# Patient Record
Sex: MALE | Race: WHITE | NOT HISPANIC OR LATINO | Employment: OTHER | URBAN - METROPOLITAN AREA
[De-identification: names, ages, dates, MRNs, and addresses within clinical notes are randomized per-mention and may not be internally consistent; named-entity substitution may affect disease eponyms.]

---

## 2017-06-01 ENCOUNTER — COMMUNICATION - HEALTHEAST (OUTPATIENT)
Dept: INTERNAL MEDICINE | Facility: CLINIC | Age: 67
End: 2017-06-01

## 2017-06-23 ENCOUNTER — COMMUNICATION - HEALTHEAST (OUTPATIENT)
Dept: INTERNAL MEDICINE | Facility: CLINIC | Age: 67
End: 2017-06-23

## 2017-07-17 ENCOUNTER — COMMUNICATION - HEALTHEAST (OUTPATIENT)
Dept: INTERNAL MEDICINE | Facility: CLINIC | Age: 67
End: 2017-07-17

## 2017-07-18 ENCOUNTER — OFFICE VISIT - HEALTHEAST (OUTPATIENT)
Dept: INTERNAL MEDICINE | Facility: CLINIC | Age: 67
End: 2017-07-18

## 2017-07-18 DIAGNOSIS — E78.00 HYPERCHOLESTEREMIA: ICD-10-CM

## 2017-07-18 DIAGNOSIS — I10 ESSENTIAL HYPERTENSION: ICD-10-CM

## 2017-07-18 ASSESSMENT — MIFFLIN-ST. JEOR: SCORE: 1527.37

## 2017-08-27 ENCOUNTER — COMMUNICATION - HEALTHEAST (OUTPATIENT)
Dept: INTERNAL MEDICINE | Facility: CLINIC | Age: 67
End: 2017-08-27

## 2017-08-27 DIAGNOSIS — I10 HTN (HYPERTENSION): ICD-10-CM

## 2017-09-25 ENCOUNTER — COMMUNICATION - HEALTHEAST (OUTPATIENT)
Dept: INTERNAL MEDICINE | Facility: CLINIC | Age: 67
End: 2017-09-25

## 2017-09-25 DIAGNOSIS — E78.5 HYPERLIPIDEMIA: ICD-10-CM

## 2017-10-19 ENCOUNTER — COMMUNICATION - HEALTHEAST (OUTPATIENT)
Dept: INTERNAL MEDICINE | Facility: CLINIC | Age: 67
End: 2017-10-19

## 2017-10-19 DIAGNOSIS — I10 ESSENTIAL HYPERTENSION: ICD-10-CM

## 2018-01-19 ENCOUNTER — COMMUNICATION - HEALTHEAST (OUTPATIENT)
Dept: INTERNAL MEDICINE | Facility: CLINIC | Age: 68
End: 2018-01-19

## 2018-01-22 ENCOUNTER — OFFICE VISIT - HEALTHEAST (OUTPATIENT)
Dept: INTERNAL MEDICINE | Facility: CLINIC | Age: 68
End: 2018-01-22

## 2018-01-22 DIAGNOSIS — E66.9 OBESITY: ICD-10-CM

## 2018-01-22 DIAGNOSIS — Z12.5 SCREENING FOR PROSTATE CANCER: ICD-10-CM

## 2018-01-22 DIAGNOSIS — F10.10 ALCOHOL ABUSE: ICD-10-CM

## 2018-01-22 DIAGNOSIS — E78.5 HYPERLIPIDEMIA: ICD-10-CM

## 2018-01-22 DIAGNOSIS — I10 ESSENTIAL HYPERTENSION: ICD-10-CM

## 2018-01-22 DIAGNOSIS — Z00.00 ENCOUNTER FOR MEDICARE ANNUAL WELLNESS EXAM: ICD-10-CM

## 2018-01-22 DIAGNOSIS — R21 RASH: ICD-10-CM

## 2018-01-22 LAB
ALBUMIN SERPL-MCNC: 4.1 G/DL (ref 3.5–5)
ALBUMIN UR-MCNC: ABNORMAL MG/DL
ALP SERPL-CCNC: 75 U/L (ref 45–120)
ALT SERPL W P-5'-P-CCNC: 56 U/L (ref 0–45)
ANION GAP SERPL CALCULATED.3IONS-SCNC: 11 MMOL/L (ref 5–18)
APPEARANCE UR: CLEAR
AST SERPL W P-5'-P-CCNC: 48 U/L (ref 0–40)
BACTERIA #/AREA URNS HPF: ABNORMAL HPF
BILIRUB SERPL-MCNC: 1.5 MG/DL (ref 0–1)
BILIRUB UR QL STRIP: ABNORMAL
BUN SERPL-MCNC: 14 MG/DL (ref 8–22)
CALCIUM SERPL-MCNC: 9.8 MG/DL (ref 8.5–10.5)
CHLORIDE BLD-SCNC: 98 MMOL/L (ref 98–107)
CHOLEST SERPL-MCNC: 186 MG/DL
CO2 SERPL-SCNC: 25 MMOL/L (ref 22–31)
COLOR UR AUTO: YELLOW
CREAT SERPL-MCNC: 0.93 MG/DL (ref 0.7–1.3)
ERYTHROCYTE [DISTWIDTH] IN BLOOD BY AUTOMATED COUNT: 11.8 % (ref 11–14.5)
FASTING STATUS PATIENT QL REPORTED: YES
GFR SERPL CREATININE-BSD FRML MDRD: >60 ML/MIN/1.73M2
GLUCOSE BLD-MCNC: 121 MG/DL (ref 70–125)
GLUCOSE UR STRIP-MCNC: NEGATIVE MG/DL
HCT VFR BLD AUTO: 39.9 % (ref 40–54)
HDLC SERPL-MCNC: 87 MG/DL
HGB BLD-MCNC: 13.4 G/DL (ref 14–18)
HGB UR QL STRIP: NEGATIVE
KETONES UR STRIP-MCNC: ABNORMAL MG/DL
LDLC SERPL CALC-MCNC: 84 MG/DL
LEUKOCYTE ESTERASE UR QL STRIP: NEGATIVE
MCH RBC QN AUTO: 32 PG (ref 27–34)
MCHC RBC AUTO-ENTMCNC: 33.5 G/DL (ref 32–36)
MCV RBC AUTO: 96 FL (ref 80–100)
MUCOUS THREADS #/AREA URNS LPF: ABNORMAL LPF
NITRATE UR QL: NEGATIVE
PH UR STRIP: 6 [PH] (ref 5–8)
PLATELET # BLD AUTO: 222 THOU/UL (ref 140–440)
PMV BLD AUTO: 7.4 FL (ref 7–10)
POTASSIUM BLD-SCNC: 4.6 MMOL/L (ref 3.5–5)
PROT SERPL-MCNC: 7 G/DL (ref 6–8)
PSA SERPL-MCNC: 1.5 NG/ML (ref 0–4.5)
RBC # BLD AUTO: 4.18 MILL/UL (ref 4.4–6.2)
RBC #/AREA URNS AUTO: ABNORMAL HPF
SODIUM SERPL-SCNC: 134 MMOL/L (ref 136–145)
SP GR UR STRIP: 1.02 (ref 1–1.03)
SQUAMOUS #/AREA URNS AUTO: ABNORMAL LPF
TRIGL SERPL-MCNC: 76 MG/DL
UROBILINOGEN UR STRIP-ACNC: ABNORMAL
WBC #/AREA URNS AUTO: ABNORMAL HPF
WBC: 6.4 THOU/UL (ref 4–11)

## 2018-01-22 ASSESSMENT — MIFFLIN-ST. JEOR: SCORE: 1545.51

## 2018-01-23 ENCOUNTER — COMMUNICATION - HEALTHEAST (OUTPATIENT)
Dept: INTERNAL MEDICINE | Facility: CLINIC | Age: 68
End: 2018-01-23

## 2018-02-07 ENCOUNTER — OFFICE VISIT - HEALTHEAST (OUTPATIENT)
Dept: INTERNAL MEDICINE | Facility: CLINIC | Age: 68
End: 2018-02-07

## 2018-02-07 DIAGNOSIS — D64.9 ANEMIA: ICD-10-CM

## 2018-02-07 DIAGNOSIS — R74.8 ELEVATED LIVER ENZYMES: ICD-10-CM

## 2018-02-07 DIAGNOSIS — F10.20 ALCOHOLISM (H): ICD-10-CM

## 2018-02-07 DIAGNOSIS — I10 ESSENTIAL HYPERTENSION: ICD-10-CM

## 2018-02-07 LAB
ALBUMIN SERPL-MCNC: 4 G/DL (ref 3.5–5)
ALP SERPL-CCNC: 68 U/L (ref 45–120)
ALT SERPL W P-5'-P-CCNC: 49 U/L (ref 0–45)
AST SERPL W P-5'-P-CCNC: 33 U/L (ref 0–40)
BILIRUB DIRECT SERPL-MCNC: 0.2 MG/DL
BILIRUB SERPL-MCNC: 0.7 MG/DL (ref 0–1)
ERYTHROCYTE [DISTWIDTH] IN BLOOD BY AUTOMATED COUNT: 11.9 % (ref 11–14.5)
FERRITIN SERPL-MCNC: 605 NG/ML (ref 27–300)
FOLATE SERPL-MCNC: >20 NG/ML
HCT VFR BLD AUTO: 40.3 % (ref 40–54)
HGB BLD-MCNC: 13.5 G/DL (ref 14–18)
IRON SATN MFR SERPL: 21 % (ref 20–50)
IRON SERPL-MCNC: 63 UG/DL (ref 42–175)
MCH RBC QN AUTO: 31.8 PG (ref 27–34)
MCHC RBC AUTO-ENTMCNC: 33.4 G/DL (ref 32–36)
MCV RBC AUTO: 95 FL (ref 80–100)
PLATELET # BLD AUTO: 258 THOU/UL (ref 140–440)
PMV BLD AUTO: 7.7 FL (ref 7–10)
PROT SERPL-MCNC: 6.6 G/DL (ref 6–8)
RBC # BLD AUTO: 4.23 MILL/UL (ref 4.4–6.2)
TIBC SERPL-MCNC: 303 UG/DL (ref 313–563)
TRANSFERRIN SERPL-MCNC: 243 MG/DL (ref 212–360)
VIT B12 SERPL-MCNC: 1380 PG/ML (ref 213–816)
WBC: 6.6 THOU/UL (ref 4–11)

## 2018-02-07 ASSESSMENT — MIFFLIN-ST. JEOR: SCORE: 1554.58

## 2018-02-09 ENCOUNTER — COMMUNICATION - HEALTHEAST (OUTPATIENT)
Dept: INTERNAL MEDICINE | Facility: CLINIC | Age: 68
End: 2018-02-09

## 2018-05-08 ENCOUNTER — RECORDS - HEALTHEAST (OUTPATIENT)
Dept: ADMINISTRATIVE | Facility: OTHER | Age: 68
End: 2018-05-08

## 2018-05-14 ENCOUNTER — OFFICE VISIT - HEALTHEAST (OUTPATIENT)
Dept: INTERNAL MEDICINE | Facility: CLINIC | Age: 68
End: 2018-05-14

## 2018-05-14 DIAGNOSIS — R68.89 COLD INTOLERANCE: ICD-10-CM

## 2018-05-14 DIAGNOSIS — F10.20 ALCOHOLISM (H): ICD-10-CM

## 2018-05-14 DIAGNOSIS — R74.8 ELEVATED LIVER ENZYMES: ICD-10-CM

## 2018-05-14 DIAGNOSIS — L73.9 FOLLICULITIS: ICD-10-CM

## 2018-05-14 DIAGNOSIS — I10 ESSENTIAL HYPERTENSION: ICD-10-CM

## 2018-05-14 DIAGNOSIS — E78.00 HYPERCHOLESTEREMIA: ICD-10-CM

## 2018-05-14 LAB
ALBUMIN SERPL-MCNC: 3.9 G/DL (ref 3.5–5)
ALP SERPL-CCNC: 81 U/L (ref 45–120)
ALT SERPL W P-5'-P-CCNC: 94 U/L (ref 0–45)
ANION GAP SERPL CALCULATED.3IONS-SCNC: 9 MMOL/L (ref 5–18)
AST SERPL W P-5'-P-CCNC: 60 U/L (ref 0–40)
BASOPHILS # BLD AUTO: 0 THOU/UL (ref 0–0.2)
BASOPHILS NFR BLD AUTO: 1 % (ref 0–2)
BILIRUB SERPL-MCNC: 0.4 MG/DL (ref 0–1)
BUN SERPL-MCNC: 24 MG/DL (ref 8–22)
CALCIUM SERPL-MCNC: 10.1 MG/DL (ref 8.5–10.5)
CHLORIDE BLD-SCNC: 102 MMOL/L (ref 98–107)
CO2 SERPL-SCNC: 23 MMOL/L (ref 22–31)
CREAT SERPL-MCNC: 0.94 MG/DL (ref 0.7–1.3)
EOSINOPHIL # BLD AUTO: 0.2 THOU/UL (ref 0–0.4)
EOSINOPHIL NFR BLD AUTO: 3 % (ref 0–6)
ERYTHROCYTE [DISTWIDTH] IN BLOOD BY AUTOMATED COUNT: 11.8 % (ref 11–14.5)
GFR SERPL CREATININE-BSD FRML MDRD: >60 ML/MIN/1.73M2
GLUCOSE BLD-MCNC: 115 MG/DL (ref 70–125)
HCT VFR BLD AUTO: 40.6 % (ref 40–54)
HGB BLD-MCNC: 13.4 G/DL (ref 14–18)
LYMPHOCYTES # BLD AUTO: 1.2 THOU/UL (ref 0.8–4.4)
LYMPHOCYTES NFR BLD AUTO: 23 % (ref 20–40)
MCH RBC QN AUTO: 31.6 PG (ref 27–34)
MCHC RBC AUTO-ENTMCNC: 33 G/DL (ref 32–36)
MCV RBC AUTO: 96 FL (ref 80–100)
MONOCYTES # BLD AUTO: 0.5 THOU/UL (ref 0–0.9)
MONOCYTES NFR BLD AUTO: 9 % (ref 2–10)
NEUTROPHILS # BLD AUTO: 3.5 THOU/UL (ref 2–7.7)
NEUTROPHILS NFR BLD AUTO: 65 % (ref 50–70)
PLATELET # BLD AUTO: 343 THOU/UL (ref 140–440)
PMV BLD AUTO: 7.3 FL (ref 7–10)
POTASSIUM BLD-SCNC: 5.1 MMOL/L (ref 3.5–5)
PROT SERPL-MCNC: 7.1 G/DL (ref 6–8)
RBC # BLD AUTO: 4.24 MILL/UL (ref 4.4–6.2)
SODIUM SERPL-SCNC: 134 MMOL/L (ref 136–145)
TSH SERPL DL<=0.005 MIU/L-ACNC: 2.5 UIU/ML (ref 0.3–5)
WBC: 5.4 THOU/UL (ref 4–11)

## 2018-05-14 RX ORDER — TRIAMCINOLONE ACETONIDE 1 MG/G
CREAM TOPICAL
Refills: 6 | Status: SHIPPED | COMMUNITY
Start: 2018-05-08 | End: 2022-01-17

## 2018-05-14 ASSESSMENT — MIFFLIN-ST. JEOR: SCORE: 1545.51

## 2018-05-16 ENCOUNTER — COMMUNICATION - HEALTHEAST (OUTPATIENT)
Dept: INTERNAL MEDICINE | Facility: CLINIC | Age: 68
End: 2018-05-16

## 2018-05-16 ENCOUNTER — AMBULATORY - HEALTHEAST (OUTPATIENT)
Dept: INTERNAL MEDICINE | Facility: CLINIC | Age: 68
End: 2018-05-16

## 2018-05-16 DIAGNOSIS — R74.01 ELEVATED TRANSAMINASE LEVEL: ICD-10-CM

## 2018-05-18 ENCOUNTER — RECORDS - HEALTHEAST (OUTPATIENT)
Dept: ADMINISTRATIVE | Facility: OTHER | Age: 68
End: 2018-05-18

## 2018-05-23 ENCOUNTER — COMMUNICATION - HEALTHEAST (OUTPATIENT)
Dept: SCHEDULING | Facility: CLINIC | Age: 68
End: 2018-05-23

## 2018-05-23 ENCOUNTER — OFFICE VISIT - HEALTHEAST (OUTPATIENT)
Dept: FAMILY MEDICINE | Facility: CLINIC | Age: 68
End: 2018-05-23

## 2018-05-23 DIAGNOSIS — L03.116 CELLULITIS OF LEFT FOOT: ICD-10-CM

## 2018-05-24 ENCOUNTER — OFFICE VISIT - HEALTHEAST (OUTPATIENT)
Dept: INTERNAL MEDICINE | Facility: CLINIC | Age: 68
End: 2018-05-24

## 2018-05-24 ENCOUNTER — COMMUNICATION - HEALTHEAST (OUTPATIENT)
Dept: INTERNAL MEDICINE | Facility: CLINIC | Age: 68
End: 2018-05-24

## 2018-05-24 ENCOUNTER — HOSPITAL ENCOUNTER (OUTPATIENT)
Dept: ULTRASOUND IMAGING | Facility: CLINIC | Age: 68
Discharge: HOME | End: 2018-05-24
Attending: INTERNAL MEDICINE

## 2018-05-24 DIAGNOSIS — R74.02 ELEVATION OF LEVEL OF TRANSAMINASE AND LACTIC ACID DEHYDROGENASE (LDH): ICD-10-CM

## 2018-05-24 DIAGNOSIS — R74.8 ELEVATED LIVER ENZYMES: ICD-10-CM

## 2018-05-24 DIAGNOSIS — R74.01 ELEVATION OF LEVEL OF TRANSAMINASE AND LACTIC ACID DEHYDROGENASE (LDH): ICD-10-CM

## 2018-05-24 DIAGNOSIS — R74.01 ELEVATED TRANSAMINASE LEVEL: ICD-10-CM

## 2018-05-24 DIAGNOSIS — L73.9 FOLLICULITIS: ICD-10-CM

## 2018-05-24 DIAGNOSIS — L03.90 CELLULITIS: ICD-10-CM

## 2018-05-24 DIAGNOSIS — I10 ESSENTIAL HYPERTENSION: ICD-10-CM

## 2018-05-24 LAB
ALBUMIN SERPL-MCNC: 4.1 G/DL (ref 3.5–5)
ALP SERPL-CCNC: 72 U/L (ref 45–120)
ALT SERPL W P-5'-P-CCNC: 58 U/L (ref 0–45)
ANION GAP SERPL CALCULATED.3IONS-SCNC: 10 MMOL/L (ref 5–18)
AST SERPL W P-5'-P-CCNC: 39 U/L (ref 0–40)
BILIRUB SERPL-MCNC: 0.7 MG/DL (ref 0–1)
BUN SERPL-MCNC: 27 MG/DL (ref 8–22)
CALCIUM SERPL-MCNC: 10.3 MG/DL (ref 8.5–10.5)
CHLORIDE BLD-SCNC: 102 MMOL/L (ref 98–107)
CO2 SERPL-SCNC: 22 MMOL/L (ref 22–31)
CREAT SERPL-MCNC: 1.15 MG/DL (ref 0.7–1.3)
GFR SERPL CREATININE-BSD FRML MDRD: >60 ML/MIN/1.73M2
GLUCOSE BLD-MCNC: 111 MG/DL (ref 70–125)
POTASSIUM BLD-SCNC: 5.4 MMOL/L (ref 3.5–5)
PROT SERPL-MCNC: 7 G/DL (ref 6–8)
SODIUM SERPL-SCNC: 134 MMOL/L (ref 136–145)

## 2018-05-24 ASSESSMENT — MIFFLIN-ST. JEOR: SCORE: 1536.44

## 2018-05-31 ENCOUNTER — OFFICE VISIT - HEALTHEAST (OUTPATIENT)
Dept: INTERNAL MEDICINE | Facility: CLINIC | Age: 68
End: 2018-05-31

## 2018-05-31 DIAGNOSIS — F10.20 ALCOHOLISM (H): ICD-10-CM

## 2018-05-31 DIAGNOSIS — R23.3 EASY BRUISING: ICD-10-CM

## 2018-05-31 DIAGNOSIS — K76.0 FATTY LIVER: ICD-10-CM

## 2018-05-31 DIAGNOSIS — I10 ESSENTIAL HYPERTENSION: ICD-10-CM

## 2018-05-31 DIAGNOSIS — L03.90 CELLULITIS: ICD-10-CM

## 2018-05-31 ASSESSMENT — MIFFLIN-ST. JEOR: SCORE: 1545.51

## 2018-06-11 ENCOUNTER — COMMUNICATION - HEALTHEAST (OUTPATIENT)
Dept: INTERNAL MEDICINE | Facility: CLINIC | Age: 68
End: 2018-06-11

## 2018-06-11 DIAGNOSIS — I10 ESSENTIAL HYPERTENSION: ICD-10-CM

## 2018-08-03 ENCOUNTER — COMMUNICATION - HEALTHEAST (OUTPATIENT)
Dept: INTERNAL MEDICINE | Facility: CLINIC | Age: 68
End: 2018-08-03

## 2018-08-03 DIAGNOSIS — I10 ESSENTIAL HYPERTENSION: ICD-10-CM

## 2018-08-15 ENCOUNTER — COMMUNICATION - HEALTHEAST (OUTPATIENT)
Dept: INTERNAL MEDICINE | Facility: CLINIC | Age: 68
End: 2018-08-15

## 2018-08-15 DIAGNOSIS — I10 ESSENTIAL HYPERTENSION: ICD-10-CM

## 2018-09-05 ENCOUNTER — OFFICE VISIT - HEALTHEAST (OUTPATIENT)
Dept: INTERNAL MEDICINE | Facility: CLINIC | Age: 68
End: 2018-09-05

## 2018-09-05 DIAGNOSIS — F10.20 ALCOHOLISM (H): ICD-10-CM

## 2018-09-05 DIAGNOSIS — I10 ESSENTIAL HYPERTENSION: ICD-10-CM

## 2018-09-05 DIAGNOSIS — K76.0 FATTY LIVER: ICD-10-CM

## 2018-09-05 DIAGNOSIS — R79.89 ELEVATED FERRITIN: ICD-10-CM

## 2018-09-05 DIAGNOSIS — E78.00 HYPERCHOLESTEREMIA: ICD-10-CM

## 2018-09-05 LAB
ALBUMIN SERPL-MCNC: 3.9 G/DL (ref 3.5–5)
ALP SERPL-CCNC: 69 U/L (ref 45–120)
ALT SERPL W P-5'-P-CCNC: 40 U/L (ref 0–45)
ANION GAP SERPL CALCULATED.3IONS-SCNC: 9 MMOL/L (ref 5–18)
AST SERPL W P-5'-P-CCNC: 30 U/L (ref 0–40)
BILIRUB SERPL-MCNC: 0.5 MG/DL (ref 0–1)
BUN SERPL-MCNC: 20 MG/DL (ref 8–22)
CALCIUM SERPL-MCNC: 10.1 MG/DL (ref 8.5–10.5)
CHLORIDE BLD-SCNC: 102 MMOL/L (ref 98–107)
CO2 SERPL-SCNC: 25 MMOL/L (ref 22–31)
CREAT SERPL-MCNC: 0.99 MG/DL (ref 0.7–1.3)
FERRITIN SERPL-MCNC: 445 NG/ML (ref 27–300)
GFR SERPL CREATININE-BSD FRML MDRD: >60 ML/MIN/1.73M2
GLUCOSE BLD-MCNC: 105 MG/DL (ref 70–125)
IRON SATN MFR SERPL: 37 % (ref 20–50)
IRON SERPL-MCNC: 103 UG/DL (ref 42–175)
POTASSIUM BLD-SCNC: 4.9 MMOL/L (ref 3.5–5)
PROT SERPL-MCNC: 6.6 G/DL (ref 6–8)
SODIUM SERPL-SCNC: 136 MMOL/L (ref 136–145)
TIBC SERPL-MCNC: 278 UG/DL (ref 313–563)
TRANSFERRIN SERPL-MCNC: 222 MG/DL (ref 212–360)

## 2018-09-05 ASSESSMENT — MIFFLIN-ST. JEOR: SCORE: 1545.51

## 2018-09-06 ENCOUNTER — COMMUNICATION - HEALTHEAST (OUTPATIENT)
Dept: INTERNAL MEDICINE | Facility: CLINIC | Age: 68
End: 2018-09-06

## 2018-11-13 ENCOUNTER — COMMUNICATION - HEALTHEAST (OUTPATIENT)
Dept: INTERNAL MEDICINE | Facility: CLINIC | Age: 68
End: 2018-11-13

## 2018-11-13 DIAGNOSIS — I10 ESSENTIAL HYPERTENSION: ICD-10-CM

## 2018-12-13 ENCOUNTER — RECORDS - HEALTHEAST (OUTPATIENT)
Dept: ADMINISTRATIVE | Facility: OTHER | Age: 68
End: 2018-12-13

## 2019-01-02 ENCOUNTER — RECORDS - HEALTHEAST (OUTPATIENT)
Dept: ADMINISTRATIVE | Facility: OTHER | Age: 69
End: 2019-01-02

## 2019-01-09 ENCOUNTER — HOSPITAL ENCOUNTER (OUTPATIENT)
Dept: ULTRASOUND IMAGING | Facility: CLINIC | Age: 69
Discharge: HOME | End: 2019-01-09
Attending: INTERNAL MEDICINE

## 2019-01-09 ENCOUNTER — OFFICE VISIT - HEALTHEAST (OUTPATIENT)
Dept: INTERNAL MEDICINE | Facility: CLINIC | Age: 69
End: 2019-01-09

## 2019-01-09 ENCOUNTER — COMMUNICATION - HEALTHEAST (OUTPATIENT)
Dept: INTERNAL MEDICINE | Facility: CLINIC | Age: 69
End: 2019-01-09

## 2019-01-09 DIAGNOSIS — Z12.5 ENCOUNTER FOR SCREENING FOR MALIGNANT NEOPLASM OF PROSTATE: ICD-10-CM

## 2019-01-09 DIAGNOSIS — I10 ESSENTIAL HYPERTENSION: ICD-10-CM

## 2019-01-09 DIAGNOSIS — F10.20 ALCOHOLISM (H): ICD-10-CM

## 2019-01-09 DIAGNOSIS — K76.0 FATTY LIVER: ICD-10-CM

## 2019-01-09 DIAGNOSIS — E66.811 CLASS 1 OBESITY DUE TO EXCESS CALORIES WITHOUT SERIOUS COMORBIDITY WITH BODY MASS INDEX (BMI) OF 31.0 TO 31.9 IN ADULT: ICD-10-CM

## 2019-01-09 DIAGNOSIS — E78.00 HYPERCHOLESTEREMIA: ICD-10-CM

## 2019-01-09 DIAGNOSIS — Z00.00 ENCOUNTER FOR MEDICARE ANNUAL WELLNESS EXAM: ICD-10-CM

## 2019-01-09 DIAGNOSIS — E66.09 CLASS 1 OBESITY DUE TO EXCESS CALORIES WITHOUT SERIOUS COMORBIDITY WITH BODY MASS INDEX (BMI) OF 31.0 TO 31.9 IN ADULT: ICD-10-CM

## 2019-01-09 DIAGNOSIS — R60.9 EDEMA, UNSPECIFIED TYPE: ICD-10-CM

## 2019-01-09 DIAGNOSIS — L73.9 FOLLICULITIS: ICD-10-CM

## 2019-01-09 LAB
ALBUMIN SERPL-MCNC: 4 G/DL (ref 3.5–5)
ALBUMIN UR-MCNC: NEGATIVE MG/DL
ALP SERPL-CCNC: 66 U/L (ref 45–120)
ALT SERPL W P-5'-P-CCNC: 30 U/L (ref 0–45)
ANION GAP SERPL CALCULATED.3IONS-SCNC: 10 MMOL/L (ref 5–18)
APPEARANCE UR: CLEAR
AST SERPL W P-5'-P-CCNC: 27 U/L (ref 0–40)
ATRIAL RATE - MUSE: 56 BPM
BILIRUB SERPL-MCNC: 0.6 MG/DL (ref 0–1)
BILIRUB UR QL STRIP: NEGATIVE
BUN SERPL-MCNC: 23 MG/DL (ref 8–22)
CALCIUM SERPL-MCNC: 9.6 MG/DL (ref 8.5–10.5)
CHLORIDE BLD-SCNC: 102 MMOL/L (ref 98–107)
CHOLEST SERPL-MCNC: 147 MG/DL
CO2 SERPL-SCNC: 27 MMOL/L (ref 22–31)
COLOR UR AUTO: YELLOW
CREAT SERPL-MCNC: 1.08 MG/DL (ref 0.7–1.3)
DIASTOLIC BLOOD PRESSURE - MUSE: NORMAL MMHG
ERYTHROCYTE [DISTWIDTH] IN BLOOD BY AUTOMATED COUNT: 13 % (ref 11–14.5)
FASTING STATUS PATIENT QL REPORTED: YES
GFR SERPL CREATININE-BSD FRML MDRD: >60 ML/MIN/1.73M2
GLUCOSE BLD-MCNC: 100 MG/DL (ref 70–125)
GLUCOSE UR STRIP-MCNC: NEGATIVE MG/DL
HCT VFR BLD AUTO: 41.1 % (ref 40–54)
HDLC SERPL-MCNC: 49 MG/DL
HGB BLD-MCNC: 13.6 G/DL (ref 14–18)
HGB UR QL STRIP: NEGATIVE
INTERPRETATION ECG - MUSE: NORMAL
KETONES UR STRIP-MCNC: NEGATIVE MG/DL
LDLC SERPL CALC-MCNC: 83 MG/DL
LEUKOCYTE ESTERASE UR QL STRIP: NEGATIVE
MCH RBC QN AUTO: 30.6 PG (ref 27–34)
MCHC RBC AUTO-ENTMCNC: 33.2 G/DL (ref 32–36)
MCV RBC AUTO: 92 FL (ref 80–100)
NITRATE UR QL: NEGATIVE
P AXIS - MUSE: 65 DEGREES
PH UR STRIP: 6.5 [PH] (ref 5–8)
PLATELET # BLD AUTO: 151 THOU/UL (ref 140–440)
PMV BLD AUTO: 9.7 FL (ref 7–10)
POTASSIUM BLD-SCNC: 4.1 MMOL/L (ref 3.5–5)
PR INTERVAL - MUSE: 166 MS
PROT SERPL-MCNC: 6.8 G/DL (ref 6–8)
PSA SERPL-MCNC: 0.9 NG/ML (ref 0–4.5)
QRS DURATION - MUSE: 110 MS
QT - MUSE: 414 MS
QTC - MUSE: 399 MS
R AXIS - MUSE: 40 DEGREES
RBC # BLD AUTO: 4.47 MILL/UL (ref 4.4–6.2)
SODIUM SERPL-SCNC: 139 MMOL/L (ref 136–145)
SP GR UR STRIP: 1.02 (ref 1–1.03)
SYSTOLIC BLOOD PRESSURE - MUSE: NORMAL MMHG
T AXIS - MUSE: 55 DEGREES
TRIGL SERPL-MCNC: 73 MG/DL
TSH SERPL DL<=0.005 MIU/L-ACNC: 2.68 UIU/ML (ref 0.3–5)
UROBILINOGEN UR STRIP-ACNC: NORMAL
VENTRICULAR RATE- MUSE: 56 BPM
WBC: 5.5 THOU/UL (ref 4–11)

## 2019-01-09 ASSESSMENT — MIFFLIN-ST. JEOR: SCORE: 1572.73

## 2019-01-10 ENCOUNTER — COMMUNICATION - HEALTHEAST (OUTPATIENT)
Dept: INTERNAL MEDICINE | Facility: CLINIC | Age: 69
End: 2019-01-10

## 2019-01-30 ENCOUNTER — COMMUNICATION - HEALTHEAST (OUTPATIENT)
Dept: INTERNAL MEDICINE | Facility: CLINIC | Age: 69
End: 2019-01-30

## 2019-01-30 DIAGNOSIS — I10 ESSENTIAL HYPERTENSION: ICD-10-CM

## 2019-02-07 ENCOUNTER — RECORDS - HEALTHEAST (OUTPATIENT)
Dept: ADMINISTRATIVE | Facility: OTHER | Age: 69
End: 2019-02-07

## 2019-02-15 ENCOUNTER — RECORDS - HEALTHEAST (OUTPATIENT)
Dept: ADMINISTRATIVE | Facility: OTHER | Age: 69
End: 2019-02-15

## 2019-02-19 ENCOUNTER — RECORDS - HEALTHEAST (OUTPATIENT)
Dept: ADMINISTRATIVE | Facility: OTHER | Age: 69
End: 2019-02-19

## 2019-03-01 ENCOUNTER — RECORDS - HEALTHEAST (OUTPATIENT)
Dept: ADMINISTRATIVE | Facility: OTHER | Age: 69
End: 2019-03-01

## 2019-03-16 ENCOUNTER — COMMUNICATION - HEALTHEAST (OUTPATIENT)
Dept: INTERNAL MEDICINE | Facility: CLINIC | Age: 69
End: 2019-03-16

## 2019-03-16 DIAGNOSIS — E78.5 HYPERLIPIDEMIA: ICD-10-CM

## 2019-04-01 ENCOUNTER — RECORDS - HEALTHEAST (OUTPATIENT)
Dept: ADMINISTRATIVE | Facility: OTHER | Age: 69
End: 2019-04-01

## 2019-04-30 ENCOUNTER — RECORDS - HEALTHEAST (OUTPATIENT)
Dept: ADMINISTRATIVE | Facility: OTHER | Age: 69
End: 2019-04-30

## 2019-05-12 ENCOUNTER — COMMUNICATION - HEALTHEAST (OUTPATIENT)
Dept: INTERNAL MEDICINE | Facility: CLINIC | Age: 69
End: 2019-05-12

## 2019-05-12 DIAGNOSIS — I10 ESSENTIAL HYPERTENSION: ICD-10-CM

## 2019-07-11 ENCOUNTER — OFFICE VISIT - HEALTHEAST (OUTPATIENT)
Dept: INTERNAL MEDICINE | Facility: CLINIC | Age: 69
End: 2019-07-11

## 2019-07-11 ENCOUNTER — COMMUNICATION - HEALTHEAST (OUTPATIENT)
Dept: INTERNAL MEDICINE | Facility: CLINIC | Age: 69
End: 2019-07-11

## 2019-07-11 DIAGNOSIS — L12.0 BULLOUS PEMPHIGOID (H): ICD-10-CM

## 2019-07-11 DIAGNOSIS — E87.1 HYPONATREMIA: ICD-10-CM

## 2019-07-11 DIAGNOSIS — F10.20 ALCOHOLISM (H): ICD-10-CM

## 2019-07-11 DIAGNOSIS — I10 ESSENTIAL HYPERTENSION: ICD-10-CM

## 2019-07-11 DIAGNOSIS — K76.0 FATTY LIVER: ICD-10-CM

## 2019-07-11 DIAGNOSIS — E78.00 HYPERCHOLESTEREMIA: ICD-10-CM

## 2019-07-11 LAB
ALBUMIN SERPL-MCNC: 4.1 G/DL (ref 3.5–5)
ALP SERPL-CCNC: 68 U/L (ref 45–120)
ALT SERPL W P-5'-P-CCNC: 36 U/L (ref 0–45)
ANION GAP SERPL CALCULATED.3IONS-SCNC: 9 MMOL/L (ref 5–18)
AST SERPL W P-5'-P-CCNC: 27 U/L (ref 0–40)
BILIRUB SERPL-MCNC: 0.5 MG/DL (ref 0–1)
BUN SERPL-MCNC: 23 MG/DL (ref 8–28)
CALCIUM SERPL-MCNC: 10 MG/DL (ref 8.5–10.5)
CHLORIDE BLD-SCNC: 96 MMOL/L (ref 98–107)
CO2 SERPL-SCNC: 24 MMOL/L (ref 22–31)
CREAT SERPL-MCNC: 1 MG/DL (ref 0.7–1.3)
ERYTHROCYTE [DISTWIDTH] IN BLOOD BY AUTOMATED COUNT: 11.9 % (ref 11–14.5)
GFR SERPL CREATININE-BSD FRML MDRD: >60 ML/MIN/1.73M2
GLUCOSE BLD-MCNC: 100 MG/DL (ref 70–125)
HCT VFR BLD AUTO: 41.6 % (ref 40–54)
HGB BLD-MCNC: 14 G/DL (ref 14–18)
MCH RBC QN AUTO: 31.3 PG (ref 27–34)
MCHC RBC AUTO-ENTMCNC: 33.6 G/DL (ref 32–36)
MCV RBC AUTO: 93 FL (ref 80–100)
PLATELET # BLD AUTO: 234 THOU/UL (ref 140–440)
PMV BLD AUTO: 7.8 FL (ref 7–10)
POTASSIUM BLD-SCNC: 4.5 MMOL/L (ref 3.5–5)
PROT SERPL-MCNC: 6.8 G/DL (ref 6–8)
RBC # BLD AUTO: 4.47 MILL/UL (ref 4.4–6.2)
SODIUM SERPL-SCNC: 129 MMOL/L (ref 136–145)
WBC: 5.9 THOU/UL (ref 4–11)

## 2019-07-11 RX ORDER — BETAMETHASONE DIPROPIONATE 0.5 MG/G
CREAM TOPICAL
Refills: 11 | Status: SHIPPED | COMMUNITY
Start: 2019-03-14 | End: 2022-04-25

## 2019-07-11 RX ORDER — MYCOPHENOLATE MOFETIL 500 MG/1
500 TABLET ORAL DAILY
Refills: 2 | Status: ON HOLD | COMMUNITY
Start: 2019-06-13 | End: 2021-10-24

## 2019-07-11 RX ORDER — CETIRIZINE HYDROCHLORIDE 10 MG/1
10 TABLET ORAL PRN
Status: SHIPPED | COMMUNITY
Start: 2019-07-11 | End: 2022-01-17

## 2019-07-11 ASSESSMENT — MIFFLIN-ST. JEOR: SCORE: 1559.12

## 2019-07-17 ENCOUNTER — AMBULATORY - HEALTHEAST (OUTPATIENT)
Dept: LAB | Facility: CLINIC | Age: 69
End: 2019-07-17

## 2019-07-17 ENCOUNTER — COMMUNICATION - HEALTHEAST (OUTPATIENT)
Dept: INTERNAL MEDICINE | Facility: CLINIC | Age: 69
End: 2019-07-17

## 2019-07-17 DIAGNOSIS — E87.1 HYPONATREMIA: ICD-10-CM

## 2019-07-17 LAB
ANION GAP SERPL CALCULATED.3IONS-SCNC: 7 MMOL/L (ref 5–18)
BUN SERPL-MCNC: 24 MG/DL (ref 8–28)
CALCIUM SERPL-MCNC: 9.7 MG/DL (ref 8.5–10.5)
CHLORIDE BLD-SCNC: 101 MMOL/L (ref 98–107)
CO2 SERPL-SCNC: 26 MMOL/L (ref 22–31)
CREAT SERPL-MCNC: 1.07 MG/DL (ref 0.7–1.3)
GFR SERPL CREATININE-BSD FRML MDRD: >60 ML/MIN/1.73M2
GLUCOSE BLD-MCNC: 95 MG/DL (ref 70–125)
POTASSIUM BLD-SCNC: 4.8 MMOL/L (ref 3.5–5)
SODIUM SERPL-SCNC: 134 MMOL/L (ref 136–145)

## 2019-07-22 ENCOUNTER — RECORDS - HEALTHEAST (OUTPATIENT)
Dept: ADMINISTRATIVE | Facility: OTHER | Age: 69
End: 2019-07-22

## 2019-08-03 ENCOUNTER — COMMUNICATION - HEALTHEAST (OUTPATIENT)
Dept: INTERNAL MEDICINE | Facility: CLINIC | Age: 69
End: 2019-08-03

## 2019-08-03 DIAGNOSIS — I10 ESSENTIAL HYPERTENSION: ICD-10-CM

## 2019-08-07 ENCOUNTER — OFFICE VISIT - HEALTHEAST (OUTPATIENT)
Dept: INTERNAL MEDICINE | Facility: CLINIC | Age: 69
End: 2019-08-07

## 2019-08-07 DIAGNOSIS — E87.1 HYPONATREMIA: ICD-10-CM

## 2019-08-07 DIAGNOSIS — I10 ESSENTIAL HYPERTENSION: ICD-10-CM

## 2019-08-07 LAB
ANION GAP SERPL CALCULATED.3IONS-SCNC: 8 MMOL/L (ref 5–18)
BUN SERPL-MCNC: 23 MG/DL (ref 8–28)
CALCIUM SERPL-MCNC: 9.8 MG/DL (ref 8.5–10.5)
CHLORIDE BLD-SCNC: 104 MMOL/L (ref 98–107)
CO2 SERPL-SCNC: 23 MMOL/L (ref 22–31)
CREAT SERPL-MCNC: 0.88 MG/DL (ref 0.7–1.3)
GFR SERPL CREATININE-BSD FRML MDRD: >60 ML/MIN/1.73M2
GLUCOSE BLD-MCNC: 100 MG/DL (ref 70–125)
POTASSIUM BLD-SCNC: 4.8 MMOL/L (ref 3.5–5)
SODIUM SERPL-SCNC: 135 MMOL/L (ref 136–145)

## 2019-08-07 ASSESSMENT — MIFFLIN-ST. JEOR: SCORE: 1568.19

## 2019-08-09 ENCOUNTER — COMMUNICATION - HEALTHEAST (OUTPATIENT)
Dept: INTERNAL MEDICINE | Facility: CLINIC | Age: 69
End: 2019-08-09

## 2019-11-02 ENCOUNTER — COMMUNICATION - HEALTHEAST (OUTPATIENT)
Dept: INTERNAL MEDICINE | Facility: CLINIC | Age: 69
End: 2019-11-02

## 2019-11-02 DIAGNOSIS — I10 ESSENTIAL HYPERTENSION: ICD-10-CM

## 2019-12-12 ENCOUNTER — COMMUNICATION - HEALTHEAST (OUTPATIENT)
Dept: INTERNAL MEDICINE | Facility: CLINIC | Age: 69
End: 2019-12-12

## 2019-12-12 DIAGNOSIS — I10 ESSENTIAL HYPERTENSION: ICD-10-CM

## 2019-12-26 ENCOUNTER — COMMUNICATION - HEALTHEAST (OUTPATIENT)
Dept: INTERNAL MEDICINE | Facility: CLINIC | Age: 69
End: 2019-12-26

## 2019-12-26 DIAGNOSIS — E78.5 HYPERLIPIDEMIA: ICD-10-CM

## 2020-01-06 ENCOUNTER — RECORDS - HEALTHEAST (OUTPATIENT)
Dept: ADMINISTRATIVE | Facility: OTHER | Age: 70
End: 2020-01-06

## 2020-03-10 ENCOUNTER — COMMUNICATION - HEALTHEAST (OUTPATIENT)
Dept: INTERNAL MEDICINE | Facility: CLINIC | Age: 70
End: 2020-03-10

## 2020-03-10 DIAGNOSIS — I10 ESSENTIAL HYPERTENSION: ICD-10-CM

## 2020-04-25 ENCOUNTER — COMMUNICATION - HEALTHEAST (OUTPATIENT)
Dept: INTERNAL MEDICINE | Facility: CLINIC | Age: 70
End: 2020-04-25

## 2020-04-25 DIAGNOSIS — I10 ESSENTIAL HYPERTENSION: ICD-10-CM

## 2020-07-25 ENCOUNTER — COMMUNICATION - HEALTHEAST (OUTPATIENT)
Dept: INTERNAL MEDICINE | Facility: CLINIC | Age: 70
End: 2020-07-25

## 2020-07-25 DIAGNOSIS — I10 ESSENTIAL HYPERTENSION: ICD-10-CM

## 2020-07-28 ENCOUNTER — COMMUNICATION - HEALTHEAST (OUTPATIENT)
Dept: INTERNAL MEDICINE | Facility: CLINIC | Age: 70
End: 2020-07-28

## 2020-07-28 DIAGNOSIS — I10 ESSENTIAL HYPERTENSION: ICD-10-CM

## 2020-10-02 ENCOUNTER — COMMUNICATION - HEALTHEAST (OUTPATIENT)
Dept: INTERNAL MEDICINE | Facility: CLINIC | Age: 70
End: 2020-10-02

## 2020-10-02 DIAGNOSIS — E78.5 HYPERLIPIDEMIA: ICD-10-CM

## 2020-10-14 ENCOUNTER — COMMUNICATION - HEALTHEAST (OUTPATIENT)
Dept: INTERNAL MEDICINE | Facility: CLINIC | Age: 70
End: 2020-10-14

## 2020-10-14 DIAGNOSIS — I10 ESSENTIAL HYPERTENSION: ICD-10-CM

## 2020-10-29 ENCOUNTER — COMMUNICATION - HEALTHEAST (OUTPATIENT)
Dept: INTERNAL MEDICINE | Facility: CLINIC | Age: 70
End: 2020-10-29

## 2020-10-29 DIAGNOSIS — I10 ESSENTIAL HYPERTENSION: ICD-10-CM

## 2020-10-30 ENCOUNTER — COMMUNICATION - HEALTHEAST (OUTPATIENT)
Dept: INTERNAL MEDICINE | Facility: CLINIC | Age: 70
End: 2020-10-30

## 2020-10-30 DIAGNOSIS — I10 ESSENTIAL HYPERTENSION: ICD-10-CM

## 2020-11-16 ENCOUNTER — OFFICE VISIT - HEALTHEAST (OUTPATIENT)
Dept: INTERNAL MEDICINE | Facility: CLINIC | Age: 70
End: 2020-11-16

## 2020-11-16 DIAGNOSIS — F41.9 ANXIETY: ICD-10-CM

## 2020-11-16 DIAGNOSIS — L12.0 BULLOUS PEMPHIGOID (H): ICD-10-CM

## 2020-11-16 DIAGNOSIS — E78.00 HYPERCHOLESTEREMIA: ICD-10-CM

## 2020-11-16 DIAGNOSIS — F10.20 ALCOHOLISM (H): ICD-10-CM

## 2020-11-16 DIAGNOSIS — I10 ESSENTIAL HYPERTENSION: ICD-10-CM

## 2020-11-16 DIAGNOSIS — F32.0 MILD MAJOR DEPRESSION (H): ICD-10-CM

## 2020-11-16 LAB
ALBUMIN SERPL-MCNC: 4.8 G/DL (ref 3.5–5)
ALBUMIN UR-MCNC: ABNORMAL MG/DL
ALP SERPL-CCNC: 79 U/L (ref 45–120)
ALT SERPL W P-5'-P-CCNC: 39 U/L (ref 0–45)
ANION GAP SERPL CALCULATED.3IONS-SCNC: 13 MMOL/L (ref 5–18)
APPEARANCE UR: CLEAR
AST SERPL W P-5'-P-CCNC: 45 U/L (ref 0–40)
BACTERIA #/AREA URNS HPF: ABNORMAL HPF
BILIRUB SERPL-MCNC: 1.4 MG/DL (ref 0–1)
BILIRUB UR QL STRIP: ABNORMAL
BUN SERPL-MCNC: 13 MG/DL (ref 8–28)
CALCIUM SERPL-MCNC: 9.7 MG/DL (ref 8.5–10.5)
CHLORIDE BLD-SCNC: 96 MMOL/L (ref 98–107)
CHOLEST SERPL-MCNC: 195 MG/DL
CO2 SERPL-SCNC: 23 MMOL/L (ref 22–31)
COLOR UR AUTO: YELLOW
CREAT SERPL-MCNC: 0.96 MG/DL (ref 0.7–1.3)
FASTING STATUS PATIENT QL REPORTED: YES
GFR SERPL CREATININE-BSD FRML MDRD: >60 ML/MIN/1.73M2
GLUCOSE BLD-MCNC: 110 MG/DL (ref 70–125)
GLUCOSE UR STRIP-MCNC: NEGATIVE MG/DL
HDLC SERPL-MCNC: 94 MG/DL
HGB UR QL STRIP: NEGATIVE
KETONES UR STRIP-MCNC: ABNORMAL MG/DL
LDLC SERPL CALC-MCNC: 90 MG/DL
LEUKOCYTE ESTERASE UR QL STRIP: NEGATIVE
MUCOUS THREADS #/AREA URNS LPF: ABNORMAL LPF
NITRATE UR QL: NEGATIVE
PH UR STRIP: 6.5 [PH] (ref 5–8)
POTASSIUM BLD-SCNC: 4.6 MMOL/L (ref 3.5–5)
PROT SERPL-MCNC: 7.3 G/DL (ref 6–8)
RBC #/AREA URNS AUTO: ABNORMAL HPF
SODIUM SERPL-SCNC: 132 MMOL/L (ref 136–145)
SP GR UR STRIP: 1.02 (ref 1–1.03)
SQUAMOUS #/AREA URNS AUTO: ABNORMAL LPF
TRIGL SERPL-MCNC: 57 MG/DL
UROBILINOGEN UR STRIP-ACNC: ABNORMAL
WBC #/AREA URNS AUTO: ABNORMAL HPF

## 2020-11-16 ASSESSMENT — PATIENT HEALTH QUESTIONNAIRE - PHQ9: SUM OF ALL RESPONSES TO PHQ QUESTIONS 1-9: 8

## 2020-11-16 ASSESSMENT — ANXIETY QUESTIONNAIRES
IF YOU CHECKED OFF ANY PROBLEMS ON THIS QUESTIONNAIRE, HOW DIFFICULT HAVE THESE PROBLEMS MADE IT FOR YOU TO DO YOUR WORK, TAKE CARE OF THINGS AT HOME, OR GET ALONG WITH OTHER PEOPLE: NOT DIFFICULT AT ALL
3. WORRYING TOO MUCH ABOUT DIFFERENT THINGS: MORE THAN HALF THE DAYS
2. NOT BEING ABLE TO STOP OR CONTROL WORRYING: MORE THAN HALF THE DAYS
GAD7 TOTAL SCORE: 9
4. TROUBLE RELAXING: SEVERAL DAYS
6. BECOMING EASILY ANNOYED OR IRRITABLE: SEVERAL DAYS
7. FEELING AFRAID AS IF SOMETHING AWFUL MIGHT HAPPEN: SEVERAL DAYS
5. BEING SO RESTLESS THAT IT IS HARD TO SIT STILL: SEVERAL DAYS
1. FEELING NERVOUS, ANXIOUS, OR ON EDGE: SEVERAL DAYS

## 2020-11-16 ASSESSMENT — MIFFLIN-ST. JEOR: SCORE: 1536.44

## 2020-11-19 ENCOUNTER — COMMUNICATION - HEALTHEAST (OUTPATIENT)
Dept: INTERNAL MEDICINE | Facility: CLINIC | Age: 70
End: 2020-11-19

## 2021-01-06 ENCOUNTER — COMMUNICATION - HEALTHEAST (OUTPATIENT)
Dept: INTERNAL MEDICINE | Facility: CLINIC | Age: 71
End: 2021-01-06

## 2021-01-06 DIAGNOSIS — E78.5 HYPERLIPIDEMIA: ICD-10-CM

## 2021-01-07 RX ORDER — ATORVASTATIN CALCIUM 10 MG/1
TABLET, FILM COATED ORAL
Qty: 90 TABLET | Refills: 2 | Status: ON HOLD | OUTPATIENT
Start: 2021-01-07 | End: 2021-11-02

## 2021-01-27 ENCOUNTER — COMMUNICATION - HEALTHEAST (OUTPATIENT)
Dept: INTERNAL MEDICINE | Facility: CLINIC | Age: 71
End: 2021-01-27

## 2021-01-27 DIAGNOSIS — I10 ESSENTIAL HYPERTENSION: ICD-10-CM

## 2021-01-29 ENCOUNTER — COMMUNICATION - HEALTHEAST (OUTPATIENT)
Dept: INTERNAL MEDICINE | Facility: CLINIC | Age: 71
End: 2021-01-29

## 2021-01-29 DIAGNOSIS — I10 ESSENTIAL HYPERTENSION: ICD-10-CM

## 2021-01-31 RX ORDER — METOPROLOL SUCCINATE 50 MG/1
TABLET, EXTENDED RELEASE ORAL
Qty: 90 TABLET | Refills: 2 | Status: ON HOLD | OUTPATIENT
Start: 2021-01-31 | End: 2021-11-02

## 2021-01-31 RX ORDER — AMLODIPINE BESYLATE 2.5 MG/1
TABLET ORAL
Qty: 90 TABLET | Refills: 2 | Status: ON HOLD | OUTPATIENT
Start: 2021-01-31 | End: 2021-11-02

## 2021-02-28 ENCOUNTER — COMMUNICATION - HEALTHEAST (OUTPATIENT)
Dept: INTERNAL MEDICINE | Facility: CLINIC | Age: 71
End: 2021-02-28

## 2021-02-28 DIAGNOSIS — F32.0 MILD MAJOR DEPRESSION (H): ICD-10-CM

## 2021-02-28 DIAGNOSIS — F41.9 ANXIETY: ICD-10-CM

## 2021-02-28 DIAGNOSIS — F10.20 ALCOHOLISM (H): ICD-10-CM

## 2021-02-28 RX ORDER — SERTRALINE HYDROCHLORIDE 25 MG/1
TABLET, FILM COATED ORAL
Qty: 90 TABLET | Refills: 2 | Status: ON HOLD | OUTPATIENT
Start: 2021-02-28 | End: 2021-11-02

## 2021-03-10 ENCOUNTER — AMBULATORY - HEALTHEAST (OUTPATIENT)
Dept: NURSING | Facility: CLINIC | Age: 71
End: 2021-03-10

## 2021-03-31 ENCOUNTER — AMBULATORY - HEALTHEAST (OUTPATIENT)
Dept: NURSING | Facility: CLINIC | Age: 71
End: 2021-03-31

## 2021-04-27 ENCOUNTER — COMMUNICATION - HEALTHEAST (OUTPATIENT)
Dept: INTERNAL MEDICINE | Facility: CLINIC | Age: 71
End: 2021-04-27

## 2021-04-27 DIAGNOSIS — I10 ESSENTIAL HYPERTENSION: ICD-10-CM

## 2021-04-28 RX ORDER — LISINOPRIL 40 MG/1
TABLET ORAL
Qty: 90 TABLET | Refills: 2 | Status: ON HOLD | OUTPATIENT
Start: 2021-04-28 | End: 2021-11-02

## 2021-05-27 ASSESSMENT — PATIENT HEALTH QUESTIONNAIRE - PHQ9: SUM OF ALL RESPONSES TO PHQ QUESTIONS 1-9: 8

## 2021-05-28 ASSESSMENT — ANXIETY QUESTIONNAIRES: GAD7 TOTAL SCORE: 9

## 2021-05-28 NOTE — TELEPHONE ENCOUNTER
Refill Approved    Rx renewed per Medication Renewal Policy. Medication was last renewed on 8/15/18.    Karin Todd, [facility] Triage/Med Refill 5/13/2019     Requested Prescriptions   Pending Prescriptions Disp Refills     hydroCHLOROthiazide (HYDRODIURIL) 12.5 MG tablet [Pharmacy Med Name: HYDROCHLOROTHIAZIDE 12.5MG TABLETS] 90 tablet 0     Sig: TAKE 1 TABLET(12.5 MG) BY MOUTH DAILY       Diuretics/Combination Diuretics Refill Protocol  Passed - 5/12/2019  2:03 PM        Passed - Visit with PCP or prescribing provider visit in past 12 months     Last office visit with prescriber/PCP: 9/5/2018 Ronnie Bundy MD OR same dept: 9/5/2018 Ronnie Bundy MD OR same specialty: 9/5/2018 Ronnie Bundy MD  Last physical: 1/9/2019 Last MTM visit: Visit date not found   Next visit within 3 mo: Visit date not found  Next physical within 3 mo: Visit date not found  Prescriber OR PCP: Ronnie Bundy MD  Last diagnosis associated with med order: 1. Essential hypertension  - hydroCHLOROthiazide (HYDRODIURIL) 12.5 MG tablet [Pharmacy Med Name: HYDROCHLOROTHIAZIDE 12.5MG TABLETS]; TAKE 1 TABLET(12.5 MG) BY MOUTH DAILY  Dispense: 90 tablet; Refill: 0    If protocol passes may refill for 12 months if within 3 months of last provider visit (or a total of 15 months).             Passed - Serum Potassium in past 12 months      Lab Results   Component Value Date    Potassium 4.1 01/09/2019             Passed - Serum Sodium in past 12 months      Lab Results   Component Value Date    Sodium 139 01/09/2019             Passed - Blood pressure on file in past 12 months     BP Readings from Last 1 Encounters:   01/09/19 130/78             Passed - Serum Creatinine in past 12 months      Creatinine   Date Value Ref Range Status   01/09/2019 1.08 0.70 - 1.30 mg/dL Final

## 2021-05-30 NOTE — TELEPHONE ENCOUNTER
LMTCB - please relay results/recommendations below per PCP. I will enter in future lab orders for PCP to co-sign. Please schedule patient for a lab appt only early next week, thank you.

## 2021-05-30 NOTE — TELEPHONE ENCOUNTER
----- Message from Ronnie Bundy MD sent at 7/11/2019  1:40 PM CDT -----  Please call pt:    Sodium came back fairly low.  This may just be a fluke.  Could be due to your medicines.  I would like you to stop the hydrochlorothiazide and come in for a repeat lab early next week.  Everything else here looks fine.    Vira, please place order for BMP.  Diagnosis is hyponatremia.  Thank you

## 2021-05-30 NOTE — PROGRESS NOTES
Office Visit - Follow Up   Efrain Gomes   69 y.o. male    Date of Visit: 7/11/2019    Chief Complaint   Patient presents with     Hypertension     6 mo f/u - not fasting         Assessment and Plan   1. Essential hypertension  Excellent control with current regimen.  Continue same.  - Comprehensive Metabolic Panel  - HM2(CBC w/o Differential)    2. Bullous pemphigoid  Now on pretty heavy duty immune suppression.  I counseled him on this today.  Fortunately he has had his in immunizations.  He was told not to get a shingles vaccination.  I am not sure if that holds true with the current vaccination or not.  He will double check with the dermatologist.  - Comprehensive Metabolic Panel  - HM2(CBC w/o Differential)    3. Alcoholism (H)  He remains sober.  Congratulated him on sobriety.    4. Hypercholesteremia  He will return fasting next visit.    5. Fatty liver  Continued efforts at lifestyle modification.        Return in about 6 months (around 1/11/2020) for AWV.     History of Present Illness   This 69 y.o. old Jus comes today for follow-up of his multiple medical problems.  Since I saw him last he was diagnosed with bullous pemphigoid.  He is now on CellCept for immunosuppression.  Also on triamcinolone cream.  He is following but with Dr. Fior Beckford [facility].  He feels good.  He is remained sober.  Going to his AA meetings.  Blood pressures been well controlled he believes.  He is trying to remain active.  He is down a few pounds from last visit.  Denies other new somatic concerns for me.    Review of Systems: A comprehensive review of systems was negative except as noted.     Medications, Allergies and Problem List   Reviewed, reconciled and updated  Post Discharge Medication Reconciliation Status:      Physical Exam   General Appearance:   Pleasant gentleman no distress.  Vitals are as noted.  He has some nodular lesions on his legs.  His right mid back has a somewhat concerning  "lesion with heaped up borders and central excoriation that could potentially be cancerous.    /74 (Patient Site: Right Arm, Patient Position: Sitting, Cuff Size: Adult Regular)   Pulse 72   Ht 5' 6\" (1.676 m)   Wt 191 lb (86.6 kg)   SpO2 98%   BMI 30.83 kg/m           Additional Information   Current Outpatient Medications   Medication Sig Dispense Refill     amLODIPine (NORVASC) 5 MG tablet Take 0.5 tablets (2.5 mg total) by mouth daily. 90 tablet 2     atorvastatin (LIPITOR) 10 MG tablet TAKE 1 TABLET(10 MG) BY MOUTH DAILY 90 tablet 2     betamethasone dipropionate (DIPROLENE) 0.05 % cream STEPHEN EXT AA BID  11     cetirizine (ZYRTEC) 10 MG tablet Take 10 mg by mouth daily.       cholecalciferol, vitamin D3, (VITAMIN D3) 2,000 unit Tab Take 1 tablet by mouth daily.       hydroCHLOROthiazide (HYDRODIURIL) 12.5 MG tablet TAKE 1 TABLET(12.5 MG) BY MOUTH DAILY 90 tablet 2     lisinopril (PRINIVIL,ZESTRIL) 40 MG tablet TAKE 1 TABLET BY MOUTH EVERY DAY. 90 tablet 3     metoprolol succinate (TOPROL-XL) 50 MG 24 hr tablet Take 1 tablet (50 mg total) by mouth daily. 90 tablet 3     mycophenolate (CELLCEPT) 500 mg tablet Take 500 mg by mouth 2 (two) times a day.  2     sildenafil (VIAGRA) 100 MG tablet Take 1 tablet (100 mg total) by mouth daily as needed for erectile dysfunction. 6 tablet 11     triamcinolone (KENALOG) 0.1 % cream STEPHEN TO BODY RASH D PRN  6     fluocinolone acetonide oil (DERMOTIC) 0.01 % Drop Administer 3 drops into both ears 2 (two) times a day. Used as needed 20 mL 1     No current facility-administered medications for this visit.      Allergies   Allergen Reactions     Septra [Sulfamethoxazole-Trimethoprim] Rash     Social History     Tobacco Use     Smoking status: Never Smoker     Smokeless tobacco: Never Used   Substance Use Topics     Alcohol use: Not on file     Drug use: Not on file       Review and/or order of clinical lab tests:  Review and/or order of radiology tests:  Review and/or " order of medicine tests:  Discussion of test results with performing physician:  Decision to obtain old records and/or obtain history from someone other than the patient:  Review and summarization of old records and/or obtaining history from someone other than the patient and.or discussion of case with another health care provider:  Independent visualization of image, tracing or specimen itself:    Time: not applicable     Ronnie Bundy MD

## 2021-05-31 VITALS — WEIGHT: 190 LBS | HEIGHT: 66 IN | BODY MASS INDEX: 30.53 KG/M2

## 2021-05-31 VITALS — WEIGHT: 188 LBS | BODY MASS INDEX: 30.22 KG/M2 | HEIGHT: 66 IN

## 2021-05-31 VITALS — WEIGHT: 184 LBS | BODY MASS INDEX: 29.57 KG/M2 | HEIGHT: 66 IN

## 2021-05-31 NOTE — TELEPHONE ENCOUNTER
Refill Approved    Rx renewed per Medication Renewal Policy. Medication was last renewed on 8/3/2018.         Shon Barney, [facility] Triage/Med Refill 8/4/2019     Requested Prescriptions   Pending Prescriptions Disp Refills     metoprolol succinate (TOPROL-XL) 50 MG 24 hr tablet [Pharmacy Med Name: METOPROLOL ER SUCCINATE 50MG TABS] 90 tablet 0     Sig: TAKE 1 TABLET(50 MG) BY MOUTH DAILY       Beta-Blockers Refill Protocol Passed - 8/3/2019 10:06 PM        Passed - PCP or prescribing provider visit in past 12 months or next 3 months     Last office visit with prescriber/PCP: 7/11/2019 Ronine Bundy MD OR same dept: 7/11/2019 Ronnie Bundy MD OR same specialty: 7/11/2019 Ronnie Bundy MD  Last physical: 1/9/2019 Last MTM visit: Visit date not found   Next visit within 3 mo: Visit date not found  Next physical within 3 mo: Visit date not found  Prescriber OR PCP: Ronnie Bundy MD  Last diagnosis associated with med order: 1. Essential hypertension  - metoprolol succinate (TOPROL-XL) 50 MG 24 hr tablet [Pharmacy Med Name: METOPROLOL ER SUCCINATE 50MG TABS]; TAKE 1 TABLET(50 MG) BY MOUTH DAILY  Dispense: 90 tablet; Refill: 0    If protocol passes may refill for 12 months if within 3 months of last provider visit (or a total of 15 months).             Passed - Blood pressure filed in past 12 months     BP Readings from Last 1 Encounters:   07/11/19 124/74

## 2021-05-31 NOTE — PROGRESS NOTES
"  Office Visit - Follow Up   Efrain Gomes   69 y.o. male    Date of Visit: 8/7/2019    Chief Complaint   Patient presents with     Hypertension     bp f/u - stopped HCTZ x 3 wks ago         Assessment and Plan   1. Essential hypertension  Fine control off the diuretic.  Continue same.  - Basic Metabolic Panel    2. Hyponatremia  Recheck sodium.  Likely either a fluke or made worse by his diuretic.  He does not have any excessive water intake at [place].  He wonders about salt intake.  I urged him to continue on a lower sodium diet due to his hypertension.  He understands.  - Basic Metabolic Panel    As an aside, he tells me that his 50-year-old daughter was just what sounds like stage IV colon cancer on her first colonoscopy.  She is doing well status post resection.  I urged him to have his son screened now.  He is in his mid 40s.    Return in about 5 months (around 1/7/2020) for Recheck.     History of Present Illness   This 69 y.o. old Efrain comes in today for follow-up of his hypertension and hyponatremia.  He was found at last visit to be hyponatremic.  He is unclear as the etiology.  I did have him hold his hydrochlorothiazide and he return for labs in his sodium had returned to near normal.  He feels well.  Blood pressure has not been checked since I saw him last.  Denies somatic concerns for me.    Review of Systems: A comprehensive review of systems was negative except as noted.     Medications, Allergies and Problem List   Reviewed, reconciled and updated  Post Discharge Medication Reconciliation Status:      Physical Exam   General Appearance:   Pleasant gentleman.  Vital signs noted.  Looks well.    /72 (Patient Site: Right Arm, Patient Position: Sitting, Cuff Size: Adult Regular)   Pulse 64   Ht 5' 6\" (1.676 m)   Wt 193 lb (87.5 kg)   SpO2 98%   BMI 31.15 kg/m           Additional Information   Current Outpatient Medications   Medication Sig Dispense Refill     amLODIPine (NORVASC) 5 MG " tablet Take 0.5 tablets (2.5 mg total) by mouth daily. 90 tablet 2     atorvastatin (LIPITOR) 10 MG tablet TAKE 1 TABLET(10 MG) BY MOUTH DAILY 90 tablet 2     betamethasone dipropionate (DIPROLENE) 0.05 % cream STEPHEN EXT AA BID  11     cetirizine (ZYRTEC) 10 MG tablet Take 10 mg by mouth daily.       cholecalciferol, vitamin D3, (VITAMIN D3) 2,000 unit Tab Take 1 tablet by mouth daily.       fluocinolone acetonide oil (DERMOTIC) 0.01 % Drop Administer 3 drops into both ears 2 (two) times a day. Used as needed 20 mL 1     lisinopril (PRINIVIL,ZESTRIL) 40 MG tablet TAKE 1 TABLET BY MOUTH EVERY DAY. 90 tablet 3     metoprolol succinate (TOPROL-XL) 50 MG 24 hr tablet TAKE 1 TABLET(50 MG) BY MOUTH DAILY 90 tablet 3     mycophenolate (CELLCEPT) 500 mg tablet Take 500 mg by mouth 2 (two) times a day.  2     sildenafil (VIAGRA) 100 MG tablet Take 1 tablet (100 mg total) by mouth daily as needed for erectile dysfunction. 6 tablet 11     triamcinolone (KENALOG) 0.1 % cream STEPHEN TO BODY RASH D PRN  6     No current facility-administered medications for this visit.      Allergies   Allergen Reactions     Septra [Sulfamethoxazole-Trimethoprim] Rash     Social History     Tobacco Use     Smoking status: Never Smoker     Smokeless tobacco: Never Used   Substance Use Topics     Alcohol use: Not on file     Drug use: Not on file       Review and/or order of clinical lab tests:  Review and/or order of radiology tests:  Review and/or order of medicine tests:  Discussion of test results with performing physician:  Decision to obtain old records and/or obtain history from someone other than the patient:  Review and summarization of old records and/or obtaining history from someone other than the patient and.or discussion of case with another health care provider:  Independent visualization of image, tracing or specimen itself:    Time: total time spent with the patient was 15 minutes of which >50% was spent in counseling and coordination  of care     Ronnie Bundy MD

## 2021-06-01 VITALS — BODY MASS INDEX: 30.22 KG/M2 | HEIGHT: 66 IN | WEIGHT: 188 LBS

## 2021-06-01 VITALS — HEIGHT: 66 IN | WEIGHT: 188 LBS | BODY MASS INDEX: 30.22 KG/M2

## 2021-06-01 VITALS — WEIGHT: 188 LBS | BODY MASS INDEX: 30.22 KG/M2 | HEIGHT: 66 IN

## 2021-06-01 VITALS — HEIGHT: 66 IN | WEIGHT: 186 LBS | BODY MASS INDEX: 29.89 KG/M2

## 2021-06-01 VITALS — WEIGHT: 188 LBS | BODY MASS INDEX: 30.34 KG/M2

## 2021-06-02 VITALS — WEIGHT: 194 LBS | BODY MASS INDEX: 31.18 KG/M2 | HEIGHT: 66 IN

## 2021-06-02 NOTE — TELEPHONE ENCOUNTER
RN cannot approve Refill Request    RN can NOT refill this medication PCP to review - SIG does not match. Last office visit: 8/7/2019 Ronnie Bundy MD Last Physical: 1/9/2019 Last MTM visit: Visit date not found Last visit same specialty: 8/7/2019 Ronnie Bundy MD.  Next visit within 3 mo: Visit date not found  Next physical within 3 mo: Visit date not found      Gina Olson, [facility] Triage/Med Refill 11/2/2019    Requested Prescriptions   Pending Prescriptions Disp Refills     amLODIPine (NORVASC) 5 MG tablet [Pharmacy Med Name: AMLODIPINE BESYLATE 5MG TABLETS] 90 tablet 0     Sig: TAKE 1 TABLET(5 MG) BY MOUTH DAILY       Calcium-Channel Blockers Protocol Passed - 11/2/2019 12:27 PM        Passed - PCP or prescribing provider visit in past 12 months or next 3 months     Last office visit with prescriber/PCP: 8/7/2019 Ronnie Bundy MD OR same dept: 8/7/2019 Ronnie Bundy MD OR same specialty: 8/7/2019 Ronnie Bundy MD  Last physical: 1/9/2019 Last MTM visit: Visit date not found   Next visit within 3 mo: Visit date not found  Next physical within 3 mo: Visit date not found  Prescriber OR PCP: Ronnie Bundy MD  Last diagnosis associated with med order: 1. Essential hypertension  - amLODIPine (NORVASC) 5 MG tablet [Pharmacy Med Name: AMLODIPINE BESYLATE 5MG TABLETS]; TAKE 1 TABLET(5 MG) BY MOUTH DAILY  Dispense: 90 tablet; Refill: 0    If protocol passes may refill for 12 months if within 3 months of last provider visit (or a total of 15 months).             Passed - Blood pressure filed in past 12 months     BP Readings from Last 1 Encounters:   08/07/19 120/72

## 2021-06-03 VITALS — BODY MASS INDEX: 30.7 KG/M2 | WEIGHT: 191 LBS | HEIGHT: 66 IN

## 2021-06-03 VITALS — WEIGHT: 193 LBS | HEIGHT: 66 IN | BODY MASS INDEX: 31.02 KG/M2

## 2021-06-03 NOTE — TELEPHONE ENCOUNTER
Pt called - patient reports that he is taking 2.5 mg (1/2 tablet of 5 mg tablet) daily.Patient reports that he would preferred the 2.5 mg tablets therefore he wouldn't have to break the 5 mg tablet in half. Please signed 2.5 mg tablet and refuse the 5 mg tablet, thank you.

## 2021-06-04 NOTE — TELEPHONE ENCOUNTER
Refill Approved    Rx renewed per Medication Renewal Policy. Medication was last renewed on 11/13/18.    Marisa Goldstein, [facility] Triage/Med Refill 12/13/2019     Requested Prescriptions   Pending Prescriptions Disp Refills     lisinopril (PRINIVIL,ZESTRIL) 40 MG tablet [Pharmacy Med Name: LISINOPRIL 40MG TABLETS] 90 tablet 0     Sig: TAKE 1 TABLET BY MOUTH EVERY DAY       Ace Inhibitors Refill Protocol Passed - 12/12/2019  8:16 AM        Passed - PCP or prescribing provider visit in past 12 months       Last office visit with prescriber/PCP: 8/7/2019 Ronnie Bundy MD OR same dept: 8/7/2019 Ronnie Bundy MD OR same specialty: 8/7/2019 Ronnie Bundy MD  Last physical: 1/9/2019 Last MTM visit: Visit date not found   Next visit within 3 mo: Visit date not found  Next physical within 3 mo: Visit date not found  Prescriber OR PCP: Ronnie Bundy MD  Last diagnosis associated with med order: 1. Essential hypertension  - lisinopril (PRINIVIL,ZESTRIL) 40 MG tablet [Pharmacy Med Name: LISINOPRIL 40MG TABLETS]; TAKE 1 TABLET BY MOUTH EVERY DAY.  Dispense: 90 tablet; Refill: 0    If protocol passes may refill for 12 months if within 3 months of last provider visit (or a total of 15 months).             Passed - Serum Potassium in past 12 months     Lab Results   Component Value Date    Potassium 4.8 08/07/2019             Passed - Blood pressure filed in past 12 months     BP Readings from Last 1 Encounters:   08/07/19 120/72             Passed - Serum Creatinine in past 12 months     Creatinine   Date Value Ref Range Status   08/07/2019 0.88 0.70 - 1.30 mg/dL Final

## 2021-06-04 NOTE — TELEPHONE ENCOUNTER
Refill Approved    Rx renewed per Medication Renewal Policy. Medication was last renewed on 3/19/19.    Karin Todd, [facility] Triage/Med Refill 12/27/2019     Requested Prescriptions   Pending Prescriptions Disp Refills     atorvastatin (LIPITOR) 10 MG tablet [Pharmacy Med Name: ATORVASTATIN 10MG TABLETS] 90 tablet 2     Sig: TAKE 1 TABLET(10 MG) BY MOUTH DAILY       Statins Refill Protocol (Hmg CoA Reductase Inhibitors) Passed - 12/26/2019  8:48 AM        Passed - PCP or prescribing provider visit in past 12 months      Last office visit with prescriber/PCP: 8/7/2019 Ronnie Bundy MD OR same dept: 8/7/2019 Ronnie Bundy MD OR same specialty: 8/7/2019 Ronnie Bundy MD  Last physical: 1/9/2019 Last MTM visit: Visit date not found   Next visit within 3 mo: Visit date not found  Next physical within 3 mo: Visit date not found  Prescriber OR PCP: Ronnie Bundy MD  Last diagnosis associated with med order: 1. Hyperlipidemia  - atorvastatin (LIPITOR) 10 MG tablet [Pharmacy Med Name: ATORVASTATIN 10MG TABLETS]; TAKE 1 TABLET(10 MG) BY MOUTH DAILY  Dispense: 90 tablet; Refill: 2    If protocol passes may refill for 12 months if within 3 months of last provider visit (or a total of 15 months).

## 2021-06-05 VITALS
WEIGHT: 186 LBS | BODY MASS INDEX: 29.89 KG/M2 | OXYGEN SATURATION: 99 % | SYSTOLIC BLOOD PRESSURE: 154 MMHG | TEMPERATURE: 97.3 F | DIASTOLIC BLOOD PRESSURE: 80 MMHG | HEIGHT: 66 IN | HEART RATE: 72 BPM

## 2021-06-06 NOTE — TELEPHONE ENCOUNTER
Refill Approved    Rx renewed per Medication Renewal Policy. Medication was last renewed on 2/13/19.    Karin Todd, [facility] Triage/Med Refill 3/14/2020     Requested Prescriptions   Pending Prescriptions Disp Refills     lisinopriL (PRINIVIL,ZESTRIL) 40 MG tablet [Pharmacy Med Name: LISINOPRIL 40MG TABLETS] 90 tablet 0     Sig: TAKE 1 TABLET BY MOUTH EVERY DAY       Ace Inhibitors Refill Protocol Passed - 3/10/2020  6:41 PM        Passed - PCP or prescribing provider visit in past 12 months       Last office visit with prescriber/PCP: 8/7/2019 Ronnie Bundy MD OR same dept: 8/7/2019 Ronnie Bundy MD OR same specialty: 8/7/2019 Ronnie Bundy MD  Last physical: 1/9/2019 Last MTM visit: Visit date not found   Next visit within 3 mo: Visit date not found  Next physical within 3 mo: Visit date not found  Prescriber OR PCP: Ronnie Bundy MD  Last diagnosis associated with med order: 1. Essential hypertension  - lisinopriL (PRINIVIL,ZESTRIL) 40 MG tablet [Pharmacy Med Name: LISINOPRIL 40MG TABLETS]; TAKE 1 TABLET BY MOUTH EVERY DAY  Dispense: 90 tablet; Refill: 0    If protocol passes may refill for 12 months if within 3 months of last provider visit (or a total of 15 months).             Passed - Serum Potassium in past 12 months     Lab Results   Component Value Date    Potassium 4.8 08/07/2019             Passed - Blood pressure filed in past 12 months     BP Readings from Last 1 Encounters:   08/07/19 120/72             Passed - Serum Creatinine in past 12 months     Creatinine   Date Value Ref Range Status   08/07/2019 0.88 0.70 - 1.30 mg/dL Final

## 2021-06-07 NOTE — TELEPHONE ENCOUNTER
Refill Approved    Rx renewed per Medication Renewal Policy. Medication was last renewed on 11/4/19.    Karin Todd, [facility] Triage/Med Refill 4/27/2020     Requested Prescriptions   Pending Prescriptions Disp Refills     amLODIPine (NORVASC) 2.5 MG tablet [Pharmacy Med Name: AMLODIPINE BESYLATE 2.5MG TABLETS] 90 tablet 1     Sig: TAKE 1 TABLET(2.5 MG) BY MOUTH DAILY       Calcium-Channel Blockers Protocol Passed - 4/25/2020  5:49 PM        Passed - PCP or prescribing provider visit in past 12 months or next 3 months     Last office visit with prescriber/PCP: 8/7/2019 Ronnie Bundy MD OR same dept: 8/7/2019 Ronnie Bundy MD OR same specialty: 8/7/2019 Ronnie Bundy MD  Last physical: 1/9/2019 Last MTM visit: Visit date not found   Next visit within 3 mo: Visit date not found  Next physical within 3 mo: Visit date not found  Prescriber OR PCP: Ronnie Bundy MD  Last diagnosis associated with med order: 1. Essential hypertension  - amLODIPine (NORVASC) 2.5 MG tablet [Pharmacy Med Name: AMLODIPINE BESYLATE 2.5MG TABLETS]; TAKE 1 TABLET(2.5 MG) BY MOUTH DAILY  Dispense: 90 tablet; Refill: 1    If protocol passes may refill for 12 months if within 3 months of last provider visit (or a total of 15 months).             Passed - Blood pressure filed in past 12 months     BP Readings from Last 1 Encounters:   08/07/19 120/72

## 2021-06-11 NOTE — PROGRESS NOTES
".    Office Visit - Follow Up   Efrain Gomes   67 y.o. male    Date of Visit: 7/18/2017    Chief Complaint   Patient presents with     Hypertension     med & bp recheck - not fasting         Assessment and Plan   1. Essential hypertension  Doing well.  Continue current regimen.    2. Hypercholesteremia  Continue current regimen.  I will recheck his labs at his next physical which I have urged he do this fall.        Return in about 3 months (around 10/18/2017) for Annual physical.     History of Present Illness   This 67 y.o. old patient comes in for follow-up.  He has not been checking his blood pressure.  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  Enjoying himself.  He feels well.  He is done no traveling since his trip to [place].  No plans for upcoming trips.  May go back to [place] this winter.    Review of Systems: A comprehensive review of systems was negative except as noted.     Medications, Allergies and Problem List   Reviewed and updated     Physical Exam   General Appearance:   Pleasant gentleman.  No distress.  Blood pressure is excellent today.  Further exam deferred.    /76 (Patient Site: Right Arm, Patient Position: Sitting, Cuff Size: Adult Regular)  Pulse 84  Ht 5' 6\" (1.676 m)  Wt 184 lb (83.5 kg)  SpO2 98%  BMI 29.7 kg/m2         Additional Information   Current Outpatient Prescriptions   Medication Sig Dispense Refill     amLODIPine (NORVASC) 5 MG tablet Take 1 tablet (5 mg total) by mouth daily. 90 tablet 2     aspirin 81 MG EC tablet Take 81 mg by mouth daily. As directed.       atorvastatin (LIPITOR) 10 MG tablet TAKE 1 TABLET(10 MG) BY MOUTH DAILY 90 tablet 2     cholecalciferol, vitamin D3, (VITAMIN D3) 2,000 unit Tab Take 1 tablet by mouth daily.       clindamycin (CLEOCIN T) 1 % lotion Use as directed prn 60 mL 1     fluocinolone acetonide oil (DERMOTIC) 0.01 % Drop " Administer 3 drops into both ears 2 (two) times a day. Used as needed 20 mL 1     lisinopril (PRINIVIL,ZESTRIL) 40 MG tablet TAKE 1 TABLET BY MOUTH ONCE DAILY. 90 tablet 0     multivitamin capsule Take 1 capsule by mouth daily.       sildenafil (VIAGRA) 100 MG tablet Take 1 tablet (100 mg total) by mouth daily as needed for erectile dysfunction. 6 tablet 11     UBIDECARENONE (COENZYME Q10) 100 mg Tab tablet Take 100 mg by mouth daily.       No current facility-administered medications for this visit.      No Known Allergies  Social History   Substance Use Topics     Smoking status: Never Smoker     Smokeless tobacco: None     Alcohol use None       Review and/or order of clinical lab tests:  Review and/or order of radiology tests:  Review and/or order of medicine tests:  Discussion of test results with performing physician:  Decision to obtain old records and/or obtain history from someone other than the patient:  Review and summarization of old records and/or obtaining history from someone other than the patient and.or discussion of case with another health care provider:  Independent visualization of image, tracing or specimen itself:    Time: total time spent with the patient was 15 minutes of which >50% was spent in counseling and coordination of care     Ronnie Bundy MD

## 2021-06-12 NOTE — TELEPHONE ENCOUNTER
RN cannot approve Refill Request    RN can NOT refill this medication Protocol failed and NO refill given. Last office visit: 8/7/2019 Ronnie Bundy MD Last Physical: 1/9/2019 Last MTM visit: Visit date not found Last visit same specialty: 8/7/2019 Ronnie Bundy MD.  Next visit within 3 mo: Visit date not found  Next physical within 3 mo: Visit date not found      Karin Todd, [facility] Triage/Med Refill 10/29/2020    Requested Prescriptions   Pending Prescriptions Disp Refills     amLODIPine (NORVASC) 2.5 MG tablet 90 tablet 1       Calcium-Channel Blockers Protocol Failed - 10/29/2020 12:02 PM        Failed - PCP or prescribing provider visit in past 12 months or next 3 months     Last office visit with prescriber/PCP: 8/7/2019 Ronnie Bundy MD OR willie dept: Visit date not found OR same specialty: 8/7/2019 Ronnie Bundy MD  Last physical: 1/9/2019 Last MTM visit: Visit date not found   Next visit within 3 mo: Visit date not found  Next physical within 3 mo: Visit date not found  Prescriber OR PCP: Ronnie Bundy MD  Last diagnosis associated with med order: 1. Essential hypertension  - amLODIPine (NORVASC) 2.5 MG tablet  Dispense: 90 tablet; Refill: 1  - metoprolol succinate (TOPROL-XL) 50 MG 24 hr tablet; Take 1 tablet (50 mg total) by mouth daily.  Dispense: 90 tablet; Refill: 0    If protocol passes may refill for 12 months if within 3 months of last provider visit (or a total of 15 months).             Failed - Blood pressure filed in past 12 months     BP Readings from Last 1 Encounters:   08/07/19 120/72                metoprolol succinate (TOPROL-XL) 50 MG 24 hr tablet 90 tablet 0     Sig: Take 1 tablet (50 mg total) by mouth daily.       Beta-Blockers Refill Protocol Failed - 10/29/2020 12:02 PM        Failed - PCP or prescribing provider visit in past 12 months or next 3 months     Last office visit with prescriber/PCP: 8/7/2019 Ronnie Bundy MD OR same dept: Visit date not found OR same  specialty: 8/7/2019 Ronnie Bundy MD  Last physical: 1/9/2019 Last MTM visit: Visit date not found   Next visit within 3 mo: Visit date not found  Next physical within 3 mo: Visit date not found  Prescriber OR PCP: Ronnie Bundy MD  Last diagnosis associated with med order: 1. Essential hypertension  - amLODIPine (NORVASC) 2.5 MG tablet  Dispense: 90 tablet; Refill: 1  - metoprolol succinate (TOPROL-XL) 50 MG 24 hr tablet; Take 1 tablet (50 mg total) by mouth daily.  Dispense: 90 tablet; Refill: 0    If protocol passes may refill for 12 months if within 3 months of last provider visit (or a total of 15 months).             Failed - Blood pressure filed in past 12 months     BP Readings from Last 1 Encounters:   08/07/19 120/72

## 2021-06-12 NOTE — TELEPHONE ENCOUNTER
RN cannot approve Refill Request    RN can NOT refill this medication Protocol failed and NO refill given. Last office visit: 8/7/2019 Ronnie Bundy MD Last Physical: 1/9/2019 Last MTM visit: Visit date not found Last visit same specialty: 8/7/2019 Ronnie Bundy MD.  Next visit within 3 mo: Visit date not found  Next physical within 3 mo: Visit date not found      Karin Todd, [facility] Triage/Med Refill 10/5/2020    Requested Prescriptions   Pending Prescriptions Disp Refills     atorvastatin (LIPITOR) 10 MG tablet 90 tablet 2     Sig: Take 1 tablet (10 mg total) by mouth daily.       Statins Refill Protocol (Hmg CoA Reductase Inhibitors) Failed - 10/2/2020  3:13 PM        Failed - PCP or prescribing provider visit in past 12 months      Last office visit with prescriber/PCP: 8/7/2019 Ronnie Bundy MD OR same dept: Visit date not found OR same specialty: 8/7/2019 Ronnie Bundy MD  Last physical: 1/9/2019 Last MTM visit: Visit date not found   Next visit within 3 mo: Visit date not found  Next physical within 3 mo: Visit date not found  Prescriber OR PCP: Ronnie Bundy MD  Last diagnosis associated with med order: 1. Hyperlipidemia  - atorvastatin (LIPITOR) 10 MG tablet; Take 1 tablet (10 mg total) by mouth daily.  Dispense: 90 tablet; Refill: 2    If protocol passes may refill for 12 months if within 3 months of last provider visit (or a total of 15 months).

## 2021-06-12 NOTE — TELEPHONE ENCOUNTER
RN cannot approve Refill Request    RN can NOT refill this medication Protocol failed and NO refill given. Last office visit: 8/7/2019 Ronnie Bundy MD Last Physical: 1/9/2019 Last MTM visit: Visit date not found Last visit same specialty: 8/7/2019 Ronnie Bundy MD.  Next visit within 3 mo: Visit date not found  Next physical within 3 mo: Visit date not found      Gina Olson, [facility] Triage/Med Refill 10/16/2020    Requested Prescriptions   Pending Prescriptions Disp Refills     lisinopriL (PRINIVIL,ZESTRIL) 40 MG tablet 90 tablet 1     Sig: Take 1 tablet (40 mg total) by mouth daily.       Ace Inhibitors Refill Protocol Failed - 10/14/2020 10:57 AM        Failed - PCP or prescribing provider visit in past 12 months       Last office visit with prescriber/PCP: 8/7/2019 Ronnie Bundy MD OR same dept: Visit date not found OR same specialty: 8/7/2019 Ronnie Bundy MD  Last physical: 1/9/2019 Last MTM visit: Visit date not found   Next visit within 3 mo: Visit date not found  Next physical within 3 mo: Visit date not found  Prescriber OR PCP: Ronnie Bundy MD  Last diagnosis associated with med order: 1. Essential hypertension  - lisinopriL (PRINIVIL,ZESTRIL) 40 MG tablet; Take 1 tablet (40 mg total) by mouth daily.  Dispense: 90 tablet; Refill: 1    If protocol passes may refill for 12 months if within 3 months of last provider visit (or a total of 15 months).             Failed - Serum Potassium in past 12 months     No results found for: LN-POTASSIUM          Failed - Blood pressure filed in past 12 months     BP Readings from Last 1 Encounters:   08/07/19 120/72             Failed - Serum Creatinine in past 12 months     Creatinine   Date Value Ref Range Status   08/07/2019 0.88 0.70 - 1.30 mg/dL Final

## 2021-06-12 NOTE — TELEPHONE ENCOUNTER
Caesar Loomis for refills requested?  Patient is requesting the following:  Amlodipine 2.5 mg -take one 2.5 mg tablet daily  Metoprolol XL 50 mg- take one 50 mg tablet by mouth daily.    Refills have been set up for you to review.  Stephanie HOLLEY CMA/SARAI....................10:29 AM

## 2021-06-13 NOTE — PROGRESS NOTES
"  Office Visit - Follow Up   Efrain Gomes   70 y.o. male    Date of Visit: 11/16/2020    Chief Complaint   Patient presents with     Hypertension     6 mo f/u - fasting         Assessment and Plan   1. Alcoholism (H)  Concerning.  He wonders about Antabuse.  I have urged him to meet with a chemical dependency counselor.  He has gone through chemical dependency in the past and I think you would benefit from another round of treatment.  Continue with his AA.  - Ambulatory referral to Chemical Dependency  - sertraline (ZOLOFT) 25 MG tablet; Take 1 tablet (25 mg total) by mouth daily.  Dispense: 30 tablet; Refill: 2    2. Essential hypertension  Blood pressure is high likely due to his alcohol abuse.  I will see him back in a month and we will recheck hopefully he will be sober by then.  - Urinalysis-UC if Indicated  - Comprehensive Metabolic Panel    3. Bullous pemphigoid  He is on CellCept through his dermatologist.  He needs close follow-up with them especially with alcohol use.    4. Hypercholesteremia  Lipids today for monitoring.  - Comprehensive Metabolic Panel  - Lipid Cascade    5. Mild major depression (H)  Mood and anxiety I think are causing some of his drinking behaviors to worsen again.  Trial of sertraline.  Low-dose.  Return in 3 to 4 weeks and we will see how he is doing.  I did discuss with him symptoms and side effects that can occur with this medication.  - sertraline (ZOLOFT) 25 MG tablet; Take 1 tablet (25 mg total) by mouth daily.  Dispense: 30 tablet; Refill: 2    6. Anxiety  As above.  - sertraline (ZOLOFT) 25 MG tablet; Take 1 tablet (25 mg total) by mouth daily.  Dispense: 30 tablet; Refill: 2        Return in about 4 weeks (around 12/14/2020) for Recheck.     History of Present Illness   This 70 y.o. old and comes in today for follow-up.  I have not seen him in some time.  He tells me he is back to drinking again.  He is somewhat elusive on how much.  He states \"a couple glasses\".  He has " "a long history of alcoholism.  He is going to  but he has been drinking for some time now it sounds like.  He denies any symptoms of alcohol withdrawal but he does appear somewhat tremulous to me today.  He feels well but does note that he is somewhat down and anxious about all that is gone on in this country recently with the pandemic and is nervous about that.    Review of Systems: A comprehensive review of systems was negative except as noted.     Medications, Allergies and Problem List   Reviewed, reconciled and updated  Post Discharge Medication Reconciliation Status:      Physical Exam   General Appearance:   Pleasant gentleman.  Blood pressure is high and is somewhat tremulous today.  No distress however.    /80 (Patient Site: Right Arm, Patient Position: Sitting, Cuff Size: Adult Regular)   Pulse 72   Temp 97.3  F (36.3  C)   Ht 5' 6\" (1.676 m)   Wt 186 lb (84.4 kg)   SpO2 99%   BMI 30.02 kg/m           Additional Information   Current Outpatient Medications   Medication Sig Dispense Refill     amLODIPine (NORVASC) 2.5 MG tablet TAKE 1 TABLET(2.5 MG) BY MOUTH DAILY 90 tablet 0     atorvastatin (LIPITOR) 10 MG tablet Take 1 tablet (10 mg total) by mouth daily. 90 tablet 0     betamethasone dipropionate (DIPROLENE) 0.05 % cream STEPHEN EXT AA BID  11     cetirizine (ZYRTEC) 10 MG tablet Take 10 mg by mouth as needed.        cholecalciferol, vitamin D3, (VITAMIN D3) 2,000 unit Tab Take 1 tablet by mouth daily.       fluocinolone acetonide oil (DERMOTIC) 0.01 % Drop Administer 3 drops into both ears 2 (two) times a day. Used as needed 20 mL 1     lisinopriL (PRINIVIL,ZESTRIL) 40 MG tablet Take 1 tablet (40 mg total) by mouth daily. 90 tablet 0     metoprolol succinate (TOPROL-XL) 50 MG 24 hr tablet Take 1 tablet (50 mg total) by mouth daily. 90 tablet 0     mycophenolate (CELLCEPT) 500 mg tablet Take 500 mg by mouth daily.   2     sildenafil (VIAGRA) 100 MG tablet Take 1 tablet (100 mg total) by mouth " daily as needed for erectile dysfunction. 6 tablet 11     triamcinolone (KENALOG) 0.1 % cream STEPHEN TO BODY RASH D PRN  6     sertraline (ZOLOFT) 25 MG tablet Take 1 tablet (25 mg total) by mouth daily. 30 tablet 2     No current facility-administered medications for this visit.      Allergies   Allergen Reactions     Septra [Sulfamethoxazole-Trimethoprim] Rash     Social History     Tobacco Use     Smoking status: Never Smoker     Smokeless tobacco: Never Used   Substance Use Topics     Alcohol use: Not on file     Drug use: Not on file       Review and/or order of clinical lab tests:  Review and/or order of radiology tests:  Review and/or order of medicine tests:  Discussion of test results with performing physician:  Decision to obtain old records and/or obtain history from someone other than the patient:  Review and summarization of old records and/or obtaining history from someone other than the patient and.or discussion of case with another health care provider:  Independent visualization of image, tracing or specimen itself:    Time: not applicable     Ronnie Bundy MD

## 2021-06-14 NOTE — TELEPHONE ENCOUNTER
Refill request received from [employer] via fax for a refill of Lisinopril. Order pended.    Last OV/VV on 11/16/2020  Next scheduled appointment with PCP None

## 2021-06-14 NOTE — TELEPHONE ENCOUNTER
Refill Approved    Rx renewed per Medication Renewal Policy. Medication was last renewed on 10/6/20.    Karin Todd, [facility] Triage/Med Refill 1/7/2021     Requested Prescriptions   Pending Prescriptions Disp Refills     atorvastatin (LIPITOR) 10 MG tablet [Pharmacy Med Name: ATORVASTATIN 10MG TABLETS] 90 tablet 0     Sig: TAKE 1 TABLET(10 MG) BY MOUTH DAILY       Statins Refill Protocol (Hmg CoA Reductase Inhibitors) Passed - 1/6/2021  7:59 AM        Passed - PCP or prescribing provider visit in past 12 months      Last office visit with prescriber/PCP: 11/16/2020 Ronnie Bundy MD OR same dept: 11/16/2020 Ronnie Bundy MD OR same specialty: 11/16/2020 Ronnie Bundy MD  Last physical: 1/9/2019 Last MTM visit: Visit date not found   Next visit within 3 mo: Visit date not found  Next physical within 3 mo: Visit date not found  Prescriber OR PCP: Ronnie Bundy MD  Last diagnosis associated with med order: 1. Hyperlipidemia  - atorvastatin (LIPITOR) 10 MG tablet [Pharmacy Med Name: ATORVASTATIN 10MG TABLETS]; TAKE 1 TABLET(10 MG) BY MOUTH DAILY  Dispense: 90 tablet; Refill: 0    If protocol passes may refill for 12 months if within 3 months of last provider visit (or a total of 15 months).

## 2021-06-14 NOTE — TELEPHONE ENCOUNTER
Refill Approved    Rx renewed per Medication Renewal Policy. Medication was last renewed on 11/2/20.    Karin Todd, [facility] Triage/Med Refill 1/31/2021     Requested Prescriptions   Pending Prescriptions Disp Refills     amLODIPine (NORVASC) 2.5 MG tablet [Pharmacy Med Name: AMLODIPINE BESYLATE 2.5MG TABLETS] 90 tablet 0     Sig: TAKE 1 TABLET(2.5 MG) BY MOUTH DAILY       Calcium-Channel Blockers Protocol Passed - 1/29/2021  1:31 PM        Passed - PCP or prescribing provider visit in past 12 months or next 3 months     Last office visit with prescriber/PCP: 11/16/2020 Ronnie Bundy MD OR same dept: 11/16/2020 Ronnie Bundy MD OR same specialty: 11/16/2020 Ronnie Bundy MD  Last physical: 1/9/2019 Last MTM visit: Visit date not found   Next visit within 3 mo: Visit date not found  Next physical within 3 mo: Visit date not found  Prescriber OR PCP: Ronnie Bundy MD  Last diagnosis associated with med order: 1. Essential hypertension  - amLODIPine (NORVASC) 2.5 MG tablet [Pharmacy Med Name: AMLODIPINE BESYLATE 2.5MG TABLETS]; TAKE 1 TABLET(2.5 MG) BY MOUTH DAILY  Dispense: 90 tablet; Refill: 0  - metoprolol succinate (TOPROL-XL) 50 MG 24 hr tablet [Pharmacy Med Name: METOPROLOL ER SUCCINATE 50MG TABS]; TAKE 1 TABLET(50 MG) BY MOUTH DAILY  Dispense: 90 tablet; Refill: 0    If protocol passes may refill for 12 months if within 3 months of last provider visit (or a total of 15 months).             Passed - Blood pressure filed in past 12 months     BP Readings from Last 1 Encounters:   11/16/20 154/80                metoprolol succinate (TOPROL-XL) 50 MG 24 hr tablet [Pharmacy Med Name: METOPROLOL ER SUCCINATE 50MG TABS] 90 tablet 0     Sig: TAKE 1 TABLET(50 MG) BY MOUTH DAILY       Beta-Blockers Refill Protocol Passed - 1/29/2021  1:31 PM        Passed - PCP or prescribing provider visit in past 12 months or next 3 months     Last office visit with prescriber/PCP: 11/16/2020 Ronnie Bundy MD OR same  dept: 11/16/2020 Ronnie Bundy MD OR same specialty: 11/16/2020 Ronnie Bundy MD  Last physical: 1/9/2019 Last MTM visit: Visit date not found   Next visit within 3 mo: Visit date not found  Next physical within 3 mo: Visit date not found  Prescriber OR PCP: Ronnie Bundy MD  Last diagnosis associated with med order: 1. Essential hypertension  - amLODIPine (NORVASC) 2.5 MG tablet [Pharmacy Med Name: AMLODIPINE BESYLATE 2.5MG TABLETS]; TAKE 1 TABLET(2.5 MG) BY MOUTH DAILY  Dispense: 90 tablet; Refill: 0  - metoprolol succinate (TOPROL-XL) 50 MG 24 hr tablet [Pharmacy Med Name: METOPROLOL ER SUCCINATE 50MG TABS]; TAKE 1 TABLET(50 MG) BY MOUTH DAILY  Dispense: 90 tablet; Refill: 0    If protocol passes may refill for 12 months if within 3 months of last provider visit (or a total of 15 months).             Passed - Blood pressure filed in past 12 months     BP Readings from Last 1 Encounters:   11/16/20 154/80

## 2021-06-15 NOTE — PROGRESS NOTES
"  Office Visit - Follow Up   Efrain Gomes   67 y.o. male    Date of Visit: 2/7/2018    Chief Complaint   Patient presents with     Follow Up     2 week follow-up        Assessment and Plan   1. Elevated liver enzymes  Check liver enzymes today.  Continue to remain sober.  - Hepatic Profile    2. Anemia  Labs as below.  - HM2(CBC w/o Differential)  - Vitamin B12  - Folate, Serum  - Ferritin  - Iron and Transferrin Iron Binding Capacity    3. Alcoholism  I congratulated him on his sobriety.  I also offered chemical dependency treatment again today and he declines.  Continue working program.    4. Essential hypertension  Increase the metoprolol to 50 mg daily.  New Rx sent.   I will see him back in 1 month.        Return in about 4 weeks (around 3/7/2018) for Recheck.     History of Present Illness   This 67 y.o. old patient comes in today for follow-up of his recent physical.  At that time he had been going back into drinking again.  He has a history of alcoholism and has had been sober for many years but started back up drinking socially in 2014 and this is escalated over the last couple years.  He was having considerable elevation of his blood pressure.  He has had elevated liver enzymes.  He is mildly anemic.  Mildly hyponatremic.  He is here today and he has been going to  at least once or twice a day.  He has not had a drink since I saw him last.  He is feeling better.  Is getting a lot of support from his friends at .  He has not checked blood pressure.    Review of Systems: A comprehensive review of systems was negative except as noted.     Medications, Allergies and Problem List   Reviewed and updated     Physical Exam   General Appearance:   Pleasant gentleman.  Pulse is 70.  Blood pressure 140s over 70s.    /68 (Patient Site: Right Arm, Patient Position: Sitting, Cuff Size: Adult Regular)  Pulse 70  Ht 5' 6\" (1.676 m)  Wt 190 lb (86.2 kg)  SpO2 97%  BMI 30.67 kg/m2         Additional " Information   Current Outpatient Prescriptions   Medication Sig Dispense Refill     amLODIPine (NORVASC) 5 MG tablet Take 1 tablet (5 mg total) by mouth daily. 90 tablet 2     aspirin 81 MG EC tablet Take 81 mg by mouth daily. As directed.       atorvastatin (LIPITOR) 10 MG tablet TAKE 1 TABLET(10 MG) BY MOUTH DAILY 90 tablet 2     cholecalciferol, vitamin D3, (VITAMIN D3) 2,000 unit Tab Take 1 tablet by mouth daily.       clindamycin (CLEOCIN T) 1 % lotion Use as directed prn 60 mL 1     fluocinolone acetonide oil (DERMOTIC) 0.01 % Drop Administer 3 drops into both ears 2 (two) times a day. Used as needed 20 mL 1     lisinopril (PRINIVIL,ZESTRIL) 40 MG tablet TAKE 1 TABLET BY MOUTH ONCE DAILY. 90 tablet 2     metoprolol succinate (TOPROL-XL) 50 MG 24 hr tablet Take 1 tablet (50 mg total) by mouth daily. 90 tablet 1     multivitamin capsule Take 1 capsule by mouth daily.       sildenafil (VIAGRA) 100 MG tablet Take 1 tablet (100 mg total) by mouth daily as needed for erectile dysfunction. 6 tablet 11     No current facility-administered medications for this visit.      No Known Allergies  Social History   Substance Use Topics     Smoking status: Never Smoker     Smokeless tobacco: Never Used     Alcohol use None       Review and/or order of clinical lab tests:  Review and/or order of radiology tests:  Review and/or order of medicine tests:  Discussion of test results with performing physician:  Decision to obtain old records and/or obtain history from someone other than the patient:  Review and summarization of old records and/or obtaining history from someone other than the patient and.or discussion of case with another health care provider:  Independent visualization of image, tracing or specimen itself:    Time: total time spent with the patient was 15 minutes of which >50% was spent in counseling and coordination of care     Ronnie Bundy MD

## 2021-06-15 NOTE — TELEPHONE ENCOUNTER
Refill Approved    Rx renewed per Medication Renewal Policy. Medication was last renewed on 11/16/20.    Gagan Burrell, [facility] Triage/Med Refill 2/28/2021     Requested Prescriptions   Pending Prescriptions Disp Refills     sertraline (ZOLOFT) 25 MG tablet [Pharmacy Med Name: SERTRALINE 25MG TABLETS] 30 tablet 2     Sig: TAKE 1 TABLET(25 MG) BY MOUTH DAILY       [facility] Refill Protocol  Passed - 2/28/2021  7:46 AM        Passed - PCP or prescribing provider visit in last year     Last office visit with prescriber/PCP: 11/16/2020 Ronnie Bundy MD OR same dept: 11/16/2020 Ronnie Bundy MD OR same specialty: 11/16/2020 Ronnie Bundy MD  Last physical: 1/9/2019 Last MTM visit: Visit date not found   Next visit within 3 mo: Visit date not found  Next physical within 3 mo: Visit date not found  Prescriber OR PCP: Ronnie Bundy MD  Last diagnosis associated with med order: 1. Alcoholism (H)  - sertraline (ZOLOFT) 25 MG tablet [Pharmacy Med Name: SERTRALINE 25MG TABLETS]; TAKE 1 TABLET(25 MG) BY MOUTH DAILY  Dispense: 30 tablet; Refill: 2    2. Mild major depression (H)  - sertraline (ZOLOFT) 25 MG tablet [Pharmacy Med Name: SERTRALINE 25MG TABLETS]; TAKE 1 TABLET(25 MG) BY MOUTH DAILY  Dispense: 30 tablet; Refill: 2    3. Anxiety  - sertraline (ZOLOFT) 25 MG tablet [Pharmacy Med Name: SERTRALINE 25MG TABLETS]; TAKE 1 TABLET(25 MG) BY MOUTH DAILY  Dispense: 30 tablet; Refill: 2    If protocol passes may refill for 12 months if within 3 months of last provider visit (or a total of 15 months).

## 2021-06-15 NOTE — PROGRESS NOTES
Assessment and Plan:   1. Encounter for Medicare annual wellness exam  Patient is up-to-date on his health maintenance.  I have urged him to get back and exercise.  My biggest concern is alcohol abuse.  Please see below.    2. Essential hypertension  Uncontrolled.  Add metoprolol XL 25 mg daily.  See me back in 2 weeks .  I have discussed with him that weight loss and alcohol cessation will help the blood pressure as well.  - amLODIPine (NORVASC) 5 MG tablet; Take 1 tablet (5 mg total) by mouth daily.  Dispense: 90 tablet; Refill: 2  - lisinopril (PRINIVIL,ZESTRIL) 40 MG tablet; TAKE 1 TABLET BY MOUTH ONCE DAILY.  Dispense: 90 tablet; Refill: 2  - Comprehensive Metabolic Panel  - Urinalysis-UC if Indicated    3. Hyperlipidemia  Labs today for monitoring.  - atorvastatin (LIPITOR) 10 MG tablet; TAKE 1 TABLET(10 MG) BY MOUTH DAILY  Dispense: 90 tablet; Refill: 2  - Comprehensive Metabolic Panel  - Lipid [place]    4. Rash    - Ambulatory referral to Dermatology    5. Alcohol abuse  Concerning.  Labs today for monitoring.  Offered chemical dependency evaluation he declines.  He is going to work with his friend who is a chemical dependency counselor and AA.  I will see him back in another couple weeks.  - HM2(CBC w/o Differential)    6. Obesity  Concerning.  We discussed that he is now obese.  I counseled him on getting back and exercise giving up the alcohol.    7. Screening for prostate cancer    - PSA, Annual Screen (Prostatic-Specific Antigen)        The patient's current medical problems were reviewed.    I have had an Advance Directives discussion with the patient.  The following health maintenance schedule was reviewed with the patient and provided in printed form in the after visit summary:   Health Maintenance   Topic Date Due     FALL RISK ASSESSMENT  07/19/2018     ADVANCE DIRECTIVES DISCUSSED WITH PATIENT  01/17/2019     COLONOSCOPY  04/26/2021     TD 18+ HE  01/22/2025     PNEUMOCOCCAL POLYSACCHARIDE  VACCINE AGE 65 AND OVER  Completed     INFLUENZA VACCINE RULE BASED  Completed     PNEUMOCOCCAL CONJUGATE VACCINE FOR ADULTS (PCV13 OR PREVNAR)  Completed     ZOSTER VACCINE  Completed        Subjective:   Chief Complaint: Efrain Gomes is an 67 y.o. male here for an Annual Wellness visit.   HPI:  Patient comes in today for annual wellness.  He states his been going to .  He believes that his alcohol use has worsened and he had not had issues in quite some time and now he also need to go to AA.  He still continues to drink.  2-4 drinks a day he states.  He does not know if he has had withdrawal symptoms.  He has had no problems with the law.  He did take a fall a couple months ago but states it was not alcohol related.  He injured his chest wall but that is better.  He has not been checking his blood pressure.  He is gained weight.  He has not been exercising.  He continues to have a rash in his groin.        Review of Systems:    Please see above.  The rest of the review of systems are negative for all systems.    Patient Care Team:  Ronnie Bundy MD as PCP - General (Internal Medicine)     Patient Active Problem List   Diagnosis     Folliculitis     ED (erectile dysfunction)     Chronic sinusitis     Low back pain     Hypercholesteremia     Essential hypertension     Obesity     No past medical history on file.   No past surgical history on file.   No family history on file.   Social History     Social History     Marital status:      Spouse name: N/A     Number of children: N/A     Years of education: N/A     Occupational History     Not on file.     Social History Main Topics     Smoking status: Never Smoker     Smokeless tobacco: Never Used     Alcohol use Not on file     Drug use: Not on file     Sexual activity: Not on file     Other Topics Concern     Not on file     Social History Narrative      Current Outpatient Prescriptions   Medication Sig Dispense Refill     amLODIPine (NORVASC) 5 MG  "tablet Take 1 tablet (5 mg total) by mouth daily. 90 tablet 2     aspirin 81 MG EC tablet Take 81 mg by mouth daily. As directed.       atorvastatin (LIPITOR) 10 MG tablet TAKE 1 TABLET(10 MG) BY MOUTH DAILY 90 tablet 2     cholecalciferol, vitamin D3, (VITAMIN D3) 2,000 unit Tab Take 1 tablet by mouth daily.       clindamycin (CLEOCIN T) 1 % lotion Use as directed prn 60 mL 1     fluocinolone acetonide oil (DERMOTIC) 0.01 % Drop Administer 3 drops into both ears 2 (two) times a day. Used as needed 20 mL 1     lisinopril (PRINIVIL,ZESTRIL) 40 MG tablet TAKE 1 TABLET BY MOUTH ONCE DAILY. 90 tablet 2     multivitamin capsule Take 1 capsule by mouth daily.       sildenafil (VIAGRA) 100 MG tablet Take 1 tablet (100 mg total) by mouth daily as needed for erectile dysfunction. 6 tablet 11     metoprolol succinate (TOPROL-XL) 25 MG Take 1 tablet (25 mg total) by mouth daily. 30 tablet 0     No current facility-administered medications for this visit.       Objective:   Vital Signs:   Visit Vitals     /82 (Patient Site: Right Arm, Patient Position: Sitting, Cuff Size: Adult Regular)     Pulse 88     Ht 5' 6\" (1.676 m)     Wt 188 lb (85.3 kg)     SpO2 99%     BMI 30.34 kg/m2        VisionScreening:  No exam data present     PHYSICAL EXAM  /82 (Patient Site: Right Arm, Patient Position: Sitting, Cuff Size: Adult Regular)  Pulse 88  Ht 5' 6\" (1.676 m)  Wt 188 lb (85.3 kg)  SpO2 99%  BMI 30.34 kg/m2    General Appearance:    Alert, cooperative, no distress, appears stated age   Head:    Normocephalic, without obvious abnormality, atraumatic   Eyes:    PERRL, conjunctiva/corneas clear, EOM's intact    Ears:    Normal TM's and external ear canals, both ears   Nose:   Nares normal, septum midline, mucosa normal, no drainage    or sinus tenderness   Throat:   Lips, mucosa, and tongue normal; teeth and gums normal   Neck:   Supple, symmetrical, trachea midline, no adenopathy;        thyroid:  No " enlargement/tenderness/nodules; no carotid    bruit or JVD   Back:     Symmetric, no curvature, ROM normal, no CVA tenderness   Lungs:     Clear to auscultation bilaterally, respirations unlabored   Chest wall:    No tenderness or deformity   Heart:    Regular rate and rhythm, S1 and S2 normal, no murmur, rub    or gallop   Abdomen:     Soft, non-tender, bowel sounds active all four quadrants,     no masses, no organomegaly ventral hernia    Genitalia:    Normal male without lesion, discharge or tenderness   Rectal:    Normal tone, normal prostate, no masses or tenderness;    guaiac negative stool   Extremities:   Extremities normal, atraumatic, no cyanosis or edema   Pulses:   2+ and symmetric all extremities   Skin:   Skin color, texture, turgor normal, rash in groin folds.   Lymph nodes:   Cervical, supraclavicular, and axillary nodes normal   Neurologic:   CNII-XII intact. Normal strength, sensation and reflexes       throughout       Assessment Results 1/22/2018   Activities of Daily Living No help needed   Instrumental Activities of Daily Living No help needed   Get Up and Go Score Less than 12 seconds   Mini Cog Total Score 5   Some recent data might be hidden     A Mini-Cog score of 0-2 suggests the possibility of dementia, score of 3-5 suggests no dementia    Identified Health Risks:     He is at risk for falling and has been provided with information to reduce the risk of falling at home.  Patient's advanced directive was discussed and I am comfortable with the patient's wishes.

## 2021-06-16 PROBLEM — K76.0 FATTY LIVER: Status: ACTIVE | Noted: 2018-05-31

## 2021-06-16 PROBLEM — E66.9 OBESITY: Status: ACTIVE | Noted: 2018-01-22

## 2021-06-16 PROBLEM — L12.0 BULLOUS PEMPHIGOID (H): Status: ACTIVE | Noted: 2019-07-11

## 2021-06-16 PROBLEM — F10.20 ALCOHOLISM (H): Status: ACTIVE | Noted: 2018-05-14

## 2021-06-16 NOTE — TELEPHONE ENCOUNTER
Telephone Encounter by Shereen Clarke LPN at 7/11/2019  3:36 PM     Author: Shereen Clarke LPN Service: -- Author Type: Certified Medical Assistant    Filed: 7/11/2019  3:38 PM Encounter Date: 7/11/2019 Status: Signed    : Shereen Clarke LPN (Licensed Nurse)       Patient Returning Call  Reason for call:  Test results   Information relayed to patient:  - Message from Ronnie Bundy MD sent at 7/11/2019  1:40 PM CDT -----  Please call pt:     Sodium came back fairly low.  This may just be a fluke.  Could be due to your medicines.  I would like you to stop the hydrochlorothiazide and come in for a repeat lab early next week.  Everything else here looks fine.     Vira, please place order for BMP.  Diagnosis is hyponatremia.  Thank you  Vira Silverio MA           7/11/19 1:46 PM   Note      LMTCB - please relay results/recommendations below per PCP. I will enter in future lab orders for PCP to co-sign. Please schedule patient for a lab appt only early next week, thank you.       Patient has additional questions:  No  If YES, what are your questions/concerns:  NONE   Okay to leave a detailed message?: No call back needed

## 2021-06-17 NOTE — TELEPHONE ENCOUNTER
Refill Approved    Rx renewed per Medication Renewal Policy. Medication was last renewed on 1/28/21.    Klaa Kay, [facility] Triage/Med Refill 4/28/2021     Requested Prescriptions   Pending Prescriptions Disp Refills     lisinopriL (PRINIVIL,ZESTRIL) 40 MG tablet [Pharmacy Med Name: LISINOPRIL 40MG TABLETS] 90 tablet 0     Sig: TAKE 1 TABLET(40 MG) BY MOUTH DAILY       [facility]ce Inhibitors Refill Protocol Passed - 4/27/2021  8:13 AM        Passed - PCP or prescribing provider visit in past 12 months       Last office visit with prescriber/PCP: Visit date not found OR same dept: Visit date not found OR same specialty: 11/16/2020 Ronnie Bundy MD  Last physical: Visit date not found Last MTM visit: Visit date not found   Next visit within 3 mo: Visit date not found  Next physical within 3 mo: Visit date not found  Prescriber OR PCP: Arpan Jiménez MD  Last diagnosis associated with med order: 1. Essential hypertension  - lisinopriL (PRINIVIL,ZESTRIL) 40 MG tablet [Pharmacy Med Name: LISINOPRIL 40MG TABLETS]; TAKE 1 TABLET(40 MG) BY MOUTH DAILY  Dispense: 90 tablet; Refill: 0    If protocol passes may refill for 12 months if within 3 months of last provider visit (or a total of 15 months).             Passed - Serum Potassium in past 12 months     Lab Results   Component Value Date    Potassium 4.6 11/16/2020             Passed - Blood pressure filed in past 12 months     BP Readings from Last 1 Encounters:   11/16/20 154/80             Passed - Serum Creatinine in past 12 months     Creatinine   Date Value Ref Range Status   11/16/2020 0.96 0.70 - 1.30 mg/dL Final

## 2021-06-18 NOTE — LETTER
Letter by Ronnie Bundy MD at      Author: Ronnie Bundy MD Service: -- Author Type: --    Filed:  Encounter Date: 1/10/2019 Status: (Other)       Efrain Gomes  [address] [place] [address]             January 10, 2019         Dear Mr. Gomes,    Below are the results from your recent visit:    Resulted Orders   Comprehensive Metabolic Panel   Result Value Ref Range    Sodium 139 136 - 145 mmol/L    Potassium 4.1 3.5 - 5.0 mmol/L    Chloride 102 98 - 107 mmol/L    CO2 27 22 - 31 mmol/L    Anion Gap, Calculation 10 5 - 18 mmol/L    Glucose 100 70 - 125 mg/dL    BUN 23 (H) 8 - 22 mg/dL    Creatinine 1.08 0.70 - 1.30 mg/dL    GFR MDRD Af Amer >60 >60 mL/min/1.73m2    GFR MDRD Non Af Amer >60 >60 mL/min/1.73m2    Bilirubin, Total 0.6 0.0 - 1.0 mg/dL    Calcium 9.6 8.5 - 10.5 mg/dL    Protein, Total 6.8 6.0 - 8.0 g/dL    Albumin 4.0 3.5 - 5.0 g/dL    Alkaline Phosphatase 66 45 - 120 U/L    AST 27 0 - 40 U/L    ALT 30 0 - 45 U/L    Narrative    Fasting Glucose reference range is 70-99 mg/dL per  American Diabetes Association (ADA) guidelines.   HM2(CBC w/o Differential)   Result Value Ref Range    WBC 5.5 4.0 - 11.0 thou/uL    RBC 4.47 4.40 - 6.20 mill/uL    Hemoglobin 13.6 (L) 14.0 - 18.0 g/dL    Hematocrit 41.1 40.0 - 54.0 %    MCV 92 80 - 100 fL    MCH 30.6 27.0 - 34.0 pg    MCHC 33.2 32.0 - 36.0 g/dL    RDW 13.0 11.0 - 14.5 %    Platelets 151 140 - 440 thou/uL    MPV 9.7 7.0 - 10.0 fL   Thyroid Cascade   Result Value Ref Range    TSH 2.68 0.30 - 5.00 uIU/mL   Lipid [place]   Result Value Ref Range    Cholesterol 147 <=199 mg/dL    Triglycerides 73 <=149 mg/dL    HDL Cholesterol 49 >=40 mg/dL    LDL Calculated 83 <=129 mg/dL    Patient Fasting > 8hrs? Yes    Urinalysis-UC if Indicated   Result Value Ref Range    Color, UA Yellow Colorless, Yellow, Straw, Light Yellow    Clarity, UA Clear Clear    Glucose, UA Negative Negative    Bilirubin, UA Negative Negative    Ketones, UA Negative Negative     Specific Gravity, UA 1.020 1.005 - 1.030    Blood, UA Negative Negative    pH, UA 6.5 5.0 - 8.0    Protein, UA Negative Negative mg/dL    Urobilinogen, UA 0.2 E.U./dL 0.2 E.U./dL, 1.0 E.U./dL    Nitrite, UA Negative Negative    Leukocytes, UA Negative Negative    Narrative    Microscopic not indicated  UC not indicated   PSA, Annual Screen (Prostatic-Specific Antigen)   Result Value Ref Range    PSA 0.9 0.0 - 4.5 ng/mL    Narrative    Method is Abbott Prostate-Specific Antigen (PSA)  Standard-WHO 1st International (90:10)       Thyroid function normal.  PSA is low and stable.  Kidney and liver tests are stable.  Cholesterol is excellent.  Urine is normal.  Blood counts stable.     Please call with questions or contact us using [employer].    Sincerely,        Electronically signed by Ronnie Bundy MD

## 2021-06-18 NOTE — PROGRESS NOTES
ASSESSMENT:   1. Cellulitis of left foot  cephalexin (KEFLEX) 500 MG capsule       PLAN:  68-year-old male presents for evaluation of swelling and redness of his left foot.  Of note, patient had a mole removed from the foot at the beginning of May.  Was seen again by the dermatologist to remove the mole for questionable cellulitis.  At that time a wound culture was taken from the mole removal site, this did result as negative.  In the interim of the wound culture resulting, patient was placed on a 4 day course of Keflex.  He does note that while on the Keflex the swelling and redness of his foot did improve.  He took 4 days of the medication, was contacted by the office and asked to stop.  His last dose was yesterday morning, and now symptoms return.  Exam today is consistent with a mild cellulitis.  I suspect that given the short time that he was on antibiotics, cellulitis was not completely resolved.  I have a low suspicion for DVT.  Patient does have a follow-up appointment with his primary care provider tomorrow morning.  This can be rechecked at that point.  He is prescribed a 10 day course of Keflex, warm soaks advised.  He will keep his appointment with his primary care provider in the morning to be rechecked at that point.    I discussed red flag symptoms, return precautions to clinic/ER and follow up care with patient/parent.  Expected clinical course, symptomatic cares advised. Questions answered. Patient/parent amenable with plan.    Patient Instructions:  Patient Instructions   I do think you have an infection of the soft tissue of your foot, and I think you did not take a complete course of antibiotics.  I have sent in a new prescription for Keflex.  Please keep your appointment with Dr. Bundy, and have him take a look at your foot.  Please use warm soaks several times a day to your foot as well.        SUBJECTIVE:   Efrain Gomes is a 68 y.o. male who presents today with left foot swelling. Had a  mole removed from the dorsum of the foot on 5/8 by derm, several days later developed redness and swelling to the foot.  Was seen by derm again, given 4 day course of Keflex, then called by the office and told the wound culture from the mole removal site was negative and he did not need to continue abx.  He does note he had vast improvement of swelling and redness while on Keflex.  Last dose of abx was yesterday, now symptoms are returning.  No fevers.       ROS:  Comprehensive 12 pt ROS completed, positives noted in HPI, otherwise negative.      Past Medical History:  Patient Active Problem List   Diagnosis     Folliculitis     ED (erectile dysfunction)     Chronic sinusitis     Low back pain     Hypercholesteremia     Essential hypertension     Obesity     Alcoholism       Surgical History:  No past surgical history on file.        Family History:  No family history on file.    Reviewed; Non-contributory    History   Smoking Status     Never Smoker   Smokeless Tobacco     Never Used            Current Medications:  Current Outpatient Prescriptions on File Prior to Visit   Medication Sig Dispense Refill     amLODIPine (NORVASC) 5 MG tablet Take 1 tablet (5 mg total) by mouth daily. 90 tablet 2     aspirin 81 MG EC tablet Take 81 mg by mouth daily. As directed.       atorvastatin (LIPITOR) 10 MG tablet TAKE 1 TABLET(10 MG) BY MOUTH DAILY 90 tablet 2     cholecalciferol, vitamin D3, (VITAMIN D3) 2,000 unit Tab Take 1 tablet by mouth daily.       clindamycin (CLEOCIN T) 1 % lotion Use as directed prn 60 mL 1     doxycycline (VIBRAMYCIN) 100 MG capsule Take 100 mg by mouth 2 (two) times a day.  10     fluocinolone acetonide oil (DERMOTIC) 0.01 % Drop Administer 3 drops into both ears 2 (two) times a day. Used as needed 20 mL 1     hydroCHLOROthiazide (HYDRODIURIL) 12.5 MG tablet Take 1 tablet (12.5 mg total) by mouth daily. 30 tablet 0     lisinopril (PRINIVIL,ZESTRIL) 40 MG tablet TAKE 1 TABLET BY MOUTH ONCE DAILY. 90  tablet 2     metoprolol succinate (TOPROL-XL) 50 MG 24 hr tablet Take 1 tablet (50 mg total) by mouth daily. 90 tablet 1     multivitamin capsule Take 1 capsule by mouth daily.       sildenafil (VIAGRA) 100 MG tablet Take 1 tablet (100 mg total) by mouth daily as needed for erectile dysfunction. 6 tablet 11     triamcinolone (KENALOG) 0.1 % cream STEPHEN TO BODY RASH D PRN  6     No current facility-administered medications on file prior to visit.        Allergies:   No Known Allergies    OBJECTIVE:   Vitals:    05/23/18 1756   BP: 138/68   Pulse: 65   Resp: 14   Temp: 98.2  F (36.8  C)   TempSrc: Oral   SpO2: 99%   Weight: 188 lb (85.3 kg)     Physical exam reveals a pleasant 68 y.o. male.   Appears healthy, alert and cooperative. Non-toxic appearance.  Lungs: Even, unlabored resp  Heart: regular rate  MSK: Left foot: Mild swelling, area of erythema to medial aspect of dorsum of foot, mild streaking proximally noted. Warm to touch. No evidence of abscess. No drainage. 2+ bilateral pedal pulses. No calf tenderness.     RADIOLOGY    none  LABORATORY STUDIES    none      Flaca Hairston, MINNIE

## 2021-06-18 NOTE — PROGRESS NOTES
"  Office Visit - Follow Up   Efrain Gomes   68 y.o. male    Date of Visit: 5/24/2018    Chief Complaint   Patient presents with     Cellulitis     Lt foot f/u - is better w/ abx since yesterday         Assessment and Plan   1. Cellulitis  Already markedly improved.  Continue 10 days treatment of the Keflex.    2. Folliculitis  I have suggested that he come off the doxycycline while on the Keflex but he refuses to do such.  We discussed the potential C. difficile and diarrhea.  I have discussed with him utilizing a probiotic peer    3. Essential hypertension  Much better control.  [facility] for monitoring  - Comprehensive Metabolic Panel    4. Elevated liver enzymes  Recheck liver enzymes today as well.        No Follow-up on file.     History of Present Illness   This 68 y.o. old patient comes in today for follow-up of his cellulitis.  He was seen last week by his dermatologist for cellulitis around a biopsy site.  He is given 4 days of Keflex and then told to stop it because a wound culture was negative.  He started getting hot painful swollen foot around the area yesterday.  I was told by triage nurse that he had fever but patient absolutely refuses that he even took his temperature.  He went into walk-in care.  They put him back on Keflex and he states that already he is markedly improved.  He is also taking doxycycline for folliculitis on his back.  He is tolerating the hydrochlorothiazide well for his blood pressure.  He has been off all alcohol since I saw him last.    Review of Systems: A comprehensive review of systems was negative except as noted.     Medications, Allergies and Problem List   Reviewed and updated     Physical Exam   General Appearance:   Pleasant gentleman.  Some mild swelling of the left foot dorsally but no significant erythema or warmth.    /72 (Patient Site: Right Arm, Patient Position: Sitting, Cuff Size: Adult Regular)  Pulse 64  Temp 98.7  F (37.1  C)  Ht 5' 6\" (1.676 m)  Wt " 186 lb (84.4 kg)  SpO2 98%  BMI 30.02 kg/m2         Additional Information   Current Outpatient Prescriptions   Medication Sig Dispense Refill     amLODIPine (NORVASC) 5 MG tablet Take 1 tablet (5 mg total) by mouth daily. 90 tablet 2     aspirin 81 MG EC tablet Take 81 mg by mouth daily. As directed.       atorvastatin (LIPITOR) 10 MG tablet TAKE 1 TABLET(10 MG) BY MOUTH DAILY 90 tablet 2     cephalexin (KEFLEX) 500 MG capsule Take 1 capsule (500 mg total) by mouth 4 (four) times a day for 10 days. 40 capsule 0     cholecalciferol, vitamin D3, (VITAMIN D3) 2,000 unit Tab Take 1 tablet by mouth daily.       clindamycin (CLEOCIN T) 1 % lotion Use as directed prn 60 mL 1     doxycycline (VIBRAMYCIN) 100 MG capsule Take 100 mg by mouth 2 (two) times a day.  10     fluocinolone acetonide oil (DERMOTIC) 0.01 % Drop Administer 3 drops into both ears 2 (two) times a day. Used as needed 20 mL 1     hydroCHLOROthiazide (HYDRODIURIL) 12.5 MG tablet Take 1 tablet (12.5 mg total) by mouth daily. 30 tablet 0     lisinopril (PRINIVIL,ZESTRIL) 40 MG tablet TAKE 1 TABLET BY MOUTH ONCE DAILY. 90 tablet 2     metoprolol succinate (TOPROL-XL) 50 MG 24 hr tablet Take 1 tablet (50 mg total) by mouth daily. 90 tablet 1     multivitamin capsule Take 1 capsule by mouth daily.       sildenafil (VIAGRA) 100 MG tablet Take 1 tablet (100 mg total) by mouth daily as needed for erectile dysfunction. 6 tablet 11     triamcinolone (KENALOG) 0.1 % cream STEPHEN TO BODY RASH D PRN  6     No current facility-administered medications for this visit.      No Known Allergies  Social History   Substance Use Topics     Smoking status: Never Smoker     Smokeless tobacco: Never Used     Alcohol use None       Review and/or order of clinical lab tests:  Review and/or order of radiology tests:  Review and/or order of medicine tests:  Discussion of test results with performing physician:  Decision to obtain old records and/or obtain history from someone other  than the patient:  Review and summarization of old records and/or obtaining history from someone other than the patient and.or discussion of case with another health care provider:  Independent visualization of image, tracing or specimen itself:    Time: total time spent with the patient was 15 minutes of which >50% was spent in counseling and coordination of care     Ronnie Bundy MD

## 2021-06-18 NOTE — LETTER
Letter by Ronnie Bundy MD at      Author: Ronnie Bundy MD Service: -- Author Type: --    Filed:  Encounter Date: 1/9/2019 Status: (Other)       Efrain Gomes  [address]  [place] [address]             January 9, 2019         Dear Mr. Gomes,    Below are the results from your recent visit:    Resulted Orders   US Venous Leg Left    Narrative    US VENOUS LEG LEFT  1/9/2019 10:07 AM    INDICATION: Pain and swelling.  TECHNIQUE: Routine exam without and with compression, augmentation, and duplex utilizing 2D gray-scale imaging, Doppler interrogation with color-flow and spectral waveform analysis.  COMPARISON: None.    FINDINGS: The common femoral, femoral, popliteal, and segmentally visualized calf veins were evaluated. The opposite CFV was also included in the evaluation.    Left leg veins are negative for deep venous thrombosis. No popliteal cysts.      Impression    CONCLUSION:  Left leg veins are negative for DVT.       No clot in the leg Efrain.     Please call with questions or contact us using [employer].    Sincerely,        Electronically signed by Ronnie Bundy MD

## 2021-06-18 NOTE — PROGRESS NOTES
Office Visit - Follow Up   Efrain Gomes   68 y.o. male    Date of Visit: 5/14/2018    Chief Complaint   Patient presents with     Hypertension     med & bp f/u - not fasting        Assessment and Plan   1. Essential hypertension  Add hydrochlorothiazide 12.5 mg and see me back in 2 weeks.  - lisinopril (PRINIVIL,ZESTRIL) 40 MG tablet; TAKE 1 TABLET BY MOUTH ONCE DAILY.  Dispense: 90 tablet; Refill: 2  - hydroCHLOROthiazide (HYDRODIURIL) 12.5 MG tablet; Take 1 tablet (12.5 mg total) by mouth daily.  Dispense: 30 tablet; Refill: 0    2. Folliculitis  He is been dealing with a folliculitis recently.  He will he is on doxycycline per his dermatologist.    3. Cold intolerance  He notes his been cold on the doxycycline.  Does not seem feverish.  Labs as below.  - Thyroid Stimulating Hormone (TSH)  - Comprehensive Metabolic Panel    4. Hypercholesteremia  Continue regimen.    5. Alcoholism  Urged complete cessation.  Continue work his program.    6. Elevated liver enzymes  Recheck liver enzymes today.  - Comprehensive Metabolic Panel  - HM1(CBC and Differential)  - HM1 (CBC with Diff)        No Follow-up on file.     History of Present Illness   This 68 y.o. old patient comes in today for follow-up of his hypertension.  He was supposed to be here about 3 months ago but failed to do so.  He has a history of alcoholism and has been active in .  He started drinking again about 4 years ago and has now been trying to get back on the [place].  He states he has not had a drink in 8-9 days now.  He has not had any major binge drinking recently he tells me.  He is not been interested in going to see a chemical dependency counselor.  Blood pressures been high.  Possibly made worse by his drinking.  He has not checked his blood pressure recently.  He has not been exercising.  He denies other new concerns today.    Review of Systems: A comprehensive review of systems was negative except as noted.     Medications, Allergies and  "Problem List   Reviewed and updated     Physical Exam   General Appearance:   Pleasant gentleman.  No distress.  Not tremulous.  Vitals are noted.  Blood pressure my check 160/80.    /82 (Patient Site: Right Arm, Patient Position: Sitting, Cuff Size: Adult Regular)  Pulse 68  Ht 5' 6\" (1.676 m)  Wt 188 lb (85.3 kg)  SpO2 98%  BMI 30.34 kg/m2         Additional Information   Current Outpatient Prescriptions   Medication Sig Dispense Refill     amLODIPine (NORVASC) 5 MG tablet Take 1 tablet (5 mg total) by mouth daily. 90 tablet 2     atorvastatin (LIPITOR) 10 MG tablet TAKE 1 TABLET(10 MG) BY MOUTH DAILY 90 tablet 2     cholecalciferol, vitamin D3, (VITAMIN D3) 2,000 unit Tab Take 1 tablet by mouth daily.       clindamycin (CLEOCIN T) 1 % lotion Use as directed prn 60 mL 1     doxycycline (VIBRAMYCIN) 100 MG capsule Take 100 mg by mouth 2 (two) times a day.  10     fluocinolone acetonide oil (DERMOTIC) 0.01 % Drop Administer 3 drops into both ears 2 (two) times a day. Used as needed 20 mL 1     metoprolol succinate (TOPROL-XL) 50 MG 24 hr tablet Take 1 tablet (50 mg total) by mouth daily. 90 tablet 1     multivitamin capsule Take 1 capsule by mouth daily.       sildenafil (VIAGRA) 100 MG tablet Take 1 tablet (100 mg total) by mouth daily as needed for erectile dysfunction. 6 tablet 11     triamcinolone (KENALOG) 0.1 % cream STEPHEN TO BODY RASH D PRN  6     aspirin 81 MG EC tablet Take 81 mg by mouth daily. As directed.       hydroCHLOROthiazide (HYDRODIURIL) 12.5 MG tablet Take 1 tablet (12.5 mg total) by mouth daily. 30 tablet 0     lisinopril (PRINIVIL,ZESTRIL) 40 MG tablet TAKE 1 TABLET BY MOUTH ONCE DAILY. 90 tablet 2     No current facility-administered medications for this visit.      No Known Allergies  Social History   Substance Use Topics     Smoking status: Never Smoker     Smokeless tobacco: Never Used     Alcohol use None       Review and/or order of clinical lab tests:  Review and/or order of " radiology tests:  Review and/or order of medicine tests:  Discussion of test results with performing physician:  Decision to obtain old records and/or obtain history from someone other than the patient:  Review and summarization of old records and/or obtaining history from someone other than the patient and.or discussion of case with another health care provider:  Independent visualization of image, tracing or specimen itself:    Time: total time spent with the patient was 25 minutes of which >50% was spent in counseling and coordination of care     Ronnie Bundy MD

## 2021-06-18 NOTE — PROGRESS NOTES
Office Visit - Follow Up   Efrain Gomes   68 y.o. male    Date of Visit: 5/31/2018    Chief Complaint   Patient presents with     Follow-up     bp check - cellulitis is better      Bleeding/Bruising     easily bruising - stopped 81mg asprin 2 wks ago        Assessment and Plan   1. Fatty liver  Counseled patient at length on fatty liver.  It is unclear if this is alcohol induced or due to his weight.  Regardless he is now off alcohol which I congratulated him on and we discussed weight loss.  I will see him back in 3 months.  His liver enzymes are already improving.    2. Alcoholism  Congratulated him on his sobriety.  Continue to work his program.    3. Essential hypertension  Good control now that he is not drinking.    4. Easy bruising  Likely combination of his alcohol use and baby aspirin.  I had like him to get back on the aspirin at some point.    5. Cellulitis  He is much improved.  No residual cellulitis at this time.  Biopsy site is healing up nicely.        Return in about 3 months (around 8/31/2018) for Recheck.     History of Present Illness   This 68 y.o. old gentleman comes in today for follow-up.  He has been off alcohol now for about a month and he is feeling very good.  His blood pressures been well controlled.  He feels his skin is getting better.  His cellulitis is resolving.  He did undergo ultrasound which indeed confirmed fatty liver.  It is unclear if this is alcohol induced versus due to his obesity.  He is trying to be good about eating less carbohydrate and staying active.  He is going to daily AA meetings.  Finding support through friends.  He denies other new concerns for me.  He did note that he is having some easy bruising so he stopped his aspirin a few weeks ago.    Review of Systems: A comprehensive review of systems was negative except as noted.     Medications, Allergies and Problem List   Reviewed and updated     Physical Exam   General Appearance:   Pleasant gentleman.  A  "few ecchymoses over his hand.    /74 (Patient Site: Right Arm, Patient Position: Sitting, Cuff Size: Adult Regular)  Pulse 64  Ht 5' 6\" (1.676 m)  Wt 188 lb (85.3 kg)  BMI 30.34 kg/m2         Additional Information   Current Outpatient Prescriptions   Medication Sig Dispense Refill     amLODIPine (NORVASC) 5 MG tablet Take 1 tablet (5 mg total) by mouth daily. 90 tablet 2     atorvastatin (LIPITOR) 10 MG tablet TAKE 1 TABLET(10 MG) BY MOUTH DAILY 90 tablet 2     cephalexin (KEFLEX) 500 MG capsule Take 1 capsule (500 mg total) by mouth 4 (four) times a day for 10 days. 40 capsule 0     cholecalciferol, vitamin D3, (VITAMIN D3) 2,000 unit Tab Take 1 tablet by mouth daily.       clindamycin (CLEOCIN T) 1 % lotion Use as directed prn 60 mL 1     doxycycline (VIBRAMYCIN) 100 MG capsule Take 100 mg by mouth 2 (two) times a day.  10     fluocinolone acetonide oil (DERMOTIC) 0.01 % Drop Administer 3 drops into both ears 2 (two) times a day. Used as needed 20 mL 1     hydroCHLOROthiazide (HYDRODIURIL) 12.5 MG tablet Take 1 tablet (12.5 mg total) by mouth daily. 30 tablet 0     lisinopril (PRINIVIL,ZESTRIL) 40 MG tablet TAKE 1 TABLET BY MOUTH ONCE DAILY. 90 tablet 2     metoprolol succinate (TOPROL-XL) 50 MG 24 hr tablet Take 1 tablet (50 mg total) by mouth daily. 90 tablet 1     multivitamin capsule Take 1 capsule by mouth daily.       sildenafil (VIAGRA) 100 MG tablet Take 1 tablet (100 mg total) by mouth daily as needed for erectile dysfunction. 6 tablet 11     triamcinolone (KENALOG) 0.1 % cream STEPHEN TO BODY RASH D PRN  6     aspirin 81 MG EC tablet Take 81 mg by mouth daily. As directed.       No current facility-administered medications for this visit.      No Known Allergies  Social History   Substance Use Topics     Smoking status: Never Smoker     Smokeless tobacco: Never Used     Alcohol use None       Review and/or order of clinical lab tests:  Review and/or order of radiology tests:  Review and/or order " of medicine tests:  Discussion of test results with performing physician:  Decision to obtain old records and/or obtain history from someone other than the patient:  Review and summarization of old records and/or obtaining history from someone other than the patient and.or discussion of case with another health care provider:  Independent visualization of image, tracing or specimen itself:    Time: total time spent with the patient was 25 minutes of which >50% was spent in counseling and coordination of care     Ronnie Bundy MD

## 2021-06-19 NOTE — LETTER
Letter by Ronnie Bundy MD at      Author: Ronnie Bundy MD Service: -- Author Type: --    Filed:  Encounter Date: 7/17/2019 Status: (Other)         Efrain Gomes  [address] [place] [address]             July 17, 2019         Dear Mr. Gomes,    Below are the results from your recent visit:    Resulted Orders   Basic Metabolic Panel   Result Value Ref Range    Sodium 134 (L) 136 - 145 mmol/L    Potassium 4.8 3.5 - 5.0 mmol/L    Chloride 101 98 - 107 mmol/L    CO2 26 22 - 31 mmol/L    Anion Gap, Calculation 7 5 - 18 mmol/L    Glucose 95 70 - 125 mg/dL    Calcium 9.7 8.5 - 10.5 mg/dL    BUN 24 8 - 28 mg/dL    Creatinine 1.07 0.70 - 1.30 mg/dL    GFR MDRD Af Amer >60 >60 mL/min/1.73m2    GFR MDRD Non Af Amer >60 >60 mL/min/1.73m2    Narrative    Fasting Glucose reference range is 70-99 mg/dL per  American Diabetes Association (ADA) guidelines.       Sodium level looks much better.  Stay off the hydrochlorothiazide.  Please make an appointment with me in about 3 weeks for blood pressure check.     Please call with questions or contact us using [employer].    Sincerely,        Electronically signed by Ronnie Bundy MD

## 2021-06-19 NOTE — LETTER
Letter by Ronnie Bundy MD at      Author: Ronnie Bundy MD Service: -- Author Type: --    Filed:  Encounter Date: 7/11/2019 Status: (Other)         Efrain Gomes  [address] [place] [address]             July 11, 2019         Dear Mr. Gomes,    Below are the results from your recent visit:    Resulted Orders   Comprehensive Metabolic Panel   Result Value Ref Range    Sodium 129 (L) 136 - 145 mmol/L    Potassium 4.5 3.5 - 5.0 mmol/L    Chloride 96 (L) 98 - 107 mmol/L    CO2 24 22 - 31 mmol/L    Anion Gap, Calculation 9 5 - 18 mmol/L    Glucose 100 70 - 125 mg/dL    BUN 23 8 - 28 mg/dL    Creatinine 1.00 0.70 - 1.30 mg/dL    GFR MDRD Af Amer >60 >60 mL/min/1.73m2    GFR MDRD Non Af Amer >60 >60 mL/min/1.73m2    Bilirubin, Total 0.5 0.0 - 1.0 mg/dL    Calcium 10.0 8.5 - 10.5 mg/dL    Protein, Total 6.8 6.0 - 8.0 g/dL    Albumin 4.1 3.5 - 5.0 g/dL    Alkaline Phosphatase 68 45 - 120 U/L    AST 27 0 - 40 U/L    ALT 36 0 - 45 U/L    Narrative    Fasting Glucose reference range is 70-99 mg/dL per  American Diabetes Association (ADA) guidelines.   HM2(CBC w/o Differential)   Result Value Ref Range    WBC 5.9 4.0 - 11.0 thou/uL    RBC 4.47 4.40 - 6.20 mill/uL    Hemoglobin 14.0 14.0 - 18.0 g/dL    Hematocrit 41.6 40.0 - 54.0 %    MCV 93 80 - 100 fL    MCH 31.3 27.0 - 34.0 pg    MCHC 33.6 32.0 - 36.0 g/dL    RDW 11.9 11.0 - 14.5 %    Platelets 234 140 - 440 thou/uL    MPV 7.8 7.0 - 10.0 fL       Sodium came back fairly low.  This may just be a fluke.  Could be due to your medicines.  I would like you to stop the hydrochlorothiazide and come in for a repeat lab early next week.  Everything else here looks fine.     Please call with questions or contact us using [employer].    Sincerely,        Electronically signed by Ronnie Bundy MD

## 2021-06-19 NOTE — LETTER
Letter by Ronnie Bundy MD at      Author: Ronnie Bundy MD Service: -- Author Type: --    Filed:  Encounter Date: 8/9/2019 Status: (Other)         Efrain Gomes  [address] [place] [address]             August 9, 2019         Dear Mr. Gomes,    Below are the results from your recent visit:    Resulted Orders   Basic Metabolic Panel   Result Value Ref Range    Sodium 135 (L) 136 - 145 mmol/L    Potassium 4.8 3.5 - 5.0 mmol/L    Chloride 104 98 - 107 mmol/L    CO2 23 22 - 31 mmol/L    Anion Gap, Calculation 8 5 - 18 mmol/L    Glucose 100 70 - 125 mg/dL    Calcium 9.8 8.5 - 10.5 mg/dL    BUN 23 8 - 28 mg/dL    Creatinine 0.88 0.70 - 1.30 mg/dL    GFR MDRD Af Amer >60 >60 mL/min/1.73m2    GFR MDRD Non Af Amer >60 >60 mL/min/1.73m2    Narrative    Fasting Glucose reference range is 70-99 mg/dL per  American Diabetes Association (ADA) guidelines.        Looks good.     Please call with questions or contact us using [employer].    Sincerely,        Electronically signed by Ronnie Bundy MD

## 2021-06-20 NOTE — PROGRESS NOTES
"  Office Visit - Follow Up   Efrain Gomes   68 y.o. male    Date of Visit: 9/5/2018    Chief Complaint   Patient presents with     Hypertension     3 mo f/u - not fasting         Assessment and Plan   1. Essential hypertension  Good control with current regimen.  Continue same.  Labs today for monitoring.  - Comprehensive Metabolic Panel    2. Alcoholism (H)  He is back at  again and I have urged him to continue to work his program.    3. Fatty liver  As above, due for labs today for monitoring.  - Comprehensive Metabolic Panel    4. Hypercholesteremia  Continue lipid-lowering therapies.    5. Elevated ferritin  He had an elevated ferritin a few months ago.  I need to recheck that today.  - Ferritin  - Iron and Transferrin Iron Binding Capacity        Return in about 3 months (around 12/5/2018) for Recheck.     History of Present Illness   This 68 y.o. old patient comes in today for follow-up.  Since I saw him last he states he had another week lapse in his alcoholism.  Couple months ago.  He was vague about it but he is sober now he states.  Feeling well.  Enjoying things used to like to do.  He was down a couple months ago but states that mood is not an issue at this time.  He may go back to [place] this winter he tells me.  Blood pressures been well controlled when he is not drinking.  He is eating healthy.  He is not in a relationship currently.    Review of Systems: A comprehensive review of systems was negative except as noted.     Medications, Allergies and Problem List   Reviewed and updated     Physical Exam   General Appearance:   Pleasant gentleman.  Obese.  Good spirits.  Blood pressure noted.    /70 (Patient Site: Right Arm, Patient Position: Sitting, Cuff Size: Adult Regular)  Pulse 60  Ht 5' 6\" (1.676 m)  Wt 188 lb (85.3 kg)  SpO2 98%  BMI 30.34 kg/m2         Additional Information   Current Outpatient Prescriptions   Medication Sig Dispense Refill     amLODIPine (NORVASC) 5 MG tablet " Take 1 tablet (5 mg total) by mouth daily. 90 tablet 2     aspirin 81 MG EC tablet Take 81 mg by mouth daily. As directed.       atorvastatin (LIPITOR) 10 MG tablet TAKE 1 TABLET(10 MG) BY MOUTH DAILY 90 tablet 2     cholecalciferol, vitamin D3, (VITAMIN D3) 2,000 unit Tab Take 1 tablet by mouth daily.       clindamycin (CLEOCIN T) 1 % lotion Use as directed prn 60 mL 1     fluocinolone acetonide oil (DERMOTIC) 0.01 % Drop Administer 3 drops into both ears 2 (two) times a day. Used as needed 20 mL 1     hydroCHLOROthiazide (HYDRODIURIL) 12.5 MG tablet TAKE 1 TABLET(12.5 MG) BY MOUTH DAILY 90 tablet 2     lisinopril (PRINIVIL,ZESTRIL) 40 MG tablet TAKE 1 TABLET BY MOUTH ONCE DAILY. 90 tablet 2     metoprolol succinate (TOPROL-XL) 50 MG 24 hr tablet Take 1 tablet (50 mg total) by mouth daily. 90 tablet 3     triamcinolone (KENALOG) 0.1 % cream STEPHEN TO BODY RASH D PRN  6     sildenafil (VIAGRA) 100 MG tablet Take 1 tablet (100 mg total) by mouth daily as needed for erectile dysfunction. 6 tablet 11     No current facility-administered medications for this visit.      No Known Allergies  Social History   Substance Use Topics     Smoking status: Never Smoker     Smokeless tobacco: Never Used     Alcohol use None       Review and/or order of clinical lab tests:  Review and/or order of radiology tests:  Review and/or order of medicine tests:  Discussion of test results with performing physician:  Decision to obtain old records and/or obtain history from someone other than the patient:  Review and summarization of old records and/or obtaining history from someone other than the patient and.or discussion of case with another health care provider:  Independent visualization of image, tracing or specimen itself:    Time: total time spent with the patient was 25 minutes of which >50% was spent in counseling and coordination of care     Ronnie Bundy MD

## 2021-06-21 NOTE — LETTER
Letter by Ronnie Bundy MD at      Author: Ronnie Bundy MD Service: -- Author Type: --    Filed:  Encounter Date: 11/19/2020 Status: (Other)         Efrain Gomes  [address]  [place] [address]             November 19, 2020         Dear Mr. Gomes,    Below are the results from your recent visit:    Resulted Orders   Urinalysis-UC if Indicated   Result Value Ref Range    Color, UA Yellow Colorless, Yellow, Straw, Light Yellow    Clarity, UA Clear Clear    Glucose, UA Negative Negative    Bilirubin, UA Small (!) Negative    Ketones, UA 15 mg/dL (!) Negative    Specific Gravity, UA 1.025 1.005 - 1.030    Blood, UA Negative Negative    pH, UA 6.5 5.0 - 8.0    Protein, UA Trace (!) Negative mg/dL    Urobilinogen, UA 1.0 E.U./dL 0.2 E.U./dL, 1.0 E.U./dL    Nitrite, UA Negative Negative    Leukocytes, UA Negative Negative    Bacteria, UA None Seen None Seen hpf    RBC, UA None Seen None Seen, 0-2 hpf    WBC, UA None Seen None Seen, 0-5 hpf    Squam Epithel, UA None Seen None Seen, 0-5 lpf    Mucus, UA Few (!) None Seen lpf    Narrative    UC not indicated   Comprehensive Metabolic Panel   Result Value Ref Range    Sodium 132 (L) 136 - 145 mmol/L    Potassium 4.6 3.5 - 5.0 mmol/L    Chloride 96 (L) 98 - 107 mmol/L    CO2 23 22 - 31 mmol/L    Anion Gap, Calculation 13 5 - 18 mmol/L    Glucose 110 70 - 125 mg/dL    BUN 13 8 - 28 mg/dL    Creatinine 0.96 0.70 - 1.30 mg/dL    GFR MDRD Af Amer >60 >60 mL/min/1.73m2    GFR MDRD Non Af Amer >60 >60 mL/min/1.73m2    Bilirubin, Total 1.4 (H) 0.0 - 1.0 mg/dL    Calcium 9.7 8.5 - 10.5 mg/dL    Protein, Total 7.3 6.0 - 8.0 g/dL    Albumin 4.8 3.5 - 5.0 g/dL    Alkaline Phosphatase 79 45 - 120 U/L    AST 45 (H) 0 - 40 U/L    ALT 39 0 - 45 U/L    Narrative    Fasting Glucose reference range is 70-99 mg/dL per  American Diabetes Association (ADA) guidelines.   Lipid Cascade   Result Value Ref Range    Cholesterol 195 <=199 mg/dL    Triglycerides 57 <=149 mg/dL    HDL  Cholesterol 94 >=40 mg/dL    LDL Calculated 90 <=129 mg/dL    Patient Fasting > 8hrs? Yes        Cholesterol looks good.  Liver enzyme elevated and sodium low likely due to your alcohol use.  Hopefully you got in to meet with a chemical dependency counselor soon?     CA:  Please let me know what the status is on the chemical dependency consult.  Thank you.  Galo notified of the result Stephanie Meier, EDITH      Please call with questions or contact us using [employer].    Sincerely,        Electronically signed by Ronnie Bundy MD

## 2021-06-22 NOTE — PATIENT INSTRUCTIONS - HE
Advance Directive  Patient s advance directive was discussed and I am comfortable with the patient s wishes.  Patient Education   Personalized Prevention Plan  You are due for the preventive services outlined below.  Your care team is available to assist you in scheduling these services.  If you have already completed any of these items, please share that information with your care team to update in your medical record.  Health Maintenance   Topic Date Due     ZOSTER VACCINES (2 of 3) 09/21/2010     FALL RISK ASSESSMENT  01/09/2020     COLONOSCOPY  04/26/2021     ADVANCE DIRECTIVES DISCUSSED WITH PATIENT  01/22/2023     TD 18+ HE  01/22/2025     PNEUMOCOCCAL POLYSACCHARIDE VACCINE AGE 65 AND OVER  Completed     INFLUENZA VACCINE RULE BASED  Completed     PNEUMOCOCCAL CONJUGATE VACCINE FOR ADULTS (PCV13 OR PREVNAR)  Completed

## 2021-06-22 NOTE — PROGRESS NOTES
Assessment and Plan:   1. Encounter for Medicare annual wellness exam  Urged shingles vaccine.  Urged continued efforts at weight loss.  I will see him back in 6 months as long as all is well.  Counseled on PSA screening and he would like to proceed.  - PSA, Annual Screen (Prostatic-Specific Antigen)    2. Edema, unspecified type  Likely due to salt and his amlodipine.  Cut amlodipine back to 2.5 mg.  Ultrasound to rule out DVT.  If things do not improve or worsen he is to let me know.  - Electrocardiogram Perform - Clinic  - Comprehensive Metabolic Panel  - Thyroid Cascade  - Urinalysis-UC if Indicated  - US Venous Leg Left; Future    3. Alcoholism (H)  Alcohol free for months now.  Continue to encourage sobriety.    4. Essential hypertension  Blood pressure well controlled.  Cut back amlodipine given the swelling.  - Lipid Cascade  - Urinalysis-UC if Indicated  - amLODIPine (NORVASC) 5 MG tablet; Take 0.5 tablets (2.5 mg total) by mouth daily.  Dispense: 90 tablet; Refill: 2    5. Fatty liver  Continued efforts at lifestyle modification urged.  Weight loss urged.  - HM2(CBC w/o Differential)    6. Folliculitis  Continue close follow-up with dermatology.  - HM2(CBC w/o Differential)    7. Hypercholesteremia  Lipids today for monitoring.  - Lipid Cascade    8. Class 1 obesity due to excess calories without serious comorbidity with body mass index (BMI) of 31.0 to 31.9 in adult  Counseled on weight loss today.    9. Encounter for screening for malignant neoplasm of prostate     - PSA, Annual Screen (Prostatic-Specific Antigen)        The patient's current medical problems were reviewed.    I have had an Advance Directives discussion with the patient.  The following health maintenance schedule was reviewed with the patient and provided in printed form in the after visit summary:   Health Maintenance   Topic Date Due     ZOSTER VACCINES (2 of 3) 09/21/2010     FALL RISK ASSESSMENT  01/09/2020     COLONOSCOPY   04/26/2021     ADVANCE DIRECTIVES DISCUSSED WITH PATIENT  01/22/2023     TD 18+ HE  01/22/2025     PNEUMOCOCCAL POLYSACCHARIDE VACCINE AGE 65 AND OVER  Completed     INFLUENZA VACCINE RULE BASED  Completed     PNEUMOCOCCAL CONJUGATE VACCINE FOR ADULTS (PCV13 OR PREVNAR)  Completed        Subjective:   Chief Complaint: Efrain Gomes is an 68 y.o. male here for an Annual Wellness visit.   HPI: Efrain comes in today for follow-up and for annual wellness.  He is complaining of recurrence of his rash which has been diagnosed of several different things.  Bronx's disease, folliculitis, multiple other things.  His most recent dermatologist given the diagnosis of folliculitis, presumably based upon biopsy although I do not have those results.    He has been sober since I saw him last.  About 5 months he tells me.  He does not know the number of days though.    He occasionally get constipated.    His biggest issue that is new is some left lower extremity swelling.  Intermittent.  Not bothersome.  No pain.  He tries to stay away from salt.  He is eating good.  He is not doing much exercise.    Review of Systems:    Please see above.  The rest of the review of systems are negative for all systems.    Patient Care Team:  Ronnie Bundy MD as PCP - General (Internal Medicine)     Patient Active Problem List   Diagnosis     Folliculitis     ED (erectile dysfunction)     Chronic sinusitis     Low back pain     Hypercholesteremia     Essential hypertension     Obesity     Alcoholism (H)     Fatty liver     No past medical history on file.   No past surgical history on file.   No family history on file.   Social History     Socioeconomic History     Marital status:      Spouse name: Not on file     Number of children: Not on file     Years of education: Not on file     Highest education level: Not on file   Social Needs     Financial resource strain: Not on file     Food insecurity - worry: Not on file     Food  insecurity - inability: Not on file     Transportation needs - medical: Not on file     Transportation needs - non-medical: Not on file   Occupational History     Not on file   Tobacco Use     Smoking status: Never Smoker     Smokeless tobacco: Never Used   Substance and Sexual Activity     Alcohol use: Not on file     Drug use: Not on file     Sexual activity: Not on file   Other Topics Concern     Not on file   Social History Narrative     Not on file      Current Outpatient Medications   Medication Sig Dispense Refill     amLODIPine (NORVASC) 5 MG tablet Take 0.5 tablets (2.5 mg total) by mouth daily. 90 tablet 2     aspirin 81 MG EC tablet Take 81 mg by mouth daily. As directed.       atorvastatin (LIPITOR) 10 MG tablet TAKE 1 TABLET(10 MG) BY MOUTH DAILY 90 tablet 2     cefuroxime (CEFTIN) 500 MG tablet Take 500 mg by mouth 2 (two) times a day.       cholecalciferol, vitamin D3, (VITAMIN D3) 2,000 unit Tab Take 1 tablet by mouth daily.       clindamycin (CLEOCIN T) 1 % lotion Use as directed prn (Patient taking differently: Apply to directed two times a day prn      ) 60 mL 1     hydroCHLOROthiazide (HYDRODIURIL) 12.5 MG tablet TAKE 1 TABLET(12.5 MG) BY MOUTH DAILY 90 tablet 2     lisinopril (PRINIVIL,ZESTRIL) 40 MG tablet TAKE 1 TABLET BY MOUTH EVERY DAY. 90 tablet 3     metoprolol succinate (TOPROL-XL) 50 MG 24 hr tablet Take 1 tablet (50 mg total) by mouth daily. 90 tablet 3     sildenafil (VIAGRA) 100 MG tablet Take 1 tablet (100 mg total) by mouth daily as needed for erectile dysfunction. 6 tablet 11     WAL-FEX ALLERGY 60 mg tablet TK 1 T PO BID PRF ITCHING  0     fluocinolone acetonide oil (DERMOTIC) 0.01 % Drop Administer 3 drops into both ears 2 (two) times a day. Used as needed 20 mL 1     triamcinolone (KENALOG) 0.1 % cream STEPHEN TO BODY RASH D PRN  6     No current facility-administered medications for this visit.       Objective:   Vital Signs:   Visit Vitals  /78 (Patient Site: Right Arm,  "Patient Position: Sitting, Cuff Size: Adult Regular)   Pulse 68   Ht 5' 6\" (1.676 m)   Wt 194 lb (88 kg)   SpO2 98%   BMI 31.31 kg/m         VisionScreening:  No exam data present     PHYSICAL EXAM  /78 (Patient Site: Right Arm, Patient Position: Sitting, Cuff Size: Adult Regular)   Pulse 68   Ht 5' 6\" (1.676 m)   Wt 194 lb (88 kg)   SpO2 98%   BMI 31.31 kg/m      General Appearance:    Alert, cooperative, no distress, appears stated age   Head:    Normocephalic, without obvious abnormality, atraumatic   Eyes:    PERRL, conjunctiva/corneas clear, EOM's intact,   Ears:    Normal TM's and external ear canals, both ears   Nose:   Nares normal, septum midline, mucosa normal, no drainage    or sinus tenderness   Throat:   Lips, mucosa, and tongue normal; teeth and gums normal   Neck:   Supple, symmetrical, trachea midline, no adenopathy;        thyroid:  No enlargement/tenderness/nodules; no carotid    bruit or JVD   Back:     Symmetric, no curvature, ROM normal, no CVA tenderness   Lungs:     Clear to auscultation bilaterally, respirations unlabored   Chest wall:    No tenderness or deformity   Heart:    Regular rate and rhythm, S1 and S2 normal, no murmur, rub    or gallop   Abdomen:     Soft, non-tender, bowel sounds active all four quadrants,     no masses, no organomegaly ventral hernia in place.   Genitalia:    Normal male without lesion, discharge or tenderness   Rectal:    Normal tone, normal prostate, no masses or tenderness;    guaiac negative stool   Extremities:   Extremities normal, atraumatic, no cyanosis or edema   Pulses:   2+ and symmetric all extremities   Skin:   Skin color, texture, turgor normal, folliculitis on trunk.  Also with dermatitis low abdomen and feet bilaterally.   Lymph nodes:   Cervical, supraclavicular, and axillary nodes normal   Neurologic:   CNII-XII intact. Normal strength, sensation and reflexes       throughout       Assessment Results 1/9/2019   Activities of Daily " Living No help needed   Instrumental Activities of Daily Living No help needed   Get Up and Go Score Less than 12 seconds   Mini Cog Total Score 5   Some recent data might be hidden     A Mini-Cog score of 0-2 suggests the possibility of dementia, score of 3-5 suggests no dementia    Identified Health Risks:     Patient's advanced directive was discussed and I am comfortable with the patient's wishes.

## 2021-06-23 NOTE — TELEPHONE ENCOUNTER
Refill Approved    Rx renewed per Medication Renewal Policy. Medication was last renewed on 1/9/19.    Karin Todd, [facility] Triage/Med Refill 1/31/2019     Requested Prescriptions   Pending Prescriptions Disp Refills     amLODIPine (NORVASC) 5 MG tablet [Pharmacy Med Name: AMLODIPINE BESYLATE 5MG TABLETS] 90 tablet 0     Sig: TAKE 1 TABLET(5 MG) BY MOUTH DAILY    Calcium-Channel Blockers Protocol Passed - 1/30/2019  1:29 PM       Passed - PCP or prescribing provider visit in past 12 months or next 3 months    Last office visit with prescriber/PCP: 9/5/2018 Ronnie Bundy MD OR same dept: 9/5/2018 Ronnie Bundy MD OR same specialty: 9/5/2018 Ronnie Bundy MD  Last physical: 1/9/2019 Last MTM visit: Visit date not found   Next visit within 3 mo: Visit date not found  Next physical within 3 mo: Visit date not found  Prescriber OR PCP: Ronnie Bundy MD  Last diagnosis associated with med order: 1. Essential hypertension  - amLODIPine (NORVASC) 5 MG tablet [Pharmacy Med Name: AMLODIPINE BESYLATE 5MG TABLETS]; TAKE 1 TABLET(5 MG) BY MOUTH DAILY  Dispense: 90 tablet; Refill: 0    If protocol passes may refill for 12 months if within 3 months of last provider visit (or a total of 15 months).            Passed - Blood pressure filed in past 12 months    BP Readings from Last 1 Encounters:   01/09/19 130/78

## 2021-06-25 NOTE — TELEPHONE ENCOUNTER
Refill Approved    Rx renewed per Medication Renewal Policy. Medication was last renewed on 1/22/18.    Karin Todd, [facility] Triage/Med Refill 3/19/2019     Requested Prescriptions   Pending Prescriptions Disp Refills     atorvastatin (LIPITOR) 10 MG tablet [Pharmacy Med Name: ATORVASTATIN 10MG TABLETS] 90 tablet 0     Sig: TAKE 1 TABLET(10 MG) BY MOUTH DAILY    Statins Refill Protocol (Hmg CoA Reductase Inhibitors) Passed - 3/16/2019  4:37 PM       Passed - PCP or prescribing provider visit in past 12 months     Last office visit with prescriber/PCP: 9/5/2018 Ronnie Bundy MD OR same dept: 9/5/2018 Ronnie Bundy MD OR same specialty: 9/5/2018 Ronnie Bundy MD  Last physical: 1/9/2019 Last MTM visit: Visit date not found   Next visit within 3 mo: Visit date not found  Next physical within 3 mo: Visit date not found  Prescriber OR PCP: Ronnie Bundy MD  Last diagnosis associated with med order: 1. Hyperlipidemia  - atorvastatin (LIPITOR) 10 MG tablet [Pharmacy Med Name: ATORVASTATIN 10MG TABLETS]; TAKE 1 TABLET(10 MG) BY MOUTH DAILY  Dispense: 90 tablet; Refill: 0    If protocol passes may refill for 12 months if within 3 months of last provider visit (or a total of 15 months).

## 2021-06-26 ENCOUNTER — HEALTH MAINTENANCE LETTER (OUTPATIENT)
Age: 71
End: 2021-06-26

## 2021-10-16 ENCOUNTER — HEALTH MAINTENANCE LETTER (OUTPATIENT)
Age: 71
End: 2021-10-16

## 2021-10-23 ENCOUNTER — HOSPITAL ENCOUNTER (INPATIENT)
Facility: CLINIC | Age: 71
LOS: 9 days | Discharge: NURSING, REHAB OR LONG-TERM CARE | DRG: 439 | End: 2021-11-02
Attending: EMERGENCY MEDICINE | Admitting: PEDIATRICS
Payer: MEDICARE

## 2021-10-23 DIAGNOSIS — I10 ESSENTIAL HYPERTENSION: ICD-10-CM

## 2021-10-23 DIAGNOSIS — Z11.52 ENCOUNTER FOR SCREENING LABORATORY TESTING FOR SEVERE ACUTE RESPIRATORY SYNDROME CORONAVIRUS 2 (SARS-COV-2): ICD-10-CM

## 2021-10-23 DIAGNOSIS — F10.20 ALCOHOLISM (H): Primary | ICD-10-CM

## 2021-10-23 DIAGNOSIS — E78.00 HYPERCHOLESTEREMIA: ICD-10-CM

## 2021-10-23 DIAGNOSIS — E83.42 HYPOMAGNESEMIA: ICD-10-CM

## 2021-10-23 DIAGNOSIS — R11.2 NAUSEA AND VOMITING, INTRACTABILITY OF VOMITING NOT SPECIFIED, UNSPECIFIED VOMITING TYPE: ICD-10-CM

## 2021-10-23 DIAGNOSIS — R74.8 ELEVATED LIPASE: ICD-10-CM

## 2021-10-23 DIAGNOSIS — F32.9 MAJOR DEPRESSIVE DISORDER, REMISSION STATUS UNSPECIFIED, UNSPECIFIED WHETHER RECURRENT: ICD-10-CM

## 2021-10-23 LAB
ALBUMIN SERPL-MCNC: 3 G/DL (ref 3.4–5)
ALBUMIN UR-MCNC: 30 MG/DL
ALP SERPL-CCNC: 129 U/L (ref 40–150)
ALT SERPL W P-5'-P-CCNC: 51 U/L (ref 0–70)
AMPHETAMINES UR QL SCN: NORMAL
ANION GAP SERPL CALCULATED.3IONS-SCNC: 12 MMOL/L (ref 3–14)
APPEARANCE UR: CLEAR
AST SERPL W P-5'-P-CCNC: 40 U/L (ref 0–45)
ATRIAL RATE - MUSE: 115 BPM
BARBITURATES UR QL: NORMAL
BASOPHILS # BLD AUTO: 0 10E3/UL (ref 0–0.2)
BASOPHILS NFR BLD AUTO: 0 %
BENZODIAZ UR QL: NORMAL
BILIRUB SERPL-MCNC: 1.3 MG/DL (ref 0.2–1.3)
BILIRUB UR QL STRIP: NEGATIVE
BUN SERPL-MCNC: 46 MG/DL (ref 7–30)
CALCIUM SERPL-MCNC: 9 MG/DL (ref 8.5–10.1)
CANNABINOIDS UR QL SCN: NORMAL
CHLORIDE BLD-SCNC: 95 MMOL/L (ref 94–109)
CK SERPL-CCNC: NORMAL U/L
CO2 SERPL-SCNC: 26 MMOL/L (ref 20–32)
COCAINE UR QL: NORMAL
COLOR UR AUTO: YELLOW
CREAT SERPL-MCNC: 1.13 MG/DL (ref 0.66–1.25)
DIASTOLIC BLOOD PRESSURE - MUSE: NORMAL MMHG
EOSINOPHIL # BLD AUTO: 0 10E3/UL (ref 0–0.7)
EOSINOPHIL NFR BLD AUTO: 0 %
ERYTHROCYTE [DISTWIDTH] IN BLOOD BY AUTOMATED COUNT: 13.1 % (ref 10–15)
ETHANOL SERPL-MCNC: <0.01 G/DL
GFR SERPL CREATININE-BSD FRML MDRD: 65 ML/MIN/1.73M2
GLUCOSE BLD-MCNC: 176 MG/DL (ref 70–99)
GLUCOSE UR STRIP-MCNC: NEGATIVE MG/DL
HCT VFR BLD AUTO: 47.9 % (ref 40–53)
HGB BLD-MCNC: 16.8 G/DL (ref 13.3–17.7)
HGB UR QL STRIP: NEGATIVE
HOLD SPECIMEN: NORMAL
HYALINE CASTS: 6 /LPF
IMM GRANULOCYTES # BLD: 0 10E3/UL
IMM GRANULOCYTES NFR BLD: 0 %
INTERPRETATION ECG - MUSE: NORMAL
KETONES UR STRIP-MCNC: 10 MG/DL
LEUKOCYTE ESTERASE UR QL STRIP: NEGATIVE
LIPASE SERPL-CCNC: 980 U/L (ref 73–393)
LYMPHOCYTES # BLD AUTO: 0.6 10E3/UL (ref 0.8–5.3)
LYMPHOCYTES NFR BLD AUTO: 6 %
MAGNESIUM SERPL-MCNC: 2.5 MG/DL (ref 1.6–2.3)
MCH RBC QN AUTO: 34 PG (ref 26.5–33)
MCHC RBC AUTO-ENTMCNC: 35.1 G/DL (ref 31.5–36.5)
MCV RBC AUTO: 97 FL (ref 78–100)
MONOCYTES # BLD AUTO: 1.4 10E3/UL (ref 0–1.3)
MONOCYTES NFR BLD AUTO: 13 %
MUCOUS THREADS #/AREA URNS LPF: PRESENT /LPF
NEUTROPHILS # BLD AUTO: 8.5 10E3/UL (ref 1.6–8.3)
NEUTROPHILS NFR BLD AUTO: 81 %
NITRATE UR QL: NEGATIVE
NRBC # BLD AUTO: 0 10E3/UL
NRBC BLD AUTO-RTO: 0 /100
OPIATES UR QL SCN: NORMAL
P AXIS - MUSE: 55 DEGREES
PH UR STRIP: 5.5 [PH] (ref 5–7)
PLATELET # BLD AUTO: 339 10E3/UL (ref 150–450)
POTASSIUM BLD-SCNC: 3.9 MMOL/L (ref 3.4–5.3)
PR INTERVAL - MUSE: 126 MS
PROT SERPL-MCNC: 6.9 G/DL (ref 6.8–8.8)
QRS DURATION - MUSE: 94 MS
QT - MUSE: 382 MS
QTC - MUSE: 528 MS
R AXIS - MUSE: 8 DEGREES
RBC # BLD AUTO: 4.94 10E6/UL (ref 4.4–5.9)
RBC URINE: 0 /HPF
SARS-COV-2 RNA RESP QL NAA+PROBE: NEGATIVE
SODIUM SERPL-SCNC: 133 MMOL/L (ref 133–144)
SP GR UR STRIP: 1.03 (ref 1–1.03)
SYSTOLIC BLOOD PRESSURE - MUSE: NORMAL MMHG
T AXIS - MUSE: 71 DEGREES
TROPONIN I SERPL-MCNC: <0.015 UG/L (ref 0–0.04)
UROBILINOGEN UR STRIP-MCNC: NORMAL MG/DL
VENTRICULAR RATE- MUSE: 115 BPM
WBC # BLD AUTO: 10.5 10E3/UL (ref 4–11)
WBC URINE: 1 /HPF

## 2021-10-23 PROCEDURE — 80307 DRUG TEST PRSMV CHEM ANLYZR: CPT | Performed by: EMERGENCY MEDICINE

## 2021-10-23 PROCEDURE — 96361 HYDRATE IV INFUSION ADD-ON: CPT | Performed by: EMERGENCY MEDICINE

## 2021-10-23 PROCEDURE — 93005 ELECTROCARDIOGRAM TRACING: CPT | Performed by: EMERGENCY MEDICINE

## 2021-10-23 PROCEDURE — 36415 COLL VENOUS BLD VENIPUNCTURE: CPT | Performed by: EMERGENCY MEDICINE

## 2021-10-23 PROCEDURE — 81001 URINALYSIS AUTO W/SCOPE: CPT | Performed by: EMERGENCY MEDICINE

## 2021-10-23 PROCEDURE — 82550 ASSAY OF CK (CPK): CPT | Performed by: NURSE PRACTITIONER

## 2021-10-23 PROCEDURE — G0378 HOSPITAL OBSERVATION PER HR: HCPCS

## 2021-10-23 PROCEDURE — U0005 INFEC AGEN DETEC AMPLI PROBE: HCPCS | Performed by: EMERGENCY MEDICINE

## 2021-10-23 PROCEDURE — 85025 COMPLETE CBC W/AUTO DIFF WBC: CPT | Performed by: EMERGENCY MEDICINE

## 2021-10-23 PROCEDURE — 99285 EMERGENCY DEPT VISIT HI MDM: CPT | Mod: 25 | Performed by: EMERGENCY MEDICINE

## 2021-10-23 PROCEDURE — 250N000013 HC RX MED GY IP 250 OP 250 PS 637: Performed by: EMERGENCY MEDICINE

## 2021-10-23 PROCEDURE — 250N000011 HC RX IP 250 OP 636: Performed by: NURSE PRACTITIONER

## 2021-10-23 PROCEDURE — 83690 ASSAY OF LIPASE: CPT | Performed by: EMERGENCY MEDICINE

## 2021-10-23 PROCEDURE — 250N000009 HC RX 250: Performed by: NURSE PRACTITIONER

## 2021-10-23 PROCEDURE — 99284 EMERGENCY DEPT VISIT MOD MDM: CPT | Mod: 25 | Performed by: EMERGENCY MEDICINE

## 2021-10-23 PROCEDURE — 82040 ASSAY OF SERUM ALBUMIN: CPT | Performed by: EMERGENCY MEDICINE

## 2021-10-23 PROCEDURE — 258N000003 HC RX IP 258 OP 636: Performed by: EMERGENCY MEDICINE

## 2021-10-23 PROCEDURE — 96365 THER/PROPH/DIAG IV INF INIT: CPT | Performed by: EMERGENCY MEDICINE

## 2021-10-23 PROCEDURE — 93010 ELECTROCARDIOGRAM REPORT: CPT | Performed by: EMERGENCY MEDICINE

## 2021-10-23 PROCEDURE — 84484 ASSAY OF TROPONIN QUANT: CPT | Performed by: EMERGENCY MEDICINE

## 2021-10-23 PROCEDURE — 250N000013 HC RX MED GY IP 250 OP 250 PS 637: Performed by: NURSE PRACTITIONER

## 2021-10-23 PROCEDURE — 36415 COLL VENOUS BLD VENIPUNCTURE: CPT | Performed by: NURSE PRACTITIONER

## 2021-10-23 PROCEDURE — 258N000003 HC RX IP 258 OP 636: Performed by: NURSE PRACTITIONER

## 2021-10-23 PROCEDURE — 83735 ASSAY OF MAGNESIUM: CPT | Performed by: NURSE PRACTITIONER

## 2021-10-23 PROCEDURE — 82077 ASSAY SPEC XCP UR&BREATH IA: CPT | Performed by: EMERGENCY MEDICINE

## 2021-10-23 PROCEDURE — C9803 HOPD COVID-19 SPEC COLLECT: HCPCS | Performed by: EMERGENCY MEDICINE

## 2021-10-23 RX ORDER — ONDANSETRON 2 MG/ML
4 INJECTION INTRAMUSCULAR; INTRAVENOUS EVERY 30 MIN PRN
Status: DISCONTINUED | OUTPATIENT
Start: 2021-10-23 | End: 2021-11-02 | Stop reason: HOSPADM

## 2021-10-23 RX ORDER — FOLIC ACID 1 MG/1
1 TABLET ORAL DAILY
Status: DISCONTINUED | OUTPATIENT
Start: 2021-10-24 | End: 2021-11-02 | Stop reason: HOSPADM

## 2021-10-23 RX ORDER — METOPROLOL SUCCINATE 25 MG/1
50 TABLET, EXTENDED RELEASE ORAL DAILY
Status: DISCONTINUED | OUTPATIENT
Start: 2021-10-24 | End: 2021-11-02 | Stop reason: HOSPADM

## 2021-10-23 RX ORDER — AMOXICILLIN 250 MG
1 CAPSULE ORAL 2 TIMES DAILY PRN
Status: DISCONTINUED | OUTPATIENT
Start: 2021-10-23 | End: 2021-10-24

## 2021-10-23 RX ORDER — AMOXICILLIN 250 MG
2 CAPSULE ORAL 2 TIMES DAILY PRN
Status: DISCONTINUED | OUTPATIENT
Start: 2021-10-23 | End: 2021-10-24

## 2021-10-23 RX ORDER — LANOLIN ALCOHOL/MO/W.PET/CERES
100 CREAM (GRAM) TOPICAL DAILY
Status: DISCONTINUED | OUTPATIENT
Start: 2021-10-24 | End: 2021-11-02 | Stop reason: HOSPADM

## 2021-10-23 RX ORDER — LISINOPRIL 40 MG/1
40 TABLET ORAL ONCE
Status: COMPLETED | OUTPATIENT
Start: 2021-10-23 | End: 2021-10-23

## 2021-10-23 RX ORDER — PROCHLORPERAZINE MALEATE 5 MG
5 TABLET ORAL EVERY 6 HOURS PRN
Status: DISCONTINUED | OUTPATIENT
Start: 2021-10-23 | End: 2021-11-02 | Stop reason: HOSPADM

## 2021-10-23 RX ORDER — AMLODIPINE BESYLATE 2.5 MG/1
2.5 TABLET ORAL ONCE
Status: COMPLETED | OUTPATIENT
Start: 2021-10-23 | End: 2021-10-23

## 2021-10-23 RX ORDER — ONDANSETRON 4 MG/1
4 TABLET, ORALLY DISINTEGRATING ORAL EVERY 6 HOURS PRN
Status: DISCONTINUED | OUTPATIENT
Start: 2021-10-23 | End: 2021-11-02 | Stop reason: HOSPADM

## 2021-10-23 RX ORDER — METOPROLOL SUCCINATE 50 MG/1
50 TABLET, EXTENDED RELEASE ORAL ONCE
Status: COMPLETED | OUTPATIENT
Start: 2021-10-23 | End: 2021-10-23

## 2021-10-23 RX ORDER — SERTRALINE HYDROCHLORIDE 25 MG/1
25 TABLET, FILM COATED ORAL DAILY
Status: DISCONTINUED | OUTPATIENT
Start: 2021-10-24 | End: 2021-11-02 | Stop reason: HOSPADM

## 2021-10-23 RX ORDER — ONDANSETRON 2 MG/ML
4 INJECTION INTRAMUSCULAR; INTRAVENOUS EVERY 6 HOURS PRN
Status: DISCONTINUED | OUTPATIENT
Start: 2021-10-23 | End: 2021-11-02 | Stop reason: HOSPADM

## 2021-10-23 RX ORDER — LIDOCAINE 40 MG/G
CREAM TOPICAL
Status: DISCONTINUED | OUTPATIENT
Start: 2021-10-23 | End: 2021-11-02 | Stop reason: HOSPADM

## 2021-10-23 RX ORDER — MAGNESIUM OXIDE 400 MG/1
800 TABLET ORAL DAILY
Status: DISCONTINUED | OUTPATIENT
Start: 2021-10-24 | End: 2021-11-02 | Stop reason: HOSPADM

## 2021-10-23 RX ORDER — AMLODIPINE BESYLATE 2.5 MG/1
2.5 TABLET ORAL DAILY
Status: DISCONTINUED | OUTPATIENT
Start: 2021-10-24 | End: 2021-10-26

## 2021-10-23 RX ORDER — LISINOPRIL 20 MG/1
40 TABLET ORAL DAILY
Status: DISCONTINUED | OUTPATIENT
Start: 2021-10-24 | End: 2021-11-02 | Stop reason: HOSPADM

## 2021-10-23 RX ORDER — PROCHLORPERAZINE 25 MG
12.5 SUPPOSITORY, RECTAL RECTAL EVERY 12 HOURS PRN
Status: DISCONTINUED | OUTPATIENT
Start: 2021-10-23 | End: 2021-11-02 | Stop reason: HOSPADM

## 2021-10-23 RX ORDER — MULTIVITAMIN,THERAPEUTIC
1 TABLET ORAL DAILY
Status: DISCONTINUED | OUTPATIENT
Start: 2021-10-24 | End: 2021-10-25 | Stop reason: ALTCHOICE

## 2021-10-23 RX ADMIN — LISINOPRIL 40 MG: 40 TABLET ORAL at 18:05

## 2021-10-23 RX ADMIN — FOLIC ACID: 5 INJECTION, SOLUTION INTRAMUSCULAR; INTRAVENOUS; SUBCUTANEOUS at 20:45

## 2021-10-23 RX ADMIN — SODIUM CHLORIDE 1000 ML: 9 INJECTION, SOLUTION INTRAVENOUS at 15:27

## 2021-10-23 RX ADMIN — METOPROLOL SUCCINATE 50 MG: 50 TABLET, EXTENDED RELEASE ORAL at 18:04

## 2021-10-23 RX ADMIN — AMLODIPINE BESYLATE 2.5 MG: 2.5 TABLET ORAL at 18:05

## 2021-10-23 RX ADMIN — MICONAZOLE NITRATE: 20 POWDER TOPICAL at 23:03

## 2021-10-23 ASSESSMENT — MIFFLIN-ST. JEOR: SCORE: 1536.44

## 2021-10-23 NOTE — ED TRIAGE NOTES
"Pt comes in by ambulance due to emesis over the last week reporting that it is \"coffee ground colored\", per EMS, patient is chronic alcohol user. Per EMS, pt endorses drinking a bottle of wine every other day, they did not ask patient when last drink was. Endorses weakness.   "

## 2021-10-23 NOTE — DISCHARGE INSTRUCTIONS
TODAY'S VISIT:  You were seen today for nausea and vomiting   -   - If you had any labs or imaging/radiology tests performed today, you should also discuss these tests with your usual provider.     FOLLOW-UP:  Please make an appointment to follow up with:  - Your Primary Care Provider. If you do not have a PCP, please call the Primary Care Center (phone: [phone] for an appointment    - Have your provider review the results from today's visit with you again to make sure no further follow-up or additional testing is needed based on those results.     RETURN TO THE EMERGENCY DEPARTMENT  Return to the Emergency Department at any time for any new or worsening symptoms or any concerns.

## 2021-10-23 NOTE — ED PROVIDER NOTES
"ED Provider Note  [facility]      History     Chief Complaint   Patient presents with     Generalized Weakness     Fall     10/23/21 due to weakness     Alcohol Problem     drinking and not eating     Dehydration     Depression     HPI  Efrain Gomes is a 71 year old male who has a significant medical history of alcohol abuse who was BIBA to ED due to coffee-ground emesis persistent for 1 week.  The patient arrived via EMS.  They report that the patient is chronic alcohol user.  The patient endorses drinking a bottle of wine every day.he reports that his last drink was 3 days ago.  He states he has not had anything to eat in the past week.  He did try to eat, but vomited.  He states he did have nausea and vomiting symptoms as part of his alcohol withdrawal symptoms over the last 3 days.  He may have had a little bit of sweating and tremor, but these have resolved.  He denies any history of alcohol withdrawal seizures or delirium tremens.  He states that overall his alcohol withdrawal symptoms have been significantly improving, but are still present.  The patient states he feels very hungry and does feel like he could tolerate some food at this time.  He reports associated weakness.   He is wondering if he could spend the night here because he \"does not trust himself.\"    Past Medical History  History reviewed. No pertinent past medical history.  History reviewed. No pertinent surgical history.  amLODIPine (NORVASC) 2.5 MG tablet  atorvastatin (LIPITOR) 10 MG tablet  betamethasone dipropionate (DIPROLENE) 0.05 % cream  cetirizine (ZYRTEC) 10 MG tablet  cholecalciferol, vitamin D3, (VITAMIN D3) 2,000 unit Tab  fluocinolone acetonide oil (DERMOTIC) 0.01 % Drop  lisinopriL (PRINIVIL,ZESTRIL) 40 MG tablet  metoprolol succinate (TOPROL-XL) 50 MG 24 hr tablet  mycophenolate (CELLCEPT) 500 mg tablet  sertraline (ZOLOFT) 25 MG tablet  sildenafil (VIAGRA) 100 MG tablet  triamcinolone (KENALOG) " "0.1 % cream      Allergies   Allergen Reactions     Septra [Sulfamethoxazole W/Trimethoprim] Rash     Sulfamethoxazole-Trimethoprim Rash     Family History  History reviewed. No pertinent family history.  Social History   Social History     Tobacco Use     Smoking status: Never Smoker     Smokeless tobacco: Never Used   Substance Use Topics     Alcohol use: Yes     Drug use: Not Currently      Past medical history, past surgical history, medications, allergies, family history, and social history were reviewed with the patient. No additional pertinent items.       Review of Systems   General: No fevers or chills  Skin: No rash or diaphoresis  Eyes: No eye redness or discharge  Ears/Nose/Throat: No rhinorrhea or nasal congestion  Respiratory: No cough or SOB  Cardiovascular: No chest pain or palpitations  Gastrointestinal: See HPI  Genitourinary: No urinary frequency, hematuria, or dysuria  Musculoskeletal: No arthralgias or myalgias  Neurologic: No numbness or weakness  Psychiatric: Positive for alcohol abuse  Hematologic/Lymphatic/Immunologic: No leg swelling, no easy bruising/bleeding  Endocrine: No polyuria/polydypsia    A complete review of systems was performed with pertinent positives and negatives noted in the HPI, and all other systems negative.    Physical Exam   BP: (!) 145/100  Pulse: 96  Temp: 97.6  F (36.4  C)  Resp: 16  Height: 167.6 cm (5' 6\")  Weight: 84.4 kg (186 lb)  SpO2: 96 %  Physical Exam  General: Well nourished, well developed, NAD  HEENT: EOMI, anicteric. NCAT, dry mucous membranes  Neck: no jugular venous distension, supple, nl ROM  Cardiac: Regular rate and rhythm. No murmurs, rubs, or gallops. Normal S1, S2.  Intact peripheral pulses  Pulm: CTAB, no stridor, wheezes, rales, rhonchi  Abd: Soft, nontender, nondistended.  No masses palpated.    Skin: Warm and dry to the touch.  No rash  Extremities: No LE edema, no cyanosis, w/w/p  Neuro: A&Ox3, no gross focal deficits      ED Course    "   Procedures       The medical record was reviewed and interpreted.  Current labs reviewed and interpreted.       EKG Interpretation:      Interpreted by Marisa Saeed MD  Time reviewed:1516   Symptoms at time of EKG: weakness   Rhythm: sinus tachycardia  Rate: 115  Axis: NORMAL  Ectopy: none  Conduction: normal  ST Segments/ T Waves: Nonspecific ST abnormality  Q Waves: none  Comparison to prior: compared to EKG dated 1/9/19, the tachycardia is new    Clinical Impression: abnormal EKG       Results for orders placed or performed during the hospital encounter of 10/23/21   Comprehensive metabolic panel     Status: Abnormal   Result Value Ref Range    Sodium 133 133 - 144 mmol/L    Potassium 3.9 3.4 - 5.3 mmol/L    Chloride 95 94 - 109 mmol/L    Carbon Dioxide (CO2) 26 20 - 32 mmol/L    Anion Gap 12 3 - 14 mmol/L    Urea Nitrogen 46 (H) 7 - 30 mg/dL    Creatinine 1.13 0.66 - 1.25 mg/dL    Calcium 9.0 8.5 - 10.1 mg/dL    Glucose 176 (H) 70 - 99 mg/dL    Alkaline Phosphatase 129 40 - 150 U/L    AST 40 0 - 45 U/L    ALT 51 0 - 70 U/L    Protein Total 6.9 6.8 - 8.8 g/dL    Albumin 3.0 (L) 3.4 - 5.0 g/dL    Bilirubin Total 1.3 0.2 - 1.3 mg/dL    GFR Estimate 65 >60 mL/min/1.73m2   Extra Blue Top Tube     Status: None   Result Value Ref Range    Hold Specimen JIC    Extra Red Top Tube     Status: None   Result Value Ref Range    Hold Specimen JIC    Extra Green Top (Lithium Heparin) Tube     Status: None   Result Value Ref Range    Hold Specimen JIC    Extra Purple Top Tube     Status: None   Result Value Ref Range    Hold Specimen JIC    CBC with platelets and differential     Status: Abnormal   Result Value Ref Range    WBC Count 10.5 4.0 - 11.0 10e3/uL    RBC Count 4.94 4.40 - 5.90 10e6/uL    Hemoglobin 16.8 13.3 - 17.7 g/dL    Hematocrit 47.9 40.0 - 53.0 %    MCV 97 78 - 100 fL    MCH 34.0 (H) 26.5 - 33.0 pg    MCHC 35.1 31.5 - 36.5 g/dL    RDW 13.1 10.0 - 15.0 %    Platelet Count 339 150 - 450 10e3/uL    %  Neutrophils 81 %    % Lymphocytes 6 %    % Monocytes 13 %    % Eosinophils 0 %    % Basophils 0 %    % Immature Granulocytes 0 %    NRBCs per 100 WBC 0 <1 /100    Absolute Neutrophils 8.5 (H) 1.6 - 8.3 10e3/uL    Absolute Lymphocytes 0.6 (L) 0.8 - 5.3 10e3/uL    Absolute Monocytes 1.4 (H) 0.0 - 1.3 10e3/uL    Absolute Eosinophils 0.0 0.0 - 0.7 10e3/uL    Absolute Basophils 0.0 0.0 - 0.2 10e3/uL    Absolute Immature Granulocytes 0.0 <=0.0 10e3/uL    Absolute NRBCs 0.0 10e3/uL   Lipase     Status: Abnormal   Result Value Ref Range    Lipase 980 (H) 73 - 393 U/L   Troponin I     Status: Normal   Result Value Ref Range    Troponin I <0.015 0.000 - 0.045 ug/L   Ethyl Alcohol Level     Status: Normal   Result Value Ref Range    Alcohol ethyl <0.01 <=0.01 g/dL   EKG 12-lead     Status: None (Preliminary result)   Result Value Ref Range    Systolic Blood Pressure  mmHg    Diastolic Blood Pressure  mmHg    Ventricular Rate 115 BPM    Atrial Rate 115 BPM    LA Interval 126 ms    QRS Duration 94 ms     ms    QTc 528 ms    P Axis 55 degrees    R AXIS 8 degrees    T Axis 71 degrees    Interpretation ECG       Sinus tachycardia  Possible Left atrial enlargement  Nonspecific ST abnormality  Prolonged QT  Abnormal ECG     [place] Draw     Status: None    Narrative    The following orders were created for panel order [place] Draw.  Procedure                               Abnormality         Status                     ---------                               -----------         ------                     Extra Blue Top Tube[[id number]]                              Final result               Extra Red Top Tube[[id number]]                               Final result               Extra Green Top (Lithium...[[id number]]                      Final result               Extra Purple Top Tube[[id number]]                            Final result                 Please view results for these tests on the individual orders.   CBC with platelets  differential     Status: Abnormal    Narrative    The following orders were created for panel order CBC with platelets differential.  Procedure                               Abnormality         Status                     ---------                               -----------         ------                     CBC with platelets and d...[[id number]]  Abnormal            Final result                 Please view results for these tests on the individual orders.   Urine Drugs of Abuse Screen     Status: None ()    Narrative    The following orders were created for panel order Urine Drugs of Abuse Screen.  Procedure                               Abnormality         Status                     ---------                               -----------         ------                     Drug abuse screen 1 urin...[[id number]]                                                   Please view results for these tests on the individual orders.     Medications   ondansetron (ZOFRAN) injection 4 mg (has no administration in time range)   0.9% sodium chloride BOLUS (0 mLs Intravenous Stopped 10/23/21 1639)   amLODIPine (NORVASC) tablet 2.5 mg (2.5 mg Oral Given 10/23/21 1805)   lisinopril (ZESTRIL) tablet 40 mg (40 mg Oral Given 10/23/21 1805)   metoprolol succinate ER (TOPROL-XL) 24 hr tablet 50 mg (50 mg Oral Given 10/23/21 1804)        Assessments & Plan (with Medical Decision Making)   The patient is a 71-year-old male with a past medical history of alcohol abuse who presents to the emergency department with coffee-ground emesis.  Differential diagnosis includes alcohol withdrawal, gastritis, peptic ulcer disease, pancreatitis.  No significant abdominal pain or tenderness on exam.  Labs were ordered to further evaluate the patient.  Medications given for symptomatic relief in the emergency department.  IV fluids were also given.    Laboratory work-up is remarkable for elevated lipase, however it is less than 3 times the upper limit of normal  and patient has no epigastric tenderness on exam, nausea is now controlled after medications were given in the emergency department.  Patient was able to tolerate p.o. food and fluids in the ER without difficulty.    Patient symptoms most likely secondary to alcohol withdrawal which seems to be improving at this time.  Patient has no tremor currently or diaphoresis.    Patient noted to have significantly elevated blood pressures.  He admits to not having taken his home medications for this.  Patient's home blood pressure medications were ordered and given, resulting in significant improvement in patient's blood pressure.    1846 blood pressure 169/84.    I have reviewed the nursing notes. I have reviewed the findings, diagnosis, plan and need for follow up with the patient.    Patient to be discharged home. Advised to follow up with PCP within 1 week. To return to ER immediately with any new/worsening symptoms. Plan of care discussed with patient who expresses understanding and agrees with plan of care.      New Prescriptions    No medications on file       Final diagnoses:   Nausea and vomiting, intractability of vomiting not specified, unspecified vomiting type   Elevated lipase       --  Marisa Saeed MD  [facility] EMERGENCY DEPARTMENT  10/23/2021     Marisa Saeed MD  10/23/21 1846       Marisa Saeed MD  10/23/21 3550

## 2021-10-24 ENCOUNTER — APPOINTMENT (OUTPATIENT)
Dept: PHYSICAL THERAPY | Facility: CLINIC | Age: 71
DRG: 439 | End: 2021-10-24
Attending: NURSE PRACTITIONER
Payer: MEDICARE

## 2021-10-24 LAB
ALBUMIN SERPL-MCNC: 2.2 G/DL (ref 3.4–5)
ALP SERPL-CCNC: 94 U/L (ref 40–150)
ALT SERPL W P-5'-P-CCNC: 35 U/L (ref 0–70)
ANION GAP SERPL CALCULATED.3IONS-SCNC: 7 MMOL/L (ref 3–14)
AST SERPL W P-5'-P-CCNC: 24 U/L (ref 0–45)
BILIRUB SERPL-MCNC: 1 MG/DL (ref 0.2–1.3)
BUN SERPL-MCNC: 33 MG/DL (ref 7–30)
CALCIUM SERPL-MCNC: 8.3 MG/DL (ref 8.5–10.1)
CHLORIDE BLD-SCNC: 101 MMOL/L (ref 94–109)
CK SERPL-CCNC: 28 U/L (ref 30–300)
CO2 SERPL-SCNC: 28 MMOL/L (ref 20–32)
CREAT SERPL-MCNC: 0.85 MG/DL (ref 0.66–1.25)
ERYTHROCYTE [DISTWIDTH] IN BLOOD BY AUTOMATED COUNT: 13.1 % (ref 10–15)
GFR SERPL CREATININE-BSD FRML MDRD: 87 ML/MIN/1.73M2
GLUCOSE BLD-MCNC: 111 MG/DL (ref 70–99)
HCT VFR BLD AUTO: 41.1 % (ref 40–53)
HGB BLD-MCNC: 14.1 G/DL (ref 13.3–17.7)
LIPASE SERPL-CCNC: 1933 U/L (ref 73–393)
MAGNESIUM SERPL-MCNC: 2.4 MG/DL (ref 1.6–2.3)
MCH RBC QN AUTO: 33.7 PG (ref 26.5–33)
MCHC RBC AUTO-ENTMCNC: 34.3 G/DL (ref 31.5–36.5)
MCV RBC AUTO: 98 FL (ref 78–100)
PLATELET # BLD AUTO: 282 10E3/UL (ref 150–450)
POTASSIUM BLD-SCNC: 2.7 MMOL/L (ref 3.4–5.3)
POTASSIUM BLD-SCNC: 3.3 MMOL/L (ref 3.4–5.3)
POTASSIUM BLD-SCNC: 3.6 MMOL/L (ref 3.4–5.3)
PROT SERPL-MCNC: 5.3 G/DL (ref 6.8–8.8)
RBC # BLD AUTO: 4.19 10E6/UL (ref 4.4–5.9)
SODIUM SERPL-SCNC: 136 MMOL/L (ref 133–144)
WBC # BLD AUTO: 6.5 10E3/UL (ref 4–11)

## 2021-10-24 PROCEDURE — HZ2ZZZZ DETOXIFICATION SERVICES FOR SUBSTANCE ABUSE TREATMENT: ICD-10-PCS | Performed by: PHYSICIAN ASSISTANT

## 2021-10-24 PROCEDURE — 83735 ASSAY OF MAGNESIUM: CPT | Performed by: INTERNAL MEDICINE

## 2021-10-24 PROCEDURE — 999N000128 HC STATISTIC PERIPHERAL IV START W/O US GUIDANCE

## 2021-10-24 PROCEDURE — 99207 PR CDG-MDM COMPONENT: MEETS MODERATE - DOWN CODED: CPT | Performed by: PEDIATRICS

## 2021-10-24 PROCEDURE — 120N000002 HC R&B MED SURG/OB UMMC

## 2021-10-24 PROCEDURE — 250N000011 HC RX IP 250 OP 636: Performed by: PHYSICIAN ASSISTANT

## 2021-10-24 PROCEDURE — 36415 COLL VENOUS BLD VENIPUNCTURE: CPT | Performed by: PHYSICIAN ASSISTANT

## 2021-10-24 PROCEDURE — 97161 PT EVAL LOW COMPLEX 20 MIN: CPT | Mod: GP

## 2021-10-24 PROCEDURE — G0378 HOSPITAL OBSERVATION PER HR: HCPCS

## 2021-10-24 PROCEDURE — 250N000013 HC RX MED GY IP 250 OP 250 PS 637: Performed by: PHYSICIAN ASSISTANT

## 2021-10-24 PROCEDURE — 258N000003 HC RX IP 258 OP 636: Performed by: PHYSICIAN ASSISTANT

## 2021-10-24 PROCEDURE — 250N000013 HC RX MED GY IP 250 OP 250 PS 637: Performed by: NURSE PRACTITIONER

## 2021-10-24 PROCEDURE — 250N000013 HC RX MED GY IP 250 OP 250 PS 637: Performed by: PEDIATRICS

## 2021-10-24 PROCEDURE — 99232 SBSQ HOSP IP/OBS MODERATE 35: CPT | Performed by: PEDIATRICS

## 2021-10-24 PROCEDURE — 85027 COMPLETE CBC AUTOMATED: CPT | Performed by: NURSE PRACTITIONER

## 2021-10-24 PROCEDURE — 80053 COMPREHEN METABOLIC PANEL: CPT | Performed by: NURSE PRACTITIONER

## 2021-10-24 PROCEDURE — 82550 ASSAY OF CK (CPK): CPT | Performed by: NURSE PRACTITIONER

## 2021-10-24 PROCEDURE — 84132 ASSAY OF SERUM POTASSIUM: CPT | Performed by: PHYSICIAN ASSISTANT

## 2021-10-24 PROCEDURE — 83690 ASSAY OF LIPASE: CPT | Performed by: NURSE PRACTITIONER

## 2021-10-24 PROCEDURE — 97530 THERAPEUTIC ACTIVITIES: CPT | Mod: GP

## 2021-10-24 PROCEDURE — 99207 PR APP CREDIT; MD BILLING SHARED VISIT: CPT | Performed by: PHYSICIAN ASSISTANT

## 2021-10-24 PROCEDURE — 84132 ASSAY OF SERUM POTASSIUM: CPT | Performed by: PEDIATRICS

## 2021-10-24 PROCEDURE — 36415 COLL VENOUS BLD VENIPUNCTURE: CPT | Performed by: NURSE PRACTITIONER

## 2021-10-24 PROCEDURE — 36415 COLL VENOUS BLD VENIPUNCTURE: CPT | Performed by: PEDIATRICS

## 2021-10-24 PROCEDURE — 97116 GAIT TRAINING THERAPY: CPT | Mod: GP

## 2021-10-24 RX ORDER — POTASSIUM CHLORIDE 750 MG/1
20 TABLET, EXTENDED RELEASE ORAL ONCE
Status: COMPLETED | OUTPATIENT
Start: 2021-10-24 | End: 2021-10-24

## 2021-10-24 RX ORDER — LORAZEPAM 0.5 MG/1
1-2 TABLET ORAL EVERY 30 MIN PRN
Status: DISCONTINUED | OUTPATIENT
Start: 2021-10-24 | End: 2021-10-27

## 2021-10-24 RX ORDER — SODIUM CHLORIDE, SODIUM LACTATE, POTASSIUM CHLORIDE, CALCIUM CHLORIDE 600; 310; 30; 20 MG/100ML; MG/100ML; MG/100ML; MG/100ML
INJECTION, SOLUTION INTRAVENOUS
Status: DISCONTINUED
Start: 2021-10-24 | End: 2021-10-25 | Stop reason: HOSPADM

## 2021-10-24 RX ORDER — ACETAMINOPHEN 325 MG/1
650 TABLET ORAL EVERY 6 HOURS PRN
Status: DISCONTINUED | OUTPATIENT
Start: 2021-10-24 | End: 2021-11-02 | Stop reason: HOSPADM

## 2021-10-24 RX ORDER — POTASSIUM CHLORIDE 750 MG/1
40 TABLET, EXTENDED RELEASE ORAL ONCE
Status: COMPLETED | OUTPATIENT
Start: 2021-10-24 | End: 2021-10-24

## 2021-10-24 RX ORDER — SODIUM CHLORIDE, SODIUM LACTATE, POTASSIUM CHLORIDE, CALCIUM CHLORIDE 600; 310; 30; 20 MG/100ML; MG/100ML; MG/100ML; MG/100ML
INJECTION, SOLUTION INTRAVENOUS CONTINUOUS
Status: DISCONTINUED | OUTPATIENT
Start: 2021-10-24 | End: 2021-10-25

## 2021-10-24 RX ORDER — SODIUM CHLORIDE 9 MG/ML
INJECTION, SOLUTION INTRAVENOUS CONTINUOUS
Status: DISCONTINUED | OUTPATIENT
Start: 2021-10-24 | End: 2021-10-24

## 2021-10-24 RX ORDER — POLYETHYLENE GLYCOL 3350 17 G/17G
17 POWDER, FOR SOLUTION ORAL 2 TIMES DAILY
Status: DISCONTINUED | OUTPATIENT
Start: 2021-10-24 | End: 2021-11-02 | Stop reason: HOSPADM

## 2021-10-24 RX ORDER — SENNOSIDES 8.6 MG
8.6 TABLET ORAL 2 TIMES DAILY
Status: DISCONTINUED | OUTPATIENT
Start: 2021-10-24 | End: 2021-11-02 | Stop reason: HOSPADM

## 2021-10-24 RX ADMIN — SODIUM CHLORIDE, POTASSIUM CHLORIDE, SODIUM LACTATE AND CALCIUM CHLORIDE: 600; 310; 30; 20 INJECTION, SOLUTION INTRAVENOUS at 10:05

## 2021-10-24 RX ADMIN — LISINOPRIL 40 MG: 40 TABLET ORAL at 08:01

## 2021-10-24 RX ADMIN — THERA TABS 1 TABLET: TAB at 08:01

## 2021-10-24 RX ADMIN — POTASSIUM CHLORIDE 40 MEQ: 750 TABLET, EXTENDED RELEASE ORAL at 16:20

## 2021-10-24 RX ADMIN — MICONAZOLE NITRATE: 20 POWDER TOPICAL at 08:33

## 2021-10-24 RX ADMIN — SERTRALINE HYDROCHLORIDE 25 MG: 25 TABLET ORAL at 08:01

## 2021-10-24 RX ADMIN — SENNOSIDES 8.6 MG: 8.6 TABLET ORAL at 20:19

## 2021-10-24 RX ADMIN — POTASSIUM CHLORIDE 40 MEQ: 750 TABLET, EXTENDED RELEASE ORAL at 08:21

## 2021-10-24 RX ADMIN — METOPROLOL SUCCINATE 50 MG: 25 TABLET, EXTENDED RELEASE ORAL at 08:01

## 2021-10-24 RX ADMIN — AMLODIPINE BESYLATE 2.5 MG: 2.5 TABLET ORAL at 08:01

## 2021-10-24 RX ADMIN — FOLIC ACID 1 MG: 1 TABLET ORAL at 08:01

## 2021-10-24 RX ADMIN — SODIUM CHLORIDE, POTASSIUM CHLORIDE, SODIUM LACTATE AND CALCIUM CHLORIDE: 600; 310; 30; 20 INJECTION, SOLUTION INTRAVENOUS at 14:42

## 2021-10-24 RX ADMIN — THIAMINE HCL TAB 100 MG 100 MG: 100 TAB at 08:01

## 2021-10-24 RX ADMIN — POLYETHYLENE GLYCOL 3350 17 G: 17 POWDER, FOR SOLUTION ORAL at 20:19

## 2021-10-24 RX ADMIN — POLYETHYLENE GLYCOL 3350 17 G: 17 POWDER, FOR SOLUTION ORAL at 11:23

## 2021-10-24 RX ADMIN — SENNOSIDES 8.6 MG: 8.6 TABLET ORAL at 11:22

## 2021-10-24 RX ADMIN — MICONAZOLE NITRATE: 20 POWDER TOPICAL at 20:19

## 2021-10-24 RX ADMIN — ENOXAPARIN SODIUM 40 MG: 40 INJECTION SUBCUTANEOUS at 11:23

## 2021-10-24 RX ADMIN — Medication 800 MG: at 08:01

## 2021-10-24 RX ADMIN — POTASSIUM CHLORIDE 20 MEQ: 750 TABLET, EXTENDED RELEASE ORAL at 10:05

## 2021-10-24 ASSESSMENT — ACTIVITIES OF DAILY LIVING (ADL)
DEPENDENT_IADLS:: INDEPENDENT
ADLS_ACUITY_SCORE: 11
CONCENTRATING,_REMEMBERING_OR_MAKING_DECISIONS_DIFFICULTY: NO
DOING_ERRANDS_INDEPENDENTLY_DIFFICULTY: NO
ADLS_ACUITY_SCORE: 16
WHICH_OF_THE_ABOVE_FUNCTIONAL_RISKS_HAD_A_RECENT_ONSET_OR_CHANGE?: AMBULATION;FALL HISTORY
ADLS_ACUITY_SCORE: 12
WEAR_GLASSES_OR_BLIND: YES
ADLS_ACUITY_SCORE: 10
EATING/SWALLOWING: OTHER (SEE COMMENTS)
ADLS_ACUITY_SCORE: 11
ADLS_ACUITY_SCORE: 10
ADLS_ACUITY_SCORE: 10
DIFFICULTY_COMMUNICATING: NO
WALKING_OR_CLIMBING_STAIRS_DIFFICULTY: YES
ADLS_ACUITY_SCORE: 10
ADLS_ACUITY_SCORE: 10
ADLS_ACUITY_SCORE: 14
DRESSING/BATHING_DIFFICULTY: NO
NUMBER_OF_TIMES_PATIENT_HAS_FALLEN_WITHIN_LAST_SIX_MONTHS: 1
ADLS_ACUITY_SCORE: 12
DIFFICULTY_EATING/SWALLOWING: NO
FALL_HISTORY_WITHIN_LAST_SIX_MONTHS: YES
WALKING_OR_CLIMBING_STAIRS: AMBULATION DIFFICULTY, REQUIRES EQUIPMENT;AMBULATION DIFFICULTY, ASSISTANCE 1 PERSON
HEARING_DIFFICULTY_OR_DEAF: NO
ADLS_ACUITY_SCORE: 10
ADLS_ACUITY_SCORE: 12
ADLS_ACUITY_SCORE: 10
TOILETING_ISSUES: NO

## 2021-10-24 NOTE — PLAN OF CARE
"OBSERVATION GOALS:    - Diagnostic tests and consults completed and resulted:  Not met  - Vital signs normal or at patient baseline:  Met; VSS on RA  - Tolerating oral intake to maintain hydration:  Met; pt denies N/V.  Pt did report some heartburn after eating a couple bites of his breakfast.  Declined medications.  Pt report it subsides quickly.  - Returns to baseline functional status:  Not met  - Safe disposition plan has been identified:  Not met    Pt A&Ox4.  Reported 2/10 bilateral shoulder pain.  Rest, repositioned, and pillow support provided.  Miconazole powder applied to abdomen folds.  Redness blanchable on pt's lower abdomen; pt could not see and could not tell if it was from his bullous pemphigold or not.  STEPHEN notified.    /86 (BP Location: Right arm)   Pulse 78   Temp 98.1  F (36.7  C) (Oral)   Resp 18   Ht 1.676 m (5' 6\")   Wt 84.4 kg (186 lb)   SpO2 98%   BMI 30.02 kg/m      "

## 2021-10-24 NOTE — PLAN OF CARE
"SHIFT 0700 - 1930     Patient A&Ox4, no deficits noted.  Able to make needs known.  VSS on RA; slightly hypertensive in 150's/90's.  Notified provider.  Will continue to monitor.  CIWA score 1.  No intervention indicated.  Pt denies pain this shift.  Pt did reported some LUQ discomfort 2/10 upon palpation.  Otherwise; no noted pain.  Declined medications/interventions.  Lung sounds are clear.  On telemonitoring with NSR.  Denies CP, SOB, N/V, lightheadedness/dizziness.  K+ replaced in the AM with 60 mEq tabs.  K+ resulted in 3.3.  Notified provider.  K+ replaced again at ~1600.  Labs to be drawn @ 2000.  Pt reported no BM for ~7 days; unable to recall exact date.  Pt reported may be due to no appetite for the past couple days.  Sennosides & Miralax administered.  Voiding spontaneously.  Up to bathroom with assistx1 via walker.  LR infusing @ 200.  Daughter at bedside.    BP (!) 154/99 (BP Location: Right arm)   Pulse 70   Temp 98.5  F (36.9  C) (Oral)   Resp 16   Ht 1.676 m (5' 6\")   Wt 84.4 kg (186 lb)   SpO2 97%   BMI 30.02 kg/m      "

## 2021-10-24 NOTE — PLAN OF CARE
- Diagnostic tests and consults completed and resulted: No   - Vital signs normal or at patient baseline: Yes   - Tolerating oral intake to maintain hydration: Yes   - Returns to baseline functional status: No   - Safe disposition plan has been identified: No

## 2021-10-24 NOTE — PROGRESS NOTES
"   10/24/21 8153   Quick Adds   Type of Visit Initial PT Evaluation   Living Environment   People in home alone   Current Living Arrangements condominium   Home Accessibility stairs to enter home   Number of Stairs, Main Entrance greater than 10 stairs  (3.5 flights)   Stair Railings, Main Entrance railing on right side (ascending)   Transportation Anticipated car, drives self   Living Environment Comments Pt lives alone in 3rd floor condo, no elevator access. [place] and will be able to assist pt as needed.   Self-Care   Usual Activity Tolerance good   Current Activity Tolerance fair   Regular Exercise No   Equipment Currently Used at Home none   Activity/Exercise/Self-Care Comment Independent at baseline, reports stairs are his exercise. When asked if he uses any equipment for walking he states \"my 4 walls.\"   Disability/Function   Hearing Difficulty or Deaf no   Wear Glasses or Blind yes   Vision Management glasses   Concentrating, Remembering or Making Decisions Difficulty no   Difficulty Communicating no   Difficulty Eating/Swallowing no   Walking or Climbing Stairs Difficulty no   Dressing/Bathing Difficulty no   Toileting issues no   Doing Errands Independently Difficulty (such as shopping) no   Fall history within last six months no   Number of times patient has fallen within last six months   (reported no falls to me though 1 per chart review)   Change in Functional Status Since Onset of Current Illness/Injury yes   General Information   Onset of Illness/Injury or Date of Surgery 10/23/21   Referring Physician Janina Cook PA-C   Patient/Family Therapy Goals Statement (PT) to return home   Pertinent History of Current Problem (include personal factors and/or comorbidities that impact the POC) Per chart, Efrain Gomes is a 71 year old male with a history of alcohol use disorder, HTN, anxiety, depression, who was admitted with pancreatitis.    Existing " Precautions/Restrictions fall   General Observations Activity orders: ambulate with assist   Cognition   Orientation Status (Cognition) oriented x 3   Affect/Mental Status (Cognition) WFL   Follows Commands (Cognition) delayed response/completion   Integumentary/Edema   Integumentary/Edema Comments Bruising noted on L anterior knee   Posture    Posture Forward head position;Protracted shoulders   Range of Motion (ROM)   ROM Quick Adds ROM WFL   Strength   Manual Muscle Testing Quick Adds Strength WFL   Bed Mobility   Comment (Bed Mobility) Michelle with use of bedrail, movement is very effortful   Transfers   Transfer Safety Comments SBA sit<>stand up to FWW, relies on UE support   Gait/Stairs (Locomotion)   Comment (Gait/Stairs) SBA and FWW, declines trial without FWW this date 2/2 weakness and feeling generally unwell   Balance   Balance Comments Fair static standing balance, tends to reach out for UE support   Sensory Examination   Sensory Perception patient reports no sensory changes   Clinical Impression   Criteria for Skilled Therapeutic Intervention yes, treatment indicated   PT Diagnosis (PT) impaired functional mobility   Influenced by the following impairments decreased strength and endurance, impaired balance   Functional limitations due to impairments gait, stairs   Clinical Presentation Stable/Uncomplicated   Clinical Presentation Rationale PMH and clinical judgment   Clinical Decision Making (Complexity) low complexity   Therapy Frequency (PT) 5x/week   Predicted Duration of Therapy Intervention (days/wks) 1 week   Planned Therapy Interventions (PT) balance training;bed mobility training;gait training;home exercise program;neuromuscular re-education;stair training;strengthening;transfer training;progressive activity/exercise;risk factor education;home program guidelines   Anticipated Equipment Needs at Discharge (PT) walker, rolling   Risk & Benefits of therapy have been explained evaluation/treatment  results reviewed;care plan/treatment goals reviewed;risks/benefits reviewed;current/potential barriers reviewed;participants voiced agreement with care plan;participants included;patient   PT Discharge Planning    PT Discharge Recommendation (DC Rec) home with assist;home with home care physical therapy   PT Rationale for DC Rec Pt currently presenting below baseline though anticipate when medically stable pt will be safe to return home with assist prn. Pt will need to demonstrate ability to navigate stairs prior to discharge home, declined performing this date.   PT Brief overview of current status  SBA and FWW   Total Evaluation Time   Total Evaluation Time (Minutes) 5

## 2021-10-24 NOTE — H&P
"[facility]    History and Physical - ED Observation       Date of Admission:  10/23/2021    Assessment & Plan      Efrain Gomes is a 71 year old male admitted on 10/23/2021. He has a past medical history significant for Alcohol abuse, HLD, HTN, Anxiety, Depression, and presents to the Emergency Dept for evaluation of nausea, vomiting, and weakness.    ##Nausea & Vomiting  ##Weakness  ##Failure to thrive  Elderly male with history of alcohol use who presents with one week of nausea & vomiting with increasing weakness. He reports vomit was coffee brown in color. Pt reports his last drink was 3 days ago. He denies falling but reports he did \"spend some time on the floor\" in the last week. In the ED, VSS. Afebrile. No vomiting while in ED. Blood pressure has improved from 182/111 on arrival to 150/97 more recently. Labs show BUN 46, Creatinine 1.13, up from a baseline ~0.9-1.0. Mag 2.5. LFTs normal. Lipase 980. Pt denies abdominal pain. Troponin negative. Ethanol level undetectable. CBC is unremarkable with Hgb 16.8. UA consistent with dehydration with 10 ketones and 9 protein, mucus. Patient was given an IVF bolus and zofran, then he tolerated a few bites of food. Plan was for discharge but patient was unable to ambulate safely. Plan for admission to ED Observation for PT evaluation and social work assistance for a safe disposition.  -IVF  -VS Q4hrs  -CBC, CMP, and CK in AM  -I/Os  -Supplemental shakes between meals  -PT evaluation  -SW consult    ##Alcoholism  ##Elevated Lipase  Pt reports his last drink was three days ago, due to nausea and vomiting and weakness. His ethanol level is undetectable. Pt does report that vomit was coffee ground like, however, he has not vomited here and his Hgb is 16. He reports having diaphoresis and tremors while at home but that has improved. He denies history of DT's or seizures with withdrawal. Per chart review, pt has long history " "of alcohol use, AA meeting, chemical dependency referrals. Labs show elevated lipase. This is less than 3 times the upper limit of normal and patient has no epigastric tenderness on exam, nausea is now controlled after medications were given in the emergency department. Patient was able to tolerate oral intake in the ER without difficulty.   -Rally pack daily with Multivitamin, folic acid, thiamine, magnesium daily  -CIWA with each set of vitals  -IVF    ##HTN: Pt reports he has not been taking any medications for the last few months. Blood pressure 182/111 on arrival. Pt was given his home doses and now 145/92.   -Resume daily Amlodipine, lisinopril, Toprol-XL    ##HLD: Will hold PTA atorvastatin for now, as pt reports he has not been taking it for months.     ##Anxiety  ##Depression: Pt reports he has not been taking any medications for months, but he has them all at home. He would like to restart \"my depression medication\".   -Resume PTA sertraline, discussed dose with pharmacy and no need to titrate dose, ok to start 25mg daily now.     ##Bullous pemphigoid  He is no longer taking CellCept through his dermatologist. Medication was expensive and he feels that his symptoms are gone. He has not been taking this medication for several months.        Diet:   regular  DVT Prophylaxis: Low Risk/Ambulatory with no VTE prophylaxis indicated  Ruiz Catheter: Not present  Central Lines: None  Code Status:   full    Disposition Plan   Expected discharge: 2-3 days, recommended to transitional care unit once adequate pain management/ tolerating PO medications, renal function improved and safe disposition plan/ TCU bed available.     The patient's care was discussed with the Bedside Nurse, Patient and ED MD, Dr Saeed.    Sabi Woodard, [facility]  ED Observation, Ascom:[id number]  ______________________________________________________________________    Chief Complaint   Nausea, " "Vomiting, and weakness    History is obtained from the patient    History of Present Illness   Per ED, \"Efrain Gomes is a 71 year old male who has a significant medical history of alcohol abuse who was BIBA to ED due to coffee-ground emesis persistent for 1 week.  The patient arrived via EMS.  They report that the patient is chronic alcohol user.  The patient endorses drinking a bottle of wine every day.he reports that his last drink was 3 days ago.  He states he has not had anything to eat in the past week.  He did try to eat, but vomited.  He states he did have nausea and vomiting symptoms as part of his alcohol withdrawal symptoms over the last 3 days.  He may have had a little bit of sweating and tremor, but these have resolved.  He denies any history of alcohol withdrawal seizures or delirium tremens.  He states that overall his alcohol withdrawal symptoms have been significantly improving, but are still present.  The patient states he feels very hungry and does feel like he could tolerate some food at this time.  He reports associated weakness.   He is wondering if he could spend the night here because he \"does not trust himself.\"    Review of Systems    A complete review of systems was performed with pertinent positives and negatives noted in the HPI, and all other systems negative.    Past Medical History    I have reviewed this patient's medical history and updated it with pertinent information if needed.   History reviewed. No pertinent past medical history.    Past Surgical History   I have reviewed this patient's surgical history and updated it with pertinent information if needed.  History reviewed. No pertinent surgical history.    Social History   I have reviewed this patient's social history and updated it with pertinent information if needed.  Social History     Tobacco Use     Smoking status: Never Smoker     Smokeless tobacco: Never Used   Substance Use Topics     Alcohol use: Yes     Drug " use: Not Currently       Prior to Admission Medications   Prior to Admission Medications   Prescriptions Last Dose Informant Patient Reported? Taking?   amLODIPine (NORVASC) 2.5 MG tablet   No No   Sig: [AMLODIPINE (NORVASC) 2.5 MG TABLET] TAKE 1 TABLET(2.5 MG) BY MOUTH DAILY   atorvastatin (LIPITOR) 10 MG tablet   No No   Sig: [ATORVASTATIN (LIPITOR) 10 MG TABLET] TAKE 1 TABLET(10 MG) BY MOUTH DAILY   betamethasone dipropionate (DIPROLENE) 0.05 % cream   Yes No   Sig: [BETAMETHASONE DIPROPIONATE (DIPROLENE) 0.05 % CREAM] STEPHEN EXT AA BID   cetirizine (ZYRTEC) 10 MG tablet   Yes No   Sig: [CETIRIZINE (ZYRTEC) 10 MG TABLET] Take 10 mg by mouth as needed.    cholecalciferol, vitamin D3, (VITAMIN D3) 2,000 unit Tab   Yes No   Sig: [CHOLECALCIFEROL, VITAMIN D3, (VITAMIN D3) 2,000 UNIT TAB] Take 1 tablet by mouth daily.   fluocinolone acetonide oil (DERMOTIC) 0.01 % Drop   No No   Sig: [FLUOCINOLONE ACETONIDE OIL (DERMOTIC) 0.01 % DROP] Administer 3 drops into both ears 2 (two) times a day. Used as needed   lisinopriL (PRINIVIL,ZESTRIL) 40 MG tablet   No No   Sig: [LISINOPRIL (PRINIVIL,ZESTRIL) 40 MG TABLET] TAKE 1 TABLET(40 MG) BY MOUTH DAILY   metoprolol succinate (TOPROL-XL) 50 MG 24 hr tablet   No No   Sig: [METOPROLOL SUCCINATE (TOPROL-XL) 50 MG 24 HR TABLET] TAKE 1 TABLET(50 MG) BY MOUTH DAILY   mycophenolate (CELLCEPT) 500 mg tablet   Yes No   Sig: [MYCOPHENOLATE (CELLCEPT) 500 MG TABLET] Take 500 mg by mouth daily.    sertraline (ZOLOFT) 25 MG tablet   No No   Sig: [SERTRALINE (ZOLOFT) 25 MG TABLET] TAKE 1 TABLET(25 MG) BY MOUTH DAILY   sildenafil (VIAGRA) 100 MG tablet   No No   Sig: [SILDENAFIL (VIAGRA) 100 MG TABLET] Take 1 tablet (100 mg total) by mouth daily as needed for erectile dysfunction.   triamcinolone (KENALOG) 0.1 % cream   Yes No   Sig: [TRIAMCINOLONE (KENALOG) 0.1 % CREAM] STEPHEN TO BODY RASH D PRN      Facility-Administered Medications: None     Allergies   Allergies   Allergen Reactions     Septra  [Sulfamethoxazole W/Trimethoprim] Rash     Sulfamethoxazole-Trimethoprim Rash       Physical Exam   Vital Signs: Temp: 97.6  F (36.4  C) Temp src: Temporal BP: (!) 185/115 Pulse: 100   Resp: 16 SpO2: 98 % O2 Device: None (Room air)    Weight: 186 lbs 0 oz    Constitutional: awake, alert, cooperative, no apparent distress, and appears stated age  Eyes: Lids and lashes normal, pupils equal, round and reactive to light, extra ocular muscles intact, sclera clear, conjunctiva normal  ENT: Normocephalic, atraumatic, external ears without lesions, oral pharynx with moist mucous membranes.   Respiratory: No increased work of breathing, good air exchange, clear to auscultation bilaterally, no crackles or wheezing  Cardiovascular: Normal apical impulse, regular rate and rhythm, normal S1 and S2, no S3 or S4, and no murmur noted  Abdomen: Normal bowel sounds, soft, non-distended, non-tender, no masses palpated, no hepatosplenomegally  Skin: normal skin color, texture, turgor and no redness, warmth, or swelling  Musculoskeletal: There is no redness, warmth, or swelling of the joints.  Full range of motion noted.  Motor strength is 5 out of 5 all extremities bilaterally.  Tone is normal.  Neurologic: Awake, alert, oriented to name, place and time.  Cranial nerves II-XII are grossly intact.  Motor is 5 out of 5 bilaterally.  Gait is normal.    Data   Data reviewed today: I reviewed all medications, new labs and imaging results over the last 24 hours. I personally reviewed the EKG tracing showing sinus tachycardia.    Recent Labs   Lab 10/23/21  1523   WBC 10.5   HGB 16.8   MCV 97         POTASSIUM 3.9   CHLORIDE 95   CO2 26   BUN 46*   CR 1.13   ANIONGAP 12   EDDIE 9.0   *   ALBUMIN 3.0*   PROTTOTAL 6.9   BILITOTAL 1.3   ALKPHOS 129   ALT 51   AST 40   LIPASE 980*   TROPONIN <0.015     No results found for this or any previous visit (from the past 24 hour(s)).

## 2021-10-24 NOTE — PLAN OF CARE
- Diagnostic tests and consults completed and resulted: No   - Vital signs normal or at patient baseline: Yes   - Tolerating oral intake to maintain hydration: Yes   - Returns to baseline functional status: No   - Safe disposition plan has been identified: No   Addended by: Tiffany Mason on: 6/22/2021 03:46 PM     Modules accepted: Orders

## 2021-10-24 NOTE — PROGRESS NOTES
[facility]    Medicine Progress Note - Hospitalist Service, Gold 6       Date of Admission:  10/23/2021    Assessment & Plan         Efrain Gomes is a 71 year old male with a history of alcohol use disorder, HTN, anxiety, depression, who was admitted with pancreatitis.     Acute pancreatitis: Presented with LUQ pain, nausea, vomiting, weakness, lipase 1,933. Most likely secondary to alcohol use. Afebrile. LFTs, mag wnl.    - Advance to clear liquid diet   - LR at 200 mL/hr   - Pain: has not received any pain medications since admission, will start with Tylenol    - Nausea: Zofran, Compazine    Constipation: Last BM 6-7 days ago. No e/o obstruction   - Start Miralax and Senokot BID    Hypokalemia: K 2.7. Suspect secondary to poor oral intake in setting of heavy alcohol use.   - Replace per protocol    Alcohol use disorder: Drinking about 1 quart daily. Last drink 3 days PTA. No other substance use, Utox negative. No prior history of withdrawal seizures. Does not appear to be withdrawing at this time.    - CIWA protocol with lorazepam as indicated   - MVI, folic acid, and thiamine supplements    Hypertension: Not taking his medications for a few months. Hypertensive on admission   - Continue PTA amlodipine, lisinopril, metoprolol      Other Chronic Medical Issues:  HLD: Not taking atorvastatin for months, will hold this.   Anxiety, depression: Also not taking his medications recently and desired to resume his meds. Continue PTA sertraline   Bullous pemphigoid: No longer on CellCept as it was cost prohibitive.        Diet: Snacks/Supplements Pediatric: Boost Breeze; Between Meals  NPO for Medical/Clinical Reasons Except for: Meds    DVT Prophylaxis: Enoxaparin (Lovenox) SQ  Ruiz Catheter: Not present  Central Lines: None  Code Status: Full Code      Disposition Plan   Expected discharge:  2-3 days recommended to prior living arrangement once clinically improved,  tolerating diet, withdrawal resolved.     The patient's care was discussed with the Attending Physician, Dr. Majano, Bedside Nurse and Patient.    Brittany Sams PA-C  Hospitalist Service, [address]  Securely message with the Vocera Web Console (learn more here)  Text page via [facility] Paging/Directory  Please see sign in/sign out for up to date coverage information    Clinically Significant Risk Factors Present on Admission        # Hypokalemia: K = 2.7 mmol/L (Ref range: 3.4 - 5.3 mmol/L) on admission, will replace as needed            ______________________________________________________________________    Interval History   Has ongoing left upper quadrant pain. He has been drinking clear liquids this morning with no increase in his pain. Denies any nausea or vomiting. Last bowel movement 6-7 days ago. No withdrawal symptoms noted.     Data reviewed today: I reviewed all medications, new labs and imaging results over the last 24 hours.     Physical Exam   Vital Signs: Temp: 97.9  F (36.6  C) Temp src: Oral BP: 136/86 Pulse: 74   Resp: 18 SpO2: 96 % O2 Device: None (Room air)    Weight: 186 lbs 0 oz  Constitutional: Awake and alert, in no apparent distress. Lying comfortably in bed.   Eyes: Sclera clear, anicteric   Respiratory: Breathing non-labored.   Cardiovascular:  RRR, normal S1/S2. No rubs or murmurs. Intact bilateral pedal pulses. No peripheral edema.   GI: Distended but soft. Non-tender. Normoactive bowel sounds.   Skin:  Good color. No jaundice. No visible rashes, lesions, or bruising of concern.   Neurologic: Alert and fully oriented. No focal deficits. No tremor noted.       Data   ROUTINE IP LABS (Last four results)  Recent Labs   Lab 10/24/21  0649 10/23/21  1523    133   POTASSIUM 2.7* 3.9   CHLORIDE 101 95   CO2 28 26   ANIONGAP 7 12   * 176*   BUN 33* 46*   CR 0.85 1.13   EDDIE 8.3* 9.0   MAG  --  2.5*   PROTTOTAL 5.3* 6.9    ALBUMIN 2.2* 3.0*   BILITOTAL 1.0 1.3   ALKPHOS 94 129   AST 24 40   ALT 35 51     Recent Labs   Lab 10/24/21  0649 10/23/21  1523   WBC 6.5 10.5   RBC 4.19* 4.94   HGB 14.1 16.8   HCT 41.1 47.9   MCV 98 97   MCH 33.7* 34.0*   MCHC 34.3 35.1   RDW 13.1 13.1    339     No lab results found in last 7 days.

## 2021-10-24 NOTE — UTILIZATION REVIEW
[facility] Utilization Review  Admission Status; Secondary Review Determination     Admission Date: 10/23/2021  3:06 PM      Under the authority of the Utilization Management Committee, the utilization review process indicated a secondary review on the above patient.  The review outcome is based on review of the medical records, discussions with staff, and applying clinical experience noted on the date of the review.        (X)      Inpatient Status Appropriate - This patient's medical care is consistent with medical management for inpatient care and reasonable inpatient medical practice.          RATIONALE FOR DETERMINATION   Efrain Goems is a 71 year old male with past medical history of alcohol abuse, who is admitted with alcohol intoxication and alcohol induced acute pancreatitis with abdominal pain, lipase significantly elevated at 1933.  Started on bowel rest, IV fluids, pain control, alcohol withdrawal precautions, electrolyte replacement and close monitoring.  Plan to initiate clear liquid diet and advance as tolerated, continue monitoring with CIWA protocol and safe disposition planning.  Patient remains high risk for alcohol withdrawal symptoms.  Anticipated length of stay is more than 2 midnights, and patient is appropriately admitted as inpatient.           The definitions of Inpatient Status and Observation Status used in making the determination above are those provided in the CMS Coverage Manual, Chapter 1 and Chapter 6, section 70.4.      Sincerely,       Jose Antonio Chiu MD, MS  Physician Advisor  Utilization Review-[place]    Phone: [phone]

## 2021-10-24 NOTE — CONSULTS
Care Management Initial Consult    General Information  Assessment completed with: Patient, Jay Cardoso      Type of CM/SW Visit: Initial Assessment    Primary Care Provider verified and updated as needed: Yes     Readmission within the last 30 days: No         Reason for Consult: discharge planning    Advance Care Planning: no concerns identified        Communication Assessment  Patient's communication style: spoken language (English or Bilingual)      Hearing Difficulty or Deaf: no     Wear Glasses or Blind: yes    Cognitive  Cognitive/Neuro/Behavioral: WDL                      Living Environment:   People in home: alone       Current living Arrangements: condominium        Able to return to prior arrangements: yes     Family/Social Support:  Care provided by: self    Provides care for: no one    Marital Status:     Support System: Jay Cardoso            Description of Support System: Supportive, Involved      Support Assessment: Adequate family and caregiver support    Current Resources:   Patient receiving home care services: No     Community Resources: None    Equipment currently used at home: none    Supplies currently used at home: None    Employment/Financial:  Employment Status: retired -      Financial Concerns: No concerns identified     Referral to Financial Counselor: No    Lifestyle & Psychosocial Needs:  Social Determinants of Health     Tobacco Use: Low Risk      Smoking Tobacco Use: Never Smoker     Smokeless Tobacco Use: Never Used   Alcohol Use:      Frequency of Alcohol Consumption:      Average Number of Drinks:      Frequency of Binge Drinking:    Financial Resource Strain:      Difficulty of Paying Living Expenses:    Food Insecurity:      Worried About Running Out of Food in the Last Year:      Ran Out of Food in the Last Year:    Transportation Needs:      Lack of Transportation (Medical):      Lack of Transportation (Non-Medical):    Physical  Activity:      Days of Exercise per Week:      Minutes of Exercise per Session:    Stress:      Feeling of Stress :    Social Connections:      Frequency of Communication with Friends and Family:      Frequency of Social Gatherings with Friends and Family:      Attends L[employer] Services:      Active Member of Clubs or Organizations:      Attends Club or Organization Meetings:      Marital Status:    Intimate Partner Violence:      Fear of Current or Ex-Partner:      Emotionally Abused:      Physically Abused:      Sexually Abused:    Depression: Not at risk     PHQ-2 Score: 2   Housing Stability:      Unable to Pay for Housing in the Last Year:      Number of Places Lived in the Last Year:      Unstable Housing in the Last Year:        Functional Status:  Prior to admission patient needed assistance:   Dependent ADLs:: Independent  Dependent IADLs:: Independent       Mental Health Status:  Mental Health Status: Current Concern - Anxiety      Mental Health Management: Medication    Chemical Dependency Status:  Chemical Dependency Status: Current Concern - Alcohol abuse    Chemical Dependency Management: AA        Additional Information:  Efrain Gomes is a 71 year old male admitted on 10/23/2021. He has a past medical history significant for Alcohol abuse, HLD, HTN, Anxiety, Depression, and presents to the Emergency Dept for evaluation of nausea, vomiting, and weakness.    Writer met with Pt in Pt's room at 1420.  Pt was awake, alert, and willing to meet with Writer.  Pt's daughter was in the room and Pt expressed that he wished for her to stay.  Writer introduced self, described SW role, and explained reason for visit to discuss discharge planning.    Pt stated that he lives alone in a condo.  Pt stated that he must walk up three flights of stairs with handrails in order to enter and exit his condo.  Once inside his condo, his living space is all on one floor.  Pt stated that he does not have any medical  "equipment that he uses in his home.  He does not use any mobility devices.    Pt shared that he does not utilize any in home services such as PCA or housekeeping.  Pt's daughter, who lives an hour away, will occasionally come by to help Pt when needed.  Pt is fully independent with his ADLs.  Pt still drives himself.  Pt states that he lives in a small building and that he and his neighbors are friendly and look out for each other.    Pt shared that he has been experiencing anxiety and depression.  His doctor prescribed him Zoloft to manage these concerns and he feels it is helping.  Pt minimized his alcohol use.  Pt's daughter encouraged Pt to be more open about his struggles and Pt admitted that he has been \"hitting the bottle pretty hard\" for the past week.  Writer asked Pt if adarsh was a trigger that caused him to start drinking more heavily and Pt stated there was not.  Writer asked Pt what has helped him in the past with avoiding heavy alcohol use and Pt stated that he has been successful with AA meetings and maintaining social connections.  Pt stated that he occasionally attends a [employer].    Writer explained that someone from PT will meet with him to assess his mobility and determine whether they think Pt will need rehabilitation before safely returning home.  Janae asked about the referral process for TCU.  Writer explained the Medicare.gov website and ratings and the TCU referral process.  Writer encouraged Pt and Janae to reach out to Writer with any additional questions or concerns.  ______________________    MARK Bermudez, LGROSELINE  ED/Obs   [facility]  Phone: [phone]  Pager: [phone]    "

## 2021-10-25 ENCOUNTER — APPOINTMENT (OUTPATIENT)
Dept: PHYSICAL THERAPY | Facility: CLINIC | Age: 71
DRG: 439 | End: 2021-10-25
Payer: MEDICARE

## 2021-10-25 LAB
ALBUMIN SERPL-MCNC: 1.9 G/DL (ref 3.4–5)
ALP SERPL-CCNC: 75 U/L (ref 40–150)
ALT SERPL W P-5'-P-CCNC: 34 U/L (ref 0–70)
ANION GAP SERPL CALCULATED.3IONS-SCNC: 4 MMOL/L (ref 3–14)
AST SERPL W P-5'-P-CCNC: 29 U/L (ref 0–45)
BILIRUB SERPL-MCNC: 0.7 MG/DL (ref 0.2–1.3)
BUN SERPL-MCNC: 24 MG/DL (ref 7–30)
CALCIUM SERPL-MCNC: 7.8 MG/DL (ref 8.5–10.1)
CHLORIDE BLD-SCNC: 107 MMOL/L (ref 94–109)
CO2 SERPL-SCNC: 27 MMOL/L (ref 20–32)
CREAT SERPL-MCNC: 0.72 MG/DL (ref 0.66–1.25)
ERYTHROCYTE [DISTWIDTH] IN BLOOD BY AUTOMATED COUNT: 13.1 % (ref 10–15)
GFR SERPL CREATININE-BSD FRML MDRD: >90 ML/MIN/1.73M2
GLUCOSE BLD-MCNC: 101 MG/DL (ref 70–99)
HCT VFR BLD AUTO: 37.7 % (ref 40–53)
HGB BLD-MCNC: 12.4 G/DL (ref 13.3–17.7)
MCH RBC QN AUTO: 33.6 PG (ref 26.5–33)
MCHC RBC AUTO-ENTMCNC: 32.9 G/DL (ref 31.5–36.5)
MCV RBC AUTO: 102 FL (ref 78–100)
PLATELET # BLD AUTO: 270 10E3/UL (ref 150–450)
POTASSIUM BLD-SCNC: 3.9 MMOL/L (ref 3.4–5.3)
PROT SERPL-MCNC: 4.4 G/DL (ref 6.8–8.8)
RBC # BLD AUTO: 3.69 10E6/UL (ref 4.4–5.9)
SODIUM SERPL-SCNC: 138 MMOL/L (ref 133–144)
WBC # BLD AUTO: 4.9 10E3/UL (ref 4–11)

## 2021-10-25 PROCEDURE — 80053 COMPREHEN METABOLIC PANEL: CPT | Performed by: PHYSICIAN ASSISTANT

## 2021-10-25 PROCEDURE — 250N000013 HC RX MED GY IP 250 OP 250 PS 637: Performed by: PHYSICIAN ASSISTANT

## 2021-10-25 PROCEDURE — 36415 COLL VENOUS BLD VENIPUNCTURE: CPT | Performed by: PHYSICIAN ASSISTANT

## 2021-10-25 PROCEDURE — 250N000011 HC RX IP 250 OP 636: Performed by: PEDIATRICS

## 2021-10-25 PROCEDURE — 97530 THERAPEUTIC ACTIVITIES: CPT | Mod: GP

## 2021-10-25 PROCEDURE — 120N000002 HC R&B MED SURG/OB UMMC

## 2021-10-25 PROCEDURE — 99207 PR APP CREDIT; MD BILLING SHARED VISIT: CPT | Performed by: PHYSICIAN ASSISTANT

## 2021-10-25 PROCEDURE — 99232 SBSQ HOSP IP/OBS MODERATE 35: CPT | Performed by: PEDIATRICS

## 2021-10-25 PROCEDURE — 250N000011 HC RX IP 250 OP 636: Performed by: PHYSICIAN ASSISTANT

## 2021-10-25 PROCEDURE — 97116 GAIT TRAINING THERAPY: CPT | Mod: GP

## 2021-10-25 PROCEDURE — 258N000003 HC RX IP 258 OP 636: Performed by: PHYSICIAN ASSISTANT

## 2021-10-25 PROCEDURE — 85014 HEMATOCRIT: CPT | Performed by: PHYSICIAN ASSISTANT

## 2021-10-25 RX ORDER — HYDRALAZINE HYDROCHLORIDE 20 MG/ML
10 INJECTION INTRAMUSCULAR; INTRAVENOUS EVERY 6 HOURS PRN
Status: DISCONTINUED | OUTPATIENT
Start: 2021-10-25 | End: 2021-10-27

## 2021-10-25 RX ORDER — MULTIPLE VITAMINS W/ MINERALS TAB 9MG-400MCG
1 TAB ORAL DAILY
Status: DISCONTINUED | OUTPATIENT
Start: 2021-10-26 | End: 2021-11-02 | Stop reason: HOSPADM

## 2021-10-25 RX ADMIN — SENNOSIDES 8.6 MG: 8.6 TABLET ORAL at 09:28

## 2021-10-25 RX ADMIN — LORAZEPAM 1 MG: 0.5 TABLET ORAL at 20:41

## 2021-10-25 RX ADMIN — POLYETHYLENE GLYCOL 3350 17 G: 17 POWDER, FOR SOLUTION ORAL at 08:33

## 2021-10-25 RX ADMIN — SODIUM CHLORIDE, POTASSIUM CHLORIDE, SODIUM LACTATE AND CALCIUM CHLORIDE: 600; 310; 30; 20 INJECTION, SOLUTION INTRAVENOUS at 01:21

## 2021-10-25 RX ADMIN — THERA TABS 1 TABLET: TAB at 08:34

## 2021-10-25 RX ADMIN — HYDRALAZINE HYDROCHLORIDE 10 MG: 20 INJECTION INTRAMUSCULAR; INTRAVENOUS at 09:28

## 2021-10-25 RX ADMIN — FOLIC ACID 1 MG: 1 TABLET ORAL at 08:34

## 2021-10-25 RX ADMIN — SERTRALINE HYDROCHLORIDE 25 MG: 25 TABLET ORAL at 08:34

## 2021-10-25 RX ADMIN — ENOXAPARIN SODIUM 40 MG: 40 INJECTION SUBCUTANEOUS at 11:43

## 2021-10-25 RX ADMIN — Medication 800 MG: at 08:34

## 2021-10-25 RX ADMIN — THIAMINE HCL TAB 100 MG 100 MG: 100 TAB at 08:34

## 2021-10-25 RX ADMIN — LISINOPRIL 40 MG: 40 TABLET ORAL at 08:34

## 2021-10-25 RX ADMIN — AMLODIPINE BESYLATE 2.5 MG: 2.5 TABLET ORAL at 08:33

## 2021-10-25 RX ADMIN — METOPROLOL SUCCINATE 50 MG: 25 TABLET, EXTENDED RELEASE ORAL at 08:34

## 2021-10-25 ASSESSMENT — ACTIVITIES OF DAILY LIVING (ADL)
ADLS_ACUITY_SCORE: 12
ADLS_ACUITY_SCORE: 11
ADLS_ACUITY_SCORE: 12
ADLS_ACUITY_SCORE: 12
ADLS_ACUITY_SCORE: 11
ADLS_ACUITY_SCORE: 11
ADLS_ACUITY_SCORE: 12
ADLS_ACUITY_SCORE: 11
ADLS_ACUITY_SCORE: 12
ADLS_ACUITY_SCORE: 11
ADLS_ACUITY_SCORE: 11
ADLS_ACUITY_SCORE: 12

## 2021-10-25 ASSESSMENT — MIFFLIN-ST. JEOR: SCORE: 1558.21

## 2021-10-25 NOTE — PLAN OF CARE
"[id number]:  Transferred from Obs. Scores a 3 on the CIWA scale; slight tremors and mild anxiety reported. Physical therapy suggesting TCU. Occupational therapy ordered. Pt up w/ SBA & walker, upon standing reports feeling \"wobbly\" for a quick moment. Telemetry discontinued. Pt had multiple BMs today. Continue to monitor & w/ POC.  "

## 2021-10-25 NOTE — PROGRESS NOTES
[facility]    Medicine Progress Note - Hospitalist Service, Gold 6       Date of Admission:  10/23/2021    Assessment & Plan         Efrain Schmid is a 71-year-old male with a history of alcohol use disorder, hypertension, anxiety, depression, who was admitted with acute pancreatitis.    # Acute pancreatitis: Presented with LUQ pain, nausea, vomiting, weakness, lipase 1,933. Most likely secondary to alcohol use. Afebrile. LFTs, mag wnl.    - Advance to Regular Diet   - Stop IVF   - Pain: has not received any pain medications since admission, will start with Tylenol    - Nausea: Zofran, Compazine  - PT/OT for safe Dispo recommendations      Alcohol use disorder: Drinking about 1 quart daily. Last drink 3 days PTA. No other substance use, Utox negative. No prior history of withdrawal seizures. Does not appear to be withdrawing at this time. Started on CIWA protocol, but has not scored for prns.   - MVI, folic acid, and thiamine supplements     Hypertension: Not taking his medications for a few months. Hypertensive on admission   - Continue PTA amlodipine, lisinopril, metoprolol  -  Hydralazine prn for SBP >180        Other Chronic Medical Issues:  HLD: Not taking atorvastatin for months, will hold this.   Anxiety, depression: Also not taking his medications recently and desired to resume his meds. Continue PTA sertraline   Bullous pemphigoid: No longer on CellCept as it was cost prohibitive.          Diet: Snacks/Supplements Pediatric: Boost Breeze; Between Meals  Regular Diet Adult    DVT Prophylaxis: Enoxaparin (Lovenox) SQ  Ruiz Catheter: Not present  Central Lines: None  Code Status: Full Code      Disposition Plan   Expected discharge: 10/26/2021  recommended to prior living arrangement once cleared by PT for safe discharge home. .     The patient's care was discussed with the Attending Physician, Dr. Majano.    Joan Brooks PA-C  Hospitalist Service, Gold  [address] [place] [address]  Securely message with the [employer] Web Console (learn more here)  Text page via [facility] Paging/Directory  Please see sign in/sign out for up to date coverage information      ______________________________________________________________________    Interval History   Feeling better today.  Denies pain.  Tolerated breakfast this morning without any nausea, vomiting, or abdominal pain.  Fluids stopped.  Patient concerned that he is wobbly on his feet, notes that he has stairs that he needs to ascend to get into his apartment.  Otherwise denies any new complaints.  Specifically, he denies any chest pain, shortness of breath, difficulty breathing.  He has not been having fevers, body aches, or chills.    Data reviewed today: I reviewed all medications, new labs and imaging results over the last 24 hours.   Physical Exam   Vital Signs: Temp: 98.5  F (36.9  C) Temp src: Oral BP: (!) 148/76 Pulse: 80   Resp: 18 SpO2: 98 % O2 Device: None (Room air)    Weight: 190 lbs 12.8 oz  General Appearance: Alert and oriented x 3, NAD  Respiratory: Lungs CTAB  Cardiovascular: RRR, no murmurs  GI: Abdomen soft, non distended, non tender to palpation  Skin: warm and dry to touch, no jaundice  Other: No peripheral edema     Data   Recent Labs   Lab 10/25/21  0529 10/24/21  2015 10/24/21  1332 10/24/21  0649 10/24/21  0649 10/23/21  1523 10/23/21  1523   WBC 4.9  --   --   --  6.5  --  10.5   HGB 12.4*  --   --   --  14.1  --  16.8   *  --   --   --  98  --  97     --   --   --  282  --  339     --   --   --  136  --  133   POTASSIUM 3.9 3.6 3.3*   < > 2.7*   < > 3.9   CHLORIDE 107  --   --   --  101  --  95   CO2 27  --   --   --  28  --  26   BUN 24  --   --   --  33*  --  46*   CR 0.72  --   --   --  0.85  --  1.13   ANIONGAP 4  --   --   --  7  --  12   EDDIE 7.8*  --   --   --  8.3*  --  9.0   *  --   --   --  111*  --  176*   ALBUMIN 1.9*  --   --   --   2.2*   < > 3.0*   PROTTOTAL 4.4*  --   --   --  5.3*   < > 6.9   BILITOTAL 0.7  --   --   --  1.0   < > 1.3   ALKPHOS 75  --   --   --  94   < > 129   ALT 34  --   --   --  35   < > 51   AST 29  --   --   --  24   < > 40   LIPASE  --   --   --   --  1,933*  --  980*   TROPONIN  --   --   --   --   --   --  <0.015    < > = values in this interval not displayed.

## 2021-10-25 NOTE — PROGRESS NOTES
"CLINICAL NUTRITION SERVICES - ASSESSMENT NOTE     Nutrition Prescription    RECOMMENDATIONS FOR MDs/PROVIDERS TO ORDER:  Recommend adding on phosphorus level and repleting if low.     Malnutrition Status:    Patient does not meet two of the established criteria necessary for diagnosing malnutrition but is at risk for malnutrition     Recommendations already ordered by Registered Dietitian (RD):  Change MVI to MVI with minerals    Continue Ensure Clear once daily    Future/Additional Recommendations:  Consider low fat diet if experiencing abdominal pain associated with PO intake.      REASON FOR ASSESSMENT  Efrain Gomes is a/an 71 year old male assessed by the dietitian for Admission Nutrition Risk Screen for positive (weight loss/reduced intake)    NUTRITION HISTORY  Admitted with 1 week hx of nausea/vomiting/heartburn and weakness.  He reports his only solid food intake in the last week was 1 banana.  He also had small amounts of milk, juice and \"quite a bit of wine\".     CURRENT NUTRITION ORDERS  Diet: Regular + Boost Breeze between meals  Intake/Tolerance: He reports he ate a full meal today (625 kcal, 30 g protein) for the first time (~11am) and so far it is sitting well.  He denies any nausea or pain associated with intake.     LABS  Lipase 1933 - acute pancreatitis per team    MEDICATIONS  Miralax BID  Senokot BID  MagOx 800 mg daily  Thiamine 100 mg daily  Folic acid 1 mg daily  MVI daily  Zofran PRN     ANTHROPOMETRICS  Height: 167.6 cm (5' 6\")  Most Recent Weight: 84.4 kg (186 lb)    IBW: 64.5 kg  BMI: Obesity Grade I BMI 30-34.9  Weight History: He denies any recent weight changes.   Dosing Weight: 70 kg (adj wt based on IBW of 64.5 kg and actual wt of 84.4 kg)    ASSESSED NUTRITION NEEDS  Estimated Energy Needs: 4436-4475 kcals/day (25 - 30 kcals/kg)  Justification: Maintenance  Estimated Protein Needs: 70-84 grams protein/day (1 - 1.2 grams of pro/kg)  Justification: Maintenance  Estimated Fluid " Needs: 1 mL/kcal  Justification: Maintenance    PHYSICAL FINDINGS  See malnutrition section below.    MALNUTRITION  % Intake: </= 50% for >/= 5 days (severe)  % Weight Loss: None noted  Subcutaneous Fat Loss: None observed  Muscle Loss: None observed  Fluid Accumulation/Edema: None noted  Malnutrition Diagnosis: Patient does not meet two of the established criteria necessary for diagnosing malnutrition but is at risk for malnutrition     NUTRITION DIAGNOSIS  Inadequate oral intake related to alcohol use, nausea/vomiting as evidenced by patient with minimal PO intake x 1 week.     INTERVENTIONS  Implementation  Nutrition Education: Provided education on consuming more protein/micronutrients.  Discussed role pancreas plays in digestion/absorption and how PO intake can affect the pancreas.    Medical food supplement therapy     Goals  Patient to consume % of nutritionally adequate meal trays TID, or the equivalent with supplements/snacks.     Monitoring/Evaluation  Progress toward goals will be monitored and evaluated per protocol.    Kristin Rea MS, RD, LD  Pager [phone]

## 2021-10-25 NOTE — PROGRESS NOTES
"Shift: 8276-8752  VS: /80 (BP Location: Right arm)   Pulse 71   Temp 97.3  F (36.3  C) (Oral)   Resp 19   Ht 1.676 m (5' 6\")   Wt 84.4 kg (186 lb)   SpO2 97%   BMI 30.02 kg/m    Facility age limit for growth percentiles is 20 years.   BP elevated this a.m, prn hydralazine given with relief  Pain: Denies, denies N/V  Neuro: Intact  Cardiac: WDL  Respiratory: WDL  Diet/Appetite:  Tolerating regular diet, denies N/V  /GI: WDL  LDA's: PIV R forearm, SL  Skin: Rash lower abdomen  Activity: Stby using walker  Tests/Procedures: Completed  Pertinent Labs/Lab Collection: Elevated lipase yesterday     Plan: Transfer to , report given to KIRIT Fang. Daughter updated. PT today to assess pt's tolerance to stairs  "

## 2021-10-26 ENCOUNTER — APPOINTMENT (OUTPATIENT)
Dept: PHYSICAL THERAPY | Facility: CLINIC | Age: 71
DRG: 439 | End: 2021-10-26
Payer: MEDICARE

## 2021-10-26 ENCOUNTER — APPOINTMENT (OUTPATIENT)
Dept: OCCUPATIONAL THERAPY | Facility: CLINIC | Age: 71
DRG: 439 | End: 2021-10-26
Attending: PHYSICIAN ASSISTANT
Payer: MEDICARE

## 2021-10-26 LAB
ALBUMIN SERPL-MCNC: 2 G/DL (ref 3.4–5)
ALP SERPL-CCNC: 84 U/L (ref 40–150)
ALT SERPL W P-5'-P-CCNC: 38 U/L (ref 0–70)
ANION GAP SERPL CALCULATED.3IONS-SCNC: 6 MMOL/L (ref 3–14)
AST SERPL W P-5'-P-CCNC: 34 U/L (ref 0–45)
BILIRUB SERPL-MCNC: 0.6 MG/DL (ref 0.2–1.3)
BUN SERPL-MCNC: 16 MG/DL (ref 7–30)
CALCIUM SERPL-MCNC: 7.6 MG/DL (ref 8.5–10.1)
CHLORIDE BLD-SCNC: 105 MMOL/L (ref 94–109)
CO2 SERPL-SCNC: 24 MMOL/L (ref 20–32)
CREAT SERPL-MCNC: 0.67 MG/DL (ref 0.66–1.25)
ERYTHROCYTE [DISTWIDTH] IN BLOOD BY AUTOMATED COUNT: 13 % (ref 10–15)
GFR SERPL CREATININE-BSD FRML MDRD: >90 ML/MIN/1.73M2
GLUCOSE BLD-MCNC: 93 MG/DL (ref 70–99)
HCT VFR BLD AUTO: 35.9 % (ref 40–53)
HGB BLD-MCNC: 11.8 G/DL (ref 13.3–17.7)
MCH RBC QN AUTO: 33.4 PG (ref 26.5–33)
MCHC RBC AUTO-ENTMCNC: 32.9 G/DL (ref 31.5–36.5)
MCV RBC AUTO: 102 FL (ref 78–100)
PLATELET # BLD AUTO: 291 10E3/UL (ref 150–450)
POTASSIUM BLD-SCNC: 3.5 MMOL/L (ref 3.4–5.3)
PROT SERPL-MCNC: 4.7 G/DL (ref 6.8–8.8)
RBC # BLD AUTO: 3.53 10E6/UL (ref 4.4–5.9)
SODIUM SERPL-SCNC: 135 MMOL/L (ref 133–144)
WBC # BLD AUTO: 4.4 10E3/UL (ref 4–11)

## 2021-10-26 PROCEDURE — 120N000002 HC R&B MED SURG/OB UMMC

## 2021-10-26 PROCEDURE — 99207 PR APP CREDIT; MD BILLING SHARED VISIT: CPT | Performed by: PHYSICIAN ASSISTANT

## 2021-10-26 PROCEDURE — 99232 SBSQ HOSP IP/OBS MODERATE 35: CPT | Performed by: INTERNAL MEDICINE

## 2021-10-26 PROCEDURE — 36415 COLL VENOUS BLD VENIPUNCTURE: CPT | Performed by: PHYSICIAN ASSISTANT

## 2021-10-26 PROCEDURE — 97166 OT EVAL MOD COMPLEX 45 MIN: CPT | Mod: GO

## 2021-10-26 PROCEDURE — 80053 COMPREHEN METABOLIC PANEL: CPT | Performed by: PHYSICIAN ASSISTANT

## 2021-10-26 PROCEDURE — 999N000127 HC STATISTIC PERIPHERAL IV START W US GUIDANCE

## 2021-10-26 PROCEDURE — 99207 PR CDG-MDM COMPONENT: MEETS MODERATE - DOWN CODED: CPT | Performed by: INTERNAL MEDICINE

## 2021-10-26 PROCEDURE — 250N000013 HC RX MED GY IP 250 OP 250 PS 637: Performed by: PHYSICIAN ASSISTANT

## 2021-10-26 PROCEDURE — 250N000013 HC RX MED GY IP 250 OP 250 PS 637: Performed by: PEDIATRICS

## 2021-10-26 PROCEDURE — 85027 COMPLETE CBC AUTOMATED: CPT | Performed by: PHYSICIAN ASSISTANT

## 2021-10-26 PROCEDURE — 250N000011 HC RX IP 250 OP 636: Performed by: PHYSICIAN ASSISTANT

## 2021-10-26 PROCEDURE — 97535 SELF CARE MNGMENT TRAINING: CPT | Mod: GO

## 2021-10-26 PROCEDURE — 97530 THERAPEUTIC ACTIVITIES: CPT | Mod: GO

## 2021-10-26 PROCEDURE — 97530 THERAPEUTIC ACTIVITIES: CPT | Mod: GP | Performed by: REHABILITATION PRACTITIONER

## 2021-10-26 PROCEDURE — 97116 GAIT TRAINING THERAPY: CPT | Mod: GP | Performed by: REHABILITATION PRACTITIONER

## 2021-10-26 RX ORDER — AMLODIPINE BESYLATE 5 MG/1
5 TABLET ORAL DAILY
Status: DISCONTINUED | OUTPATIENT
Start: 2021-10-27 | End: 2021-11-02 | Stop reason: HOSPADM

## 2021-10-26 RX ORDER — AMLODIPINE BESYLATE 2.5 MG/1
2.5 TABLET ORAL ONCE
Status: COMPLETED | OUTPATIENT
Start: 2021-10-26 | End: 2021-10-26

## 2021-10-26 RX ADMIN — THIAMINE HCL TAB 100 MG 100 MG: 100 TAB at 07:54

## 2021-10-26 RX ADMIN — AMLODIPINE BESYLATE 2.5 MG: 2.5 TABLET ORAL at 09:14

## 2021-10-26 RX ADMIN — FOLIC ACID 1 MG: 1 TABLET ORAL at 07:54

## 2021-10-26 RX ADMIN — Medication 1 TABLET: at 07:54

## 2021-10-26 RX ADMIN — Medication 800 MG: at 07:54

## 2021-10-26 RX ADMIN — ENOXAPARIN SODIUM 40 MG: 40 INJECTION SUBCUTANEOUS at 12:05

## 2021-10-26 RX ADMIN — LISINOPRIL 40 MG: 40 TABLET ORAL at 07:55

## 2021-10-26 RX ADMIN — SERTRALINE HYDROCHLORIDE 25 MG: 25 TABLET ORAL at 07:54

## 2021-10-26 RX ADMIN — METOPROLOL SUCCINATE 50 MG: 25 TABLET, EXTENDED RELEASE ORAL at 07:55

## 2021-10-26 RX ADMIN — AMLODIPINE BESYLATE 2.5 MG: 2.5 TABLET ORAL at 07:55

## 2021-10-26 RX ADMIN — SENNOSIDES 8.6 MG: 8.6 TABLET ORAL at 20:11

## 2021-10-26 ASSESSMENT — ACTIVITIES OF DAILY LIVING (ADL)
ADLS_ACUITY_SCORE: 15
ADLS_ACUITY_SCORE: 15
ADLS_ACUITY_SCORE: 16
ADLS_ACUITY_SCORE: 15
ADLS_ACUITY_SCORE: 16
ADLS_ACUITY_SCORE: 15
ADLS_ACUITY_SCORE: 15
ADLS_ACUITY_SCORE: 12
ADLS_ACUITY_SCORE: 15
ADLS_ACUITY_SCORE: 15
ADLS_ACUITY_SCORE: 16
ADLS_ACUITY_SCORE: 11
ADLS_ACUITY_SCORE: 12
ADLS_ACUITY_SCORE: 12
ADLS_ACUITY_SCORE: 16
PREVIOUS_RESPONSIBILITIES: MEAL PREP;HOUSEKEEPING;LAUNDRY;SHOPPING;MEDICATION MANAGEMENT;FINANCES;DRIVING
ADLS_ACUITY_SCORE: 15
ADLS_ACUITY_SCORE: 11
ADLS_ACUITY_SCORE: 15
ADLS_ACUITY_SCORE: 12
ADLS_ACUITY_SCORE: 15

## 2021-10-26 ASSESSMENT — MIFFLIN-ST. JEOR: SCORE: 1540.98

## 2021-10-26 NOTE — PROGRESS NOTES
"/69 (BP Location: Right arm)   Pulse 83   Temp 99.4  F (37.4  C) (Oral)   Resp 16   Ht 1.676 m (5' 6\")   Wt 84.8 kg (187 lb)   SpO2 97%   BMI 30.18 kg/m       3pm-7pm:  AVSS on room air. Denies pain, n/v, and diarrhea. Continues with CIWA scoring Q4h. Last CIWA score was 4 at 4pm. Is disoriented to situation and endorses mild anxiety with slight tremors. Has a good appetite. Is awaiting TCU placement.      "

## 2021-10-26 NOTE — PLAN OF CARE
[id number]    VSS on RA. Hypertensive. A&Ox3. Q4hr CIWA checks completed. Denies nausea and pain. 1 episode of urine incontinence. Continue with plan of care.

## 2021-10-26 NOTE — PROGRESS NOTES
10/26/21 1017   Quick Adds   Type of Visit Initial Occupational Therapy Evaluation   Living Environment   People in home alone   Current Living Arrangements condominium   Home Accessibility stairs to enter home   Number of Stairs, Main Entrance greater than 10 stairs   Stair Railings, Main Entrance railing on right side (ascending)   Transportation Anticipated car, drives self;family or friend will provide   Living Environment Comments Pt lives alone in 3rd floor condo, no elevator access. [place] and will be able to assist pt as needed. Pt has tub shower, no grab bars or shower chair   Self-Care   Usual Activity Tolerance good   Current Activity Tolerance fair   Regular Exercise No   Equipment Currently Used at Home none   Activity/Exercise/Self-Care Comment IND w/ ADLs at baseline   Instrumental Activities of Daily Living (IADL)   Previous Responsibilities meal prep;housekeeping;laundry;shopping;medication management;finances;driving   IADL Comments Reports IND w/ IADLs at baseline   Disability/Function   Hearing Difficulty or Deaf no   Wear Glasses or Blind yes   Vision Management Glasses   Concentrating, Remembering or Making Decisions Difficulty no   Difficulty Communicating no   Difficulty Eating/Swallowing no   Walking or Climbing Stairs Difficulty no   Dressing/Bathing Difficulty no   Toileting issues no   Doing Errands Independently Difficulty (such as shopping) no   Fall history within last six months yes   Number of times patient has fallen within last six months 1   Change in Functional Status Since Onset of Current Illness/Injury yes   General Information   Onset of Illness/Injury or Date of Surgery 10/23/21   Referring Physician Joan Brooks PA-C   Patient/Family Therapy Goal Statement (OT) Return to PLOF   Additional Occupational Profile Info/Pertinent History of Current Problem Efrain Schmid is a 71-year-old male with a history of alcohol use disorder,  hypertension, anxiety, depression, who was admitted with acute pancreatitis   Existing Precautions/Restrictions fall   Limitations/Impairments safety/cognitive   Left Upper Extremity (Weight-bearing Status) full weight-bearing (FWB)   Right Upper Extremity (Weight-bearing Status) full weight-bearing (FWB)   Left Lower Extremity (Weight-bearing Status) full weight-bearing (FWB)   Right Lower Extremity (Weight-bearing Status) full weight-bearing (FWB)   General Observations and Info Activity: Ambulate w/ A   Cognitive Status Examination   Orientation Status orientation to person, place and time   Affect/Mental Status (Cognitive) confused   Follows Commands follows two-step commands   Safety Deficit minimal deficit;insight into deficits/self-awareness;problem-solving   Memory Deficit minimal deficit;short-term memory;recall, recent events   Attention Deficit distractible in quiet environment;concentration;minimal deficit;focused/sustained attention   Executive Function Deficit information processing;insight/awareness of deficits;judgment;problem-solving/reasoning   Cognitive Status Comments Pt presents with cognitive deficits, anticipate below cognitive baseline. See SLUMS score. Some insight into deficits. Will continue to monitor.    Visual Perception   Visual Impairment/Limitations corrective lenses full-time   Sensory   Sensory Quick Adds No deficits were identified   Pain Assessment   Patient Currently in Pain No   Integumentary/Edema   Integumentary/Edema no deficits were identifed   Posture   Posture forward head position;protracted shoulders   Range of Motion Comprehensive   General Range of Motion bilateral upper extremity ROM WFL   Strength Comprehensive (MMT)   Comment, General Manual Muscle Testing (MMT) Assessment BUE WFL   Coordination   Coordination Comments BUE tremors, hand weakness noted   Clinical Impression   Criteria for Skilled Therapeutic Interventions Met (OT) yes;meets criteria;skilled  treatment is necessary   OT Diagnosis Decreased ADL/IADL I   OT Problem List-Impairments impacting ADL problems related to;activity tolerance impaired;cognition;strength   Assessment of Occupational Performance 3-5 Performance Deficits   Identified Performance Deficits Dressing, bathing, toileting, higher level IADLs   Planned Therapy Interventions (OT) ADL retraining;IADL retraining;cognition;strengthening;transfer training;home program guidelines;progressive activity/exercise;risk factor education   Clinical Decision Making Complexity (OT) moderate complexity   Therapy Frequency (OT) 6x/week   Predicted Duration of Therapy 2 weeks   Anticipated Equipment Needs Upon Discharge (OT) shower chair   Risk & Benefits of therapy have been explained evaluation/treatment results reviewed;care plan/treatment goals reviewed;risks/benefits reviewed;current/potential barriers reviewed;participants voiced agreement with care plan;participants included;patient   Comment-Clinical Impression Pt will benefit from skilled OT services to progress ADL/IADL I and support safe discharge plan   OT Discharge Planning    OT Discharge Recommendation (DC Rec) Transitional Care Facility;Home with assist;home with home care occupational therapy   OT Rationale for DC Rec Pt presents below baseline, limited by deconditioning and cognitive deficits secondary to withdrawal. Pt lives alone in condo with many stairs, presents as a falls risk. At this time, recommend TCU placement to promote increased ADL/IADL I prior to returning to previous living environment. Pending progress and improved IND with ambulation and safety with stair navigation with PT, pt may be safe from an OT perspective to discharge home w/ A (eduin for higher level IADL tasks) and HH OT. Pt reporting that son is able to stay with pt full time. Will continue to monitor and update recs as indicated.    OT Brief overview of current status  Ax1   Total Evaluation Time (Minutes)   Total  Evaluation Time (Minutes) 5

## 2021-10-26 NOTE — PLAN OF CARE
BPmax 190/99, team aware, amlodipine increased. OVSS. Denies pain/nausea/SOB. Continues w/ CIWA monitoring q 4hrs; disoriented to time (easily reoriented), mild anxiety reported & slight tremors noted. Bed alarm on for safety. Worked w/ PT/OT. Plan is to transition to TCU when the set up process is complete. Continue to monitor & w/ POC.

## 2021-10-26 NOTE — PLAN OF CARE
1900 - 2300  VS stable, afebrile. Pt denied pain/SOB/n/v. CIWA scale completed at 2030 and scored 10 (see flowsheets) - 1mg PO ativan given and CIWA recheck an hour later scored 6. BA on for safety. Pt is 1 assist with walker and gait belt. Confused but reoriented easily.Good UOP. Continue POC.

## 2021-10-26 NOTE — PROGRESS NOTES
[facility]    Medicine Progress Note - Hospitalist Service, Gold 6       Date of Admission:  10/23/2021    Assessment & Plan         Efrain Schmid is a 71-year-old male with a history of alcohol use disorder, hypertension, anxiety, depression, who was admitted with acute pancreatitis.    # Acute pancreatitis, resolved: Presented with LUQ pain, nausea, vomiting, weakness, lipase 1,933. Most likely secondary to alcohol use. Afebrile. LFTs, mag wnl. Diet advanced and no longer on IVF. Pain resolved.   -  PT recommending TCU placement, OT will evaluate patient today     Alcohol use disorder: Drinking about 1 quart daily. Last drink 3 days PTA. No other substance use, Utox negative. No prior history of withdrawal seizures.    - MVI, folic acid, and thiamine supplements     Hypertension: Not taking his medications for a few months. Hypertensive on admission. Resumed PTA amlodipine, lisinopril, metoprolol. BP has been elevated.   -  Increase lisinopril to 5 mg every day   -  Hydralazine prn for SBP >180        Other Chronic Medical Issues:  HLD: Not taking atorvastatin for months, did not resume on admission.   Anxiety, depression: Also not taking his medications recently and desired to resume his meds. Continue PTA sertraline.   Bullous pemphigoid: No longer on CellCept as it was cost prohibitive.          Diet: Snacks/Supplements Pediatric: Boost Breeze; Between Meals  Regular Diet Adult  Snacks/Supplements Adult: Ensure Clear; Between Meals    DVT Prophylaxis: Enoxaparin (Lovenox) SQ  Ruiz Catheter: Not present  Central Lines: None  Code Status: Full Code      Disposition Plan   Expected discharge: recommended to TCU once bed available, medically stable at this time     The patient's care was discussed with the Attending Physician, Dr. Carter.    Joan Brooks PA-C  Hospitalist Service [address]  Securely  message with the [employer] Web Console (learn more here)  Text page via [facility] Paging/Directory  Please see sign in/sign out for up to date coverage information      ______________________________________________________________________    Interval History   Feeling better today.  Denies pain.  Tolerated breakfast this morning without any nausea, vomiting, or abdominal pain.  Fluids stopped.  Patient concerned that he is wobbly on his feet, notes that he has stairs that he needs to ascend to get into his apartment.  Otherwise denies any new complaints.  Specifically, he denies any chest pain, shortness of breath, difficulty breathing.  He has not been having fevers, body aches, or chills.    Data reviewed today: I reviewed all medications, new labs and imaging results over the last 24 hours.   Physical Exam   Vital Signs: Temp: 98.5  F (36.9  C) Temp src: Oral BP: (!) 173/94 Pulse: 75   Resp: 18 SpO2: 97 % O2 Device: None (Room air)    Weight: 187 lbs 0 oz  General Appearance: Alert and oriented x 3, NAD  Respiratory: Lungs CTAB  Cardiovascular: RRR, no murmurs  GI: Abdomen soft, non distended, non tender to palpation  Skin: warm and dry to touch, no jaundice  Other: No peripheral edema     Data   Recent Labs   Lab 10/26/21  0611 10/25/21  0529 10/24/21  2015 10/24/21  1332 10/24/21  0649 10/24/21  0649 10/23/21  1523 10/23/21  1523   WBC 4.4 4.9  --   --   --  6.5   < > 10.5   HGB 11.8* 12.4*  --   --   --  14.1   < > 16.8   * 102*  --   --   --  98   < > 97    270  --   --   --  282   < > 339    138  --   --   --  136   < > 133   POTASSIUM 3.5 3.9 3.6   < >  --  2.7*   < > 3.9   CHLORIDE 105 107  --   --   --  101   < > 95   CO2 24 27  --   --   --  28   < > 26   BUN 16 24  --   --   --  33*   < > 46*   CR 0.67 0.72  --   --   --  0.85   < > 1.13   ANIONGAP 6 4  --   --   --  7   < > 12   EDDIE 7.6* 7.8*  --   --   --  8.3*   < > 9.0   GLC 93 101*  --   --   --  111*   < > 176*   ALBUMIN 2.0* 1.9*   --   --    < > 2.2*   < > 3.0*   PROTTOTAL 4.7* 4.4*  --   --    < > 5.3*   < > 6.9   BILITOTAL 0.6 0.7  --   --    < > 1.0   < > 1.3   ALKPHOS 84 75  --   --    < > 94   < > 129   ALT 38 34  --   --    < > 35   < > 51   AST 34 29  --   --    < > 24   < > 40   LIPASE  --   --   --   --   --  1,933*  --  980*   TROPONIN  --   --   --   --   --   --   --  <0.015    < > = values in this interval not displayed.

## 2021-10-26 NOTE — PROGRESS NOTES
Care Management Follow Up    Length of Stay (days): 2    Expected Discharge Date: 10/26/2021     Concerns to be Addressed:         Patient plan of care discussed at interdisciplinary rounds: Yes    Anticipated Discharge Disposition:  TCU vs Home with services     Anticipated Discharge Services:  TBD    Anticipated Discharge DME:  TBD    Patient/family educated on Medicare website which has current facility and service quality ratings:  Yes    Education Provided on the Discharge Plan:  Yes    Patient/Family in Agreement with the Plan:      Referrals Placed by CM/SW:      Private pay costs discussed: transportation costs    Additional Information:    Per medicine report, patient is medically ready for discharge and will need TCU placement. PT/OT recommending TCU due to limitations by deconditioning and living alone in condo with stairs.      met with patient in their hospital room; introduced self and explained role. Patient laying in hospital bed and minimally opened eyes to this writer during discussion.  Discussed PT/OT recommendations of SNF placement at time of discharge; patient confirmed recommendations.       discussed the SNF referral process. Provided the Medicare Compare list for SNF, with associated star ratings to assist with choice for referrals/discharge planning.  provided education regarding the star ratings and that they are updated/maintained by Medicare and can be reviewed by visiting www.medicare.gov. Patient stated that they would like to further review options and discuss with family before deciding.      No further questions or concerns reported at this time.  provided contact information and encouraged patient to call if any further needs arise.    ADDENDUM 1357: Attempted to meet with patient to obtain TCU choices. Patient's son present in room visiting from [place]. Patient stated that he has still not reviewed the Medicare Compare List.  Patient's son reviewed list and stated that he would discuss with patient and follow-up with this writer.     ADDENDUM 1455: Patient and son requested writer revisit. Patient stated first choice would be [facility]; referral submitted. Son added that patient's daughter lives in the [place], [place] area and would be open to patient discharging to a facility near her.  provided the Medicare Compare List of TCU facilities in the [facility].     YESY Horan  7D/5C Hematology/Oncology Social Worker  Phone: [phone]  Pager: [phone]  [email or web address]

## 2021-10-27 ENCOUNTER — APPOINTMENT (OUTPATIENT)
Dept: PHYSICAL THERAPY | Facility: CLINIC | Age: 71
DRG: 439 | End: 2021-10-27
Payer: MEDICARE

## 2021-10-27 LAB
CREAT SERPL-MCNC: 0.69 MG/DL (ref 0.66–1.25)
ERYTHROCYTE [DISTWIDTH] IN BLOOD BY AUTOMATED COUNT: 12.7 % (ref 10–15)
GFR SERPL CREATININE-BSD FRML MDRD: >90 ML/MIN/1.73M2
HCT VFR BLD AUTO: 38.9 % (ref 40–53)
HGB BLD-MCNC: 13.3 G/DL (ref 13.3–17.7)
MCH RBC QN AUTO: 34 PG (ref 26.5–33)
MCHC RBC AUTO-ENTMCNC: 34.2 G/DL (ref 31.5–36.5)
MCV RBC AUTO: 100 FL (ref 78–100)
PLATELET # BLD AUTO: 305 10E3/UL (ref 150–450)
RBC # BLD AUTO: 3.91 10E6/UL (ref 4.4–5.9)
WBC # BLD AUTO: 5.5 10E3/UL (ref 4–11)

## 2021-10-27 PROCEDURE — 99207 PR APP CREDIT; MD BILLING SHARED VISIT: CPT | Performed by: PHYSICIAN ASSISTANT

## 2021-10-27 PROCEDURE — 250N000011 HC RX IP 250 OP 636: Performed by: PEDIATRICS

## 2021-10-27 PROCEDURE — 36415 COLL VENOUS BLD VENIPUNCTURE: CPT | Performed by: PHYSICIAN ASSISTANT

## 2021-10-27 PROCEDURE — 250N000011 HC RX IP 250 OP 636: Performed by: PHYSICIAN ASSISTANT

## 2021-10-27 PROCEDURE — 120N000002 HC R&B MED SURG/OB UMMC

## 2021-10-27 PROCEDURE — 82565 ASSAY OF CREATININE: CPT | Performed by: PHYSICIAN ASSISTANT

## 2021-10-27 PROCEDURE — 250N000013 HC RX MED GY IP 250 OP 250 PS 637: Performed by: PHYSICIAN ASSISTANT

## 2021-10-27 PROCEDURE — 250N000013 HC RX MED GY IP 250 OP 250 PS 637: Performed by: PEDIATRICS

## 2021-10-27 PROCEDURE — 97530 THERAPEUTIC ACTIVITIES: CPT | Mod: GP | Performed by: REHABILITATION PRACTITIONER

## 2021-10-27 PROCEDURE — 97116 GAIT TRAINING THERAPY: CPT | Mod: GP | Performed by: REHABILITATION PRACTITIONER

## 2021-10-27 PROCEDURE — 99232 SBSQ HOSP IP/OBS MODERATE 35: CPT | Performed by: INTERNAL MEDICINE

## 2021-10-27 PROCEDURE — 85027 COMPLETE CBC AUTOMATED: CPT | Performed by: PHYSICIAN ASSISTANT

## 2021-10-27 PROCEDURE — 99207 PR CDG-MDM COMPONENT: MEETS MODERATE - DOWN CODED: CPT | Performed by: INTERNAL MEDICINE

## 2021-10-27 RX ADMIN — LISINOPRIL 40 MG: 40 TABLET ORAL at 08:17

## 2021-10-27 RX ADMIN — METOPROLOL SUCCINATE 50 MG: 25 TABLET, EXTENDED RELEASE ORAL at 08:18

## 2021-10-27 RX ADMIN — HYDRALAZINE HYDROCHLORIDE 10 MG: 20 INJECTION INTRAMUSCULAR; INTRAVENOUS at 09:03

## 2021-10-27 RX ADMIN — FOLIC ACID 1 MG: 1 TABLET ORAL at 08:18

## 2021-10-27 RX ADMIN — Medication 800 MG: at 08:18

## 2021-10-27 RX ADMIN — ENOXAPARIN SODIUM 40 MG: 40 INJECTION SUBCUTANEOUS at 11:36

## 2021-10-27 RX ADMIN — AMLODIPINE BESYLATE 5 MG: 5 TABLET ORAL at 08:18

## 2021-10-27 RX ADMIN — SERTRALINE HYDROCHLORIDE 25 MG: 25 TABLET ORAL at 08:17

## 2021-10-27 RX ADMIN — THIAMINE HCL TAB 100 MG 100 MG: 100 TAB at 08:17

## 2021-10-27 RX ADMIN — Medication 1 TABLET: at 08:18

## 2021-10-27 ASSESSMENT — ACTIVITIES OF DAILY LIVING (ADL)
ADLS_ACUITY_SCORE: 17
ADLS_ACUITY_SCORE: 17
ADLS_ACUITY_SCORE: 15
ADLS_ACUITY_SCORE: 17
ADLS_ACUITY_SCORE: 15
ADLS_ACUITY_SCORE: 17
ADLS_ACUITY_SCORE: 17
ADLS_ACUITY_SCORE: 15
ADLS_ACUITY_SCORE: 15
ADLS_ACUITY_SCORE: 17
ADLS_ACUITY_SCORE: 15
ADLS_ACUITY_SCORE: 17
ADLS_ACUITY_SCORE: 15
ADLS_ACUITY_SCORE: 17
ADLS_ACUITY_SCORE: 15
ADLS_ACUITY_SCORE: 15
ADLS_ACUITY_SCORE: 17
ADLS_ACUITY_SCORE: 15
ADLS_ACUITY_SCORE: 17
ADLS_ACUITY_SCORE: 17
ADLS_ACUITY_SCORE: 15
ADLS_ACUITY_SCORE: 17

## 2021-10-27 ASSESSMENT — MIFFLIN-ST. JEOR: SCORE: 1527.37

## 2021-10-27 NOTE — PROGRESS NOTES
Care Management Follow Up    Length of Stay (days): 3    Expected Discharge Date: 10/27/2021 - pending placement     Concerns to be Addressed:  Discharge planning       Patient plan of care discussed at interdisciplinary rounds: Yes    Anticipated Discharge Disposition:  Transitional Care Unit     Anticipated Discharge Services:  None    Anticipated Discharge DME:  None    Patient/family educated on Medicare website which has current facility and service quality ratings: Yes     Education Provided on the Discharge Plan:  Yes    Patient/Family in Agreement with the Plan:      Referrals Placed by CM/SW:  TCU facilities    Private pay costs discussed: Not applicable    Additional Information:    Call received from patient's daughter, Janae. Daughter stated that she and her brother, Wolf, reviewed the Medicare Compare List and have chosen facilities they would like referrals sent to. Janae requested list be emailed to this writer; email provided. Email received with a list of 15 TCU facilities chosen by family. Referrals submitted to the following in preferred order:      [facility] (P: [phone])    [facility] (P: [phone])    [facility] (P: [phone])    [facility] (P: [phone])    [facility] (P: [phone])    [facility] (P: [phone])    [facility] (P: [phone])    Referral previously submitted:       [facility] - pending    Denials:      [facility] - denied; no bed availability in near future    [facility] - denied; no bed availability in near future    [facility] - denied; patient's ETOH history    YESY Horan  7D/5C Hematology/Oncology Social Worker  Phone: [phone]  Pager: [phone]  [email or web address]

## 2021-10-27 NOTE — PROGRESS NOTES
[facility]    Medicine Progress Note - Hospitalist Service, Gold 6       Date of Admission:  10/23/2021    Assessment & Plan                    Efrain Schmid is a 71-year-old male with a history of alcohol use disorder, hypertension, anxiety, depression, who was admitted with acute pancreatitis.    Plan for today:  - Awaiting acceptance at a TCU  - Pt declined wanting Addiction consult as he states will follow up with his long time AA group.   - Discontinue CIWA protocol    # Acute pancreatitis, resolved: Presented with LUQ pain, nausea, vomiting, weakness, lipase 1,933. Most likely secondary to alcohol use. Afebrile. LFTs, mag wnl. Diet advanced to regular of which he is tolerating and no longer on IVF. Pain resolved.   - Therapies recommending TCU; awaiting placement     # Alcohol use disorder: Drinking about 1 quart daily. Last drink 3 days PTA. No other substance use, Utox negative. No prior history of withdrawal seizures. Pt declined wanting Addiction consult as he states will follow up with his long time AA group. Last scored high enough on CIWA protocol to receive lorazepam two days ago.   - Discontinue CIWA protocol  - MVI, folic acid, and thiamine supplements     # Hypertension: Not taking his medications for a few months. Hypertensive on admission. Resumed PTA amlodipine, lisinopril, metoprolol. BP has been elevated. Amlodipine dose increased yesterday to 5 mg daily. After receiving antihypertensive meds this am, last BP normotensive.   - Continue amlodipine 5 mg daily, lisinopril 40 mg daily, and Toprol XL 50 mg daily with parameters to hold.      Other Chronic Medical Issues:  # HLD: Not taking atorvastatin for months, did not resume on admission.   # Anxiety, depression: Also not taking his medications recently and desired to resume his meds. Continue PTA sertraline.   # Hx of bullous pemphigoid: No longer on CellCept as it was cost prohibitive.           Diet: Snacks/Supplements Pediatric: Boost Breeze; Between Meals  Regular Diet Adult  Snacks/Supplements Adult: Ensure Clear; Between Meals    DVT Prophylaxis: Enoxaparin (Lovenox) SQ  Ruiz Catheter: Not present  Central Lines: None  Code Status: Full Code      Disposition Plan   Expected discharge: 10/28/2021   recommended to transitional care unit once safe disposition plan/ TCU bed available.     The patient's care was discussed with the Attending Physician, Dr. Carter, Bedside Nurse and Patient.    Tevin Horton PA-C  Hospitalist Service, [address]  Securely message with the [employer] Web Console (learn more here)  Text page via [facility] Paging/Directory    Please see sign in/sign out for up to date coverage information  ______________________________________________________________________    Interval History   No acute events overnight. Tolerating regular diet. Denies abd pain and nausea. Awaiting TCU placement. Incontinent of urine. Denies other physical concerns. Declines wanting addiction consult and states will follow up with AA after discharge.     Data reviewed today: I reviewed all medications, new labs and imaging results over the last 24 hours.     Physical Exam   Vital Signs: Temp: 98.1  F (36.7  C) Temp src: Oral BP: 115/69 Pulse: 75   Resp: 18 SpO2: 98 % O2 Device: None (Room air)    Weight: 187 lbs 0 oz  GEN: In NAD  HEENT: NCAT; PERRL; sclerae non-icteric  LUNGS: CTAB  CV: RRR  ABD: +BSs; SNTND  EXT: No BLE edema  SKIN: No acute rashes noted on exposed areas.  NEURO: AAOx3; CNs grossly intact; No acute focal deficits noted.        Data   CMPRecent Labs   Lab 10/27/21  0630 10/26/21  0611 10/25/21  0529 10/24/21  2015 10/24/21  1332 10/24/21  0649 10/24/21  0649 10/23/21  1523 10/23/21  1523   NA  --  135 138  --   --   --  136  --  133   POTASSIUM  --  3.5 3.9 3.6 3.3*   < > 2.7*   < > 3.9   CHLORIDE  --  105 107  --   --   --  101   --  95   CO2  --  24 27  --   --   --  28  --  26   ANIONGAP  --  6 4  --   --   --  7  --  12   GLC  --  93 101*  --   --   --  111*  --  176*   BUN  --  16 24  --   --   --  33*  --  46*   CR 0.69 0.67 0.72  --   --   --  0.85   < > 1.13   GFRESTIMATED >90 >90 >90  --   --   --  87   < > 65   EDDIE  --  7.6* 7.8*  --   --   --  8.3*  --  9.0   MAG  --   --   --   --   --   --  2.4*  --  2.5*   PROTTOTAL  --  4.7* 4.4*  --   --   --  5.3*  --  6.9   ALBUMIN  --  2.0* 1.9*  --   --   --  2.2*  --  3.0*   BILITOTAL  --  0.6 0.7  --   --   --  1.0  --  1.3   ALKPHOS  --  84 75  --   --   --  94  --  129   AST  --  34 29  --   --   --  24  --  40   ALT  --  38 34  --   --   --  35  --  51    < > = values in this interval not displayed.     CBC  Recent Labs   Lab 10/27/21  0630 10/26/21  0611 10/25/21  0529 10/24/21  0649   WBC 5.5 4.4 4.9 6.5   RBC 3.91* 3.53* 3.69* 4.19*   HGB 13.3 11.8* 12.4* 14.1   HCT 38.9* 35.9* 37.7* 41.1    102* 102* 98   MCH 34.0* 33.4* 33.6* 33.7*   MCHC 34.2 32.9 32.9 34.3   RDW 12.7 13.0 13.1 13.1    291 270 282

## 2021-10-27 NOTE — PLAN OF CARE
"  Problem: Adult Inpatient Plan of Care  Goal: Optimal Comfort and Wellbeing  Outcome: Improving     Problem: Pain (Pancreatitis)  Goal: Acceptable Pain Control  Outcome: Improving     BP (!) 168/90 (BP Location: Left arm)   Pulse 83   Temp 97  F (36.1  C) (Oral)   Resp 20   Ht 1.676 m (5' 6\")   Wt 84.8 kg (187 lb)   SpO2 98%. Pt was seen trying to get out of bed. Pt said that he forgot to call for help because he thought he could do it himself. Gait unsteady. Assist of 2 staffs to transfer back in bed and to repositioned this patient. Denies having any pain or discomfort. Compliant with medications. Last BM was 10/25/21. Will continue to monitor.     "

## 2021-10-27 NOTE — PLAN OF CARE
Status: Admitted for nausea and vomiting, Hx: alcohol abuse.   Vitals: VSS, on RA. BP elevated this am, PRN hydralazine given and BP was within normal range. BP elevated again this evening.   Neuros: Intact  IV: 2 PIV L&R SL  Labs/Electrolytes: WNL  Resp/trach: Lung sounds clear on RA.   Diet: Regular. Good apetite ate about 75% of his meals.   Bowel status: He did not have a BM this shift.  : Incontinent of urine   Skin: Bruise on right hand, otherwise intact.  Pain: Denies  Activity: Ax2 GB and Walker   Social: Son at bedside   Plan: Awaiting placement. Continue with POC.

## 2021-10-27 NOTE — PLAN OF CARE
Alert, disoriented to situation. Denies pain, nausea and headache. PIV saline locked. Incontinent of bladder. Up with 1 assist. Bed alarm on. CIWA scores 2 and 1.

## 2021-10-28 ENCOUNTER — APPOINTMENT (OUTPATIENT)
Dept: PHYSICAL THERAPY | Facility: CLINIC | Age: 71
DRG: 439 | End: 2021-10-28
Payer: MEDICARE

## 2021-10-28 ENCOUNTER — APPOINTMENT (OUTPATIENT)
Dept: OCCUPATIONAL THERAPY | Facility: CLINIC | Age: 71
DRG: 439 | End: 2021-10-28
Payer: MEDICARE

## 2021-10-28 PROCEDURE — 250N000013 HC RX MED GY IP 250 OP 250 PS 637: Performed by: PHYSICIAN ASSISTANT

## 2021-10-28 PROCEDURE — 97535 SELF CARE MNGMENT TRAINING: CPT | Mod: GO

## 2021-10-28 PROCEDURE — 99232 SBSQ HOSP IP/OBS MODERATE 35: CPT | Performed by: INTERNAL MEDICINE

## 2021-10-28 PROCEDURE — 250N000011 HC RX IP 250 OP 636: Performed by: PHYSICIAN ASSISTANT

## 2021-10-28 PROCEDURE — 99207 PR APP CREDIT; MD BILLING SHARED VISIT: CPT | Performed by: PHYSICIAN ASSISTANT

## 2021-10-28 PROCEDURE — 97530 THERAPEUTIC ACTIVITIES: CPT | Mod: GP | Performed by: REHABILITATION PRACTITIONER

## 2021-10-28 PROCEDURE — 97530 THERAPEUTIC ACTIVITIES: CPT | Mod: GO

## 2021-10-28 PROCEDURE — 120N000002 HC R&B MED SURG/OB UMMC

## 2021-10-28 PROCEDURE — 250N000013 HC RX MED GY IP 250 OP 250 PS 637: Performed by: PEDIATRICS

## 2021-10-28 PROCEDURE — 97116 GAIT TRAINING THERAPY: CPT | Mod: GP | Performed by: REHABILITATION PRACTITIONER

## 2021-10-28 RX ORDER — HYDRALAZINE HYDROCHLORIDE 25 MG/1
25 TABLET, FILM COATED ORAL EVERY 6 HOURS PRN
Status: DISCONTINUED | OUTPATIENT
Start: 2021-10-28 | End: 2021-11-02 | Stop reason: HOSPADM

## 2021-10-28 RX ADMIN — THIAMINE HCL TAB 100 MG 100 MG: 100 TAB at 08:40

## 2021-10-28 RX ADMIN — SENNOSIDES 8.6 MG: 8.6 TABLET ORAL at 08:40

## 2021-10-28 RX ADMIN — AMLODIPINE BESYLATE 5 MG: 5 TABLET ORAL at 08:40

## 2021-10-28 RX ADMIN — Medication 1 TABLET: at 08:39

## 2021-10-28 RX ADMIN — SERTRALINE HYDROCHLORIDE 25 MG: 25 TABLET ORAL at 08:40

## 2021-10-28 RX ADMIN — ENOXAPARIN SODIUM 40 MG: 40 INJECTION SUBCUTANEOUS at 12:05

## 2021-10-28 RX ADMIN — Medication 800 MG: at 08:40

## 2021-10-28 RX ADMIN — LISINOPRIL 40 MG: 40 TABLET ORAL at 08:40

## 2021-10-28 RX ADMIN — FOLIC ACID 1 MG: 1 TABLET ORAL at 08:40

## 2021-10-28 RX ADMIN — METOPROLOL SUCCINATE 50 MG: 25 TABLET, EXTENDED RELEASE ORAL at 08:40

## 2021-10-28 ASSESSMENT — ACTIVITIES OF DAILY LIVING (ADL)
ADLS_ACUITY_SCORE: 17
ADLS_ACUITY_SCORE: 15
ADLS_ACUITY_SCORE: 17
ADLS_ACUITY_SCORE: 15
ADLS_ACUITY_SCORE: 17
ADLS_ACUITY_SCORE: 17
ADLS_ACUITY_SCORE: 15
ADLS_ACUITY_SCORE: 17
ADLS_ACUITY_SCORE: 15
ADLS_ACUITY_SCORE: 17
ADLS_ACUITY_SCORE: 15
ADLS_ACUITY_SCORE: 15
ADLS_ACUITY_SCORE: 17

## 2021-10-28 ASSESSMENT — MIFFLIN-ST. JEOR
SCORE: 1531.75
SCORE: 1519.66

## 2021-10-28 NOTE — PROGRESS NOTES
[facility]    Medicine Progress Note - Hospitalist Service, Gold 6       Date of Admission:  10/23/2021    Assessment & Plan            Efrain Schmid is a 71-year-old male with a history of alcohol use disorder, hypertension, anxiety, depression, who was admitted with acute pancreatitis.    Plan for today:  - Awaiting acceptance at a TCU  - Start oral prn hydralazine with parameters to give    # Acute pancreatitis, resolved: Presented with LUQ pain, nausea, vomiting, weakness, lipase 1,933. Most likely secondary to alcohol use. Afebrile. LFTs, mag wnl. Diet advanced to regular of which he is tolerating and no longer on IVF. Pain resolved.   - Therapies recommending TCU; awaiting placement     # Alcohol use disorder: Drinking about 1 quart daily. Last drink 3 days PTA. No other substance use, Utox negative. No prior history of withdrawal seizures. Pt declined wanting Addiction consult as he states will follow up with his long time AA group. Last scored high enough on CIWA protocol to receive lorazepam 10/25 so discontinued 10/27.   - MVI, folic acid, and thiamine supplements     # Hypertension: Not taking his medications for a few months. Hypertensive on admission, and continues to be intermittently after resuming PTA amlodipine, lisinopril, metoprolol. Amlodipine dose increased 10/26 to 5 mg daily. After receiving antihypertensive meds yesterday am, last BP normotensive. Today repeat BP 150s / 80s.   - Continue amlodipine 5 mg daily, lisinopril 40 mg daily, and Toprol XL 50 mg daily with parameters to hold.  - Add prn oral hydralazine with parameters to give.       Other Chronic Medical Issues:  # HLD: Not taking atorvastatin for months, did not resume on admission.   # Anxiety, depression: Also not taking his medications recently and desired to resume his meds. Continue PTA sertraline.   # Hx of bullous pemphigoid: No longer on CellCept as it was cost  prohibitive.          Diet: Snacks/Supplements Pediatric: Boost Breeze; Between Meals  Regular Diet Adult  Snacks/Supplements Adult: Ensure Clear; Between Meals    DVT Prophylaxis: Enoxaparin (Lovenox) SQ  Ruiz Catheter: Not present  Central Lines: None  Code Status: Full Code      Disposition Plan   Expected discharge: 10/29/2021   recommended to transitional care unit once safe disposition plan/ TCU bed available.     The patient's care was discussed with the Attending Physician, Dr. Carter, Bedside Nurse and Patient.    Tevin Horton PA-C  Hospitalist Service, [address]  Securely message with the Voc[employer] Web Console (learn more here)  Text page via [facility] Paging/Directory    Please see sign in/sign out for up to date coverage information  ______________________________________________________________________    Interval History   No acute events overnight. Denies acute physical concerns at this time including abd pain.      Data reviewed today: I reviewed all medications, new labs and imaging results over the last 24 hours.     Physical Exam   Vital Signs: Temp: 97.9  F (36.6  C) Temp src: Oral BP: (!) 151/85 Pulse: 80   Resp: 20 SpO2: 98 % O2 Device: None (Room air)    Weight: 182 lbs 4.8 oz  GEN: In NAD  HEENT: NCAT; PERRL; sclerae non-icteric  LUNGS: CTAB  CV: RRR  ABD: +BSs; SNTND  EXT: No BLE edema; No BUE rigidity noted  SKIN: No acute rashes noted on exposed areas.  NEURO: AAOx3; CNs grossly intact; No resting UE finger pill-rolling movement. No significant tremor. No acute focal deficits noted.        Data   CMP  Recent Labs   Lab 10/27/21  0630 10/26/21  0611 10/25/21  0529 10/24/21  2015 10/24/21  1332 10/24/21  0649 10/24/21  0649 10/23/21  1523 10/23/21  1523   NA  --  135 138  --   --   --  136  --  133   POTASSIUM  --  3.5 3.9 3.6 3.3*   < > 2.7*   < > 3.9   CHLORIDE  --  105 107  --   --   --  101  --  95   CO2  --  24 27  --   --   --   28  --  26   ANIONGAP  --  6 4  --   --   --  7  --  12   GLC  --  93 101*  --   --   --  111*  --  176*   BUN  --  16 24  --   --   --  33*  --  46*   CR 0.69 0.67 0.72  --   --   --  0.85   < > 1.13   GFRESTIMATED >90 >90 >90  --   --   --  87   < > 65   EDDIE  --  7.6* 7.8*  --   --   --  8.3*  --  9.0   MAG  --   --   --   --   --   --  2.4*  --  2.5*   PROTTOTAL  --  4.7* 4.4*  --   --   --  5.3*  --  6.9   ALBUMIN  --  2.0* 1.9*  --   --   --  2.2*  --  3.0*   BILITOTAL  --  0.6 0.7  --   --   --  1.0  --  1.3   ALKPHOS  --  84 75  --   --   --  94  --  129   AST  --  34 29  --   --   --  24  --  40   ALT  --  38 34  --   --   --  35  --  51    < > = values in this interval not displayed.     CBC  Recent Labs   Lab 10/27/21  0630 10/26/21  0611 10/25/21  0529 10/24/21  0649   WBC 5.5 4.4 4.9 6.5   RBC 3.91* 3.53* 3.69* 4.19*   HGB 13.3 11.8* 12.4* 14.1   HCT 38.9* 35.9* 37.7* 41.1    102* 102* 98   MCH 34.0* 33.4* 33.6* 33.7*   MCHC 34.2 32.9 32.9 34.3   RDW 12.7 13.0 13.1 13.1    291 270 282

## 2021-10-28 NOTE — PROVIDER NOTIFICATION
Provider notification;    Doctor Clifford notified at 0854 Via text page .    Reason for notification: FYI / 91, AM blood pressure meds given. Thanks Mary BETH [id number]     Plan: FYI page will monitor for new orders.

## 2021-10-28 NOTE — PROGRESS NOTES
Care Management Follow Up    Length of Stay (days): 4    Expected Discharge Date: 10/28/2021 - pending placement     Concerns to be Addressed:  Discharge planning    Patient plan of care discussed at interdisciplinary rounds: Yes    Anticipated Discharge Disposition:  Transitional Care Unit     Anticipated Discharge Services:  None    Anticipated Discharge DME:  None    Patient/family educated on Medicare website which has current facility and service quality ratings:  Yes    Education Provided on the Discharge Plan:  Yes    Patient/Family in Agreement with the Plan:  Yes    Referrals Placed by CM/SW:      Private pay costs discussed: Not applicable    Additional Information:     following for TCU placement. Patient medically ready for discharge 10/26/21.      [facility] - message sent to admissions liaison requesting update regarding referral ([phone]); follow-up message sent to admissions requesting update ([phone])    [facility] (P: [phone]) - voicemail left with admissions requesting a return call ([phone])    [facility] (P: [phone]) - admissions to review ([phone])    [facility] (P: [phone]) - spoke to admissions who stated they had not received initial referral. Referral resent as of 1038     Denials:      [facility] - denied; no bed availability in near future    [facility] - denied; no bed availability in near future    [facility] - denied; patient's ETOH history    [facility] - denied; no bed availability in near future    YESY Horan  7D/5C Hematology/Oncology Social Worker  Phone: [phone]  Pager: [phone]  [email or web address]

## 2021-10-28 NOTE — PLAN OF CARE
1930-0730: Hypertensive. Parameters to notify not met. OVSS on RA. Oriented X3. Somewhat disoriented to time. Pt stated the month was November 2021. C/o mild nausea but manageable without antiemetics. Denies pain and SOB. Slept most of shift. Incontinent of urine. Repositioning in bed with encouragement. No BM. Refused senna and Miralax. Continue with POC.

## 2021-10-28 NOTE — PLAN OF CARE
0700 - 1500 Patient is intermittently disoriented to situation, otherwise alert. Hypertensive, MD notified, scheduled BP meds given and PRN hydralazine orders initiated. Denies pain and SOB. Endorses mild nausea, declines anti-emetic. Good appetite. PIV saline locked. Incontinent of urine, voiding spontaneously. Given senna x1, BM smear. Worked with PT, OT. Bed alarm on for safety.     1500 - 1900 Patient resting comfortably between cares. Up with assist x1 to restroom, fatigues easily when ambulating. Update given to oncoming RN at the bedside.

## 2021-10-29 ENCOUNTER — APPOINTMENT (OUTPATIENT)
Dept: OCCUPATIONAL THERAPY | Facility: CLINIC | Age: 71
DRG: 439 | End: 2021-10-29
Payer: MEDICARE

## 2021-10-29 ENCOUNTER — APPOINTMENT (OUTPATIENT)
Dept: PHYSICAL THERAPY | Facility: CLINIC | Age: 71
DRG: 439 | End: 2021-10-29
Payer: MEDICARE

## 2021-10-29 PROCEDURE — 250N000013 HC RX MED GY IP 250 OP 250 PS 637: Performed by: PHYSICIAN ASSISTANT

## 2021-10-29 PROCEDURE — 97530 THERAPEUTIC ACTIVITIES: CPT | Mod: GP

## 2021-10-29 PROCEDURE — 120N000002 HC R&B MED SURG/OB UMMC

## 2021-10-29 PROCEDURE — 97530 THERAPEUTIC ACTIVITIES: CPT | Mod: GO

## 2021-10-29 PROCEDURE — 99232 SBSQ HOSP IP/OBS MODERATE 35: CPT | Performed by: PHYSICIAN ASSISTANT

## 2021-10-29 PROCEDURE — 250N000011 HC RX IP 250 OP 636: Performed by: PHYSICIAN ASSISTANT

## 2021-10-29 PROCEDURE — 250N000013 HC RX MED GY IP 250 OP 250 PS 637: Performed by: PEDIATRICS

## 2021-10-29 PROCEDURE — 97535 SELF CARE MNGMENT TRAINING: CPT | Mod: GO

## 2021-10-29 PROCEDURE — 97116 GAIT TRAINING THERAPY: CPT | Mod: GP

## 2021-10-29 RX ADMIN — FOLIC ACID 1 MG: 1 TABLET ORAL at 08:19

## 2021-10-29 RX ADMIN — AMLODIPINE BESYLATE 5 MG: 5 TABLET ORAL at 08:19

## 2021-10-29 RX ADMIN — Medication 800 MG: at 08:17

## 2021-10-29 RX ADMIN — Medication 1 TABLET: at 08:19

## 2021-10-29 RX ADMIN — METOPROLOL SUCCINATE 50 MG: 25 TABLET, EXTENDED RELEASE ORAL at 08:17

## 2021-10-29 RX ADMIN — SENNOSIDES 8.6 MG: 8.6 TABLET ORAL at 08:19

## 2021-10-29 RX ADMIN — THIAMINE HCL TAB 100 MG 100 MG: 100 TAB at 08:19

## 2021-10-29 RX ADMIN — ENOXAPARIN SODIUM 40 MG: 40 INJECTION SUBCUTANEOUS at 10:55

## 2021-10-29 RX ADMIN — SENNOSIDES 8.6 MG: 8.6 TABLET ORAL at 20:18

## 2021-10-29 RX ADMIN — SERTRALINE HYDROCHLORIDE 25 MG: 25 TABLET ORAL at 08:19

## 2021-10-29 RX ADMIN — LISINOPRIL 40 MG: 40 TABLET ORAL at 08:18

## 2021-10-29 ASSESSMENT — ACTIVITIES OF DAILY LIVING (ADL)
ADLS_ACUITY_SCORE: 15

## 2021-10-29 ASSESSMENT — MIFFLIN-ST. JEOR: SCORE: 1520.56

## 2021-10-29 NOTE — PROGRESS NOTES
Care Management Follow Up    Length of Stay (days): 5    Expected Discharge Date: TBD -pending placement      Concerns to be Addressed:  Discharge planning     Patient plan of care discussed at interdisciplinary rounds: Yes    Anticipated Discharge Disposition:  TCU     Anticipated Discharge Services:  TCU  Anticipated Discharge DME:  NA    Patient/family educated on Medicare website which has current facility and service quality ratings:  Yes  Education Provided on the Discharge Plan:  Yes  Patient/Family in Agreement with the Plan:  Yes    Referrals Placed by CM/SW:    Pending:  - [facility]  [facility] Liaison [phone] f: [phone]  SW called and left VM requesting return phone call.    - [facility] [phone]  SW called and left VM requesting return phone call.     - [facility] [phone]  SW called and left VM requesting return phone call.     - [facility] [phone]  SW called and left VM requesting return phone call.    - [facility]   [facility] Taylor liaison [phone] f: [phone]  Referral sent     - [facility]  Ph: [phone] f: [phone]   Referral sent     - [facility]  Ph: [phone] f: [phone]  Referral sent     - [facility]  Ph: [phone] f: [phone]   Referral sent     Declined:  -[facility] - denied; no bed availability in near future  -[facility] - denied; no bed availability in near future  -[facility] - denied; patient's ETOH history  -[facility] - denied; no bed availability in near future  - [facility]  Ph: [phone] f: [phone]  Declined due to ETOH  - [facility]  Ph: [phone] f: [phone]  Declined, no beds.  - [facility]  Ph: [phone] f: [phone]  Declined, no beds    Private pay costs discussed: Not applicable    Additional  "Information:  SW to continue to follow for discharge planning needs.    Addend 1020: ROSELINE sent additional referrals at the request of pt's daughter.     Addend 1330: ROSELINE spoke with Claudia who stated [facility] wants to see how pt does over the weekend to \"make sure he is not in alcohol withdrawal.\" ROSELINE also requested Claudia send referral to [facility].    Addend 1514: ROSELINE received phone call from Claudia who stated that pt has been accepted to [facility]. Claudia noted that they had a staff member test positive and visitors are not allowed indoors, are only doing outdoor visit until 10 days from when staff member tested positive. Per Claudia, she is not able to tell SW when visiting will be open.  ROSELINE spoke with pt's daughter Janae [phone] w/update. Janae expressed concerns regarding visiting policy. Janae stated she would need to discuss with her brother.   ROSELINE received phone call from Wolf. Per Wolf \"that was the last place we wanted.\" ROSELINE acknowledged concerns and provided supportive listening at difficulties regarding TCU placement. Wlof reported having concerns about visiting policy. Wolf had questions regarding home health care. ROSELINE provided education that home health care is limited and that services are not everyday. Wolf stated he was thinking a nurse \"could come out 1-2 times per day.\" ROSELINE informed Wolf that would be extremely difficult with staffing crisis. Wolf expressed understanding. Wolf requested time to consider placement at [facility]. ROSELINE again re-iterated that pt is medically ready for discharge. Wolf stated \"are you just goona kick him out to home?\" ROSELINE informed Wolf that is not the case, however there is a safe discharge option that family is refusing. Wolf requested \"some time today to consider.\" ROSELINE informed Wolf that if he does not call SW back today, ROSELINE will request staff member call him tomorrow.  ROSELINE attempted to meet with pt, however pt with other " providers.    MARK Guillory, [facility]  6D Adult Acute Care SW - assisting 7D   Ph:[phone]  Pager [phone]

## 2021-10-29 NOTE — PROGRESS NOTES
[facility]    Medicine Progress Note - Hospitalist Service, [place]       Date of Admission:  10/23/2021    Assessment & Plan           Efrain Schmid is a 71-year-old male with a history of alcohol use disorder, hypertension, anxiety, depression, who was admitted with acute pancreatitis.    Today: medically ready for discharge, awaiting TCU placement     # Acute pancreatitis, resolved. Presented with LUQ pain, nausea, vomiting, weakness, lipase 1,933. Most likely secondary to alcohol use. Afebrile. LFTs, mag wnl. Diet advanced to regular of which he is tolerating and no longer on IVF. Pain resolved.   - Therapies recommending TCU; awaiting placement     Alcohol use disorder.  Drinking about 1 quart daily. Last drink 3 days PTA. No other substance use, Utox negative. No prior history of withdrawal seizures. Pt declined wanting Addiction consult as he states will follow up with his long time AA group. Last scored high enough on CIWA protocol to receive lorazepam 10/25 so discontinued 10/27.   - MVI, folic acid, and thiamine supplements     Hypertension. Not taking his medications for a few months. Hypertensive on admission, and continues to be intermittently after resuming PTA amlodipine, lisinopril, metoprolol. Amlodipine dose increased 10/26 to 5 mg daily. After receiving antihypertensive meds yesterday am, last BP normotensive. Today repeat BP 150s / 80s.   - Continue amlodipine 5 mg daily, lisinopril 40 mg daily, and Toprol XL 50 mg daily with parameters to hold.  - Add prn oral hydralazine with parameters to give.       Other Chronic Medical Issues:  HLD. Not taking atorvastatin for months, did not resume on admission.   Anxiety, depression. Also not taking his medications recently and desired to resume his meds. Continue PTA sertraline.   Hx of bullous pemphigoid. No longer on CellCept as it was cost prohibitive.           Diet: Snacks/Supplements Pediatric:  Boost Breeze; Between Meals  Regular Diet Adult  Snacks/Supplements Adult: Ensure Clear; Between Meals    DVT Prophylaxis: Enoxaparin (Lovenox) SQ  Ruiz Catheter: Not present  Central Lines: None  Code Status: Full Code      Disposition Plan   Expected discharge: 11/01/2021   recommended to transitional care unit once safe disposition plan/ TCU bed available.     The patient's care was discussed with the Attending Physician, Dr. Carter.    Joan Brooks PA-C  Hospitalist Service, [address]  Securely message with the Vocera Web Console (learn more here)  Text page via [facility] Paging/Directory    Please see sign in/sign out for up to date coverage information    ______________________________________________________________________    Interval History   No acute events overnight. Continues to feel weak during therapies, but believes he is making progress. No new physical concerns. Agrees with plan to discharge to TCU..     Data reviewed today: I reviewed all medications, new labs and imaging results over the last 24 hours.     Physical Exam   Vital Signs: Temp: 98.5  F (36.9  C) Temp src: Oral BP: 135/75 Pulse: 77   Resp: 20 SpO2: 96 % O2 Device: None (Room air)    Weight: 182 lbs 8 oz  General Appearance: Alert and oriented x 3, NAD  Respiratory: Normal work of breathing on room air, Lungs CTAB, no wheezing  Cardiovascular: RRR, no murmurs auscultated  GI: Abdomen rotund, soft, non distended, non tender to palpation  Skin: warm and dry to touch, no rashes or excoriations  Other: No peripheral edema    Data   Recent Labs   Lab 10/27/21  0630 10/26/21  0611 10/25/21  0529 10/24/21  2015 10/24/21  1332 10/24/21  0649 10/24/21  0649 10/23/21  1523 10/23/21  1523   WBC 5.5 4.4 4.9  --   --    < > 6.5   < > 10.5   HGB 13.3 11.8* 12.4*  --   --    < > 14.1   < > 16.8    102* 102*  --   --    < > 98   < > 97    291 270  --   --    < > 282   < > 339   NA   --  135 138  --   --   --  136   < > 133   POTASSIUM  --  3.5 3.9 3.6   < >  --  2.7*   < > 3.9   CHLORIDE  --  105 107  --   --   --  101   < > 95   CO2  --  24 27  --   --   --  28   < > 26   BUN  --  16 24  --   --   --  33*   < > 46*   CR 0.69 0.67 0.72  --   --    < > 0.85   < > 1.13   ANIONGAP  --  6 4  --   --   --  7   < > 12   EDDIE  --  7.6* 7.8*  --   --   --  8.3*   < > 9.0   GLC  --  93 101*  --   --   --  111*   < > 176*   ALBUMIN  --  2.0* 1.9*  --   --    < > 2.2*   < > 3.0*   PROTTOTAL  --  4.7* 4.4*  --   --    < > 5.3*   < > 6.9   BILITOTAL  --  0.6 0.7  --   --    < > 1.0   < > 1.3   ALKPHOS  --  84 75  --   --    < > 94   < > 129   ALT  --  38 34  --   --    < > 35   < > 51   AST  --  34 29  --   --    < > 24   < > 40   LIPASE  --   --   --   --   --   --  1,933*  --  980*   TROPONIN  --   --   --   --   --   --   --   --  <0.015    < > = values in this interval not displayed.

## 2021-10-29 NOTE — PLAN OF CARE
[id number]  VSS. Disoriented to situation. No complaints of pain or N/V. Up with assistx1 to use bedside urinal. No acute events, continue with POC.

## 2021-10-29 NOTE — PLAN OF CARE
[id number]: VSS. A&Ox4. Stated he was here in the hospital to get stronger. Mentioned some lower back pain when walking. Paged provider about adding lidocaine patch. Complained of some indigestion after meals. Rounded abdomen. Denied interventions. No complaints of nausea, dizziness, or headache. Some tingling in toes. Small red blanchable area noted on buttocks. Repositioned on pillow and put on mepilex dressing. Voiding well. LBM this morning 10/29. Good appetite. Calm and cooperative. Continue with plan of care.

## 2021-10-29 NOTE — PLAN OF CARE
[id number]:    Pt is disoriented to situation. Vital signs stable. Denies nausea, pain and  Shortness of breath. Assist x1 to the restroom, incontinent with urine sometime. Continue with POC.

## 2021-10-30 LAB
CREAT SERPL-MCNC: 0.77 MG/DL (ref 0.66–1.25)
GFR SERPL CREATININE-BSD FRML MDRD: >90 ML/MIN/1.73M2
PLATELET # BLD AUTO: 340 10E3/UL (ref 150–450)

## 2021-10-30 PROCEDURE — 250N000013 HC RX MED GY IP 250 OP 250 PS 637: Performed by: PEDIATRICS

## 2021-10-30 PROCEDURE — 82565 ASSAY OF CREATININE: CPT | Performed by: PHYSICIAN ASSISTANT

## 2021-10-30 PROCEDURE — 250N000011 HC RX IP 250 OP 636: Performed by: PHYSICIAN ASSISTANT

## 2021-10-30 PROCEDURE — 250N000013 HC RX MED GY IP 250 OP 250 PS 637: Performed by: PHYSICIAN ASSISTANT

## 2021-10-30 PROCEDURE — 85049 AUTOMATED PLATELET COUNT: CPT | Performed by: PHYSICIAN ASSISTANT

## 2021-10-30 PROCEDURE — 120N000002 HC R&B MED SURG/OB UMMC

## 2021-10-30 PROCEDURE — 999N000128 HC STATISTIC PERIPHERAL IV START W/O US GUIDANCE

## 2021-10-30 PROCEDURE — 36415 COLL VENOUS BLD VENIPUNCTURE: CPT | Performed by: PHYSICIAN ASSISTANT

## 2021-10-30 PROCEDURE — 99232 SBSQ HOSP IP/OBS MODERATE 35: CPT | Performed by: PHYSICIAN ASSISTANT

## 2021-10-30 RX ADMIN — FOLIC ACID 1 MG: 1 TABLET ORAL at 08:20

## 2021-10-30 RX ADMIN — ENOXAPARIN SODIUM 40 MG: 40 INJECTION SUBCUTANEOUS at 10:36

## 2021-10-30 RX ADMIN — SENNOSIDES 8.6 MG: 8.6 TABLET ORAL at 19:51

## 2021-10-30 RX ADMIN — AMLODIPINE BESYLATE 5 MG: 5 TABLET ORAL at 08:20

## 2021-10-30 RX ADMIN — THIAMINE HCL TAB 100 MG 100 MG: 100 TAB at 08:20

## 2021-10-30 RX ADMIN — SENNOSIDES 8.6 MG: 8.6 TABLET ORAL at 08:20

## 2021-10-30 RX ADMIN — Medication 800 MG: at 08:20

## 2021-10-30 RX ADMIN — SERTRALINE HYDROCHLORIDE 25 MG: 25 TABLET ORAL at 08:20

## 2021-10-30 RX ADMIN — METOPROLOL SUCCINATE 50 MG: 25 TABLET, EXTENDED RELEASE ORAL at 08:20

## 2021-10-30 RX ADMIN — Medication 1 TABLET: at 08:20

## 2021-10-30 RX ADMIN — LISINOPRIL 40 MG: 40 TABLET ORAL at 08:20

## 2021-10-30 ASSESSMENT — ACTIVITIES OF DAILY LIVING (ADL)
ADLS_ACUITY_SCORE: 15
ADLS_ACUITY_SCORE: 14
ADLS_ACUITY_SCORE: 15
ADLS_ACUITY_SCORE: 14
ADLS_ACUITY_SCORE: 15
ADLS_ACUITY_SCORE: 14
ADLS_ACUITY_SCORE: 15
ADLS_ACUITY_SCORE: 14
ADLS_ACUITY_SCORE: 15
ADLS_ACUITY_SCORE: 15

## 2021-10-30 ASSESSMENT — MIFFLIN-ST. JEOR: SCORE: 1514.21

## 2021-10-30 NOTE — PLAN OF CARE
Galo said he slept well last night and feels great today.  Up with SBA of 1 -bed alarm on.  Eating well  and BM x2.  Red area on left buttock, skin is intake.  Mepilex over site.

## 2021-10-30 NOTE — PROGRESS NOTES
Care Management Follow Up    Length of Stay (days): 6    Expected Discharge Date: 11/01/2021     Concerns to be Addressed:       Patient plan of care discussed at interdisciplinary rounds: No    Anticipated Discharge Disposition:  TCU     Anticipated Discharge Services:    Anticipated Discharge DME:      Patient/family educated on Medicare website which has current facility and service quality ratings:    Education Provided on the Discharge Plan:    Patient/Family in Agreement with the Plan:      Referrals Placed by CM/SW:    Private pay costs discussed: Not applicable    Additional Information:  SW called to follow-up on admissions, per medical team planning for discharge 11/1/21.    Accepted:  - [facility] - Accepted; family deciding if they would like to discharge to this location.   Claudia [phone] f: [phone]      Pending:  - [facility]  Cl[facility] [place] Liaison [phone] f: [phone]  SW called and left VM requesting return phone call.     - [facility] [phone]  SW called and left VM again today for call back.      - [facility] [phone]  No weekend beds available. SW can call on Mon to check beds status     - [facility] [phone]  No weekend admissions. SW can call on Mon to check bed status.     - [facility]  Ph: [phone] f: [phone]   SW called and VM left for admissions     - [facility]  Ph: [phone] f: [phone]  No weekend admissions.      - [facility]  Ph: [phone] f: [phone]   SW called and VM left for admissions.     Declined:  -[facility] - denied; no bed availability in near future  -[facility] - denied; no bed availability in near future  -[facility] - denied; patient's ETOH history  -[facility] - denied; no bed availability in near future  - [facility]  Ph: [phone] f:  [phone]  Declined due to ETOH  - [facility]  Ph: [phone] f: [phone]  Declined, no beds.  - [facility]  Ph: [phone] f: [phone]  Declined, no beds       Carmina Morataya, [facility]  Weekend SW  [phone]

## 2021-10-30 NOTE — PROGRESS NOTES
[facility]    Medicine Progress Note - Hospitalist Service, Gold 6       Date of Admission:  10/23/2021    Assessment & Plan           Efrain Schmid is a 71-year-old male with a history of alcohol use disorder, hypertension, anxiety, depression, who was admitted with acute pancreatitis.    Today: PT to reevaluate need for TCU on discharge     # Acute pancreatitis, resolved. Presented with LUQ pain, nausea, vomiting, weakness, lipase 1,933. Most likely secondary to alcohol use. Afebrile. LFTs, mag wnl. Diet advanced to regular of which he is tolerating and no longer on IVF. Pain resolved.   - Therapies recommending TCU; awaiting placement     Alcohol use disorder.  Drinking about 1 quart daily. Last drink 3 days PTA. No other substance use, Utox negative. No prior history of withdrawal seizures. Pt declined wanting Addiction consult as he states will follow up with his long time AA group. Last scored high enough on CIWA protocol to receive lorazepam 10/25 so discontinued 10/27.   - MVI, folic acid, and thiamine supplements     Hypertension. Not taking his medications for a few months. Hypertensive on admission, and continues to be intermittently after resuming PTA amlodipine, lisinopril, metoprolol. Amlodipine dose increased 10/26 to 5 mg daily.   - Continue amlodipine 5 mg daily, lisinopril 40 mg daily, and Toprol XL 50 mg daily with parameters to hold.  - Added prn oral hydralazine with parameters to give.       Other Chronic Medical Issues:  HLD. Not taking atorvastatin for months, did not resume on admission.   Anxiety, depression. Also not taking his medications recently and desired to resume his meds. Continue PTA sertraline.   Hx of bullous pemphigoid. No longer on CellCept as it was cost prohibitive.           Diet: Snacks/Supplements Pediatric: Boost Breeze; Between Meals  Regular Diet Adult  Snacks/Supplements Adult: Ensure Clear; Between Meals    DVT  Prophylaxis: Enoxaparin (Lovenox) SQ  Ruiz Catheter: Not present  Central Lines: None  Code Status: Full Code      Disposition Plan   Expected discharge: 11/01/2021   recommended to TCU vs home with therapies pending PT reevaluation.      The patient's care was discussed with the Attending Physician, Dr. Carter.    Joan Brooks PA-C  Hospitalist Service, [address]  Securely message with the Vocera Web Console (learn more here)  Text page via [facility] Paging/Directory    Please see sign in/sign out for up to date coverage information    ______________________________________________________________________    Interval History   Placement found, however pt and family hesitant. Galo states that he is feeling significantly improved today and wonders if he would be able to discharge home with therapies. He denies any abdominal pain, nausea, vomiting or fevers. He states that he is feeling more stable on his feet and close to his physical baseline.     Data reviewed today: I reviewed all medications, new labs and imaging results over the last 24 hours.     Physical Exam   Vital Signs: Temp: 97.6  F (36.4  C) Temp src: Oral BP: (!) 149/82 Pulse: 70   Resp: 16 SpO2: 98 % O2 Device: None (Room air)    Weight: 181 lbs 1.6 oz  General Appearance: Alert and oriented x 3, NAD  Respiratory: Normal work of breathing on room air, Lungs CTAB, no wheezing  Cardiovascular: RRR, no murmurs auscultated  GI: Abdomen rotund, soft, non distended, non tender to palpation  Skin: warm and dry to touch, no rashes or excoriations  Other: No peripheral edema    Data   Recent Labs   Lab 10/30/21  0604 10/27/21  0630 10/26/21  0611 10/25/21  0529 10/25/21  0529 10/24/21  2015 10/24/21  1332 10/24/21  0649 10/24/21  0649 10/23/21  1523 10/23/21  1523   WBC  --  5.5 4.4  --  4.9  --   --    < > 6.5   < > 10.5   HGB  --  13.3 11.8*  --  12.4*  --   --    < > 14.1   < > 16.8   MCV  --  100 102*   --  102*  --   --    < > 98   < > 97    305 291   < > 270  --   --    < > 282   < > 339   NA  --   --  135  --  138  --   --   --  136   < > 133   POTASSIUM  --   --  3.5  --  3.9 3.6   < >  --  2.7*   < > 3.9   CHLORIDE  --   --  105  --  107  --   --   --  101   < > 95   CO2  --   --  24  --  27  --   --   --  28   < > 26   BUN  --   --  16  --  24  --   --   --  33*   < > 46*   CR 0.77 0.69 0.67   < > 0.72  --   --    < > 0.85   < > 1.13   ANIONGAP  --   --  6  --  4  --   --   --  7   < > 12   EDDIE  --   --  7.6*  --  7.8*  --   --   --  8.3*   < > 9.0   GLC  --   --  93  --  101*  --   --   --  111*   < > 176*   ALBUMIN  --   --  2.0*  --  1.9*  --   --    < > 2.2*   < > 3.0*   PROTTOTAL  --   --  4.7*  --  4.4*  --   --    < > 5.3*   < > 6.9   BILITOTAL  --   --  0.6  --  0.7  --   --    < > 1.0   < > 1.3   ALKPHOS  --   --  84  --  75  --   --    < > 94   < > 129   ALT  --   --  38  --  34  --   --    < > 35   < > 51   AST  --   --  34  --  29  --   --    < > 24   < > 40   LIPASE  --   --   --   --   --   --   --   --  1,933*  --  980*   TROPONIN  --   --   --   --   --   --   --   --   --   --  <0.015    < > = values in this interval not displayed.

## 2021-10-30 NOTE — PLAN OF CARE
[id number]    Disoriented to time and situation, otherwise alert. VSS on room air. Denies pain, N/V and SOB. Adequate PO intake for dinner. Pt up to bathroom to urinate x1, voiding spontaneously. Intermittently incontinent of urine. No BM this shift. Senna given x1. Mepilex on L coccyx. Awaiting TCU placement. Continue with POC.

## 2021-10-30 NOTE — PLAN OF CARE
Pt is alert and oriented x4, AVSS, afebrile on RA, denies pain, nausea, vomiting, abd pain an discomfort. On bed alarm. Meilax dressing in place on left coccyx. urnial at bedside. Awaiting TCU placement .  Bed alarm is on activated and audible. Continue POC

## 2021-10-31 ENCOUNTER — APPOINTMENT (OUTPATIENT)
Dept: PHYSICAL THERAPY | Facility: CLINIC | Age: 71
DRG: 439 | End: 2021-10-31
Attending: PHYSICIAN ASSISTANT
Payer: MEDICARE

## 2021-10-31 LAB — SARS-COV-2 RNA RESP QL NAA+PROBE: NEGATIVE

## 2021-10-31 PROCEDURE — 99207 PR CDG-MDM COMPONENT: MEETS MODERATE - DOWN CODED: CPT | Performed by: INTERNAL MEDICINE

## 2021-10-31 PROCEDURE — 99232 SBSQ HOSP IP/OBS MODERATE 35: CPT | Performed by: INTERNAL MEDICINE

## 2021-10-31 PROCEDURE — 250N000013 HC RX MED GY IP 250 OP 250 PS 637: Performed by: PEDIATRICS

## 2021-10-31 PROCEDURE — 97530 THERAPEUTIC ACTIVITIES: CPT | Mod: GP

## 2021-10-31 PROCEDURE — 250N000013 HC RX MED GY IP 250 OP 250 PS 637: Performed by: PHYSICIAN ASSISTANT

## 2021-10-31 PROCEDURE — 250N000011 HC RX IP 250 OP 636: Performed by: PHYSICIAN ASSISTANT

## 2021-10-31 PROCEDURE — 120N000002 HC R&B MED SURG/OB UMMC

## 2021-10-31 PROCEDURE — 97116 GAIT TRAINING THERAPY: CPT | Mod: GP

## 2021-10-31 PROCEDURE — U0005 INFEC AGEN DETEC AMPLI PROBE: HCPCS | Performed by: PHYSICIAN ASSISTANT

## 2021-10-31 PROCEDURE — 99207 PR APP CREDIT; MD BILLING SHARED VISIT: CPT | Performed by: PHYSICIAN ASSISTANT

## 2021-10-31 RX ORDER — ATORVASTATIN CALCIUM 10 MG/1
10 TABLET, FILM COATED ORAL EVERY EVENING
Status: DISCONTINUED | OUTPATIENT
Start: 2021-10-31 | End: 2021-11-02 | Stop reason: HOSPADM

## 2021-10-31 RX ADMIN — ENOXAPARIN SODIUM 40 MG: 40 INJECTION SUBCUTANEOUS at 11:19

## 2021-10-31 RX ADMIN — AMLODIPINE BESYLATE 5 MG: 5 TABLET ORAL at 08:24

## 2021-10-31 RX ADMIN — MICONAZOLE NITRATE: 20 POWDER TOPICAL at 08:27

## 2021-10-31 RX ADMIN — SENNOSIDES 8.6 MG: 8.6 TABLET ORAL at 19:57

## 2021-10-31 RX ADMIN — ATORVASTATIN CALCIUM 10 MG: 10 TABLET, FILM COATED ORAL at 19:57

## 2021-10-31 RX ADMIN — METOPROLOL SUCCINATE 50 MG: 25 TABLET, EXTENDED RELEASE ORAL at 08:24

## 2021-10-31 RX ADMIN — SENNOSIDES 8.6 MG: 8.6 TABLET ORAL at 08:23

## 2021-10-31 RX ADMIN — Medication 800 MG: at 08:24

## 2021-10-31 RX ADMIN — SERTRALINE HYDROCHLORIDE 25 MG: 25 TABLET ORAL at 08:24

## 2021-10-31 RX ADMIN — THIAMINE HCL TAB 100 MG 100 MG: 100 TAB at 08:24

## 2021-10-31 RX ADMIN — LISINOPRIL 40 MG: 40 TABLET ORAL at 08:24

## 2021-10-31 RX ADMIN — FOLIC ACID 1 MG: 1 TABLET ORAL at 08:23

## 2021-10-31 RX ADMIN — Medication 1 TABLET: at 08:23

## 2021-10-31 ASSESSMENT — ACTIVITIES OF DAILY LIVING (ADL)
ADLS_ACUITY_SCORE: 15
ADLS_ACUITY_SCORE: 15
ADLS_ACUITY_SCORE: 14
ADLS_ACUITY_SCORE: 15
ADLS_ACUITY_SCORE: 14
ADLS_ACUITY_SCORE: 15
ADLS_ACUITY_SCORE: 14
ADLS_ACUITY_SCORE: 15
ADLS_ACUITY_SCORE: 15
ADLS_ACUITY_SCORE: 14
ADLS_ACUITY_SCORE: 15
ADLS_ACUITY_SCORE: 15
ADLS_ACUITY_SCORE: 14

## 2021-10-31 ASSESSMENT — MIFFLIN-ST. JEOR: SCORE: 1519.2

## 2021-10-31 NOTE — PROGRESS NOTES
[facility]    Medicine Progress Note - Hospitalist Service, Gold 6       Date of Admission:  10/23/2021    Assessment & Plan           Efrain Schmid is a 71-year-old male with a history of alcohol use disorder, hypertension, anxiety, depression, who was admitted with acute pancreatitis.     # Acute pancreatitis, resolved. Presented with LUQ pain, nausea, vomiting, weakness, lipase 1,933. Most likely secondary to alcohol use. Afebrile. LFTs, mag wnl. Diet advanced to regular of which he is tolerating and no longer on IVF. Pain resolved.   - Therapies recommending TCU; awaiting placement     Alcohol use disorder.  Drinking about 1 quart daily. Last drink 3 days PTA. No other substance use, Utox negative. No prior history of withdrawal seizures. Pt declined wanting Addiction consult as he states will follow up with his long time AA group. Last scored high enough on CIWA protocol to receive lorazepam 10/25 so discontinued 10/27.   - MVI, folic acid, and thiamine supplements     Hypertension. Not taking his medications for a few months. Hypertensive on admission, and continues to be intermittently after resuming PTA amlodipine, lisinopril, metoprolol. Amlodipine dose increased 10/26 to 5 mg daily.   - Continue amlodipine 5 mg daily, lisinopril 40 mg daily, and Toprol XL 50 mg daily with parameters to hold.  - Added prn oral hydralazine with parameters to give.       Other Chronic Medical Issues:  HLD. Resumed PTA atorvastatin.  Anxiety, depression. Also not taking his medications recently and desired to resume his meds. Continue PTA sertraline.   Hx of bullous pemphigoid. No longer on CellCept as it was cost prohibitive.           Diet: Snacks/Supplements Pediatric: Boost Breeze; Between Meals  Regular Diet Adult  Snacks/Supplements Adult: Ensure Clear; Between Meals    DVT Prophylaxis: Enoxaparin (Lovenox) SQ  Ruiz Catheter: Not present  Central Lines: None  Code  Status: Full Code      Disposition Plan   Expected discharge: 11/01/2021   recommended to TCU vs home with therapies pending PT reevaluation.      The patient's care was discussed with the Attending Physician, Dr. Carter.    Joan Brooks PA-C  Hospitalist Service, [address]  Securely message with the Vocera Web Console (learn more here)  Text page via [employer] Paging/Directory    Please see sign in/sign out for up to date coverage information    ______________________________________________________________________    Interval History   Reevaluated by PT who continue to recommend TCU on discharge. Recommendations reviewed with pt, who is in agreement. No new complaints today. Does continue to feel that his balance and strength are improving, albeit slowly.     Data reviewed today: I reviewed all medications, new labs and imaging results over the last 24 hours.     Physical Exam   Vital Signs: Temp: 98.9  F (37.2  C) Temp src: Oral BP: 106/64 Pulse: 72   Resp: 16 SpO2: 97 % O2 Device: None (Room air)    Weight: 182 lbs 3.2 oz  General Appearance: Alert and oriented x 3, NAD  Respiratory: Normal work of breathing on room air, Lungs CTAB, no wheezing  Cardiovascular: RRR, no murmurs auscultated  GI: Abdomen rotund, soft, non distended, non tender to palpation  Skin: warm and dry to touch, no rashes or excoriations  Other: No peripheral edema    Data   Recent Labs   Lab 10/30/21  0604 10/27/21  0630 10/26/21  0611 10/25/21  0529 10/25/21  0529 10/24/21  2015   WBC  --  5.5 4.4  --  4.9  --    HGB  --  13.3 11.8*  --  12.4*  --    MCV  --  100 102*  --  102*  --     305 291   < > 270  --    NA  --   --  135  --  138  --    POTASSIUM  --   --  3.5  --  3.9 3.6   CHLORIDE  --   --  105  --  107  --    CO2  --   --  24  --  27  --    BUN  --   --  16  --  24  --    CR 0.77 0.69 0.67   < > 0.72  --    ANIONGAP  --   --  6  --  4  --    EDDIE  --   --  7.6*  --   7.8*  --    GLC  --   --  93  --  101*  --    ALBUMIN  --   --  2.0*  --  1.9*  --    PROTTOTAL  --   --  4.7*  --  4.4*  --    BILITOTAL  --   --  0.6  --  0.7  --    ALKPHOS  --   --  84  --  75  --    ALT  --   --  38  --  34  --    AST  --   --  34  --  29  --     < > = values in this interval not displayed.

## 2021-10-31 NOTE — PLAN OF CARE
[id number]: Pt A&Ox4, afebrile, SBP max of 141 this shift, OVSS. Pt denies pain/N/V. PIV flushed with saline, patent, and saline locked. No incident of incontinence this shift, urinal at bedside. Continue to monitor and with POC.

## 2021-10-31 NOTE — PROGRESS NOTES
Care Management Follow Up    Length of Stay (days): 7    Expected Discharge Date: 11/01/2021     Concerns to be Addressed:       Patient plan of care discussed at interdisciplinary rounds: NO    Anticipated Discharge Disposition:  TCU     Anticipated Discharge Services:  TCU    Patient/family educated on Medicare website which has current facility and service quality ratings:  Yes  Education Provided on the Discharge Plan:  Yes  Patient/Family in Agreement with the Plan:  Yes    Referrals Placed by CM/SW:  TCU - see chart review.  Private pay costs discussed: Not applicable    Additional Information:  SW called to follow-up on admissions, per medical team planning for discharge 11/1/21. CSW spoke w/ provider team to discuss status of discharge planning x2. CSW spoke w/ pt via phone re: TCU recommendation. Pt is in agreement w/ TCU placement & expresses understanding of readiness for discharge. CSW reviewed acceptance to [facility]; Pt identified that within communication w/ son & dtr they are all in agreement of concerns regarding visitor restrictions d/t current COVID staff outbreak. Pt's children also discussed having significant concerns w/ closed visitor restrictions d/t Pt's hx of depression. Pt and his two children are all fully COVID vaccinated. Pt not in agreement to discharge to [facility] at this time. Pt and family are hopeful for an additional TCU option. Pt has no concerns for alcohol withdrawal for the last week per provider. Pt's family requested additional TCU referrals be sent to expand potential options. Pt is in hopes of discharging the hospital tomorrow. Pt's son states he will assist Pt with discharge transportation to TCU. CSW reviewed potential private pay transportation costs associated w/ WC transport. Family expressed understanding and in agreement w/ self-transporting.     [facility]  [facility]; family deciding if they would like to discharge to  this location.   [place] [phone] f: [phone]    Pending referrals sent 10/31/21 (per Pt and family request):  -[facility] [place] - [phone]   -[facility] - [phone]  -[facility] - [phone]   -[facility] - [phone]   -[facility] - [phone]  -[facility] - [place], [phone] f: [phone]  -[facility] - [phone]   -[facility] - [phone]   -[facility] - [phone]   -[facility] - [phone]  -[facility] - [phone]   [facility] - [phone]   -[facility] - [phone]   -[facility] - [phone]  *Pt/family also requested [facility], which our not Medicare certified SNFs for TCU (education provided)     Pending referrals from 10/29/21 (followed up on today):  - [facility] [place]  [facility] [phone] f: [phone]  SW called and left  for Wilma requesting return phone call 10/31.  Per chart review, [facility] re: to review Pt's stability over the wknd and assess Monday.      - [facility] [phone]  Per TCU nursing, no wknd admissions to review referrals. SW can call on Mon to check status of referral.      - [facility] [phone]  No weekend beds available. SW can call on Mon to check beds status     - [facility] [phone]  No weekend admissions. SW can call on Mon to check bed status.     - [facility]  Ph: [phone] f: [phone]   ROSELINE called and VM left for admissions; per  have wknd admissions staff to review referrals, but she tried multiple numbers w/o success. Left VM w/ pager.     - [facility]  Ph: [phone] f: [phone]  No weekend admissions.      - [facility]  Ph: [phone] f: [phone]   No weekend admissions. ROSELINE can call on Mon to check bed  status.     Declined:  -[facility] - denied; no bed availability in near future  -[facility] - denied; no bed availability in near future  -[facility] - denied; patient's ETOH history  -[facility] - denied; no bed availability in near future  - [facility]  Ph: [phone] f: [phone]  Declined due to ETOH  - [facility]  Ph: [phone] f: [phone]  Declined, no beds.  - [facility]  Ph: [phone] f: [phone]  Declined, no beds    For weekend social work needs, contact information below and available on [facility]:  4A, 4C, 4E, 5A, 5B  pager [phone]  6A, 6B, 6C, 6D  pager [phone]  7A, 7B, 7C, 7D, 5C  pager [phone]    For weekend RN care coordinator needs (home discharge with needs including home care, assisted living facility returns, durable medical equip, IV antibiotics) - page [phone]    MARK Escalante, [facility]  Weekend  on 10/31/21  [facility]  Pager: [phone]

## 2021-10-31 NOTE — PROGRESS NOTES
"   10/31/21 0911   Quick Adds   Type of Visit PT Re-evaluation   Living Environment   People in home alone   Current Living Arrangements condominium   Home Accessibility stairs to enter home   Number of Stairs, Main Entrance greater than 10 stairs  (3 flights of 12 stairs)   Stair Railings, Main Entrance railings on both sides of stairs;railing on right side (ascending)  (railing on 1 side for landing, B rails otherwise)   Transportation Anticipated car, drives self;family or friend will provide   Living Environment Comments Patient lives alone in 3rd floor condo with no elevator access. Bathroom includes tub shower0- no grab bars or shower chair but pt states he will get shower chair if needed. Pt reports his son flew in from out of state & will be around some of the time but unsure how long he will be here   Self-Care   Usual Activity Tolerance good   Current Activity Tolerance moderate   Regular Exercise Other (see comments)  (Does 3 flights of stairs to access his condo ~3x/day per pt)   Equipment Currently Used at Home none   Activity/Exercise/Self-Care Comment Pt IND with    Disability/Function   Wear Glasses or Blind yes   Vision Management glasses   Fall history within last six months yes  (tripped over crack in sidewalk per pt)   Number of times patient has fallen within last six months 1   Change in Functional Status Since Onset of Current Illness/Injury yes   General Information   Onset of Illness/Injury or Date of Surgery 10/23/21   Referring Physician Joan Brooks PA-C   Patient/Family Therapy Goals Statement (PT) not stated   Pertinent History of Current Problem (include personal factors and/or comorbidities that impact the POC) Per chart, \"Efrain Schmid is a 71-year-old male with a history of alcohol use disorder, hypertension, anxiety, depression, who was admitted with acute pancreatitis.\" Pt was initially eval'd by PT 10/24 and has been seen by PT during stay for treatment with " recommendation of TCU. Acute pancreatitis now resolved & pt is awaiting TCU placement. Provider requested PT reassess for consideration of home with home therapies vs TCU discharge disposition.   Existing Precautions/Restrictions fall   Pain Assessment   Patient Currently in Pain No  (denies pain)   Posture    Posture Forward head position;Protracted shoulders   Range of Motion (ROM)   ROM Quick Adds ROM WFL   Strength   Strength Comments Pt demos some functional weakness with transfers- relies on UE pushoff but able to complete without physical assist   Bed Mobility   Comment (Bed Mobility) IND from flat bed without rail, some difficulty with sit>supine but able to complete ind'lly.    Transfers   Transfer Safety Comments SBA sit<>stand    Gait/Stairs (Locomotion)   Comment (Gait/Stairs) CGA- close SBA ambulation without AD in zarco, pt with short shuffling steps, festinating at times and with some path deviation/ missteps at times; trialled ambulation with walker with some improvement in maintaining straight path and no missteps but still with short shuffling steps with festinating.   Balance   Balance Comments Impaired dynamic balance, needs CGA-close SBA for ambulation, see above   Sensory Examination   Sensory Perception Comments reports baseline tingling B toes   Clinical Impression   Criteria for Skilled Therapeutic Intervention yes, treatment indicated   PT Diagnosis (PT) impaired gait and functional mobility   Influenced by the following impairments impaired balance, impaired gait quality, decreased activity tolerance   Functional limitations due to impairments difficulty with gait, stairs   Clinical Presentation Stable/Uncomplicated   Clinical Presentation Rationale clinical judgement   Clinical Decision Making (Complexity) low complexity   Therapy Frequency (PT) 5x/week   Predicted Duration of Therapy Intervention (days/wks) 1 week   Planned Therapy Interventions (PT) balance training;bed mobility  training;gait training;home exercise program;neuromuscular re-education;patient/family education;stair training;strengthening;transfer training;home program guidelines   Anticipated Equipment Needs at Discharge (PT) walker, rolling   Risk & Benefits of therapy have been explained evaluation/treatment results reviewed;care plan/treatment goals reviewed;current/potential barriers reviewed;participants included;patient   Clinical Impression Comments Patient has progressed in activity tolerance since initial evaluation but still with impaired and unsteady gait with short shuffling steps, festinating at times, some path deviation & missteps. Parkinsonian-type gait impairments noted, pt may benefit from neuro consult. He is a fall risk and would benefit from continued therapies in TCU prior to return home.    PT Discharge Planning    PT Discharge Recommendation (DC Rec) Transitional Care Facility;home with assist;home with home care physical therapy   PT Rationale for DC Rec Pt is below baseline level of function, decreased strength, activity intolerance, impaired balance and impaired gait. He has 3 flights of stairs to access his condo in a converted mansion, no elevator. Pt ambulates with stooped posture, small shuffling steps, difficulty initiating, festinating and freezing at times with ambulation. Previously notified RN and PA of Parkinson's like symptoms, may benefit from Neuro Consult.   PT Brief overview of current status  Pt is below baseline level of function; he has progressed in activity tolerance but is fall risk with impaired gait. He has 3 flights of 12 stairs to access his condo with no elevator. He has been able to demonstrate ability to tolerate 2x12 steps with seated rest between (did this 10/31) but does rely on B rail support and would not be able to bring walker or carry anything up/down stairs. Pt ambulates with stooped posture, small shuffling steps, difficulty initiating, festinating at times with  ambulation; Also with some path deviation & missteps. Notified RN and previously notified PA of Parkinson's like symptoms, may benefit from Neuro Consult. If pt were to return home, he would need SBA for all mobility, assist for stairs and for all errands outside the home, home PT/OT/HHA, bathroom DME per OT recs.    Total Evaluation Time   Total Evaluation Time (Minutes) 5

## 2021-10-31 NOTE — PLAN OF CARE
Galo is alert and oriented x4 this am but is forgetful.  Up with SBA and is shaky. Bed alarm on.  Eating well and denies any pain.  PT re-assessed by and recommends TCU.

## 2021-10-31 NOTE — PLAN OF CARE
[id number]    A&O x4. Pt forgetful at times. VSS, afebrile. Denies pain, N/V and SOB. Pt up to bathroom with SBA. Voiding spontaneously with adequate urine output. No instances of incontinence this shift. Adequate PO intake for dinner. No BM this shift. Scheduled senna given x1. Mepilex on L coccyx in place. PT to reevaluate need forTCU placement per PA note 10/30. Continue with POC.

## 2021-11-01 PROCEDURE — 99207 PR APP CREDIT; MD BILLING SHARED VISIT: CPT | Performed by: PHYSICIAN ASSISTANT

## 2021-11-01 PROCEDURE — 250N000013 HC RX MED GY IP 250 OP 250 PS 637: Performed by: PEDIATRICS

## 2021-11-01 PROCEDURE — 250N000011 HC RX IP 250 OP 636: Performed by: PHYSICIAN ASSISTANT

## 2021-11-01 PROCEDURE — 99232 SBSQ HOSP IP/OBS MODERATE 35: CPT | Performed by: INTERNAL MEDICINE

## 2021-11-01 PROCEDURE — 250N000013 HC RX MED GY IP 250 OP 250 PS 637: Performed by: PHYSICIAN ASSISTANT

## 2021-11-01 PROCEDURE — 120N000002 HC R&B MED SURG/OB UMMC

## 2021-11-01 RX ADMIN — Medication 800 MG: at 09:05

## 2021-11-01 RX ADMIN — Medication 1 TABLET: at 09:05

## 2021-11-01 RX ADMIN — METOPROLOL SUCCINATE 50 MG: 25 TABLET, EXTENDED RELEASE ORAL at 09:05

## 2021-11-01 RX ADMIN — AMLODIPINE BESYLATE 5 MG: 5 TABLET ORAL at 09:05

## 2021-11-01 RX ADMIN — SERTRALINE HYDROCHLORIDE 25 MG: 25 TABLET ORAL at 09:05

## 2021-11-01 RX ADMIN — THIAMINE HCL TAB 100 MG 100 MG: 100 TAB at 09:05

## 2021-11-01 RX ADMIN — ATORVASTATIN CALCIUM 10 MG: 10 TABLET, FILM COATED ORAL at 20:09

## 2021-11-01 RX ADMIN — SENNOSIDES 8.6 MG: 8.6 TABLET ORAL at 20:09

## 2021-11-01 RX ADMIN — FOLIC ACID 1 MG: 1 TABLET ORAL at 09:05

## 2021-11-01 RX ADMIN — LISINOPRIL 40 MG: 40 TABLET ORAL at 09:05

## 2021-11-01 RX ADMIN — ENOXAPARIN SODIUM 40 MG: 40 INJECTION SUBCUTANEOUS at 11:39

## 2021-11-01 RX ADMIN — SENNOSIDES 8.6 MG: 8.6 TABLET ORAL at 09:05

## 2021-11-01 ASSESSMENT — ACTIVITIES OF DAILY LIVING (ADL)
ADLS_ACUITY_SCORE: 14

## 2021-11-01 ASSESSMENT — MIFFLIN-ST. JEOR: SCORE: 1516.03

## 2021-11-01 NOTE — PROGRESS NOTES
Care Management Follow Up    Length of Stay (days): 8    Expected Discharge Date: 11/01/2021 - pending TCU placement     Concerns to be Addressed:  Discharge planning       Patient plan of care discussed at interdisciplinary rounds: Yes    Anticipated Discharge Disposition:  TCU     Anticipated Discharge Services:  None    Anticipated Discharge DME:  None     Patient/family educated on Medicare website which has current facility and service quality ratings:  Yes    Education Provided on the Discharge Plan:  Yes    Patient/Family in Agreement with the Plan:  Yes     Referrals Placed by CM/SW:      Private pay costs discussed: Not applicable    Additional Information:    Social Work following for discharge planning needs.     Ongoing referrals that were submitted 10/29/21-10/31/21:       [facility] emailed admissions liaison to check status of review (1022)    [facility]    [facility]    [facility]    [facility]    [facility]    [facility]    [facility] - accepted    [facility]    [facility]    [facility]     [facility]    [facility]    *Pt/family also [facility], which our not Medicare certified SNFs for TCU (education provided)     Denials:      [facility] - denied; no bed availability in near future    [facility] - denied; no bed availability in near future    [facility] - denied; patient's ETOH history    [facility] - denied; no bed availability in near future    [facility] - denied; Declined due to ETOH    [facility] -  denied; no beds.    [facility] - declined, no beds    [facility] but patient and family declining as patient and family are not in agreement with the visitor restrictions due to recent COVID staff outbreak.     Patient clinically accepted to [facility]; facility able to accept 11/2/21 afternoon;  confirmed acceptance. Patient and son happy with news. Son, Wolf, to transport patient at discharge. Plan for patient to discharge 11/2/21 at 1330.    YESY Horan  7D/5C Hematology/Oncology Social Worker  Phone: [phone]  Pager: [phone]  [email or web address]

## 2021-11-01 NOTE — PLAN OF CARE
"  Patient feeling well today; no complaints of pain or nausea.  Bilateral hand tremors at baseline, per patient.  Patient ate 75% of breakfast.  PT/OT scheduled for today.  Patient is medically stable to discharge.  Awaiting TCU placement, patient has declined [facility] \"because I don't want to quarantine & not see my family.\"    "

## 2021-11-01 NOTE — PROGRESS NOTES
"CLINICAL NUTRITION SERVICES - REASSESSMENT NOTE     Nutrition Prescription    RECOMMENDATIONS FOR MDs/PROVIDERS TO ORDER:  None additionally, see previous RD note from 10/25    Malnutrition Status:    Patient does not meet two of the established criteria necessary for diagnosing malnutrition but is at risk for malnutrition     Recommendations already ordered by Registered Dietitian (RD):  None at this time    Future/Additional Recommendations:  Continue current diet orders  Consider changing oral nutrition supplement if pt does not discharge tomorrow     EVALUATION OF THE PROGRESS TOWARD GOALS   Diet: Regular + Ensure Clear once daily between meals  Intake: Per I/O, pt consuming average of 50-75% of one meal per day. Visited with patient this afternoon, who says that his appetite is much improved from admit. \"I had a veggie burger for lunch, but it ended up being a little too spicy!\" He says the food is \"not too bad, bit it is obviously not 4-star!\"  He has been drinking \"some\" of his Ensure Clear, but does not care for them. He does not wish to switch to another supplement at this time, given his planned discharge for tomorrow. \"I will drink what I can of them for now\"     NEW FINDINGS   Weight: Updated Dosing Weight: 69 kg (adj wt based on IBW of 64.5 kg and lowest wt this admit of 82.1 kg on 10/30. Noted wt trending downward over past week, question d/t volume depletion vs low po intakes  GI: Last BM 10/31  Labs: Reviewed  Meds: Reviewed    (UPDATED) ASSESSED NUTRITION NEEDS   Estimated Energy Needs: 0805-9283 kcals/day (25 - 30 kcals/kg)   Justification: Maintenance   Estimated Protein Needs: 69-83 grams protein/day (1 - 1.2 grams of pro/kg)     MALNUTRITION  % Intake: < 75% for > 7 days (moderate)  % Weight Loss: 1-2% in 1 week (moderate)  Subcutaneous Fat Loss: None observed  Muscle Loss: None observed  Fluid Accumulation/Edema: None noted  Malnutrition Diagnosis: Moderate malnutrition in the context of acute " on chronic illness    Previous Goals   Patient to consume % of nutritionally adequate meal trays TID, or the equivalent with supplements/snacks.  Evaluation: Not met, but improving    Previous Nutrition Diagnosis  Inadequate oral intake related to alcohol use, nausea/vomiting as evidenced by patient with minimal PO intake x 1 week.   Evaluation: Improving, modified below    CURRENT NUTRITION DIAGNOSIS  Inadequate oral intake related to decreased appetite and lack of food options as evidenced by pt consuming 50-75% of documented meals per day for >/= 7 days and ongoing wt loss.    INTERVENTIONS  Implementation  Medical food supplement therapy - discussed continuing Ensure Clear 1x/day until discharge, which patient is agreeable to for optimal nutrition intake.    Goals  Patient to consume % of nutritionally adequate meal trays TID, or the equivalent with supplements/snacks.  2. Wt not to drop < 82 kg    Monitoring/Evaluation  Progress toward goals will be monitored and evaluated per protocol.    Ivone Oropeza  Dietetic Intern      RD has read and agrees with above.  Fadia Rey RD,LD  7D pager [phone]

## 2021-11-01 NOTE — PLAN OF CARE
Pt is alert and oriented x4, AVSS, afebrile on RA, denies pain, nausea, vomiting, abd pain an discomfort. On bed alarm. Mepelix dressing in place on left coccyx. urnial at bedside. Bed alarm is on activated and audible .Awaiting TCU placement .  Continue POC

## 2021-11-01 NOTE — PROGRESS NOTES
[facility]    Medicine Progress Note - Hospitalist Service, Gold 6       Date of Admission:  10/23/2021    Assessment & Plan           Efrain Schmid is a 71-year-old male with a history of alcohol use disorder, hypertension, anxiety, depression, who was admitted with acute pancreatitis.    Updates today:  - Discharging to TCU tomorrow afternoon     Acute pancreatitis, resolved  Presented with LUQ pain, nausea, vomiting, weakness, lipase 1,933. Most likely secondary to alcohol use. Afebrile. LFTs, mag wnl. Diet advanced to regular of which he is tolerating and no longer on IVF. Pain resolved.   - Therapies recommending TCU; awaiting placement     Alcohol use disorder  Drinking about 1 quart daily. Last drink 3 days PTA. No other substance use, Utox negative. No prior history of withdrawal seizures. Pt declined wanting Addiction consult as he states will follow up with his long time AA group. Last scored high enough on CIWA protocol to receive lorazepam 10/25 so discontinued 10/27.   - MVI, folic acid, and thiamine supplements     Hypertension  Not taking his medications for a few months. Hypertensive on admission, and continues to be intermittently after resuming PTA amlodipine, lisinopril, metoprolol. Amlodipine dose increased 10/26 to 5 mg daily.   - Continue amlodipine 5 mg daily, lisinopril 40 mg daily, and Toprol XL 50 mg daily with parameters to hold.  - Added prn oral hydralazine with parameters to give.       Other Chronic Medical Issues:  HLD. Resumed PTA atorvastatin.  Anxiety, depression. Also not taking his medications recently and desired to resume his meds. Continue PTA sertraline.   Hx of bullous pemphigoid. No longer on CellCept as it was cost prohibitive.        Diet: Snacks/Supplements Pediatric: Boost Breeze; Between Meals  Regular Diet Adult  Snacks/Supplements Adult: Ensure Clear; Between Meals    DVT Prophylaxis: Enoxaparin (Lovenox) SQ  Ruiz  Catheter: Not present  Central Lines: None  Code Status: Full Code      Disposition Plan   Expected discharge: 11/02/2021 recommended to transitional care unit once safe disposition plan/ TCU bed available.     The patient's care was discussed with the Attending Physician, Dr. Carter, Care Coordinator/ and Patient.    Ira Grullon PA-C  Hospitalist Service, [address]  Securely message with the Vocera Web Console (learn more here)  Text page via [facility] Paging/Directory    Please see sign in/sign out for up to date coverage information    Clinically Significant Risk Factors Present on Admission                ______________________________________________________________________    Interval History   No acute events overnight. He is hopeful to find a TCU and anxious to discharge. Denies any acute concerns today.    4 point ROS is otherwise negative.    Data reviewed today: I reviewed all medications, new labs and imaging results over the last 24 hours. I personally reviewed no images or EKG's today.    Physical Exam   Vital Signs: Temp: 98.7  F (37.1  C) Temp src: Oral BP: 131/74 Pulse: 78   Resp: 18 SpO2: 94 % O2 Device: None (Room air)    Weight: 181 lbs 8 oz  General Appearance: Awake. Alert and oriented x 3. No acute distress.  Respiratory: Normal work of breathing on room air. Lungs CTAB. No wheezes, rhonchi or rales.  Cardiovascular: RRR. S1, S2. No murmurs, rubs or gallops. Pedal pulses 2+ No lower extremity edema.  GI: Abdomen non-distended. Normoactive bowel sounds. Soft, non-tender throughout. No rebounding or guarding.  Skin: Warm, dry. No rashes on exposed skin. No jaundice.  Neuro: No focal deficits.     Data   Recent Labs   Lab 10/30/21  0604 10/27/21  0630 10/26/21  0611   WBC  --  5.5 4.4   HGB  --  13.3 11.8*   MCV  --  100 102*    305 291   NA  --   --  135   POTASSIUM  --   --  3.5   CHLORIDE  --   --  105   CO2  --   --   24   BUN  --   --  16   CR 0.77 0.69 0.67   ANIONGAP  --   --  6   EDDIE  --   --  7.6*   GLC  --   --  93   ALBUMIN  --   --  2.0*   PROTTOTAL  --   --  4.7*   BILITOTAL  --   --  0.6   ALKPHOS  --   --  84   ALT  --   --  38   AST  --   --  34

## 2021-11-01 NOTE — PLAN OF CARE
[id number]    A&O x4, forgetful at times. VSS, afebrile. Denies pain, N/V and SOB. Pt up to bathroom x2 with SBA. Bed alarm on for safety. No BM this shift. Adequate PO intake for dinner. Awaiting TCU placement. Continue with POC.

## 2021-11-02 ENCOUNTER — APPOINTMENT (OUTPATIENT)
Dept: PHYSICAL THERAPY | Facility: CLINIC | Age: 71
DRG: 439 | End: 2021-11-02
Payer: MEDICARE

## 2021-11-02 VITALS
SYSTOLIC BLOOD PRESSURE: 125 MMHG | HEART RATE: 77 BPM | WEIGHT: 182.2 LBS | RESPIRATION RATE: 18 BRPM | BODY MASS INDEX: 29.28 KG/M2 | HEIGHT: 66 IN | OXYGEN SATURATION: 98 % | DIASTOLIC BLOOD PRESSURE: 70 MMHG | TEMPERATURE: 98.8 F

## 2021-11-02 LAB
CREAT SERPL-MCNC: 0.88 MG/DL (ref 0.66–1.25)
GFR SERPL CREATININE-BSD FRML MDRD: 86 ML/MIN/1.73M2
PLATELET # BLD AUTO: 361 10E3/UL (ref 150–450)

## 2021-11-02 PROCEDURE — 97116 GAIT TRAINING THERAPY: CPT | Mod: GP | Performed by: REHABILITATION PRACTITIONER

## 2021-11-02 PROCEDURE — 85049 AUTOMATED PLATELET COUNT: CPT | Performed by: PHYSICIAN ASSISTANT

## 2021-11-02 PROCEDURE — 99207 PR APP CREDIT; MD BILLING SHARED VISIT: CPT | Performed by: PHYSICIAN ASSISTANT

## 2021-11-02 PROCEDURE — 99239 HOSP IP/OBS DSCHRG MGMT >30: CPT | Performed by: INTERNAL MEDICINE

## 2021-11-02 PROCEDURE — 82565 ASSAY OF CREATININE: CPT | Performed by: PHYSICIAN ASSISTANT

## 2021-11-02 PROCEDURE — 97530 THERAPEUTIC ACTIVITIES: CPT | Mod: GP | Performed by: REHABILITATION PRACTITIONER

## 2021-11-02 PROCEDURE — 250N000013 HC RX MED GY IP 250 OP 250 PS 637: Performed by: PHYSICIAN ASSISTANT

## 2021-11-02 PROCEDURE — 36415 COLL VENOUS BLD VENIPUNCTURE: CPT | Performed by: PHYSICIAN ASSISTANT

## 2021-11-02 PROCEDURE — 250N000013 HC RX MED GY IP 250 OP 250 PS 637: Performed by: PEDIATRICS

## 2021-11-02 PROCEDURE — 250N000011 HC RX IP 250 OP 636: Performed by: PHYSICIAN ASSISTANT

## 2021-11-02 RX ORDER — ATORVASTATIN CALCIUM 10 MG/1
10 TABLET, FILM COATED ORAL EVERY EVENING
DISCHARGE
Start: 2021-11-02 | End: 2022-05-05

## 2021-11-02 RX ORDER — SERTRALINE HYDROCHLORIDE 25 MG/1
25 TABLET, FILM COATED ORAL DAILY
DISCHARGE
Start: 2021-11-02 | End: 2022-01-17

## 2021-11-02 RX ORDER — LANOLIN ALCOHOL/MO/W.PET/CERES
100 CREAM (GRAM) TOPICAL DAILY
DISCHARGE
Start: 2021-11-02 | End: 2022-04-25

## 2021-11-02 RX ORDER — METOPROLOL SUCCINATE 50 MG/1
50 TABLET, EXTENDED RELEASE ORAL DAILY
DISCHARGE
Start: 2021-11-02 | End: 2022-03-08

## 2021-11-02 RX ORDER — MAGNESIUM OXIDE 400 MG/1
800 TABLET ORAL DAILY
DISCHARGE
Start: 2021-11-02 | End: 2022-04-25

## 2021-11-02 RX ORDER — FOLIC ACID 1 MG/1
1 TABLET ORAL DAILY
DISCHARGE
Start: 2021-11-02 | End: 2022-01-17

## 2021-11-02 RX ORDER — MULTIPLE VITAMINS W/ MINERALS TAB 9MG-400MCG
1 TAB ORAL DAILY
DISCHARGE
Start: 2021-11-02

## 2021-11-02 RX ORDER — LISINOPRIL 40 MG/1
40 TABLET ORAL DAILY
DISCHARGE
Start: 2021-11-02 | End: 2022-01-24

## 2021-11-02 RX ORDER — AMLODIPINE BESYLATE 5 MG/1
5 TABLET ORAL DAILY
DISCHARGE
Start: 2021-11-02 | End: 2022-01-17

## 2021-11-02 RX ADMIN — SENNOSIDES 8.6 MG: 8.6 TABLET ORAL at 09:06

## 2021-11-02 RX ADMIN — Medication 1 TABLET: at 09:06

## 2021-11-02 RX ADMIN — AMLODIPINE BESYLATE 5 MG: 5 TABLET ORAL at 09:06

## 2021-11-02 RX ADMIN — METOPROLOL SUCCINATE 50 MG: 25 TABLET, EXTENDED RELEASE ORAL at 09:06

## 2021-11-02 RX ADMIN — Medication 800 MG: at 09:06

## 2021-11-02 RX ADMIN — THIAMINE HCL TAB 100 MG 100 MG: 100 TAB at 09:06

## 2021-11-02 RX ADMIN — LISINOPRIL 40 MG: 40 TABLET ORAL at 09:06

## 2021-11-02 RX ADMIN — ENOXAPARIN SODIUM 40 MG: 40 INJECTION SUBCUTANEOUS at 11:28

## 2021-11-02 RX ADMIN — FOLIC ACID 1 MG: 1 TABLET ORAL at 09:06

## 2021-11-02 RX ADMIN — SERTRALINE HYDROCHLORIDE 25 MG: 25 TABLET ORAL at 09:06

## 2021-11-02 ASSESSMENT — ACTIVITIES OF DAILY LIVING (ADL)
ADLS_ACUITY_SCORE: 14

## 2021-11-02 ASSESSMENT — MIFFLIN-ST. JEOR: SCORE: 1519.2

## 2021-11-02 NOTE — PLAN OF CARE
Pt is alert and oriented x4, AVSS, afebrile on RA, denies pain, nausea, vomiting, abd pain an discomfort. On bed alarm. Mepelix dressing in place on left coccyx.  Plan to discharge this afternoon with son who will provide transportation.  pt would like to receive is booster vaccine against covid-19 prior to discharge. up with assist of 1. Bed alarm is on activated and audible .Continue POC

## 2021-11-02 NOTE — PLAN OF CARE
[id number]    A&O x4, forgetful at times. VSS, afebrile. Denies pain, N/V and SOB. Up with SBA to bathroom x2. Bed alarm on for safety. Ate all of his dinner. No BM. Mepilex on L coccyx CDI. Pt to discharge tomorrow to [facility] TCU via son at 1330. Continue with POC.

## 2021-11-02 NOTE — PROGRESS NOTES
Called [facility] & was put into their voicemail.  Asked for someone to call 7D for report prior to 1pm discharge.   yes

## 2021-11-02 NOTE — PLAN OF CARE
D/I:  Patient medically stable to discharge.  Son will transport patient to [facility].  PIV removed without incident.  Discharge packet given to son to give to [facility] staff.  All of patient's personal belongings taken with him upon discharge.      A:  Patient ready to discharge to TCU for further PT/OT.    P:  Patient discharged to [facility] via son's car.

## 2021-11-02 NOTE — PROGRESS NOTES
Care Management Discharge Note    Discharge Date: 11/02/2021     Discharge Disposition: Transitional Care    [facility]  [address] [place]  [address]  P: [phone]  F: [phone]    Discharge Services: None    Discharge DME: None    Discharge Transportation: family or friend will provide at 1300    Private pay costs discussed: Not applicable    PAS Confirmation Code: [id number]    Patient/family educated on Medicare website which has current facility and service quality ratings: yes    Education Provided on the Discharge Plan:  Yes    Persons Notified of Discharge Plans: Patient; patient's son, Yunior Miller Juve CAMACHO; charge nurse; bedside nurse; accepting facility     Patient/Family in Agreement with the Plan: yes    Handoff Referral Completed: Yes    YESY Horan  7D/5C Hematology/Oncology Social Worker  Phone: [phone]  Pager: [phone]  [email or web address]

## 2021-11-02 NOTE — PLAN OF CARE
Physical Therapy Discharge Summary    Reason for therapy discharge:    Discharged to transitional care facility.    Progress towards therapy goal(s). See goals on Care Plan in [facility] electronic health record for goal details.  Goals partially met.  Barriers to achieving goals:   discharge from facility.    Therapy recommendation(s):    Continued therapy is recommended.  Rationale/Recommendations:  Pt would benefit from additional skilled PT to address gait, balance and endurance.

## 2021-11-02 NOTE — DISCHARGE SUMMARY
[facility]  Hospitalist Discharge Summary      Date of Admission:  10/23/2021  Date of Discharge:  11/2/2021  1:33 PM  Discharging Provider: Ira Grullon PA-C  Discharge Team: Hospitalist Service, Gold 6    Discharge Diagnoses   Acute pancreatitis  Alcohol use disorder  Hypertension  Depression  Anxiety  Hyperlipidemia  Hx of bullous pemphigoid    Follow-ups Needed After Discharge   Follow-up Appointments     Follow Up and recommended labs and tests      Follow up with detention physician.  The following labs/tests are   recommended: CBC, CMP, Mg.  Follow up with primary care provider in 1-2 weeks.  The following   labs/tests are recommended: Consider repeat CBC, CMP, Mg.            Discharge Disposition   Discharged to rehabilitation facility  Condition at discharge: Good    Hospital Course   Efrain Schmid is a 71-year-old male with a history of alcohol use disorder, hypertension, anxiety and depression who was admitted to [facility] for acute pancreatitis.     Acute pancreatitis - resolved  He presented with LUQ abdominal pain, nausea, vomiting and weakness. Work-up revealing lipase 1,933 with normal LFTs. Suspected etiology related to alcohol use. He was treated with IVF and diet advanced prior to discharge with resolution in pain.    Alcohol use disorder  Drinking 1 quart daily with last drink 3 days prior to admission, started on CIWA protocol. He decline Addiction Consult with plans to follow up with his long time AA group.  - Continue MVI, folic acid, thiamine supplements  - Consider Chemical Dependency Treatment if concern for ongoing EtOH use after discharge    Hypertension  BP elevated on admission, had previously been on amlodipine, lisinopril and metoprolol, though had self-discontinued. BP improved with restarting medications.  - Continue amlodipine 5 mg daily, lisinopril 40 mg daily, metoprolol XL 50 mg daily at discharge  - Recommend BMP with PCP  for follow-up    Hypomagnesemia  - Continued magnesium oxide 800 mg daily  - Recommend repeat Mg within 1 week    HLD - Continue atorvastatin  Anxiety, Depression - Resumed sertraline 25 mg daily. Will need to titrate to effect as outpatient.  Hx of bullous pemphigoid - Not on Cellcept secondary to cost.    Consultations This Hospital Stay   CARE MANAGEMENT / SOCIAL WORK IP CONSULT  PHYSICAL THERAPY ADULT IP CONSULT  VASCULAR ACCESS CARE ADULT IP CONSULT  OCCUPATIONAL THERAPY ADULT IP CONSULT  VASCULAR ACCESS CARE ADULT IP CONSULT  VASCULAR ACCESS CARE ADULT IP CONSULT  PHYSICAL THERAPY ADULT IP CONSULT  PHYSICAL THERAPY ADULT IP CONSULT  OCCUPATIONAL THERAPY ADULT IP CONSULT    Code Status   Full Code    Time Spent on this Encounter   I, Ira Grullon PA-C, personally saw the patient today and spent greater than 30 minutes discharging this patient.       Ira Grullon PA-C  [facility] [address] [address]  Phone: [phone]  ______________________________________________________________________    Physical Exam   Vital Signs: Temp: 98.5  F (36.9  C) Temp src: Oral BP: 127/74 Pulse: 79   Resp: 18 SpO2: 98 % O2 Device: None (Room air)    Weight: 182 lbs 3.2 oz  General Appearance:  Awake. Alert and oriented x 3. No acute distress. Sitting on edge of bed.  Respiratory: Normal work of breathing on room air. Lungs CTAB. No wheezes, rhonchi or rales.  Cardiovascular: RRR. S1, S2. No murmurs, rubs or gallops. Pedal pulses 2+ No lower extremity edema.  GI: Abdomen non-distended. Normoactive bowel sounds. Soft, non-tender throughout. No rebounding or guarding.  Skin: Warm, dry. No rashes on exposed skin. No jaundice.  Neuro: No focal deficits.        Primary Care Physician   ANAY NAVARRO    Discharge Orders      Care Coordination Referral      General info for SNF    Length of Stay Estimate: Short Term Care: Estimated # of Days <30  Condition at Discharge:  Improving  Level of care:skilled   Rehabilitation Potential: Excellent  Admission H&P remains valid and up-to-date: Yes  Recent Chemotherapy: N/A  Use Nursing Home Standing Orders: Yes     Mantoux instructions    Give two-step Mantoux (PPD) Per Facility Policy Yes     Follow Up and recommended labs and tests    Follow up with halfway physician.  The following labs/tests are recommended: CBC, CMP, Mg.  Follow up with primary care provider in 1-2 weeks.  The following labs/tests are recommended: Consider repeat CBC, CMP, Mg.     Reason for your hospital stay    You were admitted to the hospital for acute pancreatitis due to alcohol use. This improved with IV fluids. You should abstain from further alcohol use as this is could re-occur in the future.     Intake and output    Every shift     Daily weights    Call Provider for weight gain of more than 2 pounds per day or 5 pounds per week.     Activity - Up with nursing assistance     Physical Therapy Adult Consult    Evaluate and treat as clinically indicated.    Reason:  Deconditioning from hospitalization     Occupational Therapy Adult Consult    Evaluate and treat as clinically indicated.    Reason:  Deconditioning from hospitalization     Fall precautions     Diet    Follow this diet upon discharge: Orders Placed This Encounter      Snacks/Supplements Pediatric: Boost Breeze; Between Meals      Snacks/Supplements Adult: Ensure Clear; Between Meals      Regular Diet Adult       Significant Results and Procedures   Most Recent 3 CBC's:Recent Labs   Lab Test 11/02/21  0549 10/30/21  0604 10/27/21  0630 10/26/21  0611 10/26/21  0611 10/25/21  0529 10/25/21  0529   WBC  --   --  5.5  --  4.4  --  4.9   HGB  --   --  13.3  --  11.8*  --  12.4*   MCV  --   --  100  --  102*  --  102*    340 305   < > 291   < > 270    < > = values in this interval not displayed.     Most Recent 3 BMP's:Recent Labs   Lab Test 11/02/21  0549 10/30/21  0604 10/27/21  0630  10/26/21  0611 10/26/21  0611 10/25/21  0529 10/25/21  0529 10/24/21  2015 10/24/21  1332 10/24/21  0649 10/24/21  0649   NA  --   --   --   --  135  --  138  --   --   --  136   POTASSIUM  --   --   --   --  3.5  --  3.9 3.6   < >  --  2.7*   CHLORIDE  --   --   --   --  105  --  107  --   --   --  101   CO2  --   --   --   --  24  --  27  --   --   --  28   BUN  --   --   --   --  16  --  24  --   --   --  33*   CR 0.88 0.77 0.69   < > 0.67   < > 0.72  --   --    < > 0.85   ANIONGAP  --   --   --   --  6  --  4  --   --   --  7   EDDIE  --   --   --   --  7.6*  --  7.8*  --   --   --  8.3*   GLC  --   --   --   --  93  --  101*  --   --   --  111*    < > = values in this interval not displayed.     Most Recent 2 LFT's:Recent Labs   Lab Test 10/26/21  0611 10/25/21  0529   AST 34 29   ALT 38 34   ALKPHOS 84 75   BILITOTAL 0.6 0.7   , No results found for this or any previous visit.    Discharge Medications   Current Discharge Medication List      START taking these medications    Details   folic acid (FOLVITE) 1 MG tablet Take 1 tablet (1 mg) by mouth daily    Associated Diagnoses: Alcoholism (H)      magnesium oxide (MAG-OX) 400 MG tablet Take 2 tablets (800 mg) by mouth daily    Associated Diagnoses: Hypomagnesemia      multivitamin w/minerals (THERA-VIT-M) tablet Take 1 tablet by mouth daily    Associated Diagnoses: Alcoholism (H)      thiamine (B-1) 100 MG tablet Take 1 tablet (100 mg) by mouth daily    Associated Diagnoses: Alcoholism (H)         CONTINUE these medications which have CHANGED    Details   amLODIPine (NORVASC) 5 MG tablet Take 1 tablet (5 mg) by mouth daily    Associated Diagnoses: Essential hypertension      atorvastatin (LIPITOR) 10 MG tablet Take 1 tablet (10 mg) by mouth every evening    Associated Diagnoses: Hypercholesteremia      lisinopril (ZESTRIL) 40 MG tablet Take 1 tablet (40 mg) by mouth daily    Associated Diagnoses: Essential hypertension      metoprolol succinate ER (TOPROL-XL) 50  MG 24 hr tablet Take 1 tablet (50 mg) by mouth daily    Associated Diagnoses: Essential hypertension      sertraline (ZOLOFT) 25 MG tablet Take 1 tablet (25 mg) by mouth daily    Associated Diagnoses: Major depressive disorder, remission status unspecified, unspecified whether recurrent         CONTINUE these medications which have NOT CHANGED    Details   betamethasone dipropionate (DIPROLENE) 0.05 % cream [BETAMETHASONE DIPROPIONATE (DIPROLENE) 0.05 % CREAM] STEPHEN EXT AA BID  Refills: 11      cetirizine (ZYRTEC) 10 MG tablet [CETIRIZINE (ZYRTEC) 10 MG TABLET] Take 10 mg by mouth as needed.       cholecalciferol, vitamin D3, (VITAMIN D3) 2,000 unit Tab [CHOLECALCIFEROL, VITAMIN D3, (VITAMIN D3) 2,000 UNIT TAB] Take 1 tablet by mouth daily.      fluocinolone acetonide oil (DERMOTIC) 0.01 % Drop [FLUOCINOLONE ACETONIDE OIL (DERMOTIC) 0.01 % DROP] Administer 3 drops into both ears 2 (two) times a day. Used as needed  Qty: 20 mL, Refills: 1    Associated Diagnoses: Itching of ear      sildenafil (VIAGRA) 100 MG tablet [SILDENAFIL (VIAGRA) 100 MG TABLET] Take 1 tablet (100 mg total) by mouth daily as needed for erectile dysfunction.  Qty: 6 tablet, Refills: 11    Associated Diagnoses: ED (erectile dysfunction)      triamcinolone (KENALOG) 0.1 % cream [TRIAMCINOLONE (KENALOG) 0.1 % CREAM] STEPHEN TO BODY RASH D PRN  Refills: 6           Allergies   Allergies   Allergen Reactions     Septra [Sulfamethoxazole W/Trimethoprim] Rash     Sulfamethoxazole-Trimethoprim Rash

## 2021-11-03 ENCOUNTER — LAB REQUISITION (OUTPATIENT)
Dept: LAB | Facility: CLINIC | Age: 71
End: 2021-11-03
Payer: COMMERCIAL

## 2021-11-03 ENCOUNTER — TRANSITIONAL CARE UNIT VISIT (OUTPATIENT)
Dept: GERIATRICS | Facility: CLINIC | Age: 71
End: 2021-11-03
Payer: MEDICARE

## 2021-11-03 VITALS
SYSTOLIC BLOOD PRESSURE: 155 MMHG | TEMPERATURE: 98.1 F | HEIGHT: 66 IN | HEART RATE: 68 BPM | WEIGHT: 182 LBS | BODY MASS INDEX: 29.25 KG/M2 | RESPIRATION RATE: 18 BRPM | DIASTOLIC BLOOD PRESSURE: 85 MMHG | OXYGEN SATURATION: 98 %

## 2021-11-03 DIAGNOSIS — K85.20 ALCOHOL-INDUCED ACUTE PANCREATITIS, UNSPECIFIED COMPLICATION STATUS: Primary | ICD-10-CM

## 2021-11-03 DIAGNOSIS — I10 ESSENTIAL HYPERTENSION: ICD-10-CM

## 2021-11-03 DIAGNOSIS — F41.1 GENERALIZED ANXIETY DISORDER: ICD-10-CM

## 2021-11-03 DIAGNOSIS — F32.A DEPRESSION, UNSPECIFIED DEPRESSION TYPE: ICD-10-CM

## 2021-11-03 DIAGNOSIS — I48.91 UNSPECIFIED ATRIAL FIBRILLATION (H): ICD-10-CM

## 2021-11-03 PROCEDURE — 99305 1ST NF CARE MODERATE MDM 35: CPT | Performed by: FAMILY MEDICINE

## 2021-11-03 ASSESSMENT — MIFFLIN-ST. JEOR: SCORE: 1518.3

## 2021-11-03 NOTE — LETTER
11/3/2021        RE: Efrain Gomes  [address]  [place] [address]        [facility] GERIATRIC SERVICES    Facility:  [facility] () [[id number]]  Code Status: FULL CODE      CHIEF COMPLAINT/REASON FOR VISIT:  Chief Complaint   Patient presents with     Hospital F/U       HPI:   Efrain is a 71 year old male who was recently admitted to the hospital on 10/23/2021.  He does have a history of alcohol use disorder and he does have depression with anxiety also.  He was admitted for acute pancreatitis.  Lipase was 1933 with normal LFTs suspected alcohol use.    He was admitted to the hospital diet was advanced prior to discharge with resolution of his pain.  He also was treated with IVF fluids.  He does drink about 1 quart with alcohol with last 3 drinks prior to admission.  He was seen by addiction consultation and he does have a longtime alcohol Anonymous group that he will follow up with.  He is on folic acid thiamine and multivitamins and he did have elevated blood pressure on admission.  Previously been amlodipine lisinopril metoprolol blood pressure improved when he restarted these medications.  He did have low magnesium levels was placed on mag oxide 800 mg daily.    Patient is lying in bed comfortable does not appear to be in acute distress he is says he has no issues today and we did discuss liver about the alcohol.  He says he can quit at this time and he did relapse and does not feel with this care he will drink again.  He would like to continue with alcoholics anonymous.  Otherwise he has no new concerns.    Past Medical History:  History reviewed. No pertinent past medical history.        Surgical History:  History reviewed. No pertinent surgical history.    Family History:   History reviewed. No pertinent family history.    Social History:    Social History     Socioeconomic History     Marital status:      Spouse name: Not on file     Number of children: Not on file     Years  of education: Not on file     Highest education level: Not on file   Occupational History     Not on file   Tobacco Use     Smoking status: Never Smoker     Smokeless tobacco: Never Used   Substance and Sexual Activity     Alcohol use: Yes     Drug use: Not Currently     Sexual activity: Not on file   Other Topics Concern     Not on file   Social History Narrative     Not on file     Social Determinants of Health     Financial Resource Strain:      Difficulty of Paying Living Expenses:    Food Insecurity:      Worried About Running Out of Food in the Last Year:      Ran Out of Food in the Last Year:    Transportation Needs:      Lack of Transportation (Medical):      Lack of Transportation (Non-Medical):    Physical Activity:      Days of Exercise per Week:      Minutes of Exercise per Session:    Stress:      Feeling of Stress :    Social Connections:      Frequency of Communication with Friends and Family:      Frequency of Social Gatherings with Friends and Family:      Attends Rastafarian Services:      Active Member of Clubs or Organizations:      Attends Club or Organization Meetings:      Marital Status:    Intimate Partner Violence:      Fear of Current or Ex-Partner:      Emotionally Abused:      Physically Abused:      Sexually Abused:        REVIEW OF SYSTEM: Patient denies any pain fevers chills nausea vomit diarrhea change in vision hearing taste or smell weakness one-sided other chest pain shortness of breath.  Denies any current shortness stool polyphagia polydipsia polyuria depression or anxiety in the remainder review of systems is negative.      PHYSICAL EXAM: Patient is alert pleasant does not appear to be in acute distress head is normocephalic and atraumatic sclera conjunctiva is clear there is no scleral icterus.  Oromucosa was moist nose no discharge.  Heart sounds are regular without any rubs murmurs or gallops lungs were clear to auscultation abdomen was soft nontender.  Bowel sounds are  positive in all 4 quadrants.  Extremities showed no cyanosis or edema neurologic Ian was nonfocal and affect was pleasant.        LABS: Labs are as follows; white count was all within normal limits hemoglobin on 1027 was 13.3.  On 10/26/2021 sodium was 135, potassium 3.5, chloride 105, CO2 is 24, BUN 16, creatinine 0.67, anion gap was 6, calcium was 7.6, glucose was 93.  On 1026 bilirubin was 0.6, alk phosphatase was 84, ALT was 38, AST was 34.  The platelets were 361,000, hemoglobin is 13.3.    Vital; blood pressure 110/72    Pulse of 73    Temperature is 97.9    Respirations 14    O2 sats 95%.      ASSESSMENT:    Encounter Diagnoses   Name Primary?     Alcohol-induced acute pancreatitis, unspecified complication status Yes     Essential hypertension      Depression, unspecified depression type      Generalized anxiety disorder         PLAN: This time get a PHQ-9.  My concern is is he drinking for his depression is not fairly treated at this time.     see him because of his alcoholism and we will check a amylase and lipase tomorrow second pancreatitis magnesium tomorrow secondary low magnesium.    We will continue with physical and occupational therapy at this time and will continue to monitor above medical problems and no other changes to care plan at this time.        Electronically signed by: ZOHAIB MOREL DO        Sincerely,        ZOHAIB MOREL DO

## 2021-11-03 NOTE — PLAN OF CARE
Occupational Therapy Discharge Summary    Reason for therapy discharge:    Discharged to transitional care facility.    Progress towards therapy goal(s). See goals on Care Plan in [facility] electronic health record for goal details.  Goals partially met.  Barriers to achieving goals:   discharge from facility.    Therapy recommendation(s):    Continued therapy is recommended.  Rationale/Recommendations:  Pt will benefit from continued skilled OT services at TCU to progress ADL/IADL I.

## 2021-11-03 NOTE — PROGRESS NOTES
[facility] GERIATRIC SERVICES    Facility:  [facility] ([facility]) [[id number]]  Code Status: FULL CODE      CHIEF COMPLAINT/REASON FOR VISIT:  Chief Complaint   Patient presents with     Hospital F/U       HPI:   Efrain is a 71 year old male who was recently admitted to the hospital on 10/23/2021.  He does have a history of alcohol use disorder and he does have depression with anxiety also.  He was admitted for acute pancreatitis.  Lipase was 1933 with normal LFTs suspected alcohol use.    He was admitted to the hospital diet was advanced prior to discharge with resolution of his pain.  He also was treated with IVF fluids.  He does drink about 1 quart with alcohol with last 3 drinks prior to admission.  He was seen by addiction consultation and he does have a longtime alcohol Anonymous group that he will follow up with.  He is on folic acid thiamine and multivitamins and he did have elevated blood pressure on admission.  Previously been amlodipine lisinopril metoprolol blood pressure improved when he restarted these medications.  He did have low magnesium levels was placed on mag oxide 800 mg daily.    Patient is lying in bed comfortable does not appear to be in acute distress he is says he has no issues today and we did discuss liver about the alcohol.  He says he can quit at this time and he did relapse and does not feel with this care he will drink again.  He would like to continue with alcoholics anonymous.  Otherwise he has no new concerns.    Past Medical History:  History reviewed. No pertinent past medical history.        Surgical History:  History reviewed. No pertinent surgical history.    Family History:   History reviewed. No pertinent family history.    Social History:    Social History     Socioeconomic History     Marital status:      Spouse name: Not on file     Number of children: Not on file     Years of education: Not on file     Highest education level: Not on file   Occupational History      Not on file   Tobacco Use     Smoking status: Never Smoker     Smokeless tobacco: Never Used   Substance and Sexual Activity     Alcohol use: Yes     Drug use: Not Currently     Sexual activity: Not on file   Other Topics Concern     Not on file   Social History Narrative     Not on file     Social Determinants of Health     Financial Resource Strain:      Difficulty of Paying Living Expenses:    Food Insecurity:      Worried About Running Out of Food in the Last Year:      Ran Out of Food in the Last Year:    Transportation Needs:      Lack of Transportation (Medical):      Lack of Transportation (Non-Medical):    Physical Activity:      Days of Exercise per Week:      Minutes of Exercise per Session:    Stress:      Feeling of Stress :    Social Connections:      Frequency of Communication with Friends and Family:      Frequency of Social Gatherings with Friends and Family:      Attends Gnosticist Services:      Active Member of Clubs or Organizations:      Attends Club or Organization Meetings:      Marital Status:    Intimate Partner Violence:      Fear of Current or Ex-Partner:      Emotionally Abused:      Physically Abused:      Sexually Abused:        REVIEW OF SYSTEM: Patient denies any pain fevers chills nausea vomit diarrhea change in vision hearing taste or smell weakness one-sided other chest pain shortness of breath.  Denies any current shortness stool polyphagia polydipsia polyuria depression or anxiety in the remainder review of systems is negative.      PHYSICAL EXAM: Patient is alert pleasant does not appear to be in acute distress head is normocephalic and atraumatic sclera conjunctiva is clear there is no scleral icterus.  Oromucosa was moist nose no discharge.  Heart sounds are regular without any rubs murmurs or gallops lungs were clear to auscultation abdomen was soft nontender.  Bowel sounds are positive in all 4 quadrants.  Extremities showed no cyanosis or edema neurologic Ian was  nonfocal and affect was pleasant.        LABS: Labs are as follows; white count was all within normal limits hemoglobin on 1027 was 13.3.  On 10/26/2021 sodium was 135, potassium 3.5, chloride 105, CO2 is 24, BUN 16, creatinine 0.67, anion gap was 6, calcium was 7.6, glucose was 93.  On 1026 bilirubin was 0.6, alk phosphatase was 84, ALT was 38, AST was 34.  The platelets were 361,000, hemoglobin is 13.3.    Vital; blood pressure 110/72    Pulse of 73    Temperature is 97.9    Respirations 14    O2 sats 95%.      ASSESSMENT:    Encounter Diagnoses   Name Primary?     Alcohol-induced acute pancreatitis, unspecified complication status Yes     Essential hypertension      Depression, unspecified depression type      Generalized anxiety disorder         PLAN: This time get a PHQ-9.  My concern is is he drinking for his depression is not fairly treated at this time.     see him because of his alcoholism and we will check a amylase and lipase tomorrow second pancreatitis magnesium tomorrow secondary low magnesium.    We will continue with physical and occupational therapy at this time and will continue to monitor above medical problems and no other changes to care plan at this time.        Electronically signed by: ZOHAIB MOREL,

## 2021-11-04 ENCOUNTER — LAB REQUISITION (OUTPATIENT)
Dept: LAB | Facility: CLINIC | Age: 71
End: 2021-11-04
Payer: COMMERCIAL

## 2021-11-04 DIAGNOSIS — K76.0 FATTY (CHANGE OF) LIVER, NOT ELSEWHERE CLASSIFIED: ICD-10-CM

## 2021-11-04 LAB
AMYLASE SERPL-CCNC: 155 U/L (ref 5–120)
LIPASE SERPL-CCNC: 209 U/L (ref 0–52)
MAGNESIUM SERPL-MCNC: 2.1 MG/DL (ref 1.8–2.6)

## 2021-11-04 PROCEDURE — 83735 ASSAY OF MAGNESIUM: CPT | Performed by: HOSPITALIST

## 2021-11-04 PROCEDURE — 36415 COLL VENOUS BLD VENIPUNCTURE: CPT | Performed by: HOSPITALIST

## 2021-11-04 PROCEDURE — P9604 ONE-WAY ALLOW PRORATED TRIP: HCPCS | Performed by: HOSPITALIST

## 2021-11-04 PROCEDURE — 83690 ASSAY OF LIPASE: CPT | Performed by: HOSPITALIST

## 2021-11-04 PROCEDURE — 82150 ASSAY OF AMYLASE: CPT | Performed by: HOSPITALIST

## 2021-11-04 NOTE — PROGRESS NOTES
[facility] GERIATRIC SERVICES    Facility:  [facility] ([facility]) [[id number]]  Code Status: FULL CODE      CHIEF COMPLAINT/REASON FOR VISIT:  Chief Complaint   Patient presents with     RECHECK       HISTORY:      HPI: Efrain is a 71 year old male who resides here at the [facility] after hospitalization with 10/23/2021.  He does not alcohol use disorder and does have depression with anxiety.  He was admitted for acute pancreatitis lipase 1933.  And this is improved.  Normal LFTs at this time.  He was admitted and he did improve in the hospital.  He was treated with IV fluids about 1 quart of alcohol with last 3 drinks prior to admission.  He was seen by addiction counseling in the hospital and he does have a long time while coming at alcoholics anonymous.  He has been in recovery.  He was placed on folic acid thiamine and multivitamins and he did have elevated blood pressure on admission and this has improved his last blood pressure was pretty much normotensive.    Patient is doing okay at this time he has no pain but he does have a rash in his abdomen.  He does have a history of bullous pemphigoid however there is no blister seen.  He has no other issues at this time.    History reviewed. No pertinent past medical history.          History reviewed. No pertinent family history.  Social History     Socioeconomic History     Marital status:      Spouse name: Not on file     Number of children: Not on file     Years of education: Not on file     Highest education level: Not on file   Occupational History     Not on file   Tobacco Use     Smoking status: Never Smoker     Smokeless tobacco: Never Used   Substance and Sexual Activity     Alcohol use: Yes     Drug use: Not Currently     Sexual activity: Not on file   Other Topics Concern     Not on file   Social History Narrative     Not on file     Social Determinants of Health     Financial Resource Strain:      Difficulty of Paying Living  Expenses:    Food Insecurity:      Worried About Running Out of Food in the Last Year:      Ran Out of Food in the Last Year:    Transportation Needs:      Lack of Transportation (Medical):      Lack of Transportation (Non-Medical):    Physical Activity:      Days of Exercise per Week:      Minutes of Exercise per Session:    Stress:      Feeling of Stress :    Social Connections:      Frequency of Communication with Friends and Family:      Frequency of Social Gatherings with Friends and Family:      Attends Gnosticist Services:      Active Member of Clubs or Organizations:      Attends Club or Organization Meetings:      Marital Status:    Intimate Partner Violence:      Fear of Current or Ex-Partner:      Emotionally Abused:      Physically Abused:      Sexually Abused:          REVIEW OF SYSTEM: Patient denies any pain fevers chills nausea vomit diarrhea change in vision hearing taste or smell weakness one-sided of the chest pain shortness of breath.  Denies any current shortness stool polyphagia polydipsia polyuria depression or anxiety remainder the review of systems is negative.      PHYSICAL EXAM: Patient is alert pleasant not appear to be in acute distress head is normocephalic and atraumatic sclera conjunctiva is clear oromucosa is moist nose no discharge.  Heart sounds are regular lungs are clear abdomen soft nontender.  Bowel sounds are positive in all four quadrants.  Extremities are trace edema bilateral.  Neurologic seems nonfocal and affect was pleasant.    Skin exam there is a rash in the folds of the consistent with dermatitis.  It did not look fungal at this time.  Also on the legs and hands.  He does use triamcinolone cream at home so I will prescribe for him hydrocortisone cream 2.5%.  He did agree with this.        LABS: On 11/4/2020 when lipase was 209 which was improved and amylase 155.  We will continue to monitor for his pancreatitis.  Magnesium was normal at 2.1.    Vitals; blood pressure  118/65    Pulse is 80    Temperature is 98.2    Respiration 17    O2 sats 97%.      ASSESSMENT:   Encounter Diagnoses   Name Primary?     Alcohol-induced acute pancreatitis, unspecified complication status Yes     Depression, unspecified depression type      Essential hypertension      Generalized anxiety disorder         PLAN: Plan at this time we will continue to monitor above medical problems and no new changes to care plan at this time with one exception hydrocortisone cream 2.5% apply twice daily to rash areas.  This will be done for 7 days and then as needed.    I will continue to monitor above medical problems and no other changes to care plan at this time.        Electronically signed by: ZOHAIB MOREL DO

## 2021-11-05 ENCOUNTER — TRANSITIONAL CARE UNIT VISIT (OUTPATIENT)
Dept: GERIATRICS | Facility: CLINIC | Age: 71
End: 2021-11-05
Payer: MEDICARE

## 2021-11-05 VITALS
RESPIRATION RATE: 18 BRPM | HEIGHT: 66 IN | OXYGEN SATURATION: 96 % | TEMPERATURE: 98.5 F | BODY MASS INDEX: 29.25 KG/M2 | HEART RATE: 80 BPM | WEIGHT: 182 LBS | DIASTOLIC BLOOD PRESSURE: 65 MMHG | SYSTOLIC BLOOD PRESSURE: 118 MMHG

## 2021-11-05 DIAGNOSIS — K85.20 ALCOHOL-INDUCED ACUTE PANCREATITIS, UNSPECIFIED COMPLICATION STATUS: Primary | ICD-10-CM

## 2021-11-05 DIAGNOSIS — F32.A DEPRESSION, UNSPECIFIED DEPRESSION TYPE: ICD-10-CM

## 2021-11-05 DIAGNOSIS — I10 ESSENTIAL HYPERTENSION: ICD-10-CM

## 2021-11-05 DIAGNOSIS — F41.1 GENERALIZED ANXIETY DISORDER: ICD-10-CM

## 2021-11-05 LAB
AMYLASE SERPL-CCNC: 150 U/L (ref 5–120)
LIPASE SERPL-CCNC: 194 U/L (ref 0–52)
MAGNESIUM SERPL-MCNC: 2.2 MG/DL (ref 1.8–2.6)

## 2021-11-05 PROCEDURE — P9604 ONE-WAY ALLOW PRORATED TRIP: HCPCS | Performed by: HOSPITALIST

## 2021-11-05 PROCEDURE — 36415 COLL VENOUS BLD VENIPUNCTURE: CPT | Performed by: HOSPITALIST

## 2021-11-05 PROCEDURE — 82150 ASSAY OF AMYLASE: CPT | Performed by: HOSPITALIST

## 2021-11-05 PROCEDURE — 99309 SBSQ NF CARE MODERATE MDM 30: CPT | Performed by: FAMILY MEDICINE

## 2021-11-05 PROCEDURE — 83690 ASSAY OF LIPASE: CPT | Performed by: HOSPITALIST

## 2021-11-05 PROCEDURE — 83735 ASSAY OF MAGNESIUM: CPT | Performed by: HOSPITALIST

## 2021-11-05 ASSESSMENT — MIFFLIN-ST. JEOR: SCORE: 1518.3

## 2021-11-05 NOTE — LETTER
11/5/2021        RE: Efrain Gomes  [address]  [place] [address]        [facility]    Facility:  [facility] ([facility]) [[id number]]  Code Status: FULL CODE      CHIEF COMPLAINT/REASON FOR VISIT:  Chief Complaint   Patient presents with     RECHECK       HISTORY:      HPI: Efrain is a 71 year old male who resides here at the [facility] after hospitalization with 10/23/2021.  He does not alcohol use disorder and does have depression with anxiety.  He was admitted for acute pancreatitis lipase 1933.  And this is improved.  Normal LFTs at this time.  He was admitted and he did improve in the hospital.  He was treated with IV fluids about 1 quart of alcohol with last 3 drinks prior to admission.  He was seen by addiction counseling in the hospital and he does have a long time while coming at alcoholics [facility].  He has been in recovery.  He was placed on folic acid thiamine and multivitamins and he did have elevated blood pressure on admission and this has improved his last blood pressure was pretty much normotensive.    Patient is doing okay at this time he has no pain but he does have a rash in his abdomen.  He does have a history of bullous pemphigoid however there is no blister seen.  He has no other issues at this time.    History reviewed. No pertinent past medical history.          History reviewed. No pertinent family history.  Social History     Socioeconomic History     Marital status:      Spouse name: Not on file     Number of children: Not on file     Years of education: Not on file     Highest education level: Not on file   Occupational History     Not on file   Tobacco Use     Smoking status: Never Smoker     Smokeless tobacco: Never Used   Substance and Sexual Activity     Alcohol use: Yes     Drug use: Not Currently     Sexual activity: Not on file   Other Topics Concern     Not on file   Social History Narrative     Not on file      Social Determinants of Health     Financial Resource Strain:      Difficulty of Paying Living Expenses:    Food Insecurity:      Worried About Running Out of Food in the Last Year:      Ran Out of Food in the Last Year:    Transportation Needs:      Lack of Transportation (Medical):      Lack of Transportation (Non-Medical):    Physical Activity:      Days of Exercise per Week:      Minutes of Exercise per Session:    Stress:      Feeling of Stress :    Social Connections:      Frequency of Communication with Friends and Family:      Frequency of Social Gatherings with Friends and Family:      Attends Shinto Services:      Active Member of Clubs or Organizations:      Attends Club or Organization Meetings:      Marital Status:    Intimate Partner Violence:      Fear of Current or Ex-Partner:      Emotionally Abused:      Physically Abused:      Sexually Abused:          REVIEW OF SYSTEM: Patient denies any pain fevers chills nausea vomit diarrhea change in vision hearing taste or smell weakness one-sided of the chest pain shortness of breath.  Denies any current shortness stool polyphagia polydipsia polyuria depression or anxiety remainder the review of systems is negative.      PHYSICAL EXAM: Patient is alert pleasant not appear to be in acute distress head is normocephalic and atraumatic sclera conjunctiva is clear oromucosa is moist nose no discharge.  Heart sounds are regular lungs are clear abdomen soft nontender.  Bowel sounds are positive in all four quadrants.  Extremities are trace edema bilateral.  Neurologic seems nonfocal and affect was pleasant.    Skin exam there is a rash in the folds of the consistent with dermatitis.  It did not look fungal at this time.  Also on the legs and hands.  He does use triamcinolone cream at home so I will prescribe for him hydrocortisone cream 2.5%.  He did agree with this.        LABS: On 11/4/2020 when lipase was 209 which was improved and amylase 155.  We will  continue to monitor for his pancreatitis.  Magnesium was normal at 2.1.    Vitals; blood pressure 118/65    Pulse is 80    Temperature is 98.2    Respiration 17    O2 sats 97%.      ASSESSMENT:   Encounter Diagnoses   Name Primary?     Alcohol-induced acute pancreatitis, unspecified complication status Yes     Depression, unspecified depression type      Essential hypertension      Generalized anxiety disorder         PLAN: Plan at this time we will continue to monitor above medical problems and no new changes to care plan at this time with one exception hydrocortisone cream 2.5% apply twice daily to rash areas.  This will be done for 7 days and then as needed.    I will continue to monitor above medical problems and no other changes to care plan at this time.        Electronically signed by: ZOHAIB MOREL DO        Sincerely,        ZOHAIB MOREL DO

## 2021-11-06 ENCOUNTER — LAB REQUISITION (OUTPATIENT)
Dept: LAB | Facility: CLINIC | Age: 71
End: 2021-11-06
Payer: COMMERCIAL

## 2021-11-06 DIAGNOSIS — K76.0 FATTY (CHANGE OF) LIVER, NOT ELSEWHERE CLASSIFIED: ICD-10-CM

## 2021-11-07 LAB
AMYLASE SERPL-CCNC: 185 U/L (ref 5–120)
LIPASE SERPL-CCNC: 233 U/L (ref 0–52)
MAGNESIUM SERPL-MCNC: 2.1 MG/DL (ref 1.8–2.6)

## 2021-11-07 PROCEDURE — 82150 ASSAY OF AMYLASE: CPT | Performed by: FAMILY MEDICINE

## 2021-11-07 PROCEDURE — 36415 COLL VENOUS BLD VENIPUNCTURE: CPT | Performed by: FAMILY MEDICINE

## 2021-11-07 PROCEDURE — 83735 ASSAY OF MAGNESIUM: CPT | Performed by: FAMILY MEDICINE

## 2021-11-07 PROCEDURE — 83690 ASSAY OF LIPASE: CPT | Performed by: FAMILY MEDICINE

## 2021-11-07 PROCEDURE — P9603 ONE-WAY ALLOW PRORATED MILES: HCPCS | Performed by: FAMILY MEDICINE

## 2021-11-08 ENCOUNTER — TRANSITIONAL CARE UNIT VISIT (OUTPATIENT)
Dept: GERIATRICS | Facility: CLINIC | Age: 71
End: 2021-11-08
Payer: MEDICARE

## 2021-11-08 ENCOUNTER — LAB REQUISITION (OUTPATIENT)
Dept: LAB | Facility: CLINIC | Age: 71
End: 2021-11-08
Payer: COMMERCIAL

## 2021-11-08 VITALS
TEMPERATURE: 98.6 F | RESPIRATION RATE: 16 BRPM | OXYGEN SATURATION: 95 % | DIASTOLIC BLOOD PRESSURE: 70 MMHG | HEART RATE: 90 BPM | SYSTOLIC BLOOD PRESSURE: 125 MMHG | HEIGHT: 66 IN | WEIGHT: 183 LBS | BODY MASS INDEX: 29.41 KG/M2

## 2021-11-08 DIAGNOSIS — F41.1 GENERALIZED ANXIETY DISORDER: ICD-10-CM

## 2021-11-08 DIAGNOSIS — K85.20 ALCOHOL-INDUCED ACUTE PANCREATITIS, UNSPECIFIED COMPLICATION STATUS: Primary | ICD-10-CM

## 2021-11-08 DIAGNOSIS — F32.A DEPRESSION, UNSPECIFIED DEPRESSION TYPE: ICD-10-CM

## 2021-11-08 DIAGNOSIS — I10 ESSENTIAL HYPERTENSION: ICD-10-CM

## 2021-11-08 DIAGNOSIS — K85.90 ACUTE PANCREATITIS WITHOUT NECROSIS OR INFECTION, UNSPECIFIED: ICD-10-CM

## 2021-11-08 PROCEDURE — 99309 SBSQ NF CARE MODERATE MDM 30: CPT | Performed by: FAMILY MEDICINE

## 2021-11-08 RX ORDER — HYDROCORTISONE 2.5 %
CREAM (GRAM) TOPICAL 2 TIMES DAILY
COMMUNITY
End: 2022-01-17

## 2021-11-08 ASSESSMENT — MIFFLIN-ST. JEOR: SCORE: 1522.83

## 2021-11-08 NOTE — LETTER
11/8/2021        RE: Efrain Gomes  [address]  [place] [address]        [facility]    Facility:  [facility] ([facility]) [[id number]]  Code Status: FULL CODE      CHIEF COMPLAINT/REASON FOR VISIT:  Chief Complaint   Patient presents with     RECHECK       HISTORY:      HPI: Efrain is a 71 year old male who resides here at [facility] after hospitalization for pancreatitis lipase of 1933 this is improved however they still remain elevated.  Lipase is 233 it did go down to 194.  Amylase was 155 when he first came here and now is 185.  He has had no symptoms of abdominal pain at this time.  In the hospital his liver function tests were normal.    Patient has had alcohol issues in the past and likely his elevated pancreatic enzymes are from alcohol.  We will continue to monitor him at this time.    Patient denies any issues and does not have any abdominal pain at this time.  He is moving his bowels well and urinating without difficulty.  He has no issues at this time.        History reviewed. No pertinent past medical history.          History reviewed. No pertinent family history.  Social History     Socioeconomic History     Marital status:      Spouse name: Not on file     Number of children: Not on file     Years of education: Not on file     Highest education level: Not on file   Occupational History     Not on file   Tobacco Use     Smoking status: Never Smoker     Smokeless tobacco: Never Used   Substance and Sexual Activity     Alcohol use: Yes     Drug use: Not Currently     Sexual activity: Not on file   Other Topics Concern     Not on file   Social History Narrative     Not on file     Social Determinants of Health     Financial Resource Strain: Not on file   Food Insecurity: Not on file   Transportation Needs: Not on file   Physical Activity: Not on file   Stress: Not on file   Social Connections: Not on file   Intimate Partner Violence: Not on  file   Housing Stability: Not on file         REVIEW OF SYSTEM: Patient denies any pain fevers chills nausea vomit diarrhea change in vision hearing taste or smell weakness one-sided of the chest pain shortness of breath.  He denies any current shortness stool polyphagia polydipsia polyuria depression or anxiety and the main review of systems is negative.      PHYSICAL EXAM: Patient is alert pleasant does not appear to be in acute distress head is normocephalic and atraumatic sclera conjunctive is clear oromucosa is moist nasal discharge.  Heart sounds are regular lungs clear abdomen soft nontender but somewhat distended.  No signs of any ascites however.  And there is no tenderness at all with palpation.  Extremity show trace edema bilateral.  Neurologic seems nonfocal affect is pleasant.        LABS: Amylase 185 and lipase is 233 these are elevated from last time but down from the hospital.    Vitals; blood pressure 101/54    Pulse is 86    Temperature is 98.9    Respirations 18    O2 sats 95% on room air.      ASSESSMENT:   Encounter Diagnoses   Name Primary?     Alcohol-induced acute pancreatitis, unspecified complication status Yes     Essential hypertension      Depression, unspecified depression type      Generalized anxiety disorder         PLAN: This time will check amylase and lipase tomorrow secondary to pancreatitis continue to monitor.  He will follow up with gastroenterology outpatient.    He will continue to monitor above medical problems and no other changes to care plan at this time.  Care plan was reviewed and is appropriate.        Electronically signed by: ZOHAIB MOREL DO        Sincerely,        ZOHAIB MOREL DO

## 2021-11-08 NOTE — PROGRESS NOTES
[facility] GERIATRIC SERVICES    Facility:  [facility] (SNF) [[id number]]  Code Status: FULL CODE      CHIEF COMPLAINT/REASON FOR VISIT:  Chief Complaint   Patient presents with     RECHECK       HISTORY:      HPI: Efrain is a 71 year old male who resides here at [facility] after hospitalization for pancreatitis lipase of 1933 this is improved however they still remain elevated.  Lipase is 233 it did go down to 194.  Amylase was 155 when he first came here and now is 185.  He has had no symptoms of abdominal pain at this time.  In the hospital his liver function tests were normal.    Patient has had alcohol issues in the past and likely his elevated pancreatic enzymes are from alcohol.  We will continue to monitor him at this time.    Patient denies any issues and does not have any abdominal pain at this time.  He is moving his bowels well and urinating without difficulty.  He has no issues at this time.        History reviewed. No pertinent past medical history.          History reviewed. No pertinent family history.  Social History     Socioeconomic History     Marital status:      Spouse name: Not on file     Number of children: Not on file     Years of education: Not on file     Highest education level: Not on file   Occupational History     Not on file   Tobacco Use     Smoking status: Never Smoker     Smokeless tobacco: Never Used   Substance and Sexual Activity     Alcohol use: Yes     Drug use: Not Currently     Sexual activity: Not on file   Other Topics Concern     Not on file   Social History Narrative     Not on file     Social Determinants of Health     Financial Resource Strain: Not on file   Food Insecurity: Not on file   Transportation Needs: Not on file   Physical Activity: Not on file   Stress: Not on file   Social Connections: Not on file   Intimate Partner Violence: Not on file   Housing Stability: Not on file         REVIEW OF SYSTEM: Patient denies any pain  fevers chills nausea vomit diarrhea change in vision hearing taste or smell weakness one-sided of the chest pain shortness of breath.  He denies any current shortness stool polyphagia polydipsia polyuria depression or anxiety and the main review of systems is negative.      PHYSICAL EXAM: Patient is alert pleasant does not appear to be in acute distress head is normocephalic and atraumatic sclera conjunctive is clear oromucosa is moist nasal discharge.  Heart sounds are regular lungs clear abdomen soft nontender but somewhat distended.  No signs of any ascites however.  And there is no tenderness at all with palpation.  Extremity show trace edema bilateral.  Neurologic seems nonfocal affect is pleasant.        LABS: Amylase 185 and lipase is 233 these are elevated from last time but down from the hospital.    Vitals; blood pressure 101/54    Pulse is 86    Temperature is 98.9    Respirations 18    O2 sats 95% on room air.      ASSESSMENT:   Encounter Diagnoses   Name Primary?     Alcohol-induced acute pancreatitis, unspecified complication status Yes     Essential hypertension      Depression, unspecified depression type      Generalized anxiety disorder         PLAN: This time will check amylase and lipase tomorrow secondary to pancreatitis continue to monitor.  He will follow up with gastroenterology outpatient.    He will continue to monitor above medical problems and no other changes to care plan at this time.  Care plan was reviewed and is appropriate.        Electronically signed by: ZOHAIB MOREL DO

## 2021-11-09 LAB
AMYLASE SERPL-CCNC: 110 U/L (ref 5–120)
LIPASE SERPL-CCNC: 126 U/L (ref 0–52)

## 2021-11-09 PROCEDURE — 82150 ASSAY OF AMYLASE: CPT | Performed by: FAMILY MEDICINE

## 2021-11-09 PROCEDURE — 83690 ASSAY OF LIPASE: CPT | Performed by: FAMILY MEDICINE

## 2021-11-09 PROCEDURE — P9604 ONE-WAY ALLOW PRORATED TRIP: HCPCS | Performed by: FAMILY MEDICINE

## 2021-11-09 PROCEDURE — 36415 COLL VENOUS BLD VENIPUNCTURE: CPT | Performed by: FAMILY MEDICINE

## 2021-11-11 ENCOUNTER — LAB REQUISITION (OUTPATIENT)
Dept: LAB | Facility: CLINIC | Age: 71
End: 2021-11-11
Payer: COMMERCIAL

## 2021-11-11 ENCOUNTER — TRANSITIONAL CARE UNIT VISIT (OUTPATIENT)
Dept: GERIATRICS | Facility: CLINIC | Age: 71
End: 2021-11-11
Payer: MEDICARE

## 2021-11-11 VITALS
RESPIRATION RATE: 16 BRPM | BODY MASS INDEX: 29.41 KG/M2 | DIASTOLIC BLOOD PRESSURE: 65 MMHG | SYSTOLIC BLOOD PRESSURE: 119 MMHG | HEART RATE: 72 BPM | OXYGEN SATURATION: 98 % | WEIGHT: 183 LBS | TEMPERATURE: 98.6 F | HEIGHT: 66 IN

## 2021-11-11 DIAGNOSIS — F41.1 GENERALIZED ANXIETY DISORDER: ICD-10-CM

## 2021-11-11 DIAGNOSIS — K85.20 ALCOHOL-INDUCED ACUTE PANCREATITIS, UNSPECIFIED COMPLICATION STATUS: Primary | ICD-10-CM

## 2021-11-11 DIAGNOSIS — K76.0 FATTY (CHANGE OF) LIVER, NOT ELSEWHERE CLASSIFIED: ICD-10-CM

## 2021-11-11 DIAGNOSIS — I10 ESSENTIAL HYPERTENSION: ICD-10-CM

## 2021-11-11 DIAGNOSIS — F32.A DEPRESSION, UNSPECIFIED DEPRESSION TYPE: ICD-10-CM

## 2021-11-11 PROCEDURE — 99309 SBSQ NF CARE MODERATE MDM 30: CPT | Performed by: FAMILY MEDICINE

## 2021-11-11 ASSESSMENT — MIFFLIN-ST. JEOR: SCORE: 1522.83

## 2021-11-11 NOTE — PROGRESS NOTES
[facility] GERIATRIC SERVICES    Facility:  [facility] (SNF) [[id number]]  Code Status: FULL CODE      CHIEF COMPLAINT/REASON FOR VISIT:  Chief Complaint   Patient presents with     RECHECK       HISTORY:      HPI: Efrain is a 71 year old male who resides here in the [facility] going physical and Occupational Therapy secondary to hospitalization for pancreatitis.  Lipase was 1933 and has continued to go down to close to normal limits it is 126 as of 11/9/2021.  Amylase is within normal limits.  In the hospital liver function test within normal limits and will need a GI follow-up.    Patient seen in chair and physical therapy has no signs of abdominal pain.  He is moving his bowels well at this time and has no new issues.    History reviewed. No pertinent past medical history.          History reviewed. No pertinent family history.  Social History     Socioeconomic History     Marital status:      Spouse name: Not on file     Number of children: Not on file     Years of education: Not on file     Highest education level: Not on file   Occupational History     Not on file   Tobacco Use     Smoking status: Never Smoker     Smokeless tobacco: Never Used   Substance and Sexual Activity     Alcohol use: Yes     Drug use: Not Currently     Sexual activity: Not on file   Other Topics Concern     Not on file   Social History Narrative     Not on file     Social Determinants of Health     Financial Resource Strain: Not on file   Food Insecurity: Not on file   Transportation Needs: Not on file   Physical Activity: Not on file   Stress: Not on file   Social Connections: Not on file   Intimate Partner Violence: Not on file   Housing Stability: Not on file         REVIEW OF SYSTEM: Patient denies any pain fever chills nausea vomit diarrhea change in vision hearing taste or smell weakness one-sided of the chest pain shortness of breath.  Denies any current shortness stool polyphagia polydipsia  polyuria depression or anxiety in the remainder review of systems is negative.      PHYSICAL EXAM: Patient is alert pleasant not appear to be in acute distress head is normocephalic and atraumatic sclera conjunctiva is clear heart sounds are regular at this time lungs were clear abdomen was soft nontender there is no rebound or guarding bowel sounds are positive.  Neuro exam was nonfocal affect was pleasant.        LABS: On 11/9/2021 lipase 126 and amylase was 110.  Lipase has gone down from 233 down to 126 and amylase is gone from 185 to normal range at 110.  Magnesium was 2.1.    Vitals; blood pressure 115/67    Pulse of 70    Temperature is 98    Respiration 16    O2 sats 98%.        ASSESSMENT:   Encounter Diagnoses   Name Primary?     Alcohol-induced acute pancreatitis, unspecified complication status Yes     Essential hypertension      Generalized anxiety disorder      Depression, unspecified depression type         PLAN: Plan at this time lipase tomorrow until it returns to normal limits but no amylase will be drawn.    You have GI, GI follow-up and consult within the next couple of weeks.  Secondary to pancreatitis.    I will continue to monitor above medical problems and no other changes to care plan at this time.        Electronically signed by: ZOHAIB MOREL DO

## 2021-11-11 NOTE — LETTER
11/11/2021        RE: Efrain Gomes  [address]  [place] [address]        [facility]    Facility:  [facility] ([facility]) [[id number]]  Code Status: FULL CODE      CHIEF COMPLAINT/REASON FOR VISIT:  Chief Complaint   Patient presents with     RECHECK       HISTORY:      HPI: Efrain is a 71 year old male who resides here in the [facility] going physical and Occupational Therapy secondary to hospitalization for pancreatitis.  Lipase was 1933 and has continued to go down to close to normal limits it is 126 as of 11/9/2021.  Amylase is within normal limits.  In the hospital liver function test within normal limits and will need a GI follow-up.    Patient seen in chair and physical therapy has no signs of abdominal pain.  He is moving his bowels well at this time and has no new issues.    History reviewed. No pertinent past medical history.          History reviewed. No pertinent family history.  Social History     Socioeconomic History     Marital status:      Spouse name: Not on file     Number of children: Not on file     Years of education: Not on file     Highest education level: Not on file   Occupational History     Not on file   Tobacco Use     Smoking status: Never Smoker     Smokeless tobacco: Never Used   Substance and Sexual Activity     Alcohol use: Yes     Drug use: Not Currently     Sexual activity: Not on file   Other Topics Concern     Not on file   Social History Narrative     Not on file     Social Determinants of Health     Financial Resource Strain: Not on file   Food Insecurity: Not on file   Transportation Needs: Not on file   Physical Activity: Not on file   Stress: Not on file   Social Connections: Not on file   Intimate Partner Violence: Not on file   Housing Stability: Not on file         REVIEW OF SYSTEM: Patient denies any pain fever chills nausea vomit diarrhea change in vision hearing taste or smell weakness one-sided of  the chest pain shortness of breath.  Denies any current shortness stool polyphagia polydipsia polyuria depression or anxiety in the remainder review of systems is negative.      PHYSICAL EXAM: Patient is alert pleasant not appear to be in acute distress head is normocephalic and atraumatic sclera conjunctiva is clear heart sounds are regular at this time lungs were clear abdomen was soft nontender there is no rebound or guarding bowel sounds are positive.  Neuro exam was nonfocal affect was pleasant.        LABS: On 11/9/2021 lipase 126 and amylase was 110.  Lipase has gone down from 233 down to 126 and amylase is gone from 185 to normal range at 110.  Magnesium was 2.1.    Vitals; blood pressure 115/67    Pulse of 70    Temperature is 98    Respiration 16    O2 sats 98%.        ASSESSMENT:   Encounter Diagnoses   Name Primary?     Alcohol-induced acute pancreatitis, unspecified complication status Yes     Essential hypertension      Generalized anxiety disorder      Depression, unspecified depression type         PLAN: Plan at this time lipase tomorrow until it returns to normal limits but no amylase will be drawn.    You have GI, GI follow-up and consult within the next couple of weeks.  Secondary to pancreatitis.    I will continue to monitor above medical problems and no other changes to care plan at this time.        Electronically signed by: ZOHAIB MOREL DO        Sincerely,        ZOHAIB MOREL, DO

## 2021-11-12 LAB — LIPASE SERPL-CCNC: 112 U/L (ref 0–52)

## 2021-11-12 PROCEDURE — 83690 ASSAY OF LIPASE: CPT | Performed by: FAMILY MEDICINE

## 2021-11-12 PROCEDURE — 36415 COLL VENOUS BLD VENIPUNCTURE: CPT | Performed by: FAMILY MEDICINE

## 2021-11-12 PROCEDURE — P9604 ONE-WAY ALLOW PRORATED TRIP: HCPCS | Performed by: FAMILY MEDICINE

## 2021-11-14 ENCOUNTER — LAB REQUISITION (OUTPATIENT)
Dept: LAB | Facility: CLINIC | Age: 71
End: 2021-11-14
Payer: COMMERCIAL

## 2021-11-14 DIAGNOSIS — I48.91 UNSPECIFIED ATRIAL FIBRILLATION (H): ICD-10-CM

## 2021-11-15 LAB — LIPASE SERPL-CCNC: 245 U/L (ref 0–52)

## 2021-11-15 PROCEDURE — 83690 ASSAY OF LIPASE: CPT | Performed by: FAMILY MEDICINE

## 2021-11-15 PROCEDURE — 36415 COLL VENOUS BLD VENIPUNCTURE: CPT | Performed by: FAMILY MEDICINE

## 2021-11-15 PROCEDURE — P9603 ONE-WAY ALLOW PRORATED MILES: HCPCS | Mod: ORL | Performed by: FAMILY MEDICINE

## 2021-11-18 ENCOUNTER — TELEPHONE (OUTPATIENT)
Dept: INTERNAL MEDICINE | Facility: CLINIC | Age: 71
End: 2021-11-18

## 2021-11-18 NOTE — TELEPHONE ENCOUNTER
Reason for Call: Request for an order or referral:    Order or referral being requested:   Order for delay start of care for Nursing for 11/19/2021    Date needed: as soon as possible    Has the patient been seen by the PCP for this problem? YES    Additional comments: n/a    Phone number Patient can be reached at:  Other phone number:  [phone]    Best Time:  anytime    Can we leave a detailed message on this number?  YES    Call taken on 11/18/2021 at 8:02 AM by Sierra Flores

## 2021-11-18 NOTE — TELEPHONE ENCOUNTER
I have not seen patient and he failed an appointment today.  Please have them see him as soon as possible.  He needs to reschedule his hospital follow-up as I have not seen him in quite some time.

## 2021-11-18 NOTE — TELEPHONE ENCOUNTER
Contacted Tracey from [facility] and gave V.O. for orders requested below and relayed message re: need to schedule appt. As Patient has not been seen for a while.

## 2021-11-19 ENCOUNTER — MEDICAL CORRESPONDENCE (OUTPATIENT)
Dept: HEALTH INFORMATION MANAGEMENT | Facility: CLINIC | Age: 71
End: 2021-11-19
Payer: MEDICARE

## 2021-11-23 ENCOUNTER — TELEPHONE (OUTPATIENT)
Dept: INTERNAL MEDICINE | Facility: CLINIC | Age: 71
End: 2021-11-23
Payer: MEDICARE

## 2021-11-23 NOTE — TELEPHONE ENCOUNTER
Reason for Call:  Home Health Care    Alycia with [facility] called regarding (reason for call): Verbal orders    Orders are needed for this patient.     OT: 2 times a week for 2 weeks-safety with self care, use of equipment, therapeutic exersise, increase strength and activity tolerance.        Pt Provider: Dr Bundy    Phone Number Homecare Nurse can be reached at: [phone]    Can we leave a detailed message on this number? YES        Call taken on 11/23/2021 at 8:58 AM by Pam J. Behr

## 2021-11-23 NOTE — TELEPHONE ENCOUNTER
Alycia called and notified information below per PCP. She will have OT staff help with arranging Hospital follow up appt.     Alycia is also requesting PT - 2 times per week times 3 weeks. Ok for PT orders as well?

## 2021-11-23 NOTE — TELEPHONE ENCOUNTER
I have not seen this patient in many many months.  He was just in the hospital quite ill.  He failed his hospital follow-up.  He needs to be rescheduled for hospital follow-up as soon as possible.  Okay for home care but I have not certified him as I have not seen him.

## 2021-12-02 ENCOUNTER — TELEPHONE (OUTPATIENT)
Dept: INTERNAL MEDICINE | Facility: CLINIC | Age: 71
End: 2021-12-02
Payer: MEDICARE

## 2021-12-02 NOTE — TELEPHONE ENCOUNTER
Reason for Call: Request for an order or referral:    Order or referral being requested:   Medical Social Worker   Eval and Treat    Date needed: as soon as possible    Has the patient been seen by the PCP for this problem? YES    Additional comments: n/a    Phone number Patient can be reached at:  Other phone number:  [phone]    Best Time:  anytime    Can we leave a detailed message on this number?  YES    Call taken on 12/2/2021 at 11:44 AM by Sierra Flores

## 2021-12-02 NOTE — TELEPHONE ENCOUNTER
Pauly was given verbal ok from patient's provider for the home care orders as requested.  DEYVI Healy will remind Efrain to make appointment with Dr. Jiménez.

## 2021-12-02 NOTE — TELEPHONE ENCOUNTER
Okay for these things, however I have not seen patient in years.  He needs an appointment with me ASAP for hospital follow-up.  This needs to be a 40-minute hospital follow-up block.  He failed his last appointment.

## 2021-12-08 ENCOUNTER — PATIENT OUTREACH (OUTPATIENT)
Dept: NURSING | Facility: CLINIC | Age: 71
End: 2021-12-08
Payer: MEDICARE

## 2021-12-08 NOTE — PROGRESS NOTES
Contact   Chart Review     Situation: Patient chart reviewed by .    Background: following while in TCU for discharge from [facility] where he was recuperating after pancreatitis.     Assessment: Per chart review, is receiving home care from [facility].  Called patient and he is discharged from TCU and moved into assisted living on same campus.  His home is a condo on the third floor without an elevator. At this time, he cannot do the stairs.  He plans to leave assisted living the end of January and may want to move into a senior apartment which is more accessible.  He has help from his daughter.  He needs to make appointment with PCP. Missed one several weeks ago when cab would not  in [place]. He will work with his daughter to set up appointment through My Chart. Reviewed where the clinic location is.  He would like to see PCP then work with ROSELINE BROWNE to help with locating housing.  He requested to have ROSELINE  information emailed to him. Writer communicated the risks of unencrypted electronic communication and the patient and/or patient representative has agreed to accept the risks and receive unencrypted communication related to the information or resources we have discussed. We reviewed that no PHI will be included.  Email address verified with the patient.  Will include electronic communication form for patient/caregiver to complete.    He confirmed the address on file is correct.      Plan/Recommendations: will email ROSELINE  information.  Patient to call when ready to schedule assessment. Will set outreach for one month.     Sarah Vides,   [facility]  [phone]

## 2021-12-30 NOTE — TELEPHONE ENCOUNTER
Called patient to let him know that he has an appointment January 17 th at 1:40 pm (40 min) apt to discuss ED Follow-up. Told patient The [facility] form cannot be signed without a visit with Dr. Bundy, he is aware and will be discussing this on January 17 th when he comes in for his apt.

## 2022-01-10 ENCOUNTER — PATIENT OUTREACH (OUTPATIENT)
Dept: NURSING | Facility: CLINIC | Age: 72
End: 2022-01-10
Payer: MEDICARE

## 2022-01-10 NOTE — PROGRESS NOTES
Contact   Chart Review     Situation: Patient chart reviewed by .    Background: following for discharge from TCU. Went into assisted living and asked for a call in one month to assess needs.  He hoped to return to his condo.     Assessment: Talked to patient and he continues to improve but still having trouble with walking down stairs.  Is planning on staying another month and then is likely going to move to apartment with a garage.  He will sell his condo which only has street parking.  He doesn't expect that he needs care coordination support.     Plan/Recommendations: no further outreach planned. He will attend his physical appointment later this month with PCP.     Sarah Vides,   [facility]  [phone]

## 2022-01-17 ENCOUNTER — OFFICE VISIT (OUTPATIENT)
Dept: INTERNAL MEDICINE | Facility: CLINIC | Age: 72
End: 2022-01-17
Payer: MEDICARE

## 2022-01-17 VITALS
HEART RATE: 68 BPM | BODY MASS INDEX: 33.11 KG/M2 | WEIGHT: 206 LBS | DIASTOLIC BLOOD PRESSURE: 64 MMHG | SYSTOLIC BLOOD PRESSURE: 122 MMHG | TEMPERATURE: 98.4 F | HEIGHT: 66 IN | OXYGEN SATURATION: 98 %

## 2022-01-17 DIAGNOSIS — I10 ESSENTIAL HYPERTENSION: ICD-10-CM

## 2022-01-17 DIAGNOSIS — F41.9 ANXIETY: ICD-10-CM

## 2022-01-17 DIAGNOSIS — G89.4 CHRONIC PAIN SYNDROME: ICD-10-CM

## 2022-01-17 DIAGNOSIS — L29.9 ITCHING: ICD-10-CM

## 2022-01-17 DIAGNOSIS — Z87.19 HISTORY OF PANCREATITIS: ICD-10-CM

## 2022-01-17 DIAGNOSIS — F10.20 ALCOHOLISM (H): Primary | ICD-10-CM

## 2022-01-17 DIAGNOSIS — F32.0 MILD MAJOR DEPRESSION (H): ICD-10-CM

## 2022-01-17 DIAGNOSIS — R35.1 BPH ASSOCIATED WITH NOCTURIA: ICD-10-CM

## 2022-01-17 DIAGNOSIS — Z12.11 ENCOUNTER FOR SCREENING COLONOSCOPY: ICD-10-CM

## 2022-01-17 DIAGNOSIS — E46 PROTEIN-CALORIE MALNUTRITION, UNSPECIFIED SEVERITY (H): ICD-10-CM

## 2022-01-17 DIAGNOSIS — N40.1 BPH ASSOCIATED WITH NOCTURIA: ICD-10-CM

## 2022-01-17 DIAGNOSIS — E83.42 HYPOMAGNESEMIA: ICD-10-CM

## 2022-01-17 LAB
ALBUMIN SERPL-MCNC: 3.7 G/DL (ref 3.5–5)
ALP SERPL-CCNC: 99 U/L (ref 45–120)
ALT SERPL W P-5'-P-CCNC: 30 U/L (ref 0–45)
ANION GAP SERPL CALCULATED.3IONS-SCNC: 11 MMOL/L (ref 5–18)
AST SERPL W P-5'-P-CCNC: 31 U/L (ref 0–40)
BASOPHILS # BLD AUTO: 0 10E3/UL (ref 0–0.2)
BASOPHILS NFR BLD AUTO: 0 %
BILIRUB SERPL-MCNC: 0.8 MG/DL (ref 0–1)
BUN SERPL-MCNC: 17 MG/DL (ref 8–28)
CALCIUM SERPL-MCNC: 9.7 MG/DL (ref 8.5–10.5)
CHLORIDE BLD-SCNC: 102 MMOL/L (ref 98–107)
CO2 SERPL-SCNC: 22 MMOL/L (ref 22–31)
CREAT SERPL-MCNC: 0.89 MG/DL (ref 0.7–1.3)
EOSINOPHIL # BLD AUTO: 0.3 10E3/UL (ref 0–0.7)
EOSINOPHIL NFR BLD AUTO: 4 %
ERYTHROCYTE [DISTWIDTH] IN BLOOD BY AUTOMATED COUNT: 12.2 % (ref 10–15)
GFR SERPL CREATININE-BSD FRML MDRD: >90 ML/MIN/1.73M2
GLUCOSE BLD-MCNC: 89 MG/DL (ref 70–125)
HCT VFR BLD AUTO: 39.5 % (ref 40–53)
HGB BLD-MCNC: 13 G/DL (ref 13.3–17.7)
IMM GRANULOCYTES # BLD: 0 10E3/UL
IMM GRANULOCYTES NFR BLD: 0 %
LYMPHOCYTES # BLD AUTO: 1.6 10E3/UL (ref 0.8–5.3)
LYMPHOCYTES NFR BLD AUTO: 18 %
MCH RBC QN AUTO: 31 PG (ref 26.5–33)
MCHC RBC AUTO-ENTMCNC: 32.9 G/DL (ref 31.5–36.5)
MCV RBC AUTO: 94 FL (ref 78–100)
MONOCYTES # BLD AUTO: 0.9 10E3/UL (ref 0–1.3)
MONOCYTES NFR BLD AUTO: 10 %
NEUTROPHILS # BLD AUTO: 5.8 10E3/UL (ref 1.6–8.3)
NEUTROPHILS NFR BLD AUTO: 67 %
PLATELET # BLD AUTO: 218 10E3/UL (ref 150–450)
POTASSIUM BLD-SCNC: 4.8 MMOL/L (ref 3.5–5)
PROT SERPL-MCNC: 6.2 G/DL (ref 6–8)
PSA SERPL-MCNC: 0.8 UG/L (ref 0–6.5)
RBC # BLD AUTO: 4.19 10E6/UL (ref 4.4–5.9)
SODIUM SERPL-SCNC: 135 MMOL/L (ref 136–145)
WBC # BLD AUTO: 8.6 10E3/UL (ref 4–11)

## 2022-01-17 PROCEDURE — 36415 COLL VENOUS BLD VENIPUNCTURE: CPT | Performed by: INTERNAL MEDICINE

## 2022-01-17 PROCEDURE — 99214 OFFICE O/P EST MOD 30 MIN: CPT | Mod: 25 | Performed by: INTERNAL MEDICINE

## 2022-01-17 PROCEDURE — 90732 PPSV23 VACC 2 YRS+ SUBQ/IM: CPT | Performed by: INTERNAL MEDICINE

## 2022-01-17 PROCEDURE — G0009 ADMIN PNEUMOCOCCAL VACCINE: HCPCS | Performed by: INTERNAL MEDICINE

## 2022-01-17 PROCEDURE — 84153 ASSAY OF PSA TOTAL: CPT | Performed by: INTERNAL MEDICINE

## 2022-01-17 PROCEDURE — 80053 COMPREHEN METABOLIC PANEL: CPT | Performed by: INTERNAL MEDICINE

## 2022-01-17 PROCEDURE — 85025 COMPLETE CBC W/AUTO DIFF WBC: CPT | Performed by: INTERNAL MEDICINE

## 2022-01-17 RX ORDER — DULOXETIN HYDROCHLORIDE 30 MG/1
30 CAPSULE, DELAYED RELEASE ORAL 2 TIMES DAILY
Qty: 30 CAPSULE | Refills: 0 | Status: SHIPPED | OUTPATIENT
Start: 2022-01-17 | End: 2022-01-17

## 2022-01-17 RX ORDER — AMLODIPINE BESYLATE 2.5 MG/1
2.5 TABLET ORAL DAILY
COMMUNITY
End: 2022-04-25

## 2022-01-17 RX ORDER — ACETAMINOPHEN 500 MG
500-1000 TABLET ORAL EVERY 6 HOURS PRN
COMMUNITY
End: 2024-07-25

## 2022-01-17 RX ORDER — FOLIC ACID 0.8 MG
800 TABLET ORAL DAILY
COMMUNITY
End: 2022-04-25

## 2022-01-17 RX ORDER — SENNOSIDES A AND B 8.6 MG/1
1 TABLET, FILM COATED ORAL DAILY PRN
COMMUNITY
End: 2023-10-24

## 2022-01-17 RX ORDER — DULOXETIN HYDROCHLORIDE 30 MG/1
30 CAPSULE, DELAYED RELEASE ORAL DAILY
Qty: 30 CAPSULE | Refills: 0 | Status: SHIPPED | OUTPATIENT
Start: 2022-01-17 | End: 2022-02-18

## 2022-01-17 ASSESSMENT — MIFFLIN-ST. JEOR: SCORE: 1627.16

## 2022-01-17 NOTE — LETTER
January 18, 2022      Efrain Gomes  [address]  [place] [address]      Efrain, your labs are looking better.  Liver enzymes are normal now.  PSA is low and stable.  Nutritional status looks better.  You have borderline anemia which we will continue to monitor.         Resulted Orders   Comprehensive metabolic panel (BMP + Alb, Alk Phos, ALT, AST, Total. Bili, TP)   Result Value Ref Range    Sodium 135 (L) 136 - 145 mmol/L    Potassium 4.8 3.5 - 5.0 mmol/L    Chloride 102 98 - 107 mmol/L    Carbon Dioxide (CO2) 22 22 - 31 mmol/L    Anion Gap 11 5 - 18 mmol/L    Urea Nitrogen 17 8 - 28 mg/dL    Creatinine 0.89 0.70 - 1.30 mg/dL    Calcium 9.7 8.5 - 10.5 mg/dL    Glucose 89 70 - 125 mg/dL    Alkaline Phosphatase 99 45 - 120 U/L    AST 31 0 - 40 U/L    ALT 30 0 - 45 U/L    Protein Total 6.2 6.0 - 8.0 g/dL    Albumin 3.7 3.5 - 5.0 g/dL    Bilirubin Total 0.8 0.0 - 1.0 mg/dL    GFR Estimate >90 >60 mL/min/1.73m2      Comment:      Effective December 21, 2021 eGFRcr in adults is calculated using the 2021 CKD-EPI creatinine equation which includes age and gender (Raffaele et al., NE, DOI: 10.1056/CMYOxn2023044)   PSA, tumor marker   Result Value Ref Range    PSA Tumor Marker 0.80 0.00 - 6.50 ug/L    Narrative    Assay Method is Abbott Prostate-Specific Antigen (PSA)  Standard-WHO 1st International (90:10)   CBC with platelets and differential   Result Value Ref Range    WBC Count 8.6 4.0 - 11.0 10e3/uL    RBC Count 4.19 (L) 4.40 - 5.90 10e6/uL    Hemoglobin 13.0 (L) 13.3 - 17.7 g/dL    Hematocrit 39.5 (L) 40.0 - 53.0 %    MCV 94 78 - 100 fL    MCH 31.0 26.5 - 33.0 pg    MCHC 32.9 31.5 - 36.5 g/dL    RDW 12.2 10.0 - 15.0 %    Platelet Count 218 150 - 450 10e3/uL    % Neutrophils 67 %    % Lymphocytes 18 %    % Monocytes 10 %    % Eosinophils 4 %    % Basophils 0 %    % Immature Granulocytes 0 %    Absolute Neutrophils 5.8 1.6 - 8.3 10e3/uL    Absolute Lymphocytes 1.6 0.8 - 5.3 10e3/uL    Absolute Monocytes 0.9  0.0 - 1.3 10e3/uL    Absolute Eosinophils 0.3 0.0 - 0.7 10e3/uL    Absolute Basophils 0.0 0.0 - 0.2 10e3/uL    Absolute Immature Granulocytes 0.0 <=0.4 10e3/uL       If you have any questions or concerns, please call the clinic at the number listed above.       Sincerely,      Ronnie Bundy MD

## 2022-01-17 NOTE — PROGRESS NOTES
Office Visit - Follow Up   Efrain Gomes   71 year old male    Date of Visit: 1/17/2022    Chief Complaint   Patient presents with     Hospital F/U     10/23-11/21 (currently living at [facility] living until March)      Back Pain     pt reports intermittent lower left back pains x several months  - using tylenol prn for pains        Assessment and Plan   1. Alcoholism (H)  He tells me he is doing well and is expecting to not drink again.  He tells me that moving in to a new facility is going to be helpful.  He is going to continue with his AA program as well.  I did offer chemical dependency treatment again and he declines.  - Comprehensive metabolic panel (BMP + Alb, Alk Phos, ALT, AST, Total. Bili, TP); Future  - CBC with platelets and differential; Future      2. History of pancreatitis  He has done well.  No residual issues    3. Mild major depression (H)  Trial of duloxetine  - DULoxetine (CYMBALTA) 30 MG capsule; Take 1 capsule (30 mg) by mouth daily  Dispense: 30 capsule; Refill: 0    4. Anxiety  As above.    5. Essential hypertension  Blood pressure well controlled    6. BPH associated with nocturia  He notes nocturia every couple hours.  Check PSA as its been a while  - PSA, tumor marker; Future    7. Protein-calorie malnutrition, unspecified severity (H)  He was very malnourished when he went in the hospital due to not eating.  Recheck protein levels today    8. Itching  He notes itching.  Question dry skin.  Question due to his history of bullous pemphigoid.  Readdress in 3 weeks    9. Chronic pain syndrome    - DULoxetine (CYMBALTA) 30 MG capsule; Take 1 capsule (30 mg) by mouth  daily  Dispense: 30 capsule; Refill: 0    10. Encounter for screening colonoscopy  Overdue for colonoscopy.  We will get that set up  - Adult Gastro Ref - Procedure Only; Future        Return in about 3 weeks (around 2/7/2022) for Follow up, with me, in person.     History of Present Illness   This 71  "year old Efrain comes in today for hospital follow-up.  He failed an appointment earlier.  He was actually hospitalized a few months ago.  Alcoholic pancreatitis.  He was sent to TCU and then to assisted living where he remains.  He remains sober as a result of being in this facility.  He tells me he is planning on staying in the assisted living until March and then probably selling his condo and moving into a new apartment.  He has a hard time going up and down stairs at this time.  He has a lot of low back pain.  He is eating better.  Prior to admission he had been eating very sparsely and drinking at least a bottle or more of wine per day.  He is overdue for his colonoscopy.  He tells me his daughter is doing well with her colon cancer treatment.  She recently had a CEA that was low.  She has stage IV colon cancer.  He has not gone to rehab.  He tells me he is going to manage his alcoholism with AA only at this time.  Blood pressures been well controlled.  Mood has been okay.    Review of Systems: A comprehensive review of systems was negative except as noted.     Medications, Allergies and Problem List   Reviewed, reconciled and updated  Post Discharge Medication Reconciliation Status: discharge medications reconciled and changed, per note/orders     Physical Exam   General Appearance:   Pleasant gentleman.  PHQ-9 and RASHI-7 noted.    /64 (BP Location: Left arm, Patient Position: Sitting, Cuff Size: Adult Small)   Pulse 68   Temp 98.4  F (36.9  C)   Ht 1.676 m (5' 6\")   Wt 93.4 kg (206 lb)   SpO2 98%   BMI 33.25 kg/m           Additional Information   Current Outpatient Medications   Medication Sig Dispense Refill     acetaminophen (TYLENOL) 500 MG tablet Take 500-1,000 mg by mouth every 6 hours as needed for mild pain       amLODIPine (NORVASC) 2.5 MG tablet Take 2.5 mg by mouth daily       atorvastatin (LIPITOR) 10 MG tablet Take 1 tablet (10 mg) by mouth every evening       betamethasone " dipropionate (DIPROLENE) 0.05 % cream [BETAMETHASONE DIPROPIONATE (DIPROLENE) 0.05 % CREAM] STEPHEN EXT AA BID  11     cholecalciferol, vitamin D3, (VITAMIN D3) 2,000 unit Tab [CHOLECALCIFEROL, VITAMIN D3, (VITAMIN D3) 2,000 UNIT TAB] Take 1 tablet by mouth daily.       DULoxetine (CYMBALTA) 30 MG capsule Take 1 capsule (30 mg) by mouth 2 times daily 30 capsule 0     folic acid 800 MCG tablet Take 800 mcg by mouth daily       lisinopril (ZESTRIL) 40 MG tablet Take 1 tablet (40 mg) by mouth daily       magnesium oxide (MAG-OX) 400 MG tablet Take 2 tablets (800 mg) by mouth daily       metoprolol succinate ER (TOPROL-XL) 50 MG 24 hr tablet Take 1 tablet (50 mg) by mouth daily       multivitamin w/minerals (THERA-VIT-M) tablet Take 1 tablet by mouth daily       senna (SENOKOT) 8.6 MG tablet Take 1 tablet by mouth daily       thiamine (B-1) 100 MG tablet Take 1 tablet (100 mg) by mouth daily       Allergies   Allergen Reactions     Septra [Sulfamethoxazole W/Trimethoprim] Rash     Sulfamethoxazole-Trimethoprim Rash     Social History     Tobacco Use     Smoking status: Never Smoker     Smokeless tobacco: Never Used   Substance Use Topics     Alcohol use: Yes     Drug use: Not Currently       Review and/or order of clinical lab tests:  Review and/or order of radiology tests:  Review and/or order of medicine tests:  Discussion of test results with performing physician:  Decision to obtain old records and/or obtain history from someone other than the patient:  Review and summarization of old records and/or obtaining history from someone other than the patient and.or discussion of case with another health care provider:  Independent visualization of image, tracing or specimen itself:    Time:      ANAY NAVARRO MD

## 2022-01-20 DIAGNOSIS — I10 ESSENTIAL HYPERTENSION: ICD-10-CM

## 2022-01-23 NOTE — TELEPHONE ENCOUNTER
"Routing refill request to provider for review/approval because:    Due to medication information not transferring due to [facility] please review the medication information prior to signing to ensure accuracy.     Disp Refills Start End ALO   lisinopriL (PRINIVIL,ZESTRIL) 40 MG tablet 90 tablet 2 4/28/2021  No   Sig: TAKE 1 TABLET(40 MG) BY MOUTH DAILY   Sent to pharmacy as: lisinopriL 40 mg tablet (PRINIVIL,ZESTRIL)   E-Prescribing Status: Receipt confirmed by pharmacy (4/28/2021 12:44 PM CDT)     Last Written Prescription Date:  04/28/2021  Last Fill Quantity: 90,  # refills: 2   Last office visit provider:  01/17/2022 with Dr Bundy.    Requested Prescriptions   Pending Prescriptions Disp Refills     lisinopril (ZESTRIL) 40 MG tablet [Pharmacy Med Name: LISINOPRIL 40MG TABLETS] 90 tablet      Sig: TAKE 1 TABLET(40 MG) BY MOUTH DAILY       ACE Inhibitors (Including Combos) Protocol Passed - 1/20/2022  6:04 PM        Passed - Blood pressure under 140/90 in past 12 months     BP Readings from Last 3 Encounters:   01/17/22 122/64   11/15/21 90/60   11/11/21 119/65                 Passed - Recent (12 mo) or future (30 days) visit within the authorizing provider's specialty     Patient has had an office visit with the authorizing provider or a provider within the authorizing providers department within the previous 12 mos or has a future within next 30 days. See \"Patient Info\" tab in inbasket, or \"Choose Columns\" in Meds & Orders section of the refill encounter.              Passed - Medication is active on med list        Passed - Patient is age 18 or older        Passed - Normal serum creatinine on file in past 12 months     Recent Labs   Lab Test 01/17/22  1434   CR 0.89       Ok to refill medication if creatinine is low          Passed - Normal serum potassium on file in past 12 months     Recent Labs   Lab Test 01/17/22  1434   POTASSIUM 4.8                  Pauly Mcpherson 01/23/22 5:31 PM  "

## 2022-01-24 RX ORDER — LISINOPRIL 40 MG/1
TABLET ORAL
Qty: 90 TABLET | Refills: 1 | Status: SHIPPED | OUTPATIENT
Start: 2022-01-24 | End: 2022-07-20

## 2022-02-09 ENCOUNTER — OFFICE VISIT (OUTPATIENT)
Dept: INTERNAL MEDICINE | Facility: CLINIC | Age: 72
End: 2022-02-09
Payer: MEDICARE

## 2022-02-09 VITALS
HEART RATE: 60 BPM | WEIGHT: 207 LBS | SYSTOLIC BLOOD PRESSURE: 118 MMHG | HEIGHT: 66 IN | DIASTOLIC BLOOD PRESSURE: 60 MMHG | BODY MASS INDEX: 33.27 KG/M2 | TEMPERATURE: 97.8 F | OXYGEN SATURATION: 98 %

## 2022-02-09 DIAGNOSIS — L29.9 ITCHING: ICD-10-CM

## 2022-02-09 DIAGNOSIS — F32.0 MILD MAJOR DEPRESSION (H): ICD-10-CM

## 2022-02-09 DIAGNOSIS — F10.20 ALCOHOLISM (H): ICD-10-CM

## 2022-02-09 DIAGNOSIS — M54.16 LUMBAR BACK PAIN WITH RADICULOPATHY AFFECTING LEFT LOWER EXTREMITY: Primary | ICD-10-CM

## 2022-02-09 DIAGNOSIS — I10 ESSENTIAL HYPERTENSION: ICD-10-CM

## 2022-02-09 DIAGNOSIS — G89.4 CHRONIC PAIN SYNDROME: ICD-10-CM

## 2022-02-09 PROCEDURE — 99214 OFFICE O/P EST MOD 30 MIN: CPT | Performed by: INTERNAL MEDICINE

## 2022-02-09 ASSESSMENT — MIFFLIN-ST. JEOR: SCORE: 1631.7

## 2022-02-10 ASSESSMENT — PATIENT HEALTH QUESTIONNAIRE - PHQ9
SUM OF ALL RESPONSES TO PHQ QUESTIONS 1-9: 1
5. POOR APPETITE OR OVEREATING: NOT AT ALL

## 2022-02-10 ASSESSMENT — ANXIETY QUESTIONNAIRES
6. BECOMING EASILY ANNOYED OR IRRITABLE: NOT AT ALL
GAD7 TOTAL SCORE: 0
3. WORRYING TOO MUCH ABOUT DIFFERENT THINGS: NOT AT ALL
7. FEELING AFRAID AS IF SOMETHING AWFUL MIGHT HAPPEN: NOT AT ALL
2. NOT BEING ABLE TO STOP OR CONTROL WORRYING: NOT AT ALL
1. FEELING NERVOUS, ANXIOUS, OR ON EDGE: NOT AT ALL
5. BEING SO RESTLESS THAT IT IS HARD TO SIT STILL: NOT AT ALL

## 2022-02-11 ASSESSMENT — ANXIETY QUESTIONNAIRES: GAD7 TOTAL SCORE: 0

## 2022-02-16 DIAGNOSIS — F32.0 MILD MAJOR DEPRESSION (H): ICD-10-CM

## 2022-02-16 DIAGNOSIS — G89.4 CHRONIC PAIN SYNDROME: ICD-10-CM

## 2022-02-18 RX ORDER — DULOXETIN HYDROCHLORIDE 30 MG/1
30 CAPSULE, DELAYED RELEASE ORAL DAILY
Qty: 90 CAPSULE | Refills: 0 | Status: SHIPPED | OUTPATIENT
Start: 2022-02-18 | End: 2022-04-25

## 2022-02-18 NOTE — TELEPHONE ENCOUNTER
"Last Written Prescription Date:  1/17/2022  Last Fill Quantity: 30,  # refills: 0   Last office visit provider:  2/9/2022 Dr. Bundy     Requested Prescriptions   Pending Prescriptions Disp Refills     DULoxetine (CYMBALTA) 30 MG capsule 30 capsule 0     Sig: Take 1 capsule (30 mg) by mouth daily       Serotonin-Norepinephrine Reuptake Inhibitors  Passed - 2/16/2022  3:02 PM        Passed - Blood pressure under 140/90 in past 12 months     BP Readings from Last 3 Encounters:   02/09/22 118/60   01/17/22 122/64   11/15/21 90/60                 Passed - PHQ-9 score of less than 5 in past 6 months     Please review last PHQ-9 score.           Passed - Medication is active on med list        Passed - Patient is age 18 or older        Passed - Recent (6 mo) or future (30 days) visit within the authorizing provider's specialty     Patient had office visit in the last 6 months or has a visit in the next 30 days with authorizing provider or within the authorizing provider's specialty.  See \"Patient Info\" tab in inbasket, or \"Choose Columns\" in Meds & Orders section of the refill encounter.                 Gina Olson RN 02/18/22 11:18 AM  "

## 2022-03-07 DIAGNOSIS — I10 ESSENTIAL HYPERTENSION: ICD-10-CM

## 2022-03-08 RX ORDER — METOPROLOL SUCCINATE 50 MG/1
TABLET, EXTENDED RELEASE ORAL
Qty: 90 TABLET | Refills: 1 | Status: SHIPPED | OUTPATIENT
Start: 2022-03-08 | End: 2022-09-01

## 2022-03-08 NOTE — TELEPHONE ENCOUNTER
"Outpatient Medication Detail     Disp Refills Start End ALO   metoprolol succinate (TOPROL-XL) 50 MG 24 hr tablet 90 tablet 2 1/31/2021  No   Sig: TAKE 1 TABLET(50 MG) BY MOUTH DAILY   Sent to pharmacy as: metoprolol succinate ER 50 mg tablet,extended release 24 hr (TOPROL-XL)   E-Prescribing Status: Receipt confirmed by pharmacy (1/31/2021 12:44 PM CST)       metoprolol succinate (TOPROL-XL) 50 MG 24 hr tablet [[id number]]    Electronically signed by: Karin Todd RN on 01/31/21 1244 Status: Active   Ordering user: Karin Todd RN 01/31/21 1244 Ordering provider: Ronnie Bundy MD   Authorized by: Ronnie Bundy MD   Frequency:  01/31/21 - Until Discontinued Released by: Karin Todd RN 01/31/21 1244   Diagnoses  Essential hypertension [I10]     Routing refill request to provider for review/approval because:  A break in medication    Last office visit provider:  2/9/22     Requested Prescriptions   Pending Prescriptions Disp Refills     metoprolol succinate ER (TOPROL-XL) 50 MG 24 hr tablet [Pharmacy Med Name: METOPROLOL ER SUCCINATE 50MG TABS] 90 tablet      Sig: TAKE 1 TABLET(50 MG) BY MOUTH DAILY       Beta-Blockers Protocol Passed - 3/8/2022  1:19 PM        Passed - Blood pressure under 140/90 in past 12 months     BP Readings from Last 3 Encounters:   02/09/22 118/60   01/17/22 122/64   11/15/21 90/60                 Passed - Patient is age 6 or older        Passed - Recent (12 mo) or future (30 days) visit within the authorizing provider's specialty     Patient has had an office visit with the authorizing provider or a provider within the authorizing providers department within the previous 12 mos or has a future within next 30 days. See \"Patient Info\" tab in inbasket, or \"Choose Columns\" in Meds & Orders section of the refill encounter.              Passed - Medication is active on med list             Gagan Burrell RN 03/08/22 1:19 PM  "

## 2022-04-21 DIAGNOSIS — Z53.9 DIAGNOSIS NOT YET DEFINED: Primary | ICD-10-CM

## 2022-04-21 PROCEDURE — G0180 MD CERTIFICATION HHA PATIENT: HCPCS | Performed by: INTERNAL MEDICINE

## 2022-04-25 ENCOUNTER — OFFICE VISIT (OUTPATIENT)
Dept: INTERNAL MEDICINE | Facility: CLINIC | Age: 72
End: 2022-04-25
Payer: MEDICARE

## 2022-04-25 VITALS
WEIGHT: 215 LBS | OXYGEN SATURATION: 98 % | DIASTOLIC BLOOD PRESSURE: 65 MMHG | BODY MASS INDEX: 33.74 KG/M2 | SYSTOLIC BLOOD PRESSURE: 115 MMHG | HEART RATE: 69 BPM | HEIGHT: 67 IN | TEMPERATURE: 98.2 F

## 2022-04-25 DIAGNOSIS — R35.1 BPH ASSOCIATED WITH NOCTURIA: ICD-10-CM

## 2022-04-25 DIAGNOSIS — N40.1 BPH ASSOCIATED WITH NOCTURIA: ICD-10-CM

## 2022-04-25 DIAGNOSIS — L12.0 BULLOUS PEMPHIGOID (H): ICD-10-CM

## 2022-04-25 DIAGNOSIS — F10.20 ALCOHOLISM (H): ICD-10-CM

## 2022-04-25 DIAGNOSIS — Z23 HIGH PRIORITY FOR 2019-NCOV VACCINE: ICD-10-CM

## 2022-04-25 DIAGNOSIS — E66.09 CLASS 1 OBESITY DUE TO EXCESS CALORIES WITHOUT SERIOUS COMORBIDITY WITH BODY MASS INDEX (BMI) OF 34.0 TO 34.9 IN ADULT: ICD-10-CM

## 2022-04-25 DIAGNOSIS — G89.4 CHRONIC PAIN SYNDROME: ICD-10-CM

## 2022-04-25 DIAGNOSIS — D64.9 ANEMIA, UNSPECIFIED TYPE: ICD-10-CM

## 2022-04-25 DIAGNOSIS — E66.811 CLASS 1 OBESITY DUE TO EXCESS CALORIES WITHOUT SERIOUS COMORBIDITY WITH BODY MASS INDEX (BMI) OF 34.0 TO 34.9 IN ADULT: ICD-10-CM

## 2022-04-25 DIAGNOSIS — I10 ESSENTIAL HYPERTENSION: ICD-10-CM

## 2022-04-25 DIAGNOSIS — Z00.00 ENCOUNTER FOR MEDICARE ANNUAL WELLNESS EXAM: Primary | ICD-10-CM

## 2022-04-25 DIAGNOSIS — Q68.2 PATELLA ALTA: ICD-10-CM

## 2022-04-25 DIAGNOSIS — M25.50 ARTHRALGIA, UNSPECIFIED JOINT: ICD-10-CM

## 2022-04-25 DIAGNOSIS — F32.0 MILD MAJOR DEPRESSION (H): ICD-10-CM

## 2022-04-25 DIAGNOSIS — M79.10 MYALGIA: ICD-10-CM

## 2022-04-25 PROBLEM — R11.2 NAUSEA AND VOMITING, INTRACTABILITY OF VOMITING NOT SPECIFIED, UNSPECIFIED VOMITING TYPE: Status: RESOLVED | Noted: 2021-10-23 | Resolved: 2022-04-25

## 2022-04-25 LAB
ALBUMIN SERPL-MCNC: 3.8 G/DL (ref 3.5–5)
ALBUMIN UR-MCNC: NEGATIVE MG/DL
ALP SERPL-CCNC: 104 U/L (ref 45–120)
ALT SERPL W P-5'-P-CCNC: 35 U/L (ref 0–45)
ANION GAP SERPL CALCULATED.3IONS-SCNC: 12 MMOL/L (ref 5–18)
APPEARANCE UR: CLEAR
AST SERPL W P-5'-P-CCNC: 31 U/L (ref 0–40)
BASOPHILS # BLD AUTO: 0.1 10E3/UL (ref 0–0.2)
BASOPHILS NFR BLD AUTO: 1 %
BILIRUB SERPL-MCNC: 0.6 MG/DL (ref 0–1)
BILIRUB UR QL STRIP: NEGATIVE
BUN SERPL-MCNC: 20 MG/DL (ref 8–28)
C REACTIVE PROTEIN LHE: 0.3 MG/DL (ref 0–0.8)
CALCIUM SERPL-MCNC: 9.6 MG/DL (ref 8.5–10.5)
CHLORIDE BLD-SCNC: 99 MMOL/L (ref 98–107)
CHOLEST SERPL-MCNC: 154 MG/DL
CK SERPL-CCNC: 109 U/L (ref 30–190)
CO2 SERPL-SCNC: 24 MMOL/L (ref 22–31)
COLOR UR AUTO: YELLOW
CREAT SERPL-MCNC: 0.94 MG/DL (ref 0.7–1.3)
EOSINOPHIL # BLD AUTO: 0.4 10E3/UL (ref 0–0.7)
EOSINOPHIL NFR BLD AUTO: 5 %
ERYTHROCYTE [DISTWIDTH] IN BLOOD BY AUTOMATED COUNT: 13 % (ref 10–15)
ERYTHROCYTE [SEDIMENTATION RATE] IN BLOOD BY WESTERGREN METHOD: 13 MM/HR (ref 0–15)
FASTING STATUS PATIENT QL REPORTED: YES
FERRITIN SERPL-MCNC: 113 NG/ML (ref 27–300)
FOLATE SERPL-MCNC: >20 NG/ML
GFR SERPL CREATININE-BSD FRML MDRD: 86 ML/MIN/1.73M2
GLUCOSE BLD-MCNC: 94 MG/DL (ref 70–125)
GLUCOSE UR STRIP-MCNC: NEGATIVE MG/DL
HCT VFR BLD AUTO: 41.9 % (ref 40–53)
HDLC SERPL-MCNC: 42 MG/DL
HGB BLD-MCNC: 13.8 G/DL (ref 13.3–17.7)
HGB UR QL STRIP: NEGATIVE
IMM GRANULOCYTES # BLD: 0 10E3/UL
IMM GRANULOCYTES NFR BLD: 0 %
IRON SATN MFR SERPL: 35 % (ref 15–46)
IRON SERPL-MCNC: 107 UG/DL (ref 35–180)
KETONES UR STRIP-MCNC: NEGATIVE MG/DL
LDLC SERPL CALC-MCNC: 77 MG/DL
LEUKOCYTE ESTERASE UR QL STRIP: NEGATIVE
LYMPHOCYTES # BLD AUTO: 1.6 10E3/UL (ref 0.8–5.3)
LYMPHOCYTES NFR BLD AUTO: 22 %
MCH RBC QN AUTO: 29.4 PG (ref 26.5–33)
MCHC RBC AUTO-ENTMCNC: 32.9 G/DL (ref 31.5–36.5)
MCV RBC AUTO: 89 FL (ref 78–100)
MONOCYTES # BLD AUTO: 0.7 10E3/UL (ref 0–1.3)
MONOCYTES NFR BLD AUTO: 9 %
NEUTROPHILS # BLD AUTO: 4.7 10E3/UL (ref 1.6–8.3)
NEUTROPHILS NFR BLD AUTO: 63 %
NITRATE UR QL: NEGATIVE
PH UR STRIP: 5.5 [PH] (ref 5–8)
PLATELET # BLD AUTO: 235 10E3/UL (ref 150–450)
POTASSIUM BLD-SCNC: 5.2 MMOL/L (ref 3.5–5)
PROT SERPL-MCNC: 6.4 G/DL (ref 6–8)
RBC # BLD AUTO: 4.7 10E6/UL (ref 4.4–5.9)
SODIUM SERPL-SCNC: 135 MMOL/L (ref 136–145)
SP GR UR STRIP: 1.02 (ref 1–1.03)
TIBC SERPL-MCNC: 308 UG/DL (ref 240–430)
TRIGL SERPL-MCNC: 176 MG/DL
UROBILINOGEN UR STRIP-ACNC: 0.2 E.U./DL
VIT B12 SERPL-MCNC: 1258 PG/ML (ref 213–816)
WBC # BLD AUTO: 7.4 10E3/UL (ref 4–11)

## 2022-04-25 PROCEDURE — 36415 COLL VENOUS BLD VENIPUNCTURE: CPT | Performed by: INTERNAL MEDICINE

## 2022-04-25 PROCEDURE — G0439 PPPS, SUBSEQ VISIT: HCPCS | Performed by: INTERNAL MEDICINE

## 2022-04-25 PROCEDURE — 99214 OFFICE O/P EST MOD 30 MIN: CPT | Mod: 25 | Performed by: INTERNAL MEDICINE

## 2022-04-25 PROCEDURE — 0054A COVID-19,PF,PFIZER (12+ YRS): CPT | Performed by: INTERNAL MEDICINE

## 2022-04-25 PROCEDURE — 80053 COMPREHEN METABOLIC PANEL: CPT | Performed by: INTERNAL MEDICINE

## 2022-04-25 PROCEDURE — 91305 COVID-19,PF,PFIZER (12+ YRS): CPT | Performed by: INTERNAL MEDICINE

## 2022-04-25 PROCEDURE — 85652 RBC SED RATE AUTOMATED: CPT | Performed by: INTERNAL MEDICINE

## 2022-04-25 PROCEDURE — 81003 URINALYSIS AUTO W/O SCOPE: CPT | Performed by: INTERNAL MEDICINE

## 2022-04-25 PROCEDURE — 82746 ASSAY OF FOLIC ACID SERUM: CPT | Performed by: INTERNAL MEDICINE

## 2022-04-25 PROCEDURE — 80061 LIPID PANEL: CPT | Performed by: INTERNAL MEDICINE

## 2022-04-25 PROCEDURE — 82550 ASSAY OF CK (CPK): CPT | Performed by: INTERNAL MEDICINE

## 2022-04-25 PROCEDURE — 83550 IRON BINDING TEST: CPT | Performed by: INTERNAL MEDICINE

## 2022-04-25 PROCEDURE — 86140 C-REACTIVE PROTEIN: CPT | Performed by: INTERNAL MEDICINE

## 2022-04-25 PROCEDURE — 85025 COMPLETE CBC W/AUTO DIFF WBC: CPT | Performed by: INTERNAL MEDICINE

## 2022-04-25 PROCEDURE — 82607 VITAMIN B-12: CPT | Performed by: INTERNAL MEDICINE

## 2022-04-25 PROCEDURE — 82728 ASSAY OF FERRITIN: CPT | Performed by: INTERNAL MEDICINE

## 2022-04-25 RX ORDER — DULOXETIN HYDROCHLORIDE 30 MG/1
30 CAPSULE, DELAYED RELEASE ORAL DAILY
Qty: 90 CAPSULE | Refills: 0 | Status: SHIPPED | OUTPATIENT
Start: 2022-04-25 | End: 2022-08-17

## 2022-04-25 RX ORDER — AMLODIPINE BESYLATE 2.5 MG/1
2.5 TABLET ORAL DAILY
Qty: 90 TABLET | Refills: 3 | Status: SHIPPED | OUTPATIENT
Start: 2022-04-25 | End: 2023-01-27

## 2022-04-25 RX ORDER — TAMSULOSIN HYDROCHLORIDE 0.4 MG/1
0.4 CAPSULE ORAL EVERY EVENING
Qty: 90 CAPSULE | Refills: 1 | Status: SHIPPED | OUTPATIENT
Start: 2022-04-25 | End: 2022-11-01

## 2022-04-25 RX ORDER — TRIAMCINOLONE ACETONIDE 1 MG/G
CREAM TOPICAL 2 TIMES DAILY PRN
COMMUNITY
End: 2023-05-22

## 2022-04-25 RX ORDER — BETAMETHASONE DIPROPIONATE 0.5 MG/G
CREAM TOPICAL
Qty: 45 G | Refills: 11 | Status: SHIPPED | OUTPATIENT
Start: 2022-04-25 | End: 2024-07-25

## 2022-04-25 ASSESSMENT — ACTIVITIES OF DAILY LIVING (ADL): CURRENT_FUNCTION: NO ASSISTANCE NEEDED

## 2022-04-25 NOTE — LETTER
April 29, 2022      Efrain JOSELINE Dugankeira  [address]  [place] [address]        Dear ,    We are writing to inform you of your test results.    Your labs are all looking good/better Efrain.         Resulted Orders   Comprehensive metabolic panel (BMP + Alb, Alk Phos, ALT, AST, Total. Bili, TP)   Result Value Ref Range    Sodium 135 (L) 136 - 145 mmol/L    Potassium 5.2 (H) 3.5 - 5.0 mmol/L    Chloride 99 98 - 107 mmol/L    Carbon Dioxide (CO2) 24 22 - 31 mmol/L    Anion Gap 12 5 - 18 mmol/L    Urea Nitrogen 20 8 - 28 mg/dL    Creatinine 0.94 0.70 - 1.30 mg/dL    Calcium 9.6 8.5 - 10.5 mg/dL    Glucose 94 70 - 125 mg/dL    Alkaline Phosphatase 104 45 - 120 U/L    AST 31 0 - 40 U/L    ALT 35 0 - 45 U/L    Protein Total 6.4 6.0 - 8.0 g/dL    Albumin 3.8 3.5 - 5.0 g/dL    Bilirubin Total 0.6 0.0 - 1.0 mg/dL    GFR Estimate 86 >60 mL/min/1.73m2      Comment:      Effective December 21, 2021 eGFRcr in adults is calculated using the 2021 CKD-EPI creatinine equation which includes age and gender (Raffaele et al., NEJM, DOI: 10.1056/HBIDoq5846410)   UA Macro with Reflex to Micro and Culture - lab collect   Result Value Ref Range    Color Urine Yellow Colorless, Straw, Light Yellow, Yellow    Appearance Urine Clear Clear    Glucose Urine Negative Negative mg/dL    Bilirubin Urine Negative Negative    Ketones Urine Negative Negative mg/dL    Specific Gravity Urine 1.020 1.005 - 1.030    Blood Urine Negative Negative    pH Urine 5.5 5.0 - 8.0    Protein Albumin Urine Negative Negative mg/dL    Urobilinogen Urine 0.2 0.2, 1.0 E.U./dL    Nitrite Urine Negative Negative    Leukocyte Esterase Urine Negative Negative    Narrative    Microscopic not indicated   Lipid panel reflex to direct LDL Fasting   Result Value Ref Range    Cholesterol 154 <=199 mg/dL    Triglycerides 176 (H) <=149 mg/dL    Direct Measure HDL 42 >=40 mg/dL      Comment:      HDL Cholesterol Reference Range:     0-2 years:   No reference ranges  established for patients under 2 years old  at [facility] for lipid analytes.    2-8 years:  Greater than 45 mg/dL     18 years and older:   Female: Greater than or equal to 50 mg/dL   Male:   Greater than or equal to 40 mg/dL    LDL Cholesterol Calculated 77 <=129 mg/dL    Patient Fasting > 8hrs? Yes    Ferritin   Result Value Ref Range    Ferritin 113 27 - 300 ng/mL   Iron and iron binding capacity   Result Value Ref Range    Iron 107 35 - 180 ug/dL    Iron Binding Capacity 308 240 - 430 ug/dL    Iron Sat Index 35 15 - 46 %   Vitamin B12   Result Value Ref Range    Vitamin B12 1,258 (H) 213 - 816 pg/mL   Folate   Result Value Ref Range    Folic Acid >20.0 >=3.5 ng/mL   ESR: Erythrocyte sedimentation rate   Result Value Ref Range    Erythrocyte Sedimentation Rate 13 0 - 15 mm/hr   CRP, inflammation   Result Value Ref Range    CRP 0.3 0.0 - 0.8 mg/dL   CK total   Result Value Ref Range     30 - 190 U/L   CBC with platelets and differential   Result Value Ref Range    WBC Count 7.4 4.0 - 11.0 10e3/uL    RBC Count 4.70 4.40 - 5.90 10e6/uL    Hemoglobin 13.8 13.3 - 17.7 g/dL    Hematocrit 41.9 40.0 - 53.0 %    MCV 89 78 - 100 fL    MCH 29.4 26.5 - 33.0 pg    MCHC 32.9 31.5 - 36.5 g/dL    RDW 13.0 10.0 - 15.0 %    Platelet Count 235 150 - 450 10e3/uL    % Neutrophils 63 %    % Lymphocytes 22 %    % Monocytes 9 %    % Eosinophils 5 %    % Basophils 1 %    % Immature Granulocytes 0 %    Absolute Neutrophils 4.7 1.6 - 8.3 10e3/uL    Absolute Lymphocytes 1.6 0.8 - 5.3 10e3/uL    Absolute Monocytes 0.7 0.0 - 1.3 10e3/uL    Absolute Eosinophils 0.4 0.0 - 0.7 10e3/uL    Absolute Basophils 0.1 0.0 - 0.2 10e3/uL    Absolute Immature Granulocytes 0.0 <=0.4 10e3/uL       If you have any questions or concerns, please call the clinic at the number listed above.       Sincerely,      Ronnie Bundy MD

## 2022-04-25 NOTE — PROGRESS NOTES
"SUBJECTIVE:   Efrain Gomes is a 72 year old male who presents for Preventive Visit.    Efrain comes in today for his annual wellness.  Lengthy discussion today.  He has alcoholism and is currently living in assisted living in [place].  That is going well.  He has been sober now for 6 months.  I congratulated him on this.  He still does not know what he is going to do with his condo.  He is probably going to sell it he tells me.  He is complaining a lot of aches and pains.  Knees hurt.  Left greater than right.  Lots of joint and muscle aches throughout.  His mood has been good though.  Cymbalta does not seem to help the aches and pains.  He also complains of urinary frequency.  He gets up every couple hours during the night.  He used to take tamsulosin but this \"interfered with his sex life\".  He would like to go back on it though.  PSA done in the last few months was normal and unchanged.  Patient has been advised of split billing requirements and indicates understanding: Yes  Are you in the first 12 months of your Medicare coverage?  No    Healthy Habits:     In general, how would you rate your overall health?  Good    Frequency of exercise:  4-5 days/week    Duration of exercise:  30-45 minutes    Do you usually eat at least 4 servings of fruit and vegetables a day, include whole grains    & fiber and avoid regularly eating high fat or \"junk\" foods?  Yes    Taking medications regularly:  Yes    Medication side effects:  Muscle aches    Ability to successfully perform activities of daily living:  No assistance needed    Home Safety:  No safety concerns identified    Hearing Impairment:  No hearing concerns    In the past 6 months, have you been bothered by leaking of urine?  No    In general, how would you rate your overall mental or emotional health?  Good      PHQ-2 Total Score: 0    Additional concerns today:  No    Do you feel safe in your environment? Yes    Have you ever done Advance Care Planning? " (For example, a Health Directive, POLST, or a discussion with a medical provider or your loved ones about your wishes): Yes, patient states has an Advance Care Planning document and will bring a copy to the clinic.      Fall risk  Fallen 2 or more times in the past year?: No  Any fall with injury in the past year?: No    Cognitive Screening   1) Repeat 3 items (Leader, Season, Table)    2) Clock draw: NORMAL  3) 3 item recall: Recalls 3 objects  Results: normal clock    Mini-CogTM Copyright RALPH Collado. Licensed by the author for use in [facility]; reprinted with permission (florencio@[facility]). All rights reserved.      Do you have sleep apnea, excessive snoring or daytime drowsiness?: no    Reviewed and updated as needed this visit by clinical staff   Tobacco  Allergies  Meds                Reviewed and updated as needed this visit by Provider                   Social History     Tobacco Use     Smoking status: Never Smoker     Smokeless tobacco: Never Used   Substance Use Topics     Alcohol use: Yes     If you drink alcohol do you typically have >3 drinks per day or >7 drinks per week? No    Alcohol Use 4/25/2022   Prescreen: >3 drinks/day or >7 drinks/week? Not Applicable               Current providers sharing in care for this patient include:   Patient Care Team:  Ronnie Bundy MD as PCP - General (Internal Medicine)  Ronnie Bundy MD as Assigned PCP    The following health maintenance items are reviewed in Epic and correct as of today:  Health Maintenance Due   Topic Date Due     ZOSTER IMMUNIZATION (2 of 3) 09/21/2010     AORTIC ANEURYSM SCREENING (SYSTEM ASSIGNED)  Never done     MEDICARE ANNUAL WELLNESS VISIT  01/09/2020               Review of Systems  Constitutional, HEENT, cardiovascular, pulmonary, GI, , musculoskeletal, neuro, skin, endocrine and psych systems are negative, except as otherwise noted.    OBJECTIVE:   /65 (BP Location: Left arm, Patient Position: Sitting, Cuff Size:  "Adult Regular)   Pulse 69   Temp 98.2  F (36.8  C)   Ht 1.689 m (5' 6.5\")   Wt 97.5 kg (215 lb)   SpO2 98%   BMI 34.18 kg/m   Estimated body mass index is 34.18 kg/m  as calculated from the following:    Height as of this encounter: 1.689 m (5' 6.5\").    Weight as of this encounter: 97.5 kg (215 lb).  Physical Exam  GENERAL: healthy, alert and no distress  EYES: Eyes grossly normal to inspection, PERRL and conjunctivae and sclerae normal  NECK: no adenopathy, no asymmetry, masses, or scars and thyroid normal to palpation  RESP: lungs clear to auscultation - no rales, rhonchi or wheezes  CV: regular rate and rhythm, normal S1 S2, no S3 or S4, no murmur, click or rub, no peripheral edema and peripheral pulses strong  ABDOMEN: soft, nontender, no hepatosplenomegaly, no masses and bowel sounds normal  RECTAL (male): Large anal skin tags.  Normal tone.  Unable to fully palpate prostate.  MS: no gross musculoskeletal defects noted, no edema        ASSESSMENT / PLAN:   1. Encounter for Medicare annual wellness exam  I have urged shingles vaccination through his pharmacy.  He will bring in his healthcare directive.  I have urged more regular exercise and working on weight loss    2. BPH associated with nocturia  Get him back on Flomax and follow-up with me on the phone in few weeks.  - tamsulosin (FLOMAX) 0.4 MG capsule; Take 1 capsule (0.4 mg) by mouth every evening  Dispense: 90 capsule; Refill: 1    3. Alcoholism (H)  Congratulated him on his sobriety.    4. Essential hypertension  Blood pressure is excellent.  - amLODIPine (NORVASC) 2.5 MG tablet; Take 1 tablet (2.5 mg) by mouth daily  Dispense: 90 tablet; Refill: 3  - Lipid panel reflex to direct LDL Fasting; Future  - Lipid panel reflex to direct LDL Fasting    5. Mild major depression (H)  Continue Cymbalta.  - DULoxetine (CYMBALTA) 30 MG capsule; Take 1 capsule (30 mg) by mouth daily  Dispense: 90 capsule; Refill: 0    6. Chronic pain syndrome  As above.  - " "DULoxetine (CYMBALTA) 30 MG capsule; Take 1 capsule (30 mg) by mouth daily  Dispense: 90 capsule; Refill: 0    7.  Atopic dermatitis    - betamethasone dipropionate (DIPROSONE) 0.05 % external cream; [BETAMETHASONE DIPROPIONATE (DIPROLENE) 0.05 % CREAM] STEPHEN EXT AA BID PRN  Dispense: 45 g; Refill: 11    8. Class 1 obesity due to excess calories without serious comorbidity with body mass index (BMI) of 34.0 to 34.9 in adult  Urged more regular exercise    9. High priority for 2019-nCoV vaccine    - COVID-19,PF,PFIZER (12+ Yrs GRAY LABEL)    10. Anemia, unspecified type  Mild anemia likely due to his recent pancreatitis.  Recheck today  - CBC with platelets and differential; Future  - Comprehensive metabolic panel (BMP + Alb, Alk Phos, ALT, AST, Total. Bili, TP); Future  - UA Macro with Reflex to Micro and Culture - lab collect; Future  - Ferritin; Future  - Iron and iron binding capacity; Future  - Vitamin B12; Future  - Folate; Future  - CBC with platelets and differential  - Comprehensive metabolic panel (BMP + Alb, Alk Phos, ALT, AST, Total. Bili, TP)  - UA Macro with Reflex to Micro and Culture - lab collect  - Ferritin  - Iron and iron binding capacity  - Vitamin B12  - Folate    11. Arthralgia, unspecified joint  Unclear etiology.  Check x-rays left knee.  - ESR: Erythrocyte sedimentation rate; Future  - CRP, inflammation; Future  - XR Knee Left 1/2 Views; Future  - ESR: Erythrocyte sedimentation rate  - CRP, inflammation    12. Myalgia    - ESR: Erythrocyte sedimentation rate; Future  - CRP, inflammation; Future  - CK total; Future  - ESR: Erythrocyte sedimentation rate  - CRP, inflammation  - CK total    Patient has been advised of split billing requirements and indicates understanding: Yes    COUNSELING:  Reviewed preventive health counseling, as reflected in patient instructions    Estimated body mass index is 34.18 kg/m  as calculated from the following:    Height as of this encounter: 1.689 m (5' 6.5\").    " Weight as of this encounter: 97.5 kg (215 lb).    Weight management plan: Patient was referred to their PCP to discuss a diet and exercise plan.    He reports that he has never smoked. He has never used smokeless tobacco.      Appropriate preventive services were discussed with this patient, including applicable screening as appropriate for cardiovascular disease, diabetes, osteopenia/osteoporosis, and glaucoma.  As appropriate for age/gender, discussed screening for colorectal cancer, prostate cancer, breast cancer, and cervical cancer. Checklist reviewing preventive services available has been given to the patient.    Reviewed patients plan of care and provided an AVS. The Basic Care Plan (routine screening as documented in Health Maintenance) for Efrain meets the Care Plan requirement. This Care Plan has been established and reviewed with the Patient.    Counseling Resources:  ATP IV Guidelines  Pooled Cohorts Equation Calculator  Breast Cancer Risk Calculator  Breast Cancer: Medication to Reduce Risk  FRAX Risk Assessment  ICSI Preventive Guidelines  Dietary Guidelines for Americans, 2010  USDA's MyPlate  ASA Prophylaxis  Lung CA Screening    ANAY NAVARRO MD  [facility]    Identified Health Risks:

## 2022-04-26 ASSESSMENT — PATIENT HEALTH QUESTIONNAIRE - PHQ9: SUM OF ALL RESPONSES TO PHQ QUESTIONS 1-9: 1

## 2022-04-27 NOTE — TELEPHONE ENCOUNTER
DIAGNOSIS: arthralgia/patella cindy L knee/Dr. Bundy/XR   APPOINTMENT DATE: 5.3.22   NOTES STATUS DETAILS   OFFICE NOTE from referring provider Internal 4.25.22 Dr Ronnie Bundy, [facility]   MEDICATION LIST Internal    XRAYS (IMAGES & REPORTS) Internal 4.25.22 L knee

## 2022-05-02 DIAGNOSIS — E78.00 HYPERCHOLESTEREMIA: ICD-10-CM

## 2022-05-02 NOTE — PROGRESS NOTES
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  (primary encounter diagnosis)  Comment: exam consistent w/ chondromalacia and degenerative MM tear; he was fitted for a brace today w/ good relief; he will trial naproxen prn and continue his home strengthening exercises; f/u in 6 wks; if no better, consider cortisone  Plan: naproxen (NAPROSYN) 500 MG tablet          (M23.204) Degenerative tear of left medial meniscus  Comment: see above  Plan: naproxen (NAPROSYN) 500 MG tablet          Pt wishes to see me in 2 weeks for low back pain    Govind Calhoun MD  May 3, 2022  11:08 AM        Pt is a 72 year old male here today for:     Left Medial Knee pain :   Duration? Several years   Injury/ Inciting activity? Doesn't recall an injury   Pop? Yes   Swelling? Slight swelling   Limited motion? Stiffness with knee extension   Locking/Catching? Yes, catching  Giving way/ instability? Yes   Imaging? Xray 4/25/22  IMPRESSION: Patella cindy. Small effusion. No evidence for fracture or overall compartmental narrowing. Mild prepatellar edema.  Treatment? Physical Therapy, Tylenol  Lives on the 3rd floor of an old home w/o an elevator -> going down stairs was so hard that he has temporarily moved into an assisted living facility      Per 4/25/22 preventative care visit -  Normal labwork from this visit   Arthralgia, unspecified joint  Unclear etiology.  Check x-rays left knee.  - ESR: Erythrocyte sedimentation rate; Future  - CRP, inflammation; Future  - XR Knee Left 1/2 Views; Future  - ESR: Erythrocyte sedimentation rate  - CRP, inflammation      Patient Active Problem List   Diagnosis Code     ED (erectile dysfunction) N52.9     Chronic sinusitis J32.9     Low back pain M54.50     Hypercholesteremia E78.00     Essential hypertension I10     Obesity E66.9     Alcoholism (H) F10.20     Fatty liver K76.0     Bullous pemphigoid L12.0     Elevated lipase R74.8     Current Outpatient Medications   Medication Sig Dispense  "Refill     acetaminophen (TYLENOL) 500 MG tablet Take 500-1,000 mg by mouth every 6 hours as needed for mild pain       amLODIPine (NORVASC) 2.5 MG tablet Take 1 tablet (2.5 mg) by mouth daily 90 tablet 3     atorvastatin (LIPITOR) 10 MG tablet Take 1 tablet (10 mg) by mouth every evening       betamethasone dipropionate (DIPROSONE) 0.05 % external cream [BETAMETHASONE DIPROPIONATE (DIPROLENE) 0.05 % CREAM] STEPHEN EXT AA BID PRN 45 g 11     cholecalciferol, vitamin D3, (VITAMIN D3) 2,000 unit Tab [CHOLECALCIFEROL, VITAMIN D3, (VITAMIN D3) 2,000 UNIT TAB] Take 1 tablet by mouth daily.       DULoxetine (CYMBALTA) 30 MG capsule Take 1 capsule (30 mg) by mouth daily 90 capsule 0     lisinopril (ZESTRIL) 40 MG tablet TAKE 1 TABLET(40 MG) BY MOUTH DAILY 90 tablet 1     metoprolol succinate ER (TOPROL-XL) 50 MG 24 hr tablet TAKE 1 TABLET(50 MG) BY MOUTH DAILY 90 tablet 1     multivitamin w/minerals (THERA-VIT-M) tablet Take 1 tablet by mouth daily       senna (SENOKOT) 8.6 MG tablet Take 1 tablet by mouth daily       tamsulosin (FLOMAX) 0.4 MG capsule Take 1 capsule (0.4 mg) by mouth every evening 90 capsule 1     triamcinolone (KENALOG) 0.1 % external cream Apply topically 2 times daily as needed for irritation        Allergies   Allergen Reactions     Septra [Sulfamethoxazole W/Trimethoprim] Rash     Sulfamethoxazole-Trimethoprim Rash      ROS:   Gen- no fevers/chills   Rheum - no morning stiffness   Derm - no rash/ redness   Neuro - no numbness, no tingling   Remainder of ROS negative.     Exam:   Ht 1.689 m (5' 6.5\")   Wt 97.5 kg (215 lb)   BMI 34.18 kg/m       L Knee:   ROM: 0-120; Crepitus: YES   Effusion: NO ; Swelling: NO   Strength: Full in flexion/ extension   Tenderness: Patella - NO Medial joint line - YES; Lateral joint line - NO; Quad tendon - No; Patellar tendon- YES; Hamstring - NO.   Cruciates: anterior drawer - neg/posterior drawer -neg. Lachman - neg   Collaterals: varus -neg/valgus -neg.   Patella: " patellar compression - neg; single leg bend- neg   Meniscus: Shona - neg; Thessaly - neg   Maneuvers: Marilee - neg     Xray of L knee on 4/25/2022  - films personally reviewed with patient at time of visit     My impression: mild PF and medial OA

## 2022-05-03 ENCOUNTER — OFFICE VISIT (OUTPATIENT)
Dept: ORTHOPEDICS | Facility: CLINIC | Age: 72
End: 2022-05-03
Payer: MEDICARE

## 2022-05-03 ENCOUNTER — PRE VISIT (OUTPATIENT)
Dept: ORTHOPEDICS | Facility: CLINIC | Age: 72
End: 2022-05-03
Payer: MEDICARE

## 2022-05-03 VITALS — BODY MASS INDEX: 33.74 KG/M2 | WEIGHT: 215 LBS | HEIGHT: 67 IN

## 2022-05-03 DIAGNOSIS — M22.42 CHONDROMALACIA OF LEFT PATELLA: Primary | ICD-10-CM

## 2022-05-03 DIAGNOSIS — M23.204 DEGENERATIVE TEAR OF LEFT MEDIAL MENISCUS: ICD-10-CM

## 2022-05-03 PROCEDURE — 99203 OFFICE O/P NEW LOW 30 MIN: CPT | Performed by: FAMILY MEDICINE

## 2022-05-03 RX ORDER — NAPROXEN 500 MG/1
500 TABLET ORAL 2 TIMES DAILY WITH MEALS
Qty: 28 TABLET | Refills: 3 | Status: SHIPPED | OUTPATIENT
Start: 2022-05-03 | End: 2022-05-17

## 2022-05-03 NOTE — LETTER
5/3/2022      RE: Efrain Gomes  [address]  [place] [address]     Dear Colleague,    Thank you for referring your patient, Efrain Gomes, to the [facility] [place]. Please see a copy of my visit note below.    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  (primary encounter diagnosis)  Comment: exam consistent w/ chondromalacia and degenerative MM tear; he was fitted for a brace today w/ good relief; he will trial naproxen prn and continue his home strengthening exercises; f/u in 6 wks; if no better, consider cortisone  Plan: naproxen (NAPROSYN) 500 MG tablet          (M23.204) Degenerative tear of left medial meniscus  Comment: see above  Plan: naproxen (NAPROSYN) 500 MG tablet          Pt wishes to see me in 2 weeks for low back pain    Govind Calhoun MD  May 3, 2022  11:08 AM        Pt is a 72 year old male here today for:     Left Medial Knee pain :   Duration? Several years   Injury/ Inciting activity? Doesn't recall an injury   Pop? Yes   Swelling? Slight swelling   Limited motion? Stiffness with knee extension   Locking/Catching? Yes, catching  Giving way/ instability? Yes   Imaging? Xray 4/25/22  IMPRESSION: Patella cindy. Small effusion. No evidence for fracture or overall compartmental narrowing. Mild prepatellar edema.  Treatment? Physical Therapy, Tylenol  Lives on the 3rd floor of an old home w/o an elevator -> going down stairs was so hard that he has temporarily moved into an assisted living facility      Per 4/25/22 preventative care visit -  Normal labwork from this visit   Arthralgia, unspecified joint  Unclear etiology.  Check x-rays left knee.  - ESR: Erythrocyte sedimentation rate; Future  - CRP, inflammation; Future  - XR Knee Left 1/2 Views; Future  - ESR: Erythrocyte sedimentation rate  - CRP, inflammation      Patient Active Problem List   Diagnosis Code     ED (erectile dysfunction) N52.9     Chronic sinusitis J32.9     Low  "back pain M54.50     Hypercholesteremia E78.00     Essential hypertension I10     Obesity E66.9     Alcoholism (H) F10.20     Fatty liver K76.0     Bullous pemphigoid L12.0     Elevated lipase R74.8     Current Outpatient Medications   Medication Sig Dispense Refill     acetaminophen (TYLENOL) 500 MG tablet Take 500-1,000 mg by mouth every 6 hours as needed for mild pain       amLODIPine (NORVASC) 2.5 MG tablet Take 1 tablet (2.5 mg) by mouth daily 90 tablet 3     atorvastatin (LIPITOR) 10 MG tablet Take 1 tablet (10 mg) by mouth every evening       betamethasone dipropionate (DIPROSONE) 0.05 % external cream [BETAMETHASONE DIPROPIONATE (DIPROLENE) 0.05 % CREAM] STEPHEN EXT AA BID PRN 45 g 11     cholecalciferol, vitamin D3, (VITAMIN D3) 2,000 unit Tab [CHOLECALCIFEROL, VITAMIN D3, (VITAMIN D3) 2,000 UNIT TAB] Take 1 tablet by mouth daily.       DULoxetine (CYMBALTA) 30 MG capsule Take 1 capsule (30 mg) by mouth daily 90 capsule 0     lisinopril (ZESTRIL) 40 MG tablet TAKE 1 TABLET(40 MG) BY MOUTH DAILY 90 tablet 1     metoprolol succinate ER (TOPROL-XL) 50 MG 24 hr tablet TAKE 1 TABLET(50 MG) BY MOUTH DAILY 90 tablet 1     multivitamin w/minerals (THERA-VIT-M) tablet Take 1 tablet by mouth daily       senna (SENOKOT) 8.6 MG tablet Take 1 tablet by mouth daily       tamsulosin (FLOMAX) 0.4 MG capsule Take 1 capsule (0.4 mg) by mouth every evening 90 capsule 1     triamcinolone (KENALOG) 0.1 % external cream Apply topically 2 times daily as needed for irritation        Allergies   Allergen Reactions     Septra [Sulfamethoxazole W/Trimethoprim] Rash     Sulfamethoxazole-Trimethoprim Rash      ROS:   Gen- no fevers/chills   Rheum - no morning stiffness   Derm - no rash/ redness   Neuro - no numbness, no tingling   Remainder of ROS negative.     Exam:   Ht 1.689 m (5' 6.5\")   Wt 97.5 kg (215 lb)   BMI 34.18 kg/m       L Knee:   ROM: 0-120; Crepitus: YES   Effusion: NO ; Swelling: NO   Strength: Full in flexion/ extension "   Tenderness: Patella - NO Medial joint line - YES; Lateral joint line - NO; Quad tendon - No; Patellar tendon- YES; Hamstring - NO.   Cruciates: anterior drawer - neg/posterior drawer -neg. Lachman - neg   Collaterals: varus -neg/valgus -neg.   Patella: patellar compression - neg; single leg bend- neg   Meniscus: Shona - neg; Thessaly - neg   Maneuvers: Marilee - neg     Xray of L knee on 4/25/2022  - films personally reviewed with patient at time of visit     My impression: mild PF and medial OA         Again, thank you for allowing me to participate in the care of your patient.      Sincerely,    Govind Calhoun MD

## 2022-05-03 NOTE — TELEPHONE ENCOUNTER
DIAGNOSIS: Low back pain with sciatica down left leg   APPOINTMENT DATE: 5.17.22   NOTES STATUS DETAILS   MEDICATION LIST Internal

## 2022-05-05 RX ORDER — ATORVASTATIN CALCIUM 10 MG/1
TABLET, FILM COATED ORAL
Qty: 90 TABLET | Refills: 3 | Status: SHIPPED | OUTPATIENT
Start: 2022-05-05 | End: 2023-04-18

## 2022-05-05 NOTE — TELEPHONE ENCOUNTER
"Outpatient Medication Detail     Disp Refills Start End ALO   atorvastatin (LIPITOR) 10 MG tablet 90 tablet 2 1/7/2021  No   Sig: TAKE 1 TABLET(10 MG) BY MOUTH DAILY   Sent to pharmacy as: atorvastatin 10 mg tablet (LIPITOR)   E-Prescribing Status: Receipt confirmed by pharmacy (1/7/2021  8:37 AM CST)       atorvastatin (LIPITOR) 10 MG tablet [[id number]]    Electronically signed by: Karin Todd RN on 01/07/21 0837 Status: Active   Ordering user: Karin Todd RN 01/07/21 0837 Ordering provider: Ronnie Bundy MD   Authorized by: Ronnie Bundy MD   Frequency:  01/07/21 - Until Discontinued Released by: Karin Todd RN 01/07/21 0837   Diagnoses  Hyperlipidemia [E78.5]     Routing refill request to provider for review/approval because:  A break in medication    Last office visit provider:  4/25/22     Requested Prescriptions   Pending Prescriptions Disp Refills     atorvastatin (LIPITOR) 10 MG tablet [Pharmacy Med Name: ATORVASTATIN 10MG TABLETS] 90 tablet      Sig: TAKE 1 TABLET(10 MG) BY MOUTH DAILY       Statins Protocol Passed - 5/5/2022 10:36 AM        Passed - LDL on file in past 12 months     Recent Labs   Lab Test 04/25/22  1302   LDL 77             Passed - No abnormal creatine kinase in past 12 months     Recent Labs   Lab Test 04/25/22  1302                   Passed - Recent (12 mo) or future (30 days) visit within the authorizing provider's specialty     Patient has had an office visit with the authorizing provider or a provider within the authorizing providers department within the previous 12 mos or has a future within next 30 days. See \"Patient Info\" tab in inbasket, or \"Choose Columns\" in Meds & Orders section of the refill encounter.              Passed - Medication is active on med list        Passed - Patient is age 18 or older             Gagan Burrell RN 05/05/22 10:37 AM    "

## 2022-05-17 ENCOUNTER — ANCILLARY PROCEDURE (OUTPATIENT)
Dept: GENERAL RADIOLOGY | Facility: CLINIC | Age: 72
End: 2022-05-17
Attending: FAMILY MEDICINE
Payer: MEDICARE

## 2022-05-17 ENCOUNTER — PRE VISIT (OUTPATIENT)
Dept: ORTHOPEDICS | Facility: CLINIC | Age: 72
End: 2022-05-17
Payer: MEDICARE

## 2022-05-17 ENCOUNTER — OFFICE VISIT (OUTPATIENT)
Dept: ORTHOPEDICS | Facility: CLINIC | Age: 72
End: 2022-05-17
Payer: MEDICARE

## 2022-05-17 VITALS — HEIGHT: 67 IN | WEIGHT: 215 LBS | BODY MASS INDEX: 33.74 KG/M2

## 2022-05-17 DIAGNOSIS — M54.50 LOW BACK PAIN: Primary | ICD-10-CM

## 2022-05-17 DIAGNOSIS — M54.50 LOW BACK PAIN: ICD-10-CM

## 2022-05-17 DIAGNOSIS — M54.16 LUMBAR RADICULOPATHY: Primary | ICD-10-CM

## 2022-05-17 PROCEDURE — 72100 X-RAY EXAM L-S SPINE 2/3 VWS: CPT | Performed by: RADIOLOGY

## 2022-05-17 PROCEDURE — 99214 OFFICE O/P EST MOD 30 MIN: CPT | Performed by: FAMILY MEDICINE

## 2022-05-17 RX ORDER — GABAPENTIN 100 MG/1
100 CAPSULE ORAL 3 TIMES DAILY
Qty: 42 CAPSULE | Refills: 1 | Status: SHIPPED | OUTPATIENT
Start: 2022-05-17 | End: 2022-06-21

## 2022-05-17 NOTE — PROGRESS NOTES
ASSESSMENT/PLAN:    (M54.16) Lumbar radiculopathy  (primary encounter diagnosis)  Comment: exam consistent w/ lumbar radic; reviewed tx options/ red flags; will start w/ PT; meds adjusted; precautions given; f/u in 4-6 wks; if no better, consider MRI  Plan: Physical Therapy Referral, gabapentin (NEURONTIN) 100 MG capsule          Govind Calhoun MD  May 17, 2022  2:50 PM        Pt is a 72 year old male last seen on 5/3/2022 for knee pain here today for:     Back pain:   Location: Left sided low back   Duration: About 10 years   Radiation: Yes, down left leg   Numbness/ Tingling? Occasional numbness and tingling in both feet  Weakness? Weakness in left leg and knee   Flexion or Extension bias: Flexion and twisting is the worst   Red flags? None   Meds tried?  Naproxen  PT?  Yes at his assisted living; also does fitness training  Imaging? XR 5/17/22     Back feels tired w/ walking     Patient Active Problem List   Diagnosis     ED (erectile dysfunction)     Chronic sinusitis     Low back pain     Hypercholesteremia     Essential hypertension     Obesity     Alcoholism (H)     Fatty liver     Bullous pemphigoid     Elevated lipase     No past medical history on file.   No past surgical history on file.   Current Outpatient Medications   Medication Sig Dispense Refill     acetaminophen (TYLENOL) 500 MG tablet Take 500-1,000 mg by mouth every 6 hours as needed for mild pain       amLODIPine (NORVASC) 2.5 MG tablet Take 1 tablet (2.5 mg) by mouth daily 90 tablet 3     atorvastatin (LIPITOR) 10 MG tablet TAKE 1 TABLET(10 MG) BY MOUTH DAILY 90 tablet 3     betamethasone dipropionate (DIPROSONE) 0.05 % external cream [BETAMETHASONE DIPROPIONATE (DIPROLENE) 0.05 % CREAM] STEPHEN EXT AA BID PRN 45 g 11     cholecalciferol, vitamin D3, (VITAMIN D3) 2,000 unit Tab [CHOLECALCIFEROL, VITAMIN D3, (VITAMIN D3) 2,000 UNIT TAB] Take 1 tablet by mouth daily.       DULoxetine (CYMBALTA) 30 MG capsule Take 1 capsule (30 mg) by mouth daily 90  "capsule 0     lisinopril (ZESTRIL) 40 MG tablet TAKE 1 TABLET(40 MG) BY MOUTH DAILY 90 tablet 1     metoprolol succinate ER (TOPROL-XL) 50 MG 24 hr tablet TAKE 1 TABLET(50 MG) BY MOUTH DAILY 90 tablet 1     multivitamin w/minerals (THERA-VIT-M) tablet Take 1 tablet by mouth daily       senna (SENOKOT) 8.6 MG tablet Take 1 tablet by mouth daily       tamsulosin (FLOMAX) 0.4 MG capsule Take 1 capsule (0.4 mg) by mouth every evening 90 capsule 1     triamcinolone (KENALOG) 0.1 % external cream Apply topically 2 times daily as needed for irritation        Allergies   Allergen Reactions     Septra [Sulfamethoxazole W/Trimethoprim] Rash     Sulfamethoxazole-Trimethoprim Rash      ROS:   Gen- no fevers/chills   Rheum - no morning stiffness   Derm - no rash/ redness   Neuro - see HPI    Remainder of ROS negative.     Exam:   Ht 1.689 m (5' 6.5\")   Wt 97.5 kg (215 lb)   BMI 34.18 kg/m         BACK:   ROM: flexion -full /extension -limited /lateral rotation- full /side bend- full; pain w/ L lateral rotation  Bony tenderness: None   Paraspinal tenderness: None.   Sensation: intact in b/l lower extremities.   Strength: 5/5 w/ dorsiflexion/ plantarflexion/ inversion/ eversion/ knee flexion/ extension.   Maneuvers: ? Slump on L; + MIRIAM on L for back pain  Neuro: DTR's 2+ Neg Clonus     [place] [facility] [place] [facility] [place] at time of visit     My impression: +dextroscoliosis and multilevel degenerative disc disease        "

## 2022-05-17 NOTE — LETTER
5/17/2022      RE: Efrain Gomes  [address]  [place] [address]     Dear Colleague,    Thank you for referring your patient, Efrain Gomes, to the [facility] [place]. Please see a copy of my visit note below.    ASSESSMENT/PLAN:    (M54.16) Lumbar radiculopathy  (primary encounter diagnosis)  Comment: exam consistent w/ lumbar radic; reviewed tx options/ red flags; will start w/ PT; meds adjusted; precautions given; f/u in 4-6 wks; if no better, consider MRI  Plan: Physical Therapy Referral, gabapentin (NEURONTIN) 100 MG capsule          Govind Calhoun MD  May 17, 2022  2:50 PM        Pt is a 72 year old male last seen on 5/3/2022 for knee pain here today for:     Back pain:   Location: Left sided low back   Duration: About 10 years   Radiation: Yes, down left leg   Numbness/ Tingling? Occasional numbness and tingling in both feet  Weakness? Weakness in left leg and knee   Flexion or Extension bias: Flexion and twisting is the worst   Red flags? None   Meds tried?  Naproxen  PT?  Yes at his assisted living; also does fitness training  Imaging? XR 5/17/22     Back feels tired w/ walking     Patient Active Problem List   Diagnosis     ED (erectile dysfunction)     Chronic sinusitis     Low back pain     Hypercholesteremia     Essential hypertension     Obesity     Alcoholism (H)     Fatty liver     Bullous pemphigoid     Elevated lipase     No past medical history on file.   No past surgical history on file.   Current Outpatient Medications   Medication Sig Dispense Refill     acetaminophen (TYLENOL) 500 MG tablet Take 500-1,000 mg by mouth every 6 hours as needed for mild pain       amLODIPine (NORVASC) 2.5 MG tablet Take 1 tablet (2.5 mg) by mouth daily 90 tablet 3     atorvastatin (LIPITOR) 10 MG tablet TAKE 1 TABLET(10 MG) BY MOUTH DAILY 90 tablet 3     betamethasone dipropionate (DIPROSONE) 0.05 % external cream [BETAMETHASONE DIPROPIONATE (DIPROLENE) 0.05 %  "CREAM] STEPHEN EXT AA BID PRN 45 g 11     cholecalciferol, vitamin D3, (VITAMIN D3) 2,000 unit Tab [CHOLECALCIFEROL, VITAMIN D3, (VITAMIN D3) 2,000 UNIT TAB] Take 1 tablet by mouth daily.       DULoxetine (CYMBALTA) 30 MG capsule Take 1 capsule (30 mg) by mouth daily 90 capsule 0     lisinopril (ZESTRIL) 40 MG tablet TAKE 1 TABLET(40 MG) BY MOUTH DAILY 90 tablet 1     metoprolol succinate ER (TOPROL-XL) 50 MG 24 hr tablet TAKE 1 TABLET(50 MG) BY MOUTH DAILY 90 tablet 1     multivitamin w/minerals (THERA-VIT-M) tablet Take 1 tablet by mouth daily       senna (SENOKOT) 8.6 MG tablet Take 1 tablet by mouth daily       tamsulosin (FLOMAX) 0.4 MG capsule Take 1 capsule (0.4 mg) by mouth every evening 90 capsule 1     triamcinolone (KENALOG) 0.1 % external cream Apply topically 2 times daily as needed for irritation        Allergies   Allergen Reactions     Septra [Sulfamethoxazole W/Trimethoprim] Rash     Sulfamethoxazole-Trimethoprim Rash      ROS:   Gen- no fevers/chills   Rheum - no morning stiffness   Derm - no rash/ redness   Neuro - see HPI    Remainder of ROS negative.     Exam:   Ht 1.689 m (5' 6.5\")   Wt 97.5 kg (215 lb)   BMI 34.18 kg/m         BACK:   ROM: flexion -full /extension -limited /lateral rotation- full /side bend- full; pain w/ L lateral rotation  Bony tenderness: None   Paraspinal tenderness: None.   Sensation: intact in b/l lower extremities.   Strength: 5/5 w/ dorsiflexion/ plantarflexion/ inversion/ eversion/ knee flexion/ extension.   Maneuvers: ? Slump on L; + MIRIAM on L for back pain  Neuro: DTR's 2+ Neg Clonus     Xray of lumbar spine on May 17, 2022 at [facility] [place] - films personally reviewed with patient at time of visit     My impression: +dextroscoliosis and multilevel degenerative disc disease            Again, thank you for allowing me to participate in the care of your patient.      Sincerely,    Govind Calhoun MD    "

## 2022-05-25 ENCOUNTER — VIRTUAL VISIT (OUTPATIENT)
Dept: INTERNAL MEDICINE | Facility: CLINIC | Age: 72
End: 2022-05-25
Payer: MEDICARE

## 2022-05-25 DIAGNOSIS — N40.1 BPH ASSOCIATED WITH NOCTURIA: Primary | ICD-10-CM

## 2022-05-25 DIAGNOSIS — R35.1 BPH ASSOCIATED WITH NOCTURIA: Primary | ICD-10-CM

## 2022-05-25 DIAGNOSIS — M54.16 LUMBAR BACK PAIN WITH RADICULOPATHY AFFECTING LEFT LOWER EXTREMITY: ICD-10-CM

## 2022-05-25 PROCEDURE — 99213 OFFICE O/P EST LOW 20 MIN: CPT | Mod: 95 | Performed by: INTERNAL MEDICINE

## 2022-05-25 NOTE — PROGRESS NOTES
"Efrain is a 72 year old who is being evaluated via a billable telephone visit.      What phone number would you like to be contacted at? [phone]  How would you like to obtain your AVS? Rafa      Phone call duration: 9 minutes      Efrain joins been a telephone visit.  Reports his been doing well.  Continues remain sober.  He is tolerating the tamsulosin without difficulty and its helping with his sleep and urination.  He is getting up half the times that he used to he tells me.  He did see sports medicine a couple visits for knee and leg pains.  They told him to go meet with a physical therapist that did \"[facility] method\" patient cannot find anyone to do that he is called around and no one is accepting new patients.  He wonders if he can go to the therapist that works at his care facility.    1. BPH associated with nocturia  Good control with the tamsulosin.  Continue same    2. Lumbar back pain with radiculopathy affecting left lower extremity  I think that seeing the physical therapist at his care facility is a good place to start.  We will get a referral sent over to him  - Physical Therapy Referral; Future    I will see him back in the early fall end of summer    "

## 2022-06-06 ENCOUNTER — THERAPY VISIT (OUTPATIENT)
Dept: PHYSICAL THERAPY | Facility: CLINIC | Age: 72
End: 2022-06-06
Attending: INTERNAL MEDICINE
Payer: MEDICARE

## 2022-06-06 DIAGNOSIS — M54.16 LUMBAR BACK PAIN WITH RADICULOPATHY AFFECTING LEFT LOWER EXTREMITY: ICD-10-CM

## 2022-06-06 PROCEDURE — 97161 PT EVAL LOW COMPLEX 20 MIN: CPT | Mod: GP

## 2022-06-06 PROCEDURE — 97110 THERAPEUTIC EXERCISES: CPT | Mod: GP

## 2022-06-06 NOTE — PROGRESS NOTES
[facility]    OUTPATIENT Physical Therapy ORTHOPEDIC EVALUATION  PLAN OF TREATMENT FOR OUTPATIENT REHABILITATION  (COMPLETE FOR INITIAL CLAIMS ONLY)  Patient's Last Name, First Name, M.I.  YOB: 1950  Efrain Gomes    Provider s Name:  [facility]   Medical Record No.  [id number]   Start of Care Date:  06/06/22   Onset Date:   06/06/22 (MD orders)   Type:     _X__PT   ___OT Medical Diagnosis:    Encounter Diagnosis   Name Primary?     Lumbar back pain with radiculopathy affecting left lower extremity         Treatment Diagnosis:  LBP        Goals:     06/06/22 0500   Body Part   Goals listed below are for Low back   Goal #1   Goal #1 ambulation   Previous Functional Level No restrictions   Current Functional Level Minutes patient can walk   Performance Level <5 minutes with 7/10 pain   STG Target Performance Minutes patient will be able to walk   Performance Level 5 minutes with <5/10 pain   Rationale for safe household ambulation;for safe community ambulation;to promote a healthy and active lifestyle;to maintain proper body mechanics/posture while ambulating to avoid additional compensatory injury due to improper gait mechanics;for safe outdoor household ambulation   Due Date 07/04/22    LTG Target Performance Minutes patient will be able to  walk   Performance Level 10 minutes with <3/10 pain   Rationale for safe household ambulation;for safe community ambulation;to maintain proper body mechanics/posture while ambulating to avoid additional compensatory injury due to improper gait mechanics;to promote a healthy and active lifestyle;for safe outdoor household ambulation   Due Date 08/06/22       Therapy Frequency:  2x/month  Predicted Duration of Therapy Intervention:  3 months    Radha Galvan PT                 I CERTIFY THE NEED FOR THESE SERVICES FURNISHED  UNDER        THIS PLAN OF TREATMENT AND WHILE UNDER MY CARE     (Physician attestation of this document indicates review and certification of the therapy plan).                     Certification Date From:  06/06/22   Certification Date To:  09/06/22    Referring Provider:  Ronnie Bundy    Initial Assessment        See Epic Evaluation SOC Date: 06/06/22

## 2022-06-06 NOTE — PROGRESS NOTES
Physical Therapy Initial Evaluation  6/6/2022     Precautions/Restrictions/MD instructions: evaluate and treat L-sided lumbar radiculopathy w/ scoliosis    Therapist Assessment: Efrain Gomes is a 72 year old male patient presenting to Physical Therapy with low back pain. Patient demonstrates limited lumbar ROM, glute weakness,  poor balance and extension directional based preference. Signs and symptoms are consistent with lumbar radiculopathy. These impairments limit their ability to walk, stand, transfer sit<>stand and negotiate stairs without pain. Skilled PT services are necessary in order to reduce impairments and improve independent function.    SUBJECTIVE    Chief Complaint: low back pain  Associated symptoms: pain and muscle cramping in L>R legs primarily in thighs, muscle fatigue   Paresthesia (yes/no):  In toes   Onset date: 20 years ago  Symptoms commenced as a result of: unknown   Condition occurred in the following environment:   community   Pain severity: 2/10 current, 7/10 worst  Location of pain: low back L>R  Quality of Pain: dull pain   Better: sitting with good posture, laying supine  Worse:  Walking (100-200 feet), rotation with trunk flexion, descending>ascending stairs, sit<>stand  Progression of Symptoms since onset:  Since onset, these symptoms are getting worse  Previous treatment: has included PT at assisted living facility; effect was fair  Current Functional Status: living in assisted living facility   Previous Functional Status:  fully functional prior to pain onset/injury.      General health as reported by patient: good.    PMH: Chemical dependency, depression, high blood pressure, overweight, numbness/tingling   Surgical history/trauma: None. He denies any significant current illness or recent hospital admissions. He denies any regional implanted devices.  Imaging: x-ray dextro scoliosis and multilevel DDD  Medications: high blood pressure, anti-depressants, pain    Occupation:  retired Job duties: none  Current exercise regimen/Recreational activities: walks (<5 min walks) stairs  Barriers to treatment: none    Red Flags: (Bold when present) - reviewed the following and denies  Cauda equina: progressive motor or sensory loss, urinary retention or overflow incontinence, new fecal incontinence, saddle anesthesia, significant motor deficits encompassing multiple nerve roots  Fracture: Significant trauma, prolonged corticosteroid use, osteoporosis, age >70  Infection: spinal procedure in the last 12 months, IV drug use, immunosuppression, fever, wound in spinal region, generally unwell  Malignancy: history of metastatic cancer, unexplained weight loss    Patient's Goal(s): wants to get back to the weight room, walk longer distances     OBJECTIVE    Posture:   Sitting (good/fair/poor): rounded shoulders, posterior tilt     Lateral Shift (right/left/nil): nil      Dynamic Movement Screen  Single leg stance observations: proprioceptive challenge <5 sec B  Double limb squat observations: Anterior knee translation and decreased depth due to pain in L knee  Sit to stand: requires extra time    Neurological:    Motor deficit:  5/5 Knee ext, ankle DF and great toe DF       Movement Loss:   Pedro Pablo Mod Min Nil Pain   Flexion  x   Low back   Extension x       Side Gliding R   x  Pain in R hip   Side Gliding L   x  Pain in L hip     Tested Movements  Directional Preference: Trial extension  Repeated Extension Test: pain to 1/10         Hip and Knee Strength   MMT Hip ER Hip Flexion   Left 3/5 5/5   Right 3+/5 5-/5 low back pain     Neural Tension:   Slump: negative B  SLR: negative B    ASSESSMENT/PLAN  Patient is a 72 year old male with lumbar complaints.    Patient has the following significant findings with corresponding treatment plan.                Diagnosis 1:  Low back pain    Pain -  hot/cold therapy, manual therapy, self management, education, directional preference exercise and home  program  Decreased ROM/flexibility - manual therapy, therapeutic exercise and home program  Decreased strength - therapeutic exercise, therapeutic activities and home program  Impaired balance - neuro re-education, therapeutic activities and home program  Decreased proprioception - neuro re-education, therapeutic activities and home program  Decreased function - therapeutic activities and home program  Impaired posture - neuro re-education, therapeutic activities and home program    Therapy Evaluation Codes:   1) History comprised of:   Personal factors that impact the plan of care:      None.    Comorbidity factors that impact the plan of care are:      None.     Medications impacting care: None.  2) Examination of Body Systems comprised of:   Body structures and functions that impact the plan of care:      Lumbar spine.   Activity limitations that impact the plan of care are:      Bending, Lifting, Sitting, Squatting/kneeling, Stairs, Standing and Walking.  3) Clinical presentation characteristics are:   Stable/Uncomplicated.  4) Decision-Making    Low complexity using standardized patient assessment instrument and/or measureable assessment of functional outcome.  Cumulative Therapy Evaluation is: Low complexity.    Previous and current functional limitations:  (See Goal Flow Sheet for this information)    Short term and Long term goals: (See Goal Flow Sheet for this information)     Communication ability:  Patient appears to be able to clearly communicate and understand verbal and written communication and follow directions correctly.  Treatment Explanation - The following has been discussed with the patient:   RX ordered/plan of care  Anticipated outcomes  Possible risks and side effects  This patient would benefit from PT intervention to resume normal activities.   Rehab potential is good.    Frequency:  2 X month, once daily  Duration:  for 3 months  Discharge Plan:  Achieve all LTG.  Independent in home  treatment program.  Reach maximal therapeutic benefit.    Please refer to the daily flowsheet for treatment today, total treatment time and time spent performing 1:1 timed codes.

## 2022-06-21 ENCOUNTER — OFFICE VISIT (OUTPATIENT)
Dept: ORTHOPEDICS | Facility: CLINIC | Age: 72
End: 2022-06-21
Payer: MEDICARE

## 2022-06-21 VITALS — BODY MASS INDEX: 33.74 KG/M2 | WEIGHT: 215 LBS | HEIGHT: 67 IN

## 2022-06-21 DIAGNOSIS — M23.204 DEGENERATIVE TEAR OF LEFT MEDIAL MENISCUS: ICD-10-CM

## 2022-06-21 DIAGNOSIS — M54.16 LUMBAR RADICULOPATHY: Primary | ICD-10-CM

## 2022-06-21 PROCEDURE — 99213 OFFICE O/P EST LOW 20 MIN: CPT | Performed by: FAMILY MEDICINE

## 2022-06-21 NOTE — PROGRESS NOTES
"ASSESSMENT/PLAN:    (M54.16) Lumbar radiculopathy  (primary encounter diagnosis)  Comment: will continue PT but will check MRI to eval for stenosis vs bad L5/S1 disc   Plan: MRI Lumbar spine w/o contrast          (M23.204) Degenerative tear of left medial meniscus  Comment:   Plan: stable; cont to follow    Govind Calhoun MD  June 21, 2022  2:19 PM      Pt is a 72 year old male last seen on 5/17/2022 for back pain here for follow up of:     1) Lumbar radic - recommended PT/ gabapentin at last visit - Continued pain with walking, bending over; bugging him slowly over the past 10 years   One PT session, then exercises on his own; maybe some benefit as mornings are somewhat better  Back does not give out quickly  Pain in lower back, hips, and down legs; not shooting pain but muscles are tight and painful  Pain radiates down lateral hips/ anterior thighs  \"Walking in molasses\"  Improved w/ extension/ planks   Has next PT session later this week     2) L knee pain - recommended brace/ naproxen/ home program at last visit - minimal change since the last visit   Brace helps  Avoids going down stairs if possible   Not doing specific exercises for knee except walking/ stairs  Would like to defer injection for knee and sort out back     Per PT note from 6/6/2022:  Therapist Assessment: Efrain Gomes is a 72 year old male patient presenting to Physical Therapy with low back pain. Patient demonstrates limited lumbar ROM, glute weakness,  poor balance and extension directional based preference. Signs and symptoms are consistent with lumbar radiculopathy. These impairments limit their ability to walk, stand, transfer sit<>stand and negotiate stairs without pain. Skilled PT services are necessary in order to reduce impairments and improve independent function.    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   Current Outpatient Medications   Medication Sig Dispense Refill     acetaminophen (TYLENOL) 500 MG tablet Take 500-1,000 mg by mouth every 6 hours as needed for mild pain       amLODIPine (NORVASC) 2.5 MG tablet Take 1 tablet (2.5 mg) by mouth daily 90 tablet 3     atorvastatin (LIPITOR) 10 MG tablet TAKE 1 TABLET(10 MG) BY MOUTH DAILY 90 tablet 3     betamethasone dipropionate (DIPROSONE) 0.05 % external cream [BETAMETHASONE DIPROPIONATE (DIPROLENE) 0.05 % CREAM] STEPHEN EXT AA BID PRN 45 g 11     cholecalciferol, vitamin D3, (VITAMIN D3) 2,000 unit Tab [CHOLECALCIFEROL, VITAMIN D3, (VITAMIN D3) 2,000 UNIT TAB] Take 1 tablet by mouth daily.       DULoxetine (CYMBALTA) 30 MG capsule Take 1 capsule (30 mg) by mouth daily 90 capsule 0     gabapentin (NEURONTIN) 100 MG capsule Take 1 capsule (100 mg) by mouth 3 times daily 42 capsule 1     lisinopril (ZESTRIL) 40 MG tablet TAKE 1 TABLET(40 MG) BY MOUTH DAILY 90 tablet 1     metoprolol succinate ER (TOPROL-XL) 50 MG 24 hr tablet TAKE 1 TABLET(50 MG) BY MOUTH DAILY 90 tablet 1     multivitamin w/minerals (THERA-VIT-M) tablet Take 1 tablet by mouth daily       senna (SENOKOT) 8.6 MG tablet Take 1 tablet by mouth daily       tamsulosin (FLOMAX) 0.4 MG capsule Take 1 capsule (0.4 mg) by  "mouth every evening 90 capsule 1     triamcinolone (KENALOG) 0.1 % external cream Apply topically 2 times daily as needed for irritation        Allergies   Allergen Reactions     Septra [Sulfamethoxazole W/Trimethoprim] Rash     Sulfamethoxazole-Trimethoprim Rash      ROS:   Gen- no fevers/chills   Rheum - no morning stiffness   Derm - no rash/ redness   Neuro - see HPI   Remainder of ROS negative.     Exam:   Ht 1.689 m (5' 6.5\")   Wt 97.5 kg (215 lb)   BMI 34.18 kg/m        "

## 2022-06-21 NOTE — LETTER
"  6/21/2022      RE: Efrain Gomes  [address]  [place] [address]     Dear Colleague,    Thank you for referring your patient, Efrain Gomes, to the [facility] [place]. Please see a copy of my visit note below.    ASSESSMENT/PLAN:    (M54.16) Lumbar radiculopathy  (primary encounter diagnosis)  Comment: will continue PT but will check MRI to eval for stenosis vs bad L5/S1 disc   Plan: MRI Lumbar spine w/o contrast          (M23.204) Degenerative tear of left medial meniscus  Comment:   Plan: stable; cont to follow    Govind Calhoun MD  June 21, 2022  2:19 PM      Pt is a 72 year old male last seen on 5/17/2022 for back pain here for follow up of:     1) Lumbar radic - recommended PT/ gabapentin at last visit - Continued pain with walking, bending over; bugging him slowly over the past 10 years   One PT session, then exercises on his own; maybe some benefit as mornings are somewhat better  Back does not give out quickly  Pain in lower back, hips, and down legs; not shooting pain but muscles are tight and painful  Pain radiates down lateral hips/ anterior thighs  \"Walking in molasses\"  Improved w/ extension/ planks   Has next PT session later this week     2) L knee pain - recommended brace/ naproxen/ home program at last visit - minimal change since the last visit   Brace helps  Avoids going down stairs if possible   Not doing specific exercises for knee except walking/ stairs  Would like to defer injection for knee and sort out back     Per PT note from 6/6/2022:  Therapist Assessment: Efrain Gomes is a 72 year old male patient presenting to Physical Therapy with low back pain. Patient demonstrates limited lumbar ROM, glute weakness,  poor balance and extension directional based preference. Signs and symptoms are consistent with lumbar radiculopathy. These impairments limit their ability to walk, stand, transfer sit<>stand and negotiate stairs without pain. " Skilled PT services are necessary in order to reduce impairments and improve independent function.    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   Current Outpatient Medications   Medication Sig Dispense Refill     acetaminophen (TYLENOL) 500 MG tablet Take 500-1,000 mg by mouth every 6 hours as needed for mild pain       amLODIPine (NORVASC) 2.5 MG tablet Take 1 tablet (2.5 mg) by mouth daily 90 tablet 3     atorvastatin (LIPITOR) 10 MG tablet TAKE 1 TABLET(10 MG) BY MOUTH DAILY 90 tablet 3     betamethasone dipropionate (DIPROSONE) 0.05 % external cream [BETAMETHASONE DIPROPIONATE (DIPROLENE) 0.05 % CREAM] STEPHEN EXT AA BID PRN 45 g 11     cholecalciferol, vitamin D3, (VITAMIN D3) 2,000 unit Tab [CHOLECALCIFEROL, VITAMIN D3, (VITAMIN D3) 2,000 UNIT TAB] Take 1 tablet by mouth daily.       DULoxetine (CYMBALTA) 30 MG capsule Take 1 capsule (30 mg) by mouth daily 90 capsule 0     gabapentin (NEURONTIN) 100 MG capsule Take 1 capsule (100 mg) by mouth 3 times daily 42 capsule 1     lisinopril (ZESTRIL) 40 MG tablet TAKE 1 TABLET(40 MG) BY MOUTH DAILY 90 tablet 1     metoprolol succinate ER (TOPROL-XL) 50 MG 24  "hr tablet TAKE 1 TABLET(50 MG) BY MOUTH DAILY 90 tablet 1     multivitamin w/minerals (THERA-VIT-M) tablet Take 1 tablet by mouth daily       senna (SENOKOT) 8.6 MG tablet Take 1 tablet by mouth daily       tamsulosin (FLOMAX) 0.4 MG capsule Take 1 capsule (0.4 mg) by mouth every evening 90 capsule 1     triamcinolone (KENALOG) 0.1 % external cream Apply topically 2 times daily as needed for irritation        Allergies   Allergen Reactions     Septra [Sulfamethoxazole W/Trimethoprim] Rash     Sulfamethoxazole-Trimethoprim Rash      ROS:   Gen- no fevers/chills   Rheum - no morning stiffness   Derm - no rash/ redness   Neuro - see HPI   Remainder of ROS negative.     Exam:   Ht 1.689 m (5' 6.5\")   Wt 97.5 kg (215 lb)   BMI 34.18 kg/m            Again, thank you for allowing me to participate in the care of your patient.      Sincerely,    Govind Calhoun MD    "

## 2022-06-22 ENCOUNTER — ANCILLARY PROCEDURE (OUTPATIENT)
Dept: MRI IMAGING | Facility: CLINIC | Age: 72
End: 2022-06-22
Attending: FAMILY MEDICINE
Payer: MEDICARE

## 2022-06-22 DIAGNOSIS — M54.16 LUMBAR RADICULOPATHY: ICD-10-CM

## 2022-06-22 PROCEDURE — 72148 MRI LUMBAR SPINE W/O DYE: CPT | Performed by: RADIOLOGY

## 2022-07-07 ENCOUNTER — THERAPY VISIT (OUTPATIENT)
Dept: PHYSICAL THERAPY | Facility: CLINIC | Age: 72
End: 2022-07-07
Payer: MEDICARE

## 2022-07-07 DIAGNOSIS — M54.50 LOW BACK PAIN: Primary | ICD-10-CM

## 2022-07-07 PROCEDURE — 97112 NEUROMUSCULAR REEDUCATION: CPT | Mod: GP

## 2022-07-12 ENCOUNTER — VIRTUAL VISIT (OUTPATIENT)
Dept: ORTHOPEDICS | Facility: CLINIC | Age: 72
End: 2022-07-12
Payer: MEDICARE

## 2022-07-12 DIAGNOSIS — M54.16 LUMBAR RADICULOPATHY: Primary | ICD-10-CM

## 2022-07-12 DIAGNOSIS — K86.2 CYST OF PANCREAS: ICD-10-CM

## 2022-07-12 DIAGNOSIS — I73.9 CLAUDICATION OF BOTH LOWER EXTREMITIES (H): ICD-10-CM

## 2022-07-12 PROCEDURE — 99441 PR PHYSICIAN TELEPHONE EVALUATION 5-10 MIN: CPT | Mod: 95 | Performed by: FAMILY MEDICINE

## 2022-07-12 NOTE — PROGRESS NOTES
"Call start: 11:13 AM  Call stop: 11:20 AM  Call duration: 7 min     ASSESSMENT/PLAN:    (M54.16) Lumbar radiculopathy  (primary encounter diagnosis)  Comment:   Plan: stable/ improved; will cont rehab; consider guided injection at L5/S1 if pain is refractory    (I73.9) Claudication of both lower extremities (H)  Comment: will r/o vascular claudication given reassuring lumbar MRI; consider EMG to eval for peripheral neuropathy   Plan: US GUS Doppler with Exercise Bilateral          (K86.2) Cyst of pancreas  Comment: follow-up study ordered   Plan: US Abdomen Limited*          Govind Calhoun MD  July 12, 2022  11:19 AM      Pt is a 72 year old male last seen on 6/21/2022 here for follow up of:     Low back pain - has been doing home exercises regularly and still going to PT  Recently felt back go \"clunk-clunk\" and things felt better for a bit   No shooting pain down legs but does get \"tired\" feeling in legs  Paresthesias in toes is fairly regular and he has cold sensitivity       Per PT note on 7/7/2022:  Reports some progress. Feels lumbar support, walking and stretching has helped the most. Wants to know if he should get back to workout routine.    MRI lumbar spine - 6/22/2022:  Findings: There are 5 lumbar-type vertebrae assumed for the purposes  of this dictation.  The tip of the conus medullaris is at the inferior  endplate of L2.  Normal lumbar vertebral alignment.  There is mild  disc height narrowing at L1-2 and L5-S1.  Normal marrow signal.     On a level by level basis:     T12-L1: No spinal canal or neuroforaminal stenosis.   L1-2: Minimal broad-based posterior disc bulge but no significant  spinal canal stenosis or neural foraminal narrowing.   L2-3: No spinal canal or neuroforaminal stenosis.   L3-4: Minimal disc bulge with facet arthropathy but no spinal canal  stenosis. The neural foramen are mildly narrowed bilaterally.   L4-5: Disc bulge with facet arthropathy without significant spinal  canal stenosis. " The neural foramen are mildly to moderately narrowed  bilaterally.    L5-S1: Disc bulge with central protrusion with facet arthropathy but  no spinal canal stenosis. The disc protrusion abuts the descending  bilateral S1 nerve roots without impingement.   In the region of the head of the pancreas there is a partially  visualized T2 hyperintense structure measuring up to 3.6 cm.                                                                    Impression:   1. Multilevel lumbar spondylosis without high-grade spinal canal  stenosis or neural foraminal narrowing.  2. 3.6 cm partially visualized T2 hyperintense cystic-appearing  structure near the head of the pancreas. This could represent a  pseudocyst or possibly a duodenal diverticulum or duplication cyst.  Recommend abdominal ultrasound for further evaluation.    Per my last note:  (M54.16) Lumbar radiculopathy  (primary encounter diagnosis)  Comment: will continue PT but will check MRI to eval for stenosis vs bad L5/S1 disc   Plan: MRI Lumbar spine w/o contrast          (M23.204) Degenerative tear of left medial meniscus  Comment:   Plan: stable; cont to follow    Patient Active Problem List   Diagnosis Code     ED (erectile dysfunction) N52.9     Chronic sinusitis J32.9     Low back pain M54.50     Hypercholesteremia E78.00     Essential hypertension I10     Obesity E66.9     Alcoholism (H) F10.20     Fatty liver K76.0     Bullous pemphigoid L12.0     Elevated lipase R74.8     Current Outpatient Medications   Medication Sig Dispense Refill     acetaminophen (TYLENOL) 500 MG tablet Take 500-1,000 mg by mouth every 6 hours as needed for mild pain       amLODIPine (NORVASC) 2.5 MG tablet Take 1 tablet (2.5 mg) by mouth daily 90 tablet 3     atorvastatin (LIPITOR) 10 MG tablet TAKE 1 TABLET(10 MG) BY MOUTH DAILY 90 tablet 3     betamethasone dipropionate (DIPROSONE) 0.05 % external cream [BETAMETHASONE DIPROPIONATE (DIPROLENE) 0.05 % CREAM] STEPHEN EXT AA BID PRN 45 g 11      cholecalciferol, vitamin D3, (VITAMIN D3) 2,000 unit Tab [CHOLECALCIFEROL, VITAMIN D3, (VITAMIN D3) 2,000 UNIT TAB] Take 1 tablet by mouth daily.       DULoxetine (CYMBALTA) 30 MG capsule Take 1 capsule (30 mg) by mouth daily 90 capsule 0     lisinopril (ZESTRIL) 40 MG tablet TAKE 1 TABLET(40 MG) BY MOUTH DAILY 90 tablet 1     metoprolol succinate ER (TOPROL-XL) 50 MG 24 hr tablet TAKE 1 TABLET(50 MG) BY MOUTH DAILY 90 tablet 1     multivitamin w/minerals (THERA-VIT-M) tablet Take 1 tablet by mouth daily       senna (SENOKOT) 8.6 MG tablet Take 1 tablet by mouth daily       tamsulosin (FLOMAX) 0.4 MG capsule Take 1 capsule (0.4 mg) by mouth every evening 90 capsule 1     triamcinolone (KENALOG) 0.1 % external cream Apply topically 2 times daily as needed for irritation        Allergies   Allergen Reactions     Septra [Sulfamethoxazole W/Trimethoprim] Rash     Sulfamethoxazole-Trimethoprim Rash      ROS:   Gen- no fevers/chills   Rheum - no morning stiffness   Derm - no rash/ redness   Neuro - see HPI   Remainder of ROS negative.     Exam:

## 2022-07-12 NOTE — LETTER
"7/12/2022       RE: Efrain Gomes  [address]  [place] [address]     Dear Colleague,    Thank you for referring your patient, Efrain Gomes, to the [facility] [place] [facility]. Please see a copy of my visit note below.    ASSESSMENT/PLAN:  (M54.16) Lumbar radiculopathy  (primary encounter diagnosis)  Comment:   Plan: stable/ improved; will cont rehab; consider guided injection at L5/S1 if pain is refractory    (I73.9) Claudication of both lower extremities (H)  Comment: will r/o vascular claudication given reassuring lumbar MRI; consider EMG to eval for peripheral neuropathy   Plan: US GUS Doppler with Exercise Bilateral          (K86.2) Cyst of pancreas  Comment: follow-up study ordered   Plan: US Abdomen Limited*          Govind Calhoun MD  July 12, 2022  11:19 AM      Pt is a 72 year old male last seen on 6/21/2022 here for follow up of:     Low back pain - has been doing home exercises regularly and still going to PT  Recently felt back go \"clunk-clunk\" and things felt better for a bit   No shooting pain down legs but does get \"tired\" feeling in legs  Paresthesias in toes is fairly regular and he has cold sensitivity   Per PT note on 7/7/2022:  Reports some progress. Feels lumbar support, walking and stretching has helped the most. Wants to know if he should get back to workout routine.    MRI lumbar spine - 6/22/2022:  Findings: There are 5 lumbar-type vertebrae assumed for the purposes  of this dictation.  The tip of the conus medullaris is at the inferior  endplate of L2.  Normal lumbar vertebral alignment.  There is mild  disc height narrowing at L1-2 and L5-S1.  Normal marrow signal.     On a level by level basis:     T12-L1: No spinal canal or neuroforaminal stenosis.   L1-2: Minimal broad-based posterior disc bulge but no significant  spinal canal stenosis or neural foraminal narrowing.   L2-3: No spinal " canal or neuroforaminal stenosis.   L3-4: Minimal disc bulge with facet arthropathy but no spinal canal  stenosis. The neural foramen are mildly narrowed bilaterally.   L4-5: Disc bulge with facet arthropathy without significant spinal  canal stenosis. The neural foramen are mildly to moderately narrowed  bilaterally.    L5-S1: Disc bulge with central protrusion with facet arthropathy but  no spinal canal stenosis. The disc protrusion abuts the descending  bilateral S1 nerve roots without impingement.   In the region of the head of the pancreas there is a partially  visualized T2 hyperintense structure measuring up to 3.6 cm.                                                                    Impression:   1. Multilevel lumbar spondylosis without high-grade spinal canal  stenosis or neural foraminal narrowing.  2. 3.6 cm partially visualized T2 hyperintense cystic-appearing  structure near the head of the pancreas. This could represent a  pseudocyst or possibly a duodenal diverticulum or duplication cyst.  Recommend abdominal ultrasound for further evaluation.    Per my last note:  (M54.16) Lumbar radiculopathy  (primary encounter diagnosis)  Comment: will continue PT but will check MRI to eval for stenosis vs bad L5/S1 disc   Plan: MRI Lumbar spine w/o contrast          (M23.204) Degenerative tear of left medial meniscus  Comment:   Plan: stable; cont to follow    Patient Active Problem List   Diagnosis Code     ED (erectile dysfunction) N52.9     Chronic sinusitis J32.9     Low back pain M54.50     Hypercholesteremia E78.00     Essential hypertension I10     Obesity E66.9     Alcoholism (H) F10.20     Fatty liver K76.0     Bullous pemphigoid L12.0     Elevated lipase R74.8     Current Outpatient Medications   Medication Sig Dispense Refill     acetaminophen (TYLENOL) 500 MG tablet Take 500-1,000 mg by mouth every 6 hours as needed for mild pain       amLODIPine (NORVASC) 2.5 MG tablet Take 1 tablet (2.5 mg) by  mouth daily 90 tablet 3     atorvastatin (LIPITOR) 10 MG tablet TAKE 1 TABLET(10 MG) BY MOUTH DAILY 90 tablet 3     betamethasone dipropionate (DIPROSONE) 0.05 % external cream [BETAMETHASONE DIPROPIONATE (DIPROLENE) 0.05 % CREAM] STEPHEN EXT AA BID PRN 45 g 11     cholecalciferol, vitamin D3, (VITAMIN D3) 2,000 unit Tab [CHOLECALCIFEROL, VITAMIN D3, (VITAMIN D3) 2,000 UNIT TAB] Take 1 tablet by mouth daily.       DULoxetine (CYMBALTA) 30 MG capsule Take 1 capsule (30 mg) by mouth daily 90 capsule 0     lisinopril (ZESTRIL) 40 MG tablet TAKE 1 TABLET(40 MG) BY MOUTH DAILY 90 tablet 1     metoprolol succinate ER (TOPROL-XL) 50 MG 24 hr tablet TAKE 1 TABLET(50 MG) BY MOUTH DAILY 90 tablet 1     multivitamin w/minerals (THERA-VIT-M) tablet Take 1 tablet by mouth daily       senna (SENOKOT) 8.6 MG tablet Take 1 tablet by mouth daily       tamsulosin (FLOMAX) 0.4 MG capsule Take 1 capsule (0.4 mg) by mouth every evening 90 capsule 1     triamcinolone (KENALOG) 0.1 % external cream Apply topically 2 times daily as needed for irritation        Allergies   Allergen Reactions     Septra [Sulfamethoxazole W/Trimethoprim] Rash     Sulfamethoxazole-Trimethoprim Rash      ROS:   Gen- no fevers/chills   Rheum - no morning stiffness   Derm - no rash/ redness   Neuro - see HPI   Remainder of ROS negative.     Exam:         Again, thank you for allowing me to participate in the care of your patient.      Sincerely,    Govind Calhoun MD

## 2022-07-15 ENCOUNTER — TRANSFERRED RECORDS (OUTPATIENT)
Dept: HEALTH INFORMATION MANAGEMENT | Facility: CLINIC | Age: 72
End: 2022-07-15

## 2022-07-16 DIAGNOSIS — I10 ESSENTIAL HYPERTENSION: ICD-10-CM

## 2022-07-17 NOTE — TELEPHONE ENCOUNTER
"Routing refill request to provider for review/approval because:  Labs out of range:  k    Last Written Prescription Date:  1/24/22  Last Fill Quantity: 90,  # refills: 1   Last office visit provider:  4/25/22     Requested Prescriptions   Pending Prescriptions Disp Refills     lisinopril (ZESTRIL) 40 MG tablet [Pharmacy Med Name: LISINOPRIL 40MG TABLETS] 90 tablet 1     Sig: TAKE 1 TABLET(40 MG) BY MOUTH DAILY       ACE Inhibitors (Including Combos) Protocol Failed - 7/16/2022  6:04 PM        Failed - Normal serum potassium on file in past 12 months     Recent Labs   Lab Test 04/25/22  1302   POTASSIUM 5.2*             Passed - Blood pressure under 140/90 in past 12 months     BP Readings from Last 3 Encounters:   04/25/22 115/65   02/09/22 118/60   01/17/22 122/64                 Passed - Recent (12 mo) or future (30 days) visit within the authorizing provider's specialty     Patient has had an office visit with the authorizing provider or a provider within the authorizing providers department within the previous 12 mos or has a future within next 30 days. See \"Patient Info\" tab in inbasket, or \"Choose Columns\" in Meds & Orders section of the refill encounter.              Passed - Medication is active on med list        Passed - Patient is age 18 or older        Passed - Normal serum creatinine on file in past 12 months     Recent Labs   Lab Test 04/25/22  1302   CR 0.94       Ok to refill medication if creatinine is low               Karin Todd RN 07/16/22 8:06 PM  "

## 2022-07-20 RX ORDER — LISINOPRIL 40 MG/1
TABLET ORAL
Qty: 90 TABLET | Refills: 1 | Status: SHIPPED | OUTPATIENT
Start: 2022-07-20 | End: 2023-04-18

## 2022-07-21 ENCOUNTER — ANCILLARY PROCEDURE (OUTPATIENT)
Dept: ULTRASOUND IMAGING | Facility: CLINIC | Age: 72
End: 2022-07-21
Attending: FAMILY MEDICINE
Payer: MEDICARE

## 2022-07-21 ENCOUNTER — THERAPY VISIT (OUTPATIENT)
Dept: PHYSICAL THERAPY | Facility: CLINIC | Age: 72
End: 2022-07-21
Payer: MEDICARE

## 2022-07-21 DIAGNOSIS — I73.9 CLAUDICATION OF BOTH LOWER EXTREMITIES (H): ICD-10-CM

## 2022-07-21 DIAGNOSIS — K86.2 CYST OF PANCREAS: ICD-10-CM

## 2022-07-21 DIAGNOSIS — M54.50 LOW BACK PAIN: Primary | ICD-10-CM

## 2022-07-21 PROCEDURE — 93924 LWR XTR VASC STDY BILAT: CPT | Mod: GC | Performed by: RADIOLOGY

## 2022-07-21 PROCEDURE — 76705 ECHO EXAM OF ABDOMEN: CPT | Mod: GC | Performed by: RADIOLOGY

## 2022-07-21 PROCEDURE — 97112 NEUROMUSCULAR REEDUCATION: CPT | Mod: GP

## 2022-07-21 PROCEDURE — 97110 THERAPEUTIC EXERCISES: CPT | Mod: GP

## 2022-07-26 ENCOUNTER — TELEPHONE (OUTPATIENT)
Dept: VASCULAR SURGERY | Facility: CLINIC | Age: 72
End: 2022-07-26

## 2022-07-26 ENCOUNTER — TELEPHONE (OUTPATIENT)
Dept: ORTHOPEDICS | Facility: CLINIC | Age: 72
End: 2022-07-26

## 2022-07-26 DIAGNOSIS — K86.2 PANCREAS CYST: ICD-10-CM

## 2022-07-26 DIAGNOSIS — I74.09 AORTOILIAC OCCLUSIVE DISEASE (H): Primary | ICD-10-CM

## 2022-07-26 NOTE — TELEPHONE ENCOUNTER
New vascular referral   Referral Type:  Vascular Surgery  Preferred Location:  [facility]  Reason for Referral:  aortoiliac disease in patient w/ claudication symptoms    Referring provider: Govind Calhoun MD     Most recent imaging?   - 22: ABIs   Right le. Resting GUS is 1.23. Normal.  2. Exercise study: Negative  3. VPRs suggestive of aortoiliac disease  Left le. Resting GUS is 1.18. Normal.  2. Exercise study: Negative  3. VPRs suggestive of aortoiliac disease    22: US abdomen limited:  The visualized IVC and aorta are patent.    Additional info: 73 y/o male with hx low back pain and claudication of both extremities. ABIs are normal besides being suggestive of aortoiliac disease. Pt is participating in PT. Referring provider has ordred MR abdomen and CTA abdomen/pelvis w/ runoff. We will await these results before scheduling pt with vascular. I will update the patient on the plan.    FORTUNATO Farmer, RN  [facility]  Ph: [phone]  Fax: [phone]

## 2022-07-26 NOTE — TELEPHONE ENCOUNTER
Spoke to pt and explained vascular study results and abd U/S results. Will check angiogram and refer to vascular surgery for aortoiliac disease and check MRI abdomen and refer to GI for pancreatic cyst. Pt in agreement with plan.      (I74.09) Aortoiliac occlusive disease (H)  (primary encounter diagnosis)  Comment:   Plan: CT Angiogram abdominal aorta and bilateral         iliofemoral runoff w & w/o contrast, Vascular         Surgery Referral            (K86.2) Pancreas cyst  Comment:   Plan: MR Abdomen w/o Contrast, Adult GI          Referral - Consult Only            Govind Calhoun MD  July 26, 2022  1:30 PM

## 2022-07-27 ENCOUNTER — TELEPHONE (OUTPATIENT)
Dept: GASTROENTEROLOGY | Facility: CLINIC | Age: 72
End: 2022-07-27

## 2022-07-27 DIAGNOSIS — R35.1 BPH ASSOCIATED WITH NOCTURIA: ICD-10-CM

## 2022-07-27 DIAGNOSIS — R93.3 ABNORMAL FINDING ON GI TRACT IMAGING: Primary | ICD-10-CM

## 2022-07-27 DIAGNOSIS — N40.1 BPH ASSOCIATED WITH NOCTURIA: ICD-10-CM

## 2022-07-27 RX ORDER — TAMSULOSIN HYDROCHLORIDE 0.4 MG/1
CAPSULE ORAL
Qty: 90 CAPSULE | Refills: 1 | OUTPATIENT
Start: 2022-07-27

## 2022-07-27 NOTE — TELEPHONE ENCOUNTER
[facility]     Referring provider:   Govind Calhoun MD    Referred to: [facility]     Provider Requested:  NA     Referral Received: 07/27/22     Records received: EPIC     Images received: PACS    Evaluation for: pancreatic cyst     Clinical History (per RN review):     Pt with hx of pancreatic cyst, referring provider ordered US for surviellence    (K86.2) Cyst of pancreas  Comment: follow-up study ordered   Plan: US Abdomen Limited*    Impression:      1. Simple cyst of the pancreas measuring up to 3.8 cm, similar to  lumbar spine MRI dated 6/23/2022. MRI and Gastroenterology  consultation recommended for guidelines below.     2. Sessile appearing gallbladder polyp measuring up to 8 mm without  identifiable stalk. Recommend follow-up ultrasound in 12 months.    Cyst first found on Lumbar spine MRI 6/22/22  Impression:   1. Multilevel lumbar spondylosis without high-grade spinal canal  stenosis or neural foraminal narrowing.  2. 3.6 cm partially visualized T2 hyperintense cystic-appearing  structure near the head of the pancreas. This could represent a  pseudocyst or possibly a duodenal diverticulum or duplication cyst.  Recommend abdominal ultrasound for further evaluation.    MD review date: 07/27/22    MD Decision for clinic consultation/Orders:            Referral updates/Patient contacted:

## 2022-07-28 NOTE — TELEPHONE ENCOUNTER
Per Dr. Perez  Lets get an MRI/MRCP of the abdomen and evaluate by EUS soon after (unless the patient would rather have a formal visit before the EUS.   This could be deep at either location     Called to discuss with patient.MRI ordered, Mychart sent with scheduling number per patient request.   ML

## 2022-08-03 ENCOUNTER — PREP FOR PROCEDURE (OUTPATIENT)
Dept: GASTROENTEROLOGY | Facility: CLINIC | Age: 72
End: 2022-08-03

## 2022-08-03 ENCOUNTER — ANCILLARY PROCEDURE (OUTPATIENT)
Dept: CT IMAGING | Facility: CLINIC | Age: 72
End: 2022-08-03
Attending: FAMILY MEDICINE
Payer: MEDICARE

## 2022-08-03 DIAGNOSIS — I74.09 AORTOILIAC OCCLUSIVE DISEASE (H): ICD-10-CM

## 2022-08-03 DIAGNOSIS — R93.3 ABNORMAL FINDING ON GI TRACT IMAGING: Primary | ICD-10-CM

## 2022-08-03 LAB
CREAT BLD-MCNC: 1 MG/DL (ref 0.7–1.3)
GFR SERPL CREATININE-BSD FRML MDRD: >60 ML/MIN/1.73M2

## 2022-08-03 PROCEDURE — 75635 CT ANGIO ABDOMINAL ARTERIES: CPT | Mod: MG | Performed by: RADIOLOGY

## 2022-08-03 PROCEDURE — G1010 CDSM STANSON: HCPCS | Mod: GC | Performed by: RADIOLOGY

## 2022-08-03 PROCEDURE — 82565 ASSAY OF CREATININE: CPT | Performed by: PATHOLOGY

## 2022-08-03 RX ORDER — IOPAMIDOL 755 MG/ML
150 INJECTION, SOLUTION INTRAVASCULAR ONCE
Status: COMPLETED | OUTPATIENT
Start: 2022-08-03 | End: 2022-08-03

## 2022-08-03 RX ADMIN — IOPAMIDOL 150 ML: 755 INJECTION, SOLUTION INTRAVASCULAR at 12:13

## 2022-08-03 NOTE — TELEPHONE ENCOUNTER
MRCP scheduled on , called to     Please assist in scheduling:     Procedure/Imaging/Clinic: EUS  Physician: Dr. Perez  Timin/29  Procedure length:50 min  Anesthesia:MAC  Dx: abnormal finding on GI imaging  Tier:2  Location: [place]     Called to discuss with patient.     Explained they can expect a call from  for date and time of procedure, will need a , someone to stay with them for 24 hours and should stay in town for 24 hours (within 45 min of Hospital) post procedure    Patient needs to get pre-op physical completed. If outside [facility] system will need physical faxed to number [phone]   If you do not get a preop physical, your procedure could be cancelled, patient voiced understanding*    Preop Plan: PCP will do, PCP verified, Ronnie Bundy      Med Review    Blood thinner -  no  ASA - no  Diabetic - no    COVID test discussed:will do home test    Patient Education r/t procedure:done    Does patient have any history of gastric bypass/gastric surgery/altered panc/bili anatomy?no    A pre-op nurse will call 1-2 days prior to the procedure.    NPO/Prep: advised to be NPO/no solid food 8 hours before the procedure. Ok to drink clear liquids (Water, Apple Juice or Gatorade) up to 2 hours prior to procedure.    Other specific details/comments:none     Verbalized understanding of all instructions. All questions answered.     Procedure order placed, message routed to OR / Endo

## 2022-08-08 NOTE — TELEPHONE ENCOUNTER
CTA w/ runoff complete. Will have our provider review.    SOFY FarmerN, RN  [facility]  Ph: [phone]  Fax: [phone]

## 2022-08-08 NOTE — TELEPHONE ENCOUNTER
Our provider has reviewed imaging, including CTA w/ runoff. Pt has minimal tibial disease, but otherwise unremarkable vascular imaging. No vascular follow-up needed.     I will update the referring provider.    SOFY FarmerN, RN  [facility]  Ph: [phone]  Fax: [phone]

## 2022-08-17 DIAGNOSIS — F32.0 MILD MAJOR DEPRESSION (H): ICD-10-CM

## 2022-08-17 DIAGNOSIS — G89.4 CHRONIC PAIN SYNDROME: ICD-10-CM

## 2022-08-17 RX ORDER — DULOXETIN HYDROCHLORIDE 30 MG/1
30 CAPSULE, DELAYED RELEASE ORAL DAILY
Qty: 90 CAPSULE | Refills: 0 | Status: SHIPPED | OUTPATIENT
Start: 2022-08-17 | End: 2022-11-13

## 2022-08-18 ENCOUNTER — OFFICE VISIT (OUTPATIENT)
Dept: INTERNAL MEDICINE | Facility: CLINIC | Age: 72
End: 2022-08-18
Payer: MEDICARE

## 2022-08-18 VITALS
TEMPERATURE: 97.9 F | OXYGEN SATURATION: 98 % | HEART RATE: 62 BPM | HEIGHT: 65 IN | DIASTOLIC BLOOD PRESSURE: 70 MMHG | WEIGHT: 224.9 LBS | SYSTOLIC BLOOD PRESSURE: 118 MMHG | BODY MASS INDEX: 37.47 KG/M2

## 2022-08-18 DIAGNOSIS — G89.4 CHRONIC PAIN SYNDROME: ICD-10-CM

## 2022-08-18 DIAGNOSIS — M54.16 LUMBAR BACK PAIN WITH RADICULOPATHY AFFECTING LEFT LOWER EXTREMITY: ICD-10-CM

## 2022-08-18 DIAGNOSIS — E66.01 MORBID OBESITY (H): ICD-10-CM

## 2022-08-18 DIAGNOSIS — Z87.19 HISTORY OF PANCREATITIS: ICD-10-CM

## 2022-08-18 DIAGNOSIS — Z01.818 PRE-OP EXAM: Primary | ICD-10-CM

## 2022-08-18 DIAGNOSIS — K86.2 PANCREATIC CYST: ICD-10-CM

## 2022-08-18 DIAGNOSIS — I10 ESSENTIAL HYPERTENSION: ICD-10-CM

## 2022-08-18 LAB
ANION GAP SERPL CALCULATED.3IONS-SCNC: 8 MMOL/L (ref 7–15)
BUN SERPL-MCNC: 21.5 MG/DL (ref 8–23)
CALCIUM SERPL-MCNC: 9.4 MG/DL (ref 8.8–10.2)
CHLORIDE SERPL-SCNC: 97 MMOL/L (ref 98–107)
CREAT SERPL-MCNC: 1 MG/DL (ref 0.67–1.17)
DEPRECATED HCO3 PLAS-SCNC: 26 MMOL/L (ref 22–29)
GFR SERPL CREATININE-BSD FRML MDRD: 79 ML/MIN/1.73M2
GLUCOSE SERPL-MCNC: 103 MG/DL (ref 70–99)
HGB BLD-MCNC: 13.5 G/DL (ref 13.3–17.7)
POTASSIUM SERPL-SCNC: 5.2 MMOL/L (ref 3.4–5.3)
SODIUM SERPL-SCNC: 131 MMOL/L (ref 136–145)

## 2022-08-18 PROCEDURE — 80048 BASIC METABOLIC PNL TOTAL CA: CPT | Performed by: INTERNAL MEDICINE

## 2022-08-18 PROCEDURE — 36415 COLL VENOUS BLD VENIPUNCTURE: CPT | Performed by: INTERNAL MEDICINE

## 2022-08-18 PROCEDURE — 85018 HEMOGLOBIN: CPT | Performed by: INTERNAL MEDICINE

## 2022-08-18 PROCEDURE — 93005 ELECTROCARDIOGRAM TRACING: CPT | Performed by: INTERNAL MEDICINE

## 2022-08-18 PROCEDURE — 99214 OFFICE O/P EST MOD 30 MIN: CPT | Performed by: INTERNAL MEDICINE

## 2022-08-18 PROCEDURE — 93010 ELECTROCARDIOGRAM REPORT: CPT | Mod: OFF | Performed by: INTERNAL MEDICINE

## 2022-08-18 NOTE — PROGRESS NOTES
[facility]  [address] [place] [address]  Phone: [phone]  Fax: [phone]  Primary Provider: Anay Bundy  Pre-op Performing Provider: ANAY BUNDY      PREOPERATIVE EVALUATION:  Today's date: 8/18/2022    Efrain Gomes is a 72 year old male who presents for a preoperative evaluation.    Surgical Information:  Surgery/Procedure: ENDOSCOPIC ULTRASOUND, ESOPHAGOSCOPY / UPPER GASTROINTESTINAL TRACT (GI)  Surgery Location: [facility]   Surgeon: Dr. Zana Perez  Surgery Date: 8/29/22  Time of Surgery: TBD   Where patient plans to recover: At home with family  Fax number for surgical facility: Note does not need to be faxed, will be available electronically in Epic.    Type of Anesthesia Anticipated: to be determined    1. Pre-op exam  Proceed with procedure as planned.  That is if this procedure is necessary.  I do question if this cyst is residual from his pancreatitis.  - EKG 12-lead, tracing only    2. Pancreatic cyst  As above.  Recent pancreatitis.  No pancreatic imaging at the time of his pancreatitis was completed.  I question if he had pseudocyst formation at that time.    3. History of pancreatitis  As above    4. Morbid obesity (H)  He has gained entirely too much weight.  I discussed this with him.  He needs to start working on weight loss with caloric restriction and increased exercise.  I urged recumbent bike or NuStep to do some aerobic type exercise.    5. Chronic pain syndrome  He is doing fairly well with the duloxetine.  Continue same    6. Lumbar back pain with radiculopathy affecting left lower extremity  He is following with a sports medicine provider.  He is also doing some therapy.  Consider a visit with our spine clinic.    7. Essential hypertension  Blood pressure controlled.  Continue regimen.  He will take his medications on the morning of procedure.    Subjective     HPI related to upcoming procedure:  Efrain comes in today for preop although I am a little confused about why he has having this.  I have had no correspondence from gastroenterology.  He was found to have a cyst on his pancreas during imaging for back and leg pains.  He has a history of alcoholic pancreatitis and had a pretty profound pancreatitis back in 2021.  Unfortunately at that time they did no imaging of his pancreas.  He now has this 3.8 cm cyst.  He is supposed to have a MRCP for further evaluation which is happening in a few days.  However he is also been now scheduled for an endoscopic ultrasound and is here today for preop for that.  He is feeling fine except for his ongoing pain in his legs.  He has lumbar spine disease with radicular symptoms.  He had an extensive evaluation for vascular disease which was negative.  He is able to go up and down stairs without shortness of breath or chest pain.  He is mainly limited by his spinal disease how far he can walk.    Preop Questions 8/14/2022   1. Have you ever had a heart attack or stroke? No   2. Have you ever had surgery on your heart or blood vessels, such as a stent placement, a coronary artery bypass, or surgery on an artery in your head, neck, heart, or legs? No   3. Do you have chest pain with activity? No   4. Do you have a history of  heart failure? No   5. Do you currently have a cold, bronchitis or symptoms of other infection? No   6. Do you have a cough, shortness of breath, or wheezing? No   7. Do you or anyone in your family have previous history of blood clots? No   8. Do you or does anyone in your family have a serious bleeding problem such as prolonged bleeding following surgeries or cuts? No   9. Have you ever had problems with anemia or been told to take iron pills? No   10. Have you had any abnormal blood loss such as black, tarry or bloody stools? No   11. Have you ever had a blood transfusion? No   12. Are you willing to have a blood transfusion if it is medically needed  before, during, or after your surgery? Yes   13. Have you or any of your relatives ever had problems with anesthesia? No   14. Do you have sleep apnea, excessive snoring or daytime drowsiness? No   15. Do you have any artifical heart valves or other implanted medical devices like a pacemaker, defibrillator, or continuous glucose monitor? No   16. Do you have artificial joints? No   17. Are you allergic to latex? No       Health Care Directive:  Patient does not have a Health Care Directive or Living Will: Patient states has Advance Directive and will bring in a copy to clinic.    Preoperative Review of :   reviewed - no record of controlled substances prescribed.          Review of Systems  Constitutional, neuro, ENT, endocrine, pulmonary, cardiac, gastrointestinal, genitourinary, musculoskeletal, integument and psychiatric systems are negative, except as otherwise noted.    Patient Active Problem List    Diagnosis Date Noted     Elevated lipase 10/23/2021     Priority: Medium     Bullous pemphigoid 07/11/2019     Priority: Medium     Fatty liver 05/31/2018     Priority: Medium     Alcoholism (H) 05/14/2018     Priority: Medium     Obesity 01/22/2018     Priority: Medium     Essential hypertension 12/27/2016     Priority: Medium     Hypercholesteremia 01/26/2016     Priority: Medium     Low back pain 12/16/2015     Priority: Medium     ED (erectile dysfunction) 01/22/2015     Priority: Medium     Chronic sinusitis 01/22/2015     Priority: Medium      No past medical history on file.  No past surgical history on file.  Current Outpatient Medications   Medication Sig Dispense Refill     acetaminophen (TYLENOL) 500 MG tablet Take 500-1,000 mg by mouth every 6 hours as needed for mild pain       amLODIPine (NORVASC) 2.5 MG tablet Take 1 tablet (2.5 mg) by mouth daily 90 tablet 3     atorvastatin (LIPITOR) 10 MG tablet TAKE 1 TABLET(10 MG) BY MOUTH DAILY 90 tablet 3     betamethasone dipropionate (DIPROSONE) 0.05 %  external cream [BETAMETHASONE DIPROPIONATE (DIPROLENE) 0.05 % CREAM] STEPHEN EXT AA BID PRN 45 g 11     cholecalciferol, vitamin D3, (VITAMIN D3) 2,000 unit Tab [CHOLECALCIFEROL, VITAMIN D3, (VITAMIN D3) 2,000 UNIT TAB] Take 1 tablet by mouth daily.       DULoxetine (CYMBALTA) 30 MG capsule Take 1 capsule (30 mg) by mouth daily 90 capsule 0     lisinopril (ZESTRIL) 40 MG tablet TAKE 1 TABLET(40 MG) BY MOUTH DAILY 90 tablet 1     metoprolol succinate ER (TOPROL-XL) 50 MG 24 hr tablet TAKE 1 TABLET(50 MG) BY MOUTH DAILY 90 tablet 1     multivitamin w/minerals (THERA-VIT-M) tablet Take 1 tablet by mouth daily       senna (SENOKOT) 8.6 MG tablet Take 1 tablet by mouth daily as needed       tamsulosin (FLOMAX) 0.4 MG capsule Take 1 capsule (0.4 mg) by mouth every evening 90 capsule 1     triamcinolone (KENALOG) 0.1 % external cream Apply topically 2 times daily as needed for irritation         Allergies   Allergen Reactions     Septra [Sulfamethoxazole W/Trimethoprim] Rash     Sulfamethoxazole-Trimethoprim Rash        Social History     Tobacco Use     Smoking status: Never Smoker     Smokeless tobacco: Never Used   Substance Use Topics     Alcohol use: Yes     No family history on file.  History   Drug Use Unknown         Objective     There were no vitals taken for this visit.    Physical Exam    GENERAL APPEARANCE: obese, no distress     EYES: EOMI,  PERRL     HENT: ear canals and TM's normal     NECK: no adenopathy, no asymmetry, masses, or scars and thyroid normal to palpation     RESP: lungs clear to auscultation - no rales, rhonchi or wheezes     CV: regular rates and rhythm, normal S1 S2, no S3 or S4 and no murmur, click or rub     ABDOMEN: obese, soft     MS: extremities normal- no gross deformities noted, no evidence of inflammation in joints, FROM in all extremities.     SKIN: no suspicious lesions or rashes     NEURO: Normal strength and tone, sensory exam grossly normal, mentation intact and speech normal      PSYCH: mentation appears normal. and affect normal/bright     LYMPHATICS: No cervical adenopathy    Recent Labs   Lab Test 08/03/22  1211 04/25/22  1302 01/17/22  1434   HGB  --  13.8 13.0*   PLT  --  235 218   NA  --  135* 135*   POTASSIUM  --  5.2* 4.8   CR 1.0 0.94 0.89        Diagnostics:  Labs pending at this time.  Results will be reviewed when available.   EKG: sinus bradycardia, normal axis, normal intervals, no acute ST/T changes c/w ischemia, no LVH by voltage criteria, unchanged from previous tracings    Revised Cardiac Risk Index (RCRI):  The patient has the following serious cardiovascular risks for perioperative complications:   - No serious cardiac risks = 0 points     RCRI Interpretation: 0 points: Class I (very low risk - 0.4% complication rate)           Signed Electronically by: ANAY NAVARRO MD  Copy of this evaluation report is provided to requesting physician.

## 2022-08-18 NOTE — H&P (VIEW-ONLY)
[facility]  [address] [place] [address]  Phone: [phone]  Fax: [phone]  Primary Provider: Anay Bundy  Pre-op Performing Provider: ANAY BUNDY      PREOPERATIVE EVALUATION:  Today's date: 8/18/2022    Efrain Gomes is a 72 year old male who presents for a preoperative evaluation.    Surgical Information:  Surgery/Procedure: ENDOSCOPIC ULTRASOUND, ESOPHAGOSCOPY / UPPER GASTROINTESTINAL TRACT (GI)  Surgery Location: [facility]   Surgeon: Dr. Zana Perez  Surgery Date: 8/29/22  Time of Surgery: TBD   Where patient plans to recover: At home with family  Fax number for surgical facility: Note does not need to be faxed, will be available electronically in Epic.    Type of Anesthesia Anticipated: to be determined    1. Pre-op exam  Proceed with procedure as planned.  That is if this procedure is necessary.  I do question if this cyst is residual from his pancreatitis.  - EKG 12-lead, tracing only    2. Pancreatic cyst  As above.  Recent pancreatitis.  No pancreatic imaging at the time of his pancreatitis was completed.  I question if he had pseudocyst formation at that time.    3. History of pancreatitis  As above    4. Morbid obesity (H)  He has gained entirely too much weight.  I discussed this with him.  He needs to start working on weight loss with caloric restriction and increased exercise.  I urged recumbent bike or NuStep to do some aerobic type exercise.    5. Chronic pain syndrome  He is doing fairly well with the duloxetine.  Continue same    6. Lumbar back pain with radiculopathy affecting left lower extremity  He is following with a sports medicine provider.  He is also doing some therapy.  Consider a visit with our spine clinic.    7. Essential hypertension  Blood pressure controlled.  Continue regimen.  He will take his medications on the morning of procedure.    Subjective     HPI related to upcoming procedure:  Efrain comes in today for preop although I am a little confused about why he has having this.  I have had no correspondence from gastroenterology.  He was found to have a cyst on his pancreas during imaging for back and leg pains.  He has a history of alcoholic pancreatitis and had a pretty profound pancreatitis back in 2021.  Unfortunately at that time they did no imaging of his pancreas.  He now has this 3.8 cm cyst.  He is supposed to have a MRCP for further evaluation which is happening in a few days.  However he is also been now scheduled for an endoscopic ultrasound and is here today for preop for that.  He is feeling fine except for his ongoing pain in his legs.  He has lumbar spine disease with radicular symptoms.  He had an extensive evaluation for vascular disease which was negative.  He is able to go up and down stairs without shortness of breath or chest pain.  He is mainly limited by his spinal disease how far he can walk.    Preop Questions 8/14/2022   1. Have you ever had a heart attack or stroke? No   2. Have you ever had surgery on your heart or blood vessels, such as a stent placement, a coronary artery bypass, or surgery on an artery in your head, neck, heart, or legs? No   3. Do you have chest pain with activity? No   4. Do you have a history of  heart failure? No   5. Do you currently have a cold, bronchitis or symptoms of other infection? No   6. Do you have a cough, shortness of breath, or wheezing? No   7. Do you or anyone in your family have previous history of blood clots? No   8. Do you or does anyone in your family have a serious bleeding problem such as prolonged bleeding following surgeries or cuts? No   9. Have you ever had problems with anemia or been told to take iron pills? No   10. Have you had any abnormal blood loss such as black, tarry or bloody stools? No   11. Have you ever had a blood transfusion? No   12. Are you willing to have a blood transfusion if it is medically needed  before, during, or after your surgery? Yes   13. Have you or any of your relatives ever had problems with anesthesia? No   14. Do you have sleep apnea, excessive snoring or daytime drowsiness? No   15. Do you have any artifical heart valves or other implanted medical devices like a pacemaker, defibrillator, or continuous glucose monitor? No   16. Do you have artificial joints? No   17. Are you allergic to latex? No       Health Care Directive:  Patient does not have a Health Care Directive or Living Will: Patient states has Advance Directive and will bring in a copy to clinic.    Preoperative Review of :   reviewed - no record of controlled substances prescribed.          Review of Systems  Constitutional, neuro, ENT, endocrine, pulmonary, cardiac, gastrointestinal, genitourinary, musculoskeletal, integument and psychiatric systems are negative, except as otherwise noted.    Patient Active Problem List    Diagnosis Date Noted     Elevated lipase 10/23/2021     Priority: Medium     Bullous pemphigoid 07/11/2019     Priority: Medium     Fatty liver 05/31/2018     Priority: Medium     Alcoholism (H) 05/14/2018     Priority: Medium     Obesity 01/22/2018     Priority: Medium     Essential hypertension 12/27/2016     Priority: Medium     Hypercholesteremia 01/26/2016     Priority: Medium     Low back pain 12/16/2015     Priority: Medium     ED (erectile dysfunction) 01/22/2015     Priority: Medium     Chronic sinusitis 01/22/2015     Priority: Medium      No past medical history on file.  No past surgical history on file.  Current Outpatient Medications   Medication Sig Dispense Refill     acetaminophen (TYLENOL) 500 MG tablet Take 500-1,000 mg by mouth every 6 hours as needed for mild pain       amLODIPine (NORVASC) 2.5 MG tablet Take 1 tablet (2.5 mg) by mouth daily 90 tablet 3     atorvastatin (LIPITOR) 10 MG tablet TAKE 1 TABLET(10 MG) BY MOUTH DAILY 90 tablet 3     betamethasone dipropionate (DIPROSONE) 0.05 %  external cream [BETAMETHASONE DIPROPIONATE (DIPROLENE) 0.05 % CREAM] STEPHEN EXT AA BID PRN 45 g 11     cholecalciferol, vitamin D3, (VITAMIN D3) 2,000 unit Tab [CHOLECALCIFEROL, VITAMIN D3, (VITAMIN D3) 2,000 UNIT TAB] Take 1 tablet by mouth daily.       DULoxetine (CYMBALTA) 30 MG capsule Take 1 capsule (30 mg) by mouth daily 90 capsule 0     lisinopril (ZESTRIL) 40 MG tablet TAKE 1 TABLET(40 MG) BY MOUTH DAILY 90 tablet 1     metoprolol succinate ER (TOPROL-XL) 50 MG 24 hr tablet TAKE 1 TABLET(50 MG) BY MOUTH DAILY 90 tablet 1     multivitamin w/minerals (THERA-VIT-M) tablet Take 1 tablet by mouth daily       senna (SENOKOT) 8.6 MG tablet Take 1 tablet by mouth daily as needed       tamsulosin (FLOMAX) 0.4 MG capsule Take 1 capsule (0.4 mg) by mouth every evening 90 capsule 1     triamcinolone (KENALOG) 0.1 % external cream Apply topically 2 times daily as needed for irritation         Allergies   Allergen Reactions     Septra [Sulfamethoxazole W/Trimethoprim] Rash     Sulfamethoxazole-Trimethoprim Rash        Social History     Tobacco Use     Smoking status: Never Smoker     Smokeless tobacco: Never Used   Substance Use Topics     Alcohol use: Yes     No family history on file.  History   Drug Use Unknown         Objective     There were no vitals taken for this visit.    Physical Exam    GENERAL APPEARANCE: obese, no distress     EYES: EOMI,  PERRL     HENT: ear canals and TM's normal     NECK: no adenopathy, no asymmetry, masses, or scars and thyroid normal to palpation     RESP: lungs clear to auscultation - no rales, rhonchi or wheezes     CV: regular rates and rhythm, normal S1 S2, no S3 or S4 and no murmur, click or rub     ABDOMEN: obese, soft     MS: extremities normal- no gross deformities noted, no evidence of inflammation in joints, FROM in all extremities.     SKIN: no suspicious lesions or rashes     NEURO: Normal strength and tone, sensory exam grossly normal, mentation intact and speech normal      PSYCH: mentation appears normal. and affect normal/bright     LYMPHATICS: No cervical adenopathy    Recent Labs   Lab Test 08/03/22  1211 04/25/22  1302 01/17/22  1434   HGB  --  13.8 13.0*   PLT  --  235 218   NA  --  135* 135*   POTASSIUM  --  5.2* 4.8   CR 1.0 0.94 0.89        Diagnostics:  Labs pending at this time.  Results will be reviewed when available.   EKG: sinus bradycardia, normal axis, normal intervals, no acute ST/T changes c/w ischemia, no LVH by voltage criteria, unchanged from previous tracings    Revised Cardiac Risk Index (RCRI):  The patient has the following serious cardiovascular risks for perioperative complications:   - No serious cardiac risks = 0 points     RCRI Interpretation: 0 points: Class I (very low risk - 0.4% complication rate)           Signed Electronically by: ANAY NAVARRO MD  Copy of this evaluation report is provided to requesting physician.

## 2022-08-18 NOTE — TELEPHONE ENCOUNTER
"Last Written Prescription Date:  4/25/22  Last Fill Quantity: 90,  # refills: 0   Last office visit provider:  5/25/22     Requested Prescriptions   Pending Prescriptions Disp Refills     DULoxetine (CYMBALTA) 30 MG capsule 90 capsule 0     Sig: Take 1 capsule (30 mg) by mouth daily       Serotonin-Norepinephrine Reuptake Inhibitors  Passed - 8/17/2022 12:23 PM        Passed - Blood pressure under 140/90 in past 12 months     BP Readings from Last 3 Encounters:   04/25/22 115/65   02/09/22 118/60   01/17/22 122/64                 Passed - PHQ-9 score of less than 5 in past 6 months     Please review last PHQ-9 score.           Passed - Medication is active on med list        Passed - Patient is age 18 or older        Passed - Recent (6 mo) or future (30 days) visit within the authorizing provider's specialty     Patient had office visit in the last 6 months or has a visit in the next 30 days with authorizing provider or within the authorizing provider's specialty.  See \"Patient Info\" tab in inbasket, or \"Choose Columns\" in Meds & Orders section of the refill encounter.                 Karin Todd RN 08/17/22 8:10 PM  "

## 2022-08-18 NOTE — LETTER
August 22, 2022      Efrain Gomes  [address]  [place] [address]        Dear ,    We are writing to inform you of your test results.      Preop sodium is a little lower.  This may be a lab fluke.  I will recheck again when I see you next month.         Resulted Orders   Basic metabolic panel  (Ca, Cl, CO2, Creat, Gluc, K, Na, BUN)   Result Value Ref Range    Creatinine 1.00 0.67 - 1.17 mg/dL    Sodium 131 (L) 136 - 145 mmol/L    Potassium 5.2 3.4 - 5.3 mmol/L    Urea Nitrogen 21.5 8.0 - 23.0 mg/dL    Chloride 97 (L) 98 - 107 mmol/L    Carbon Dioxide (CO2) 26 22 - 29 mmol/L    Anion Gap 8 7 - 15 mmol/L    Glucose 103 (H) 70 - 99 mg/dL    GFR Estimate 79 >60 mL/min/1.73m2      Comment:      Effective December 21, 2021 eGFRcr in adults is calculated using the 2021 CKD-EPI creatinine equation which includes age and gender (Raffaele et al., NEJM, DOI: 10.1056/RODQuu7146987)    Calcium 9.4 8.8 - 10.2 mg/dL   Hemoglobin   Result Value Ref Range    Hemoglobin 13.5 13.3 - 17.7 g/dL       If you have any questions or concerns, please call the clinic at the number listed above.       Sincerely,      Ronnie Bundy MD

## 2022-08-18 NOTE — PATIENT INSTRUCTIONS
Preparing for Your Surgery  Getting started  A nurse will call you to review your health history and instructions. They will give you an arrival time based on your scheduled surgery time. Please be ready to share:    Your doctor's clinic name and phone number    Your medical, surgical and anesthesia history    A list of allergies and sensitivities    A list of medicines, including herbal treatments and over-the-counter drugs    Whether the patient has a legal guardian (ask how to send us the papers in advance)  Please tell us if you're pregnant--or if there's any chance you might be pregnant. Some surgeries may injure a fetus (unborn baby), so they require a pregnancy test. Surgeries that are safe for a fetus don't always need a test, and you can choose whether to have one.   If you have a child who's having surgery, please ask for a copy of Preparing for Your Child's Surgery.    Preparing for surgery    Within 30 days of surgery: Have a pre-op exam (sometimes called an H&P, or History and Physical). This can be done at a clinic or pre-operative center.  ? If you're having a , you may not need this exam. Talk to your care team.    At your pre-op exam, talk to your care team about all medicines you take. If you need to stop any medicines before surgery, ask when to start taking them again.  ? We do this for your safety. Many medicines can make you bleed too much during surgery. Some change how well surgery (anesthesia) drugs work.    Call your insurance company to let them know you're having surgery. (If you don't have insurance, call [phone].)    Call your clinic if there's any change in your health. This includes signs of a cold or flu (sore throat, runny nose, cough, rash, fever). It also includes a scrape or scratch near the surgery site.    If you have questions on the day of surgery, call your hospital or surgery center.  COVID testing  You may need to be tested for COVID-19 before having  surgery. If so, we will give you instructions.  Eating and drinking guidelines  For your safety: Unless your surgeon tells you otherwise, follow the guidelines below.    Eat and drink as usual until 8 hours before surgery. After that, no food or milk.    Drink clear liquids until 2 hours before surgery. These are liquids you can see through, like water, Gatorade and Propel Water. You may also have black coffee and tea (no cream or milk).    Nothing by mouth within 2 hours of surgery. This includes gum, candy and breath mints.    If you drink alcohol: Stop drinking it the night before surgery.    If your care team tells you to take medicine on the morning of surgery, it's okay to take it with a sip of water.  Preventing infection    Shower or bathe the night before and morning of your surgery. Follow the instructions your clinic gave you. (If no instructions, use regular soap.)    Don't shave or clip hair near your surgery site. We'll remove the hair if needed.    Don't smoke or vape the morning of surgery. You may chew nicotine gum up to 2 hours before surgery. A nicotine patch is okay.  ? Note: Some surgeries require you to completely quit smoking and nicotine. Check with your surgeon.    Your care team will make every effort to keep you safe from infection. We will:  ? Clean our hands often with soap and water (or an alcohol-based hand rub).  ? Clean the skin at your surgery site with a special soap that kills germs.  ? Give you a special gown to keep you warm. (Cold raises the risk of infection.)  ? Wear special hair covers, masks, gowns and gloves during surgery.  ? Give antibiotic medicine, if prescribed. Not all surgeries need antibiotics.  What to bring on the day of surgery    Photo ID and insurance card    Copy of your health care directive, if you have one    Glasses and hearing aides (bring cases)  ? You can't wear contacts during surgery    Inhaler and eye drops, if you use them (tell us about these when  you arrive)    CPAP machine or breathing device, if you use them    A few personal items, if spending the night    If you have . . .  ? A pacemaker, ICD (cardiac defibrillator) or other implant: Bring the ID card.  ? An implanted stimulator: Bring the remote control.  ? A legal guardian: Bring a copy of the certified (court-stamped) guardianship papers.  Please remove any jewelry, including body piercings. Leave jewelry and other valuables at home.  If you're going home the day of surgery    You must have a responsible adult drive you home. They should stay with you overnight as well.    If you don't have someone to stay with you, and you aren't safe to go home alone, we may keep you overnight. Insurance often won't pay for this.  After surgery  If it's hard to control your pain or you need more pain medicine, please call your surgeon's office.  Questions?   If you have any questions for your care team, list them here: _________________________________________________________________________________________________________________________________________________________________________ ____________________________________ ____________________________________ ____________________________________  For informational purposes only. Not to replace the advice of your health care provider. Copyright   2003, 2019 [facility]. All rights reserved. Clinically reviewed by Germaine Summers MD. [employer] 948532 - REV 07/21.    How to Take Your Medication Before Surgery    Nothing to eat or drink after midnight the night before surgery .  The morning of your procedure, please take your usual morning medicines with a sip of water.

## 2022-08-19 LAB
ATRIAL RATE - MUSE: 58 BPM
DIASTOLIC BLOOD PRESSURE - MUSE: NORMAL MMHG
INTERPRETATION ECG - MUSE: NORMAL
P AXIS - MUSE: 53 DEGREES
PR INTERVAL - MUSE: 194 MS
QRS DURATION - MUSE: 100 MS
QT - MUSE: 394 MS
QTC - MUSE: 386 MS
R AXIS - MUSE: 33 DEGREES
SYSTOLIC BLOOD PRESSURE - MUSE: NORMAL MMHG
T AXIS - MUSE: 63 DEGREES
VENTRICULAR RATE- MUSE: 58 BPM

## 2022-08-21 ENCOUNTER — ANCILLARY PROCEDURE (OUTPATIENT)
Dept: MRI IMAGING | Facility: CLINIC | Age: 72
End: 2022-08-21
Attending: INTERNAL MEDICINE
Payer: MEDICARE

## 2022-08-21 DIAGNOSIS — R93.3 ABNORMAL FINDING ON GI TRACT IMAGING: ICD-10-CM

## 2022-08-21 PROCEDURE — G1010 CDSM STANSON: HCPCS | Mod: GC | Performed by: RADIOLOGY

## 2022-08-21 PROCEDURE — A9585 GADOBUTROL INJECTION: HCPCS | Performed by: RADIOLOGY

## 2022-08-21 PROCEDURE — 74183 MRI ABD W/O CNTR FLWD CNTR: CPT | Mod: MG | Performed by: RADIOLOGY

## 2022-08-21 RX ORDER — GADOBUTROL 604.72 MG/ML
10 INJECTION INTRAVENOUS ONCE
Status: COMPLETED | OUTPATIENT
Start: 2022-08-21 | End: 2022-08-21

## 2022-08-21 RX ADMIN — GADOBUTROL 10 ML: 604.72 INJECTION INTRAVENOUS at 10:06

## 2022-08-21 NOTE — DISCHARGE INSTRUCTIONS
MRI Contrast Discharge Instructions    The IV contrast you received today will pass out of your body in your  urine. This will happen in the next 24 hours. You will not feel this process.  Your urine will not change color.    Drink at least 4 extra glasses of water or juice today (unless your doctor  has restricted your fluids). This reduces the stress on your kidneys.  You may take your regular medicines.    If you are on dialysis: It is best to have dialysis today.    If you have a reaction: Most reactions happen right away. If you have  any new symptoms after leaving the hospital (such as hives or swelling),  call your hospital at the correct number below. Or call your family doctor.  If you have breathing distress or wheezing, call 911.    Special instructions: ***    I have read and understand the above information.    Signature:______________________________________ Date:___________    Staff:__________________________________________ Date:___________     Time:__________    [facility] Departments:    ___[place]: [phone]  ___[facility]: [phone]  ___[place]: [phone] ___[facility]: [phone]  ___[facility]: [phone]  ___[facility]: [phone]  ___[phone]  ___[facility]: [phone]  ___[phone]

## 2022-08-21 NOTE — LETTER
Patient:  Efrain Gomes  :   1950  MRN:     [id number]        Mr.Calvin JOSELINE Gomes  [address]  [place] [address]        2022    Dear ,    We are writing to inform you of your test results. In short, the imaging was reassuring in terms of the known abdominal cyst which may not be pancreatic in nature, but seems benign nonethelss. As discussed previously, our plan involves a close inspection by endoscopic ultrasound which will also allow for sampling and further confirmation of a benign etiology.    I have included the formal documtentation of the results below. It continues to be a pleasure participating in your care.  Please feel free to contact our clinic with any further questions.      Sincerely,    Zana Perez MD PhD FACG NERI ALAS  Associate Professor of Medicine, Surgery and Pediatrics  Interventional and Therapeutic Endoscopy  GI Service     [facility]  Division of Gastroenterology and Hepatology  [facility] [address] [address]    New Consultations  [phone]  Procedure Scheduling [phone]  Clinical Nurse Coordinator [phone]  Clinical Fax   [phone]  Administrative   [phone]  Administrative Fax  [phone]        Resulted Orders   MRI Abdomen w & w/o contrast mrcp    Narrative    MRCP Without and With Contrast    CLINICAL HISTORY: pancreatic cyst; Abnormal finding on GI tract  imaging    DATE: 2022 10:57 AM    TECHNIQUE:     Images were acquired with and without intravenous gadolinium contrast  through the upper abdomen. 3-D reformatted images were generated by  the technologist. Contrast dose: 10mL Gadavist    Comparison study: Ultrasound 2022    FINDINGS:    Biliary Tree: No intrahepatic biliary ductal dilation. Normal caliber  of the common bile duct.    Pancreas: There is a T2 hyperintense, T1 hypointense lesion that  courses along the anterior lateral  aspect of the pancreatic head and  neck and the medial aspect of the first and second portions of the  duodenum. A portion of this collection circumferentially surrounds the  pylorus and appears to extend to the submucosal plane(series 26, image  15, series 15 images 54-67). This lesion measures 3.8 x 3.5 x 9 cm  (transverse, AP, craniocaudal). No pancreatic ductal dilation.    Liver: Normal size. No focal lesion. Normal parenchymal signal.    Gallbladder: Stone versus polyp fixed to the superior pole of the  gallbladder measuring up to 4 mm (series 18 image 12).    Spleen: Unremarkable.    Kidneys: No focal renal lesions. No hydronephrosis. Mild perinephric  stranding bilaterally.    Adrenal glands: Normal.    Bowel: Cystic lesion adjacent to the duodenum and pylorus described  above. Normal caliber bowel within the field of view.    Lymph nodes: No lymphadenopathy.    Blood vessels: Mild atherosclerotic changes of the abdominal aorta  without aneurysm.    Lung bases: Clear.    Bones and soft tissues: No acute or suspicious bone lesion.    Mesentery and abdominal wall: Unremarkable.    Ascites: None.      Impression    IMPRESSION: Benign appearing cystic lesion in the pancreatico-duodenal  groove with a component that circumferentially surrounds the pylorus  and appears to extend to the submucosal plane. No solid component  within the cyst or communication with the pancreatic duct or biliary  tree. Differential diagnosis includes enteric duplication cyst,  pseudocyst etc.    I have personally reviewed the examination and initial interpretation  and I agree with the findings.    ZAYNAB LOWRY MD         SYSTEM ID:  [id number]

## 2022-08-26 ENCOUNTER — ANESTHESIA EVENT (OUTPATIENT)
Dept: SURGERY | Facility: CLINIC | Age: 72
End: 2022-08-26
Payer: MEDICARE

## 2022-08-29 ENCOUNTER — ANESTHESIA (OUTPATIENT)
Dept: SURGERY | Facility: CLINIC | Age: 72
End: 2022-08-29
Payer: MEDICARE

## 2022-08-29 ENCOUNTER — HOSPITAL ENCOUNTER (OUTPATIENT)
Facility: CLINIC | Age: 72
Discharge: HOME | End: 2022-08-29
Attending: INTERNAL MEDICINE | Admitting: INTERNAL MEDICINE
Payer: MEDICARE

## 2022-08-29 VITALS
RESPIRATION RATE: 16 BRPM | DIASTOLIC BLOOD PRESSURE: 82 MMHG | HEIGHT: 66 IN | SYSTOLIC BLOOD PRESSURE: 138 MMHG | WEIGHT: 225.2 LBS | HEART RATE: 55 BPM | BODY MASS INDEX: 36.19 KG/M2 | TEMPERATURE: 97.6 F | OXYGEN SATURATION: 98 %

## 2022-08-29 DIAGNOSIS — Q45.8 ENTERIC DUPLICATION CYST: Primary | ICD-10-CM

## 2022-08-29 LAB — UPPER EUS: NORMAL

## 2022-08-29 PROCEDURE — 360N000075 HC SURGERY LEVEL 2, PER MIN: Performed by: INTERNAL MEDICINE

## 2022-08-29 PROCEDURE — 710N000012 HC RECOVERY PHASE 2, PER MINUTE: Performed by: INTERNAL MEDICINE

## 2022-08-29 PROCEDURE — 87070 CULTURE OTHR SPECIMN AEROBIC: CPT | Performed by: INTERNAL MEDICINE

## 2022-08-29 PROCEDURE — 250N000009 HC RX 250

## 2022-08-29 PROCEDURE — 710N000009 HC RECOVERY PHASE 1, LEVEL 1, PER MIN: Performed by: INTERNAL MEDICINE

## 2022-08-29 PROCEDURE — 82378 CARCINOEMBRYONIC ANTIGEN: CPT | Mod: GZ | Performed by: INTERNAL MEDICINE

## 2022-08-29 PROCEDURE — 370N000017 HC ANESTHESIA TECHNICAL FEE, PER MIN: Performed by: INTERNAL MEDICINE

## 2022-08-29 PROCEDURE — 250N000011 HC RX IP 250 OP 636

## 2022-08-29 PROCEDURE — 999N000141 HC STATISTIC PRE-PROCEDURE NURSING ASSESSMENT: Performed by: INTERNAL MEDICINE

## 2022-08-29 PROCEDURE — 272N000001 HC OR GENERAL SUPPLY STERILE: Performed by: INTERNAL MEDICINE

## 2022-08-29 PROCEDURE — 250N000011 HC RX IP 250 OP 636: Performed by: INTERNAL MEDICINE

## 2022-08-29 PROCEDURE — 258N000003 HC RX IP 258 OP 636: Performed by: ANESTHESIOLOGY

## 2022-08-29 RX ORDER — LIDOCAINE 40 MG/G
CREAM TOPICAL
Status: DISCONTINUED | OUTPATIENT
Start: 2022-08-29 | End: 2022-08-29 | Stop reason: HOSPADM

## 2022-08-29 RX ORDER — CIPROFLOXACIN 2 MG/ML
400 INJECTION, SOLUTION INTRAVENOUS ONCE
Status: COMPLETED | OUTPATIENT
Start: 2022-08-29 | End: 2022-08-29

## 2022-08-29 RX ORDER — FENTANYL CITRATE 0.05 MG/ML
25 INJECTION, SOLUTION INTRAMUSCULAR; INTRAVENOUS
Status: DISCONTINUED | OUTPATIENT
Start: 2022-08-29 | End: 2022-08-29 | Stop reason: HOSPADM

## 2022-08-29 RX ORDER — LIDOCAINE HYDROCHLORIDE 20 MG/ML
INJECTION, SOLUTION INFILTRATION; PERINEURAL PRN
Status: DISCONTINUED | OUTPATIENT
Start: 2022-08-29 | End: 2022-08-29

## 2022-08-29 RX ORDER — MEPERIDINE HYDROCHLORIDE 25 MG/ML
12.5 INJECTION INTRAMUSCULAR; INTRAVENOUS; SUBCUTANEOUS
Status: DISCONTINUED | OUTPATIENT
Start: 2022-08-29 | End: 2022-08-29 | Stop reason: HOSPADM

## 2022-08-29 RX ORDER — PROPOFOL 10 MG/ML
INJECTION, EMULSION INTRAVENOUS CONTINUOUS PRN
Status: DISCONTINUED | OUTPATIENT
Start: 2022-08-29 | End: 2022-08-29

## 2022-08-29 RX ORDER — ONDANSETRON 2 MG/ML
4 INJECTION INTRAMUSCULAR; INTRAVENOUS EVERY 30 MIN PRN
Status: DISCONTINUED | OUTPATIENT
Start: 2022-08-29 | End: 2022-08-29 | Stop reason: HOSPADM

## 2022-08-29 RX ORDER — ONDANSETRON 2 MG/ML
INJECTION INTRAMUSCULAR; INTRAVENOUS PRN
Status: DISCONTINUED | OUTPATIENT
Start: 2022-08-29 | End: 2022-08-29

## 2022-08-29 RX ORDER — ONDANSETRON 4 MG/1
4 TABLET, ORALLY DISINTEGRATING ORAL EVERY 30 MIN PRN
Status: DISCONTINUED | OUTPATIENT
Start: 2022-08-29 | End: 2022-08-29 | Stop reason: HOSPADM

## 2022-08-29 RX ORDER — SODIUM CHLORIDE, SODIUM LACTATE, POTASSIUM CHLORIDE, CALCIUM CHLORIDE 600; 310; 30; 20 MG/100ML; MG/100ML; MG/100ML; MG/100ML
INJECTION, SOLUTION INTRAVENOUS CONTINUOUS
Status: DISCONTINUED | OUTPATIENT
Start: 2022-08-29 | End: 2022-08-29 | Stop reason: HOSPADM

## 2022-08-29 RX ORDER — FENTANYL CITRATE 0.05 MG/ML
25 INJECTION, SOLUTION INTRAMUSCULAR; INTRAVENOUS EVERY 5 MIN PRN
Status: DISCONTINUED | OUTPATIENT
Start: 2022-08-29 | End: 2022-08-29 | Stop reason: HOSPADM

## 2022-08-29 RX ORDER — OXYCODONE HYDROCHLORIDE 5 MG/1
5 TABLET ORAL EVERY 4 HOURS PRN
Status: DISCONTINUED | OUTPATIENT
Start: 2022-08-29 | End: 2022-08-29 | Stop reason: HOSPADM

## 2022-08-29 RX ORDER — CIPROFLOXACIN 500 MG/1
500 TABLET, FILM COATED ORAL 2 TIMES DAILY
Qty: 6 TABLET | Refills: 0 | Status: SHIPPED | OUTPATIENT
Start: 2022-08-29 | End: 2022-09-13

## 2022-08-29 RX ORDER — HYDROMORPHONE HCL IN WATER/PF 6 MG/30 ML
0.2 PATIENT CONTROLLED ANALGESIA SYRINGE INTRAVENOUS EVERY 5 MIN PRN
Status: DISCONTINUED | OUTPATIENT
Start: 2022-08-29 | End: 2022-08-29 | Stop reason: HOSPADM

## 2022-08-29 RX ADMIN — LIDOCAINE HYDROCHLORIDE 50 MG: 20 INJECTION, SOLUTION INFILTRATION; PERINEURAL at 07:29

## 2022-08-29 RX ADMIN — CIPROFLOXACIN 400 MG: 2 INJECTION INTRAVENOUS at 07:29

## 2022-08-29 RX ADMIN — LIDOCAINE HYDROCHLORIDE 50 MG: 20 INJECTION, SOLUTION INFILTRATION; PERINEURAL at 07:34

## 2022-08-29 RX ADMIN — SODIUM CHLORIDE, POTASSIUM CHLORIDE, SODIUM LACTATE AND CALCIUM CHLORIDE: 600; 310; 30; 20 INJECTION, SOLUTION INTRAVENOUS at 06:14

## 2022-08-29 RX ADMIN — PROPOFOL 150 MCG/KG/MIN: 10 INJECTION, EMULSION INTRAVENOUS at 07:29

## 2022-08-29 RX ADMIN — LIDOCAINE HYDROCHLORIDE 50 MG: 20 INJECTION, SOLUTION INFILTRATION; PERINEURAL at 07:36

## 2022-08-29 RX ADMIN — ONDANSETRON 4 MG: 2 INJECTION INTRAMUSCULAR; INTRAVENOUS at 07:29

## 2022-08-29 ASSESSMENT — ACTIVITIES OF DAILY LIVING (ADL)
ADLS_ACUITY_SCORE: 35
ADLS_ACUITY_SCORE: 35

## 2022-08-29 ASSESSMENT — LIFESTYLE VARIABLES: TOBACCO_USE: 0

## 2022-08-29 ASSESSMENT — ENCOUNTER SYMPTOMS
SEIZURES: 0
DYSRHYTHMIAS: 0

## 2022-08-29 NOTE — INTERVAL H&P NOTE
"I have reviewed the surgical (or preoperative) H&P that is linked to this encounter, and examined the patient. There are no significant changes    Clinical Conditions Present on Arrival:  Clinically Significant Risk Factors Present on Admission           # Hyponatremia: Na = N/A on admission, will monitor as appropriate         # Obesity: Estimated body mass index is 36.35 kg/m  as calculated from the following:    Height as of this encounter: 1.676 m (5' 6\").    Weight as of this encounter: 102.2 kg (225 lb 3.2 oz).       "

## 2022-08-29 NOTE — LETTER
Patient:  Efrain Gomes  :   1950  MRN:     [id number]        Mr.Calvin JOSELINE Gomes  [address]  [place] [address]        2022    Dear ,    We are writing to inform you of your test results. In short, the sampling of the abdominal cyst returned consistent with a benign, uninfected pseudocyst (rather than a duplication cyst), rather than an duplication cyst. This suggests you had an episode of pancreatitis in the pas. That said, we would watch the cyst rather than do any invasive procedures at this time as you have no related symptoms. Therefore we will repeat a CT with IV contrast of the abdomen in 3m.     I have included the formal documtentation of the results below. It continues to be a pleasure participating in your care.  Please feel free to contact our clinic with any further questions.      Sincerely,    Zana Perez MD PhD CHRISTINE ALAS  Associate Professor of Medicine, Surgery and Pediatrics  Interventional and Therapeutic Endoscopy  GI Service     [facility]  Division of Gastroenterology and Hepatology  [place] [address]    New Consultations  [phone]  Procedure Scheduling [phone]  Clinical Nurse Coordinator [phone]  Clinical Fax   [phone]  Administrative   [phone]  Administrative Fax  [phone]        Resulted Orders   Cyst Aerobic Bacterial Culture Routine   Result Value Ref Range    Culture No growth after 3 days    Pancreatic Cyst Panel (Includes Cytology)   Result Value Ref Range    CEA Fluid 27.2 ng/mL    Amylase fluid >7,500.0 U/L    Narrative    No reference ranges have been established. This result should be interpreted in the context of the patient's clinical condition and compared to simultaneous measurement in the patient's blood. This is a lab developed test. It has not been cleared or approved by the FDA. FDA clearance is not required  for clinical use.

## 2022-08-29 NOTE — ANESTHESIA POSTPROCEDURE EVALUATION
Patient: Efrain Gomes    Procedure: Procedure(s):  ENDOSCOPIC ULTRASOUND, ESOPHAGOSCOPY / UPPER GASTROINTESTINAL TRACT (GI)       Anesthesia Type:  MAC    Note:  Disposition: Admission   Postop Pain Control: Uneventful            Sign Out: Well controlled pain   PONV: No   Neuro/Psych: Uneventful            Sign Out: Acceptable/Baseline neuro status   Airway/Respiratory: Uneventful            Sign Out: Acceptable/Baseline resp. status   CV/Hemodynamics: Uneventful            Sign Out: Acceptable CV status; No obvious hypovolemia; No obvious fluid overload   Other NRE: NONE   DID A NON-ROUTINE EVENT OCCUR? No           Last vitals:  Vitals Value Taken Time   /64 08/29/22 0845   Temp 36.4  C (97.6  F) 08/29/22 0815   Pulse 54 08/29/22 0847   Resp 10 08/29/22 0847   SpO2 99 % 08/29/22 0847   Vitals shown include unvalidated device data.    Electronically Signed By: Kami Callejas MD  August 29, 2022  2:43 PM

## 2022-08-29 NOTE — OR NURSING
Patient brought a picture of home covid antigen test on phone as directed.  I personally saw this result and it was time stamped 8/28/22.  Result was negative.

## 2022-08-29 NOTE — ANESTHESIA CARE TRANSFER NOTE
Patient: Efrain Gomes    Procedure: Procedure(s):  ENDOSCOPIC ULTRASOUND, ESOPHAGOSCOPY / UPPER GASTROINTESTINAL TRACT (GI)       Diagnosis: Abnormal finding on GI tract imaging [R93.3]  Diagnosis Additional Information: No value filed.    Anesthesia Type:   MAC     Note:    Oropharynx: oropharynx clear of all foreign objects and spontaneously breathing  Level of Consciousness: awake  Oxygen Supplementation: room air    Independent Airway: airway patency satisfactory and stable  Dentition: dentition unchanged  Vital Signs Stable: post-procedure vital signs reviewed and stable  Report to RN Given: handoff report given  Patient transferred to: PACU (same day)    Handoff Report: Identifed the Patient, Identified the Reponsible Provider, Reviewed the pertinent medical history, Discussed the surgical course, Reviewed Intra-OP anesthesia mangement and issues during anesthesia, Set expectations for post-procedure period and Allowed opportunity for questions and acknowledgement of understanding      Vitals:  Vitals Value Taken Time   /63 08/29/22 0815   Temp 36.4  C (97.6  F) 08/29/22 0815   Pulse 66 08/29/22 0816   Resp 16 08/29/22 0816   SpO2 98 % 08/29/22 0816   Vitals shown include unvalidated device data.    Electronically Signed By: DARIAN Soriano CRNA  August 29, 2022  8:18 AM

## 2022-08-29 NOTE — ANESTHESIA PREPROCEDURE EVALUATION
Anesthesia Pre-Procedure Evaluation    Patient: Efrain Gomes   MRN: [id number] : 1950        Procedure : Procedure(s):  ENDOSCOPIC ULTRASOUND, ESOPHAGOSCOPY / UPPER GASTROINTESTINAL TRACT (GI)          No past medical history on file.   No past surgical history on file.   Allergies   Allergen Reactions     Septra [Sulfamethoxazole W/Trimethoprim] Rash     Sulfamethoxazole-Trimethoprim Rash      Social History     Tobacco Use     Smoking status: Never Smoker     Smokeless tobacco: Never Used   Substance Use Topics     Alcohol use: Yes      Wt Readings from Last 1 Encounters:   22 102 kg (224 lb 14.4 oz)        Allergies   Allergen Reactions     Septra [Sulfamethoxazole W/Trimethoprim] Rash     Sulfamethoxazole-Trimethoprim Rash     Prior to Admission medications    Medication Sig Start Date End Date Taking? Authorizing Provider   acetaminophen (TYLENOL) 500 MG tablet Take 500-1,000 mg by mouth every 6 hours as needed for mild pain    Reported, Patient   amLODIPine (NORVASC) 2.5 MG tablet Take 1 tablet (2.5 mg) by mouth daily 22   Ronnie Bundy MD   atorvastatin (LIPITOR) 10 MG tablet TAKE 1 TABLET(10 MG) BY MOUTH DAILY 22   Ronnie Bundy MD   betamethasone dipropionate (DIPROSONE) 0.05 % external cream [BETAMETHASONE DIPROPIONATE (DIPROLENE) 0.05 % CREAM] STEPHEN EXT AA BID PRN 22   Ronnie Bundy MD   cholecalciferol, vitamin D3, (VITAMIN D3) 2,000 unit Tab [CHOLECALCIFEROL, VITAMIN D3, (VITAMIN D3) 2,000 UNIT TAB] Take 1 tablet by mouth daily. 1/21/15   Provider, Historical   DULoxetine (CYMBALTA) 30 MG capsule Take 1 capsule (30 mg) by mouth daily 22   Ronnie Bundy MD   lisinopril (ZESTRIL) 40 MG tablet TAKE 1 TABLET(40 MG) BY MOUTH DAILY 22   Ronnie Bundy MD   metoprolol succinate ER (TOPROL-XL) 50 MG 24 hr tablet TAKE 1 TABLET(50 MG) BY MOUTH DAILY 3/8/22   Ronnie Bundy MD   multivitamin w/minerals (THERA-VIT-M) tablet Take 1 tablet by mouth daily 21    Ira Grullon PA-C   senna (SENOKOT) 8.6 MG tablet Take 1 tablet by mouth daily as needed    Reported, Patient   tamsulosin (FLOMAX) 0.4 MG capsule Take 1 capsule (0.4 mg) by mouth every evening 4/25/22   Ronnie Bundy MD   triamcinolone (KENALOG) 0.1 % external cream Apply topically 2 times daily as needed for irritation    Reported, Patient     ECG 8/18/22: Sinus bradycardia    Anesthesia Evaluation   Pt has had prior anesthetic. Type: General.    No history of anesthetic complications       ROS/MED HX  ENT/Pulmonary:    (-) tobacco use, asthma and sleep apnea   Neurologic:    (-) no seizures, no CVA and migraines   Cardiovascular:     (+) hypertension----- (-) CAD, HOOD, arrhythmias, valvular problems/murmurs and dyslipidemia   METS/Exercise Tolerance: >4 METS    Hematologic:    (-) history of blood clots and anemia   Musculoskeletal: Comment: Lumbar radiculopathy   (-) arthritis   GI/Hepatic: Comment: Hx pancreatitis - now possible pancreatic cyst    (+) liver disease (fatty liver),  (-) GERD   Renal/Genitourinary:    (-) renal disease and nephrolithiasis   Endo:     (+) Obesity,  (-) Type I DM, Type II DM and thyroid disease   Psychiatric/Substance Use:    (-) psychiatric history   Infectious Disease:    (-) Recent Fever   Malignancy:       Other:      (+) , H/O Chronic Pain,        Physical Exam    Airway        Mallampati: III   TM distance: > 3 FB   Neck ROM: limited   Mouth opening: > 3 cm    Respiratory Devices and Support         Dental  no notable dental history         Cardiovascular   cardiovascular exam normal       Rhythm and rate: normal     Pulmonary   pulmonary exam normal        breath sounds clear to auscultation           OUTSIDE LABS:  CBC:   Lab Results   Component Value Date    WBC 7.4 04/25/2022    WBC 8.6 01/17/2022    HGB 13.5 08/18/2022    HGB 13.8 04/25/2022    HCT 41.9 04/25/2022    HCT 39.5 (L) 01/17/2022     04/25/2022     01/17/2022     BMP:   Lab Results    Component Value Date     (L) 08/18/2022     (L) 04/25/2022    POTASSIUM 5.2 08/18/2022    POTASSIUM 5.2 (H) 04/25/2022    CHLORIDE 97 (L) 08/18/2022    CHLORIDE 99 04/25/2022    CO2 26 08/18/2022    CO2 24 04/25/2022    BUN 21.5 08/18/2022    BUN 20 04/25/2022    CR 1.00 08/18/2022    CR 1.0 08/03/2022     (H) 08/18/2022    GLC 94 04/25/2022     COAGS: No results found for: PTT, INR, FIBR  POC: No results found for: BGM, HCG, HCGS  HEPATIC:   Lab Results   Component Value Date    ALBUMIN 3.8 04/25/2022    PROTTOTAL 6.4 04/25/2022    ALT 35 04/25/2022    AST 31 04/25/2022    ALKPHOS 104 04/25/2022    BILITOTAL 0.6 04/25/2022     OTHER:   Lab Results   Component Value Date    A1C 5.2 01/22/2015    EDDIE 9.4 08/18/2022    MAG 2.1 11/07/2021    LIPASE 245 (H) 11/15/2021    AMYLASE 110 11/09/2021    TSH 2.68 01/09/2019    CRP 0.3 04/25/2022    SED 13 04/25/2022       Anesthesia Plan    ASA Status:  3   NPO Status:  NPO Appropriate    Anesthesia Type: MAC.     - Reason for MAC: straight local not clinically adequate              Consents            Postoperative Care    Pain management: Multi-modal analgesia.   PONV prophylaxis: Ondansetron (or other 5HT-3), Dexamethasone or Solumedrol     Comments:                Kami Callejas MD

## 2022-08-29 NOTE — OP NOTE
Upper EUS 08/29/2022  7:04 AM [address] [address] [place]  [address]   _______________________________________________________________________________   Patient Name: Efrain Gomes          Procedure Date: 8/29/2022 7:04 AM   MRN: [id number]                       Account Number: [id number]   YOB: 1950               Admit Type: Outpatient   Age: 72                               Room: [place] [address]   Note Status: Finalized                Attending MD: LUIZ BHATTI MD   Instrument Name: [id number] EUS Linear   _______________________________________________________________________________       Procedure:                Upper EUS   Indications:              Cyst seen on MRI   Providers:                LUIZ BHATTI MD, Donna Hoang, RN, Mabel Lowe, Technologist   Patient Profile:          Mr Gomes is a 71yo gentleman with an incidental,                             large peripancreatic or pancreatic cyst found                             during an evaluation of lumbar pathology. He now                             proceeds to evaluation by EUS.   Referring MD:             MIGUEL ARECHIGA MD   Medicines:                Deep sedation was administered, Cipro 400 mg IV   Complications:            No immediate complications.   _______________________________________________________________________________   Procedure:                Pre-Anesthesia Assessment:                             - Prior to the procedure, a History and Physical                             was performed, and patient medications and                             allergies were reviewed. The patient is competent.                             The risks and benefits of the procedure and the                             sedation options and risks were discussed with the                             patient. All questions were answered and informed                              consent was obtained. Patient identification and                             proposed procedure were verified by the nurse in                             the pre-procedure area. Mental Status Examination:                             alert and oriented. Airway Examination: Mallampati                             Class II (the uvula but not tonsillar pillars                             visualized). Respiratory Examination: clear to                             auscultation. CV Examination: systolic murmur. ASA                             Grade Assessment: III - A patient with severe                             systemic disease. After reviewing the risks and                             benefits, the patient was deemed in satisfactory                             condition to undergo the procedure. The anesthesia                             plan was to use deep sedation / analgesia.                             Immediately prior to administration of medications,                             the patient was re-assessed for adequacy to receive                             sedatives. The heart rate, respiratory rate, oxygen                             saturations, blood pressure, adequacy of pulmonary                             ventilation, and response to care were monitored                             throughout the procedure. The physical status of                             the patient was re-assessed after the procedure.                             After obtaining informed consent, the endoscope was                             passed under direct vision. Throughout the                             procedure, the patient's blood pressure, pulse, and                             oxygen saturations were monitored continuously. The                             echoendoscope was introduced through the mouth, and                             advanced to the second part of duodenum. The upper                              EUS was accomplished without difficulty. The                             patient tolerated the procedure well.                                                                                     Findings:        White light and endosonographic findings :        The patient was left lateral, under deep sedation and a luinear        echoendoscope was utilized. A large, complex, mostly anechoic collection        with mobile isoechoic contents was demonstrated between the medial        duodenal wall and pancreatic head and genu, extending anteriorly along        the inferior antral wall measuring at least 70.4x31.6mm. The complex        cyst appeared to have an independent wall and seemed distinct from both        the duodenum and pancreas. There were no solid masses. The gallbladder        was identified and was anechioic with a single 4mm hyperechoic,        nonmobile lesion of the wall consistent with a polyp. The pancreatic        parenchyma was diffusely mildly hyperehoic and without signficant        heterogeneity, solid lesion or liquid lesion. The main duct was        decompressed and difficult to identify in the neck, body and tail though        measured less than 2mm in the head. The common duct was decompressed and        measured up to 3mm and was without wall thickening or internal solid        component. Views of the left lobe, right lobe and caudate were notable        for diffuse hyperechoic echotecture without suspicious lesions. There        was no evidence of peripancreatic, periortal, perigastric or celiac        region lymphadenopathy. The major vascularure of the abdomen including        the splenics, superior mesenterics, gastroduodenal, portal and celiac        were unremarkable. Diagnostic needle aspiration for fluid was performed        of the cystic lesion. Color Doppler imaging was utilized prior to needle        puncture to confirm a lack of significant vascular structures within the         needle path. One pass was made with the 22 gauge needle using a        transgastric approach. No stylet was used. The amount of fluid collected        was 6 mL and the fluid was turbid, brown and thin. Sample(s) were sent        for amylase concentration, mucin, cytology, bacterial cultures and CEA.                                                                                     Impression:               - An over 7cm complex, mostly anechoic collection                             with mobile isoechoic contents was demonstrated                             between the medial duodenal wall and pancreatic                             head and genu, extending anteriorly along the                             inferior antral wall. This demonstrated a wall                             independent of the pancreas and duodenum and was                             sampled returning thin, murky fluid sent for                             analyses and culture. These findings and the                             history of the patient favors a duplication (or                             othe benign) cyst.                             - A 4mm benign appearing gallbladder polyp was                             demonstrated                             - Grossly unremarkable pancreas and liver save for                             evidence of diffuse fatty infiltration of each                             - No evidence of lymphadenopathy or vascular                             abnormality   Recommendation:           - Deep sedation recovery with probable discharge                             home this morning                             - Hold antithrombotics for at least three days; all                             other medications may resume without delay, along                             with a diet and activity                             - Surveillance and or other recommendations will be                             based in part  of fluid analyses; as he is without                             related symptoms, decompression will be deferred                             - Complete a short course of antibiotic as                             prescribed                             - The findings and recommendations were discussed                             with the patient and their family

## 2022-08-30 LAB
AMYLASE FLD-CCNC: >7500 U/L
CEA FLD-MCNC: 27.2 NG/ML

## 2022-09-01 DIAGNOSIS — I10 ESSENTIAL HYPERTENSION: ICD-10-CM

## 2022-09-02 DIAGNOSIS — R93.3 ABNORMAL FINDING ON GI TRACT IMAGING: Primary | ICD-10-CM

## 2022-09-02 RX ORDER — METOPROLOL SUCCINATE 50 MG/1
50 TABLET, EXTENDED RELEASE ORAL DAILY
Qty: 90 TABLET | Refills: 3 | Status: SHIPPED | OUTPATIENT
Start: 2022-09-02 | End: 2023-09-18

## 2022-09-02 NOTE — TELEPHONE ENCOUNTER
"Last Written Prescription Date:  3/8/22  Last Fill Quantity: 90,  # refills: 1   Last office visit provider:  8/18/22     Requested Prescriptions   Pending Prescriptions Disp Refills     metoprolol succinate ER (TOPROL XL) 50 MG 24 hr tablet 90 tablet 1     Sig: Take 1 tablet (50 mg) by mouth daily       Beta-Blockers Protocol Passed - 9/1/2022  5:00 PM        Passed - Blood pressure under 140/90 in past 12 months     BP Readings from Last 3 Encounters:   08/29/22 138/82   08/18/22 118/70   04/25/22 115/65                 Passed - Patient is age 6 or older        Passed - Recent (12 mo) or future (30 days) visit within the authorizing provider's specialty     Patient has had an office visit with the authorizing provider or a provider within the authorizing providers department within the previous 12 mos or has a future within next 30 days. See \"Patient Info\" tab in inbasket, or \"Choose Columns\" in Meds & Orders section of the refill encounter.              Passed - Medication is active on med list             Marilou Higgins RN 09/02/22 3:26 PM  "

## 2022-09-03 LAB — BACTERIA FLD CULT: NO GROWTH

## 2022-09-13 ENCOUNTER — OFFICE VISIT (OUTPATIENT)
Dept: INTERNAL MEDICINE | Facility: CLINIC | Age: 72
End: 2022-09-13
Payer: MEDICARE

## 2022-09-13 VITALS
WEIGHT: 227 LBS | HEART RATE: 66 BPM | TEMPERATURE: 98.3 F | DIASTOLIC BLOOD PRESSURE: 68 MMHG | OXYGEN SATURATION: 98 % | BODY MASS INDEX: 36.48 KG/M2 | SYSTOLIC BLOOD PRESSURE: 124 MMHG | HEIGHT: 66 IN

## 2022-09-13 DIAGNOSIS — I10 ESSENTIAL HYPERTENSION: ICD-10-CM

## 2022-09-13 DIAGNOSIS — K86.2 PANCREATIC CYST: ICD-10-CM

## 2022-09-13 DIAGNOSIS — M54.16 LUMBAR BACK PAIN WITH RADICULOPATHY AFFECTING LEFT LOWER EXTREMITY: ICD-10-CM

## 2022-09-13 DIAGNOSIS — E66.01 MORBID OBESITY (H): Primary | ICD-10-CM

## 2022-09-13 DIAGNOSIS — G89.4 CHRONIC PAIN SYNDROME: ICD-10-CM

## 2022-09-13 PROCEDURE — 99214 OFFICE O/P EST MOD 30 MIN: CPT | Mod: 25 | Performed by: INTERNAL MEDICINE

## 2022-09-13 PROCEDURE — 90662 IIV NO PRSV INCREASED AG IM: CPT | Performed by: INTERNAL MEDICINE

## 2022-09-13 PROCEDURE — G0008 ADMIN INFLUENZA VIRUS VAC: HCPCS | Performed by: INTERNAL MEDICINE

## 2022-09-13 NOTE — PROGRESS NOTES
1. Morbid obesity (H)  I think the weight is causing his back to worsen as well in addition to his other medical problems such as the hypertension.  10.  To be start working on the weight loss.  We discussed a calorie counting adalberto on his phone today.  We also discussed things that he can eliminate from his diet that would help such as getting rid of granola which is a high caloric food.    2. Essential hypertension  Blood pressure is controlled.  Continue regimen.    3. Pancreatic cyst  Presumably benign.  He will be having repeat imaging in December.  I will see him right after that.    4. Chronic pain syndrome  Given his chronic pain he has had really no good results from the sports medicine doctor he saw so we will have him meet with our spine care group.  - Spine  Referral; Future    5. Lumbar back pain with radiculopathy affecting left lower extremity  As above.  - Spine  Referral; Future    Subjective   Efrain is a 72 year old, presenting for the following health issues:  Follow Up (1 mo follow up )      History of Present Illness       Back Pain:  He presents for follow up of back pain. Patient's back pain is a chronic problem.  Location of back pain:  Right lower back, left lower back, right shoulder, left shoulder, right hip and left hip  Description of back pain: burning and dull ache  Back pain spreads: right buttocks, left buttocks, right thigh and left thigh    Since patient first noticed back pain, pain is: always present, but gets better and worse  Does back pain interfere with his job:  Not applicable      He eats 4 or more servings of fruits and vegetables daily.He consumes 0 sweetened beverage(s) daily.He exercises with enough effort to increase his heart rate 20 to 29 minutes per day.  He exercises with enough effort to increase his heart rate 7 days per week.   He is taking medications regularly.         Efrain comes in today for follow-up of his multi medical problems.  He  "had a pancreatic cyst and underwent an endoscopic ultrasound.  Apparently that was felt to be benign in nature.  He may have developed this after pancreatitis within the last couple years.  He never had imaging during or after that episode.  He continues to complain of low back and upper back pain.  His back continues to hurt.  He is not doing any exercise as a result.  We have talked about weight loss and he is not been able to lose any.  He tells me he is trying to cut back his intake but his exercise is minimal due to his back pain.  He continues to be off alcohol.  He denies other somatic concerns today.    Review of Systems         Objective    /68 (BP Location: Left arm, Patient Position: Sitting, Cuff Size: Adult Regular)   Pulse 66   Temp 98.3  F (36.8  C)   Ht 1.676 m (5' 6\")   Wt 103 kg (227 lb)   SpO2 98%   BMI 36.64 kg/m    Body mass index is 36.64 kg/m .  Physical Exam   Pleasant obese gentleman in no distress.                    "

## 2022-10-13 ENCOUNTER — OFFICE VISIT (OUTPATIENT)
Dept: PHYSICAL MEDICINE AND REHAB | Facility: CLINIC | Age: 72
End: 2022-10-13
Payer: MEDICARE

## 2022-10-13 VITALS
HEIGHT: 66 IN | HEART RATE: 73 BPM | WEIGHT: 230 LBS | SYSTOLIC BLOOD PRESSURE: 123 MMHG | BODY MASS INDEX: 36.96 KG/M2 | DIASTOLIC BLOOD PRESSURE: 60 MMHG

## 2022-10-13 DIAGNOSIS — R20.2 PARESTHESIA OF FOOT, BILATERAL: ICD-10-CM

## 2022-10-13 DIAGNOSIS — M79.18 GLUTEAL PAIN: ICD-10-CM

## 2022-10-13 DIAGNOSIS — M53.3 SACROILIAC JOINT PAIN: Primary | ICD-10-CM

## 2022-10-13 PROCEDURE — 99204 OFFICE O/P NEW MOD 45 MIN: CPT | Performed by: PHYSICAL MEDICINE & REHABILITATION

## 2022-10-13 ASSESSMENT — PAIN SCALES - GENERAL: PAINLEVEL: MILD PAIN (3)

## 2022-10-13 NOTE — PROGRESS NOTES
Assessment/Plan:      Efrain was seen today for back pain.    Diagnoses and all orders for this visit:    Sacroiliac joint pain  -     Physical Therapy Referral; Future  -     PAIN Joint Injection Sacroiliac Joint Bilateral; Future    Gluteal pain  -     Physical Therapy Referral; Future    Paresthesia of foot, bilateral  -     Physical Therapy Referral; Future    Other orders  -     Spine  Referral         Assessment: Pleasant 72 year old male with a history of hypertension, chronic pain, pancreatitis, hyperlipidemia, mild depression, obesity, history of alcoholism sober for 1 year with:    1.  Chronic intermittent lumbar spine with bilateral gluteal pain.  His pain is most consistent with bilateral sacroiliac joint pain.  He has degenerative changes of the SI joints on CT abdomen and pelvis.  He has some degenerative disc disease mild to moderate at L5-S1 with some minimal lateral recess stenosis but no high-grade central stenosis or foraminal stenosis/nerve compression on my review of the imaging.  He does have some bilateral lower extremity radicular type symptoms intermittently which are curious.  I question if this is myofascial referral have some component of vascular phenomenon although his CT abdomen pelvis is  not to significantly abnormal.    2.  Bilateral foot paresthesias consistent with polyneuropathy.      Discussion:    1.  We discussed the diagnosis and treatment options.  Again I am not sure what is causing his lower extremity radicular symptoms other than potential myofascial referral.  He has SI joint degenerative changes and provocative maneuvers which are positive today including thoughtlessness, Naomi's bilaterally, Gaenslen's bilaterally.  He also has degenerative changes on CT pelvis.    2.  Trial bilateral sacroiliac joint injections.  He has failed physical therapy doing his exercises.    3.  Also would like to get him scheduled for [facility].  Would like to try him on  "gentle [facility] protocol to help strengthen his lumbar spine in general.    4.  Can consider EMG of the lower extremities to further evaluate for polyneuropathy or myopathy although negative CK ESR and CRP.    5.  Follow-up 2 to 4 weeks after injection.      It was our pleasure caring for your patient today, if there any questions or concerns please do not hesitate to contact us.      Subjective:   Patient ID: Efrain Gomes is a 72 year old male.    History of Present Illness: *Patient presents at the request of Dr. Ronnie Bundy for an evaluation of low back pain.  He has a longstanding history of low back pain intermittently for several years worsening over time has tended to wax and wane and his back will \"go out \"on him for 2 to 3 days at a time.  Has been worsening for the past couple of years worse at the lumbosacral junction rating to the bilateral gluteal region towards her greater trochanters.  When the pain is severe he gets pain to the spine itself and up to the thoracolumbar region.  Has a dull achy pain feels muscle tightness through his buttock stiffness in his legs down the back of the legs to the calves with standing and walking more than a quarter of a mile.  Sitting down for just a few minutes helps his legs and buttock.  On a lumbar support also helped significantly.  Pain is a 5/10 at worst 3/10 today 1/10 at best.  Does report some paresthesias of his feet and the toes.    Reports seeing sports medicine.  Has been to physical therapy doing some home exercises.  Has not had a chiropractor.  Has had MRI of the lumbar spine along with CT with runoff of the abdomen and pelvis.  Takes duloxetine and Tylenol.    Labs: ESR CRP and CK recently Normal 13, 0.3, 109 respectively.    Imaging: * MRI report and images were personally reviewed and discussed with the patient.  A plastic model was utilized during the discussion.  MRI of theLumbar spine from June personally reviewed.  Some mild degenerative changes " "of the discs with mild to moderate disc height loss L5-S1 mild L4-5.  No significant disc height loss at other levels.  L5-S1 mild broad-based disc bulge without any significant central canal or foraminal stenosis.  At L4-5 there is mild foraminal stenosis bilaterally with minimal broad-based disc bulge without spinal stenosis and L3-4 there is also minimal foraminal stenosis bilaterally.   I personally reviewed CT scan abdomen pelvis with as well.  This shows degenerative changes of the SI joints.  It also shows multifocal stenosis of the anterior tibial and peroneal arteries of the right leg and multifocal focal anterior tibial artery stenosis in the left leg.       Review of Systems: Complains of weight gain, muscle pain/joint pain, muscle fatigue and \"sciatica\".  Denies fevers, headache, hoarseness, change in vision, chest pain, shortness of breath, abdominal pain, nausea, vomiting, bowel or bladder incontinence, skin issues, balance issues, sleep issues.  Remainder of 12 point review systems negative unless listed above.    Past Medical History:   Diagnosis Date     Alcoholism (H)      BPH (benign prostatic hyperplasia)      Chronic pain syndrome      ED (erectile dysfunction)      Fatty liver      History of anemia      History of pancreatitis      HLD (hyperlipidemia)      Hypertension      Lumbar back pain with radiculopathy affecting lower extremity      Mild major depression (H)      Morbid obesity (H)      Pancreatic cyst        Family History   Problem Relation Age of Onset     Cancer Mother      Cancer Father      Diabetes Brother          Social History     Socioeconomic History     Marital status:      Spouse name: None     Number of children: None     Years of education: None     Highest education level: None   Tobacco Use     Smoking status: Former     Types: Pipe     Quit date: 1994     Years since quittin.8     Smokeless tobacco: Never   Vaping Use     Vaping Use: Never used " "  Substance and Sexual Activity     Alcohol use: Not Currently     Comment: sober since 10/2021     Drug use: Not Currently     Patient is retired.  No tobacco.  Has previously used alcohol but has been sober for 1 year.    The following portions of the patient's history were reviewed and updated as appropriate: allergies, current medications, past family history, past medical history, past social history, past surgical history and problem list.    Oswestry (CHIN) Questionnaire    OSWESTRY DISABILITY INDEX 10/13/2022   Count 9   Sum 14   Oswestry Score (%) 31.11       Neck Disability Index:  No flowsheet data found.       PHQ-2 Score:     PHQ-2 ( 1999 [employer]) 4/25/2022   Q1: Little interest or pleasure in doing things 0   Q2: Feeling down, depressed or hopeless 0   PHQ-2 Score 0   Q1: Little interest or pleasure in doing things Not at all   Q2: Feeling down, depressed or hopeless Not at all   PHQ-2 Score 0                  Objective:   Physical Exam:    /60   Pulse 73   Ht 5' 6\" (1.676 m)   Wt 230 lb (104.3 kg)   BMI 37.12 kg/m    Body mass index is 37.12 kg/m .      General:  Well-appearing male in no acute distress.  Overweight, pleasant, cooperative, and interactive throughout the examination and interview.  CV: No lower extremity edema on inspection or paltation.  Lymphatics: No cervical lymphadenopathy palpated.  Pulses 2+ posterior tibial arteries bilaterally.  Eyes: sclera clear. Skin: No rashes or lesions seen over the head/neck,   arms, legs   Respirations unlabored.  MSK: Gait is nonantalgic.  Able to heel-toe walk without difficulty.  Negative Romberg.  Spine: normal AP curves of the C, T, and L spine.  Moderately reduced lumbar flexion finger to floor testing..  Palpation: Tenderness to palpation over SI joints and gluteal tissues.  Extremities: Full range of motion of the elbows, and wrists with no effusions or tenderness to palpation.  Mildly reduced hip external rotation bilaterally and " Naomi's test.  Full range of motion of the  knees, and ankles with no effusions or tenderness to palpation.  Positive SI pain with thigh thrust bilaterally, Naomi's test and Gaenslen's.. No hypermobility of the upper or lower extremities.  Neurologic exam: Mental status: Patient is alert and oriented with normal affect.  Attention, knowledge, memory, and language are intact.  Normal coordination throughout the examination.  Reflexes are 2+ and symmetric biceps, triceps, brachioradialis, patellar, and 0 Achilles with down-going toes and Negative Azul's.  Sensation is intact to light touch throughout the upper and lower extremities bilaterally.  Manual muscle testing reveals 5 out of 5 in the hip flexors, knee flexors/extensors, ankle plantar flexors, ankle  dorsiflexors, and EHL.  Upper extremities: Grossly normal strength . Normal muscle bulk and tone in the arms and legs.    Negative seated and supine straight leg raise bilaterally.

## 2022-10-13 NOTE — LETTER
10/13/2022         RE: Efrain Gomes  [address]  [place] [address]        Dear Colleague,    Thank you for referring your patient, Efrain Gomes, to the [facility]. Please see a copy of my visit note below.    Assessment/Plan:      Efrain was seen today for back pain.    Diagnoses and all orders for this visit:    Sacroiliac joint pain  -     Physical Therapy Referral; Future  -     PAIN Joint Injection Sacroiliac Joint Bilateral; Future    Gluteal pain  -     Physical Therapy Referral; Future    Paresthesia of foot, bilateral  -     Physical Therapy Referral; Future    Other orders  -     Spine  Referral         Assessment: Pleasant 72 year old male with a history of hypertension, chronic pain, pancreatitis, hyperlipidemia, mild depression, obesity, history of alcoholism sober for 1 year with:    1.  Chronic intermittent lumbar spine with bilateral gluteal pain.  His pain is most consistent with bilateral sacroiliac joint pain.  He has degenerative changes of the SI joints on CT abdomen and pelvis.  He has some degenerative disc disease mild to moderate at L5-S1 with some minimal lateral recess stenosis but no high-grade central stenosis or foraminal stenosis/nerve compression on my review of the imaging.  He does have some bilateral lower extremity radicular type symptoms intermittently which are curious.  I question if this is myofascial referral have some component of vascular phenomenon although his CT abdomen pelvis is  not to significantly abnormal.    2.  Bilateral foot paresthesias consistent with polyneuropathy.      Discussion:    1.  We discussed the diagnosis and treatment options.  Again I am not sure what is causing his lower extremity radicular symptoms other than potential myofascial referral.  He has SI joint degenerative changes and provocative maneuvers which are positive today including thoughtlessness, Naomi's bilaterally, Gaenslen's  "bilaterally.  He also has degenerative changes on CT pelvis.    2.  Trial bilateral sacroiliac joint injections.  He has failed physical therapy doing his exercises.    3.  Also would like to get him scheduled for [facility].  Would like to try him on gentle [facility] protocol to help strengthen his lumbar spine in general.    4.  Can consider EMG of the lower extremities to further evaluate for polyneuropathy or myopathy although negative CK ESR and CRP.    5.  Follow-up 2 to 4 weeks after injection.      It was our pleasure caring for your patient today, if there any questions or concerns please do not hesitate to contact us.      Subjective:   Patient ID: Efrain Gomes is a 72 year old male.    History of Present Illness: *Patient presents at the request of Dr. Ronnie Bundy for an evaluation of low back pain.  He has a longstanding history of low back pain intermittently for several years worsening over time has tended to wax and wane and his back will \"go out \"on him for 2 to 3 days at a time.  Has been worsening for the past couple of years worse at the lumbosacral junction rating to the bilateral gluteal region towards her greater trochanters.  When the pain is severe he gets pain to the spine itself and up to the thoracolumbar region.  Has a dull achy pain feels muscle tightness through his buttock stiffness in his legs down the back of the legs to the calves with standing and walking more than a quarter of a mile.  Sitting down for just a few minutes helps his legs and buttock.  On a lumbar support also helped significantly.  Pain is a 5/10 at worst 3/10 today 1/10 at best.  Does report some paresthesias of his feet and the toes.    Reports seeing sports medicine.  Has been to physical therapy doing some home exercises.  Has not had a chiropractor.  Has had MRI of the lumbar spine along with CT with runoff of the abdomen and pelvis.  Takes duloxetine and Tylenol.    Labs: ESR CRP and CK recently " "Normal 13, 0.3, 109 respectively.    Imaging: * MRI report and images were personally reviewed and discussed with the patient.  A plastic model was utilized during the discussion.  MRI of theLumbar spine from June personally reviewed.  Some mild degenerative changes of the discs with mild to moderate disc height loss L5-S1 mild L4-5.  No significant disc height loss at other levels.  L5-S1 mild broad-based disc bulge without any significant central canal or foraminal stenosis.  At L4-5 there is mild foraminal stenosis bilaterally with minimal broad-based disc bulge without spinal stenosis and L3-4 there is also minimal foraminal stenosis bilaterally.   I personally reviewed CT scan abdomen pelvis with as well.  This shows degenerative changes of the SI joints.  It also shows multifocal stenosis of the anterior tibial and peroneal arteries of the right leg and multifocal focal anterior tibial artery stenosis in the left leg.       Review of Systems: Complains of weight gain, muscle pain/joint pain, muscle fatigue and \"sciatica\".  Denies fevers, headache, hoarseness, change in vision, chest pain, shortness of breath, abdominal pain, nausea, vomiting, bowel or bladder incontinence, skin issues, balance issues, sleep issues.  Remainder of 12 point review systems negative unless listed above.    Past Medical History:   Diagnosis Date     Alcoholism (H)      BPH (benign prostatic hyperplasia)      Chronic pain syndrome      ED (erectile dysfunction)      Fatty liver      History of anemia      History of pancreatitis      HLD (hyperlipidemia)      Hypertension      Lumbar back pain with radiculopathy affecting lower extremity      Mild major depression (H)      Morbid obesity (H)      Pancreatic cyst        Family History   Problem Relation Age of Onset     Cancer Mother      Cancer Father      Diabetes Brother          Social History     Socioeconomic History     Marital status:      Spouse name: None     Number " "of children: None     Years of education: None     Highest education level: None   Tobacco Use     Smoking status: Former     Types: Pipe     Quit date: 1994     Years since quittin.8     Smokeless tobacco: Never   Vaping Use     Vaping Use: Never used   Substance and Sexual Activity     Alcohol use: Not Currently     Comment: sober since 10/2021     Drug use: Not Currently     Patient is retired.  No tobacco.  Has previously used alcohol but has been sober for 1 year.    The following portions of the patient's history were reviewed and updated as appropriate: allergies, current medications, past family history, past medical history, past social history, past surgical history and problem list.    Oswestry (CHIN) Questionnaire    OSWESTRY DISABILITY INDEX 10/13/2022   Count 9   Sum 14   Oswestry Score (%) 31.11       Neck Disability Index:  No flowsheet data found.       PHQ-2 Score:     PHQ-2 (  [employer]) 2022   Q1: Little interest or pleasure in doing things 0   Q2: Feeling down, depressed or hopeless 0   PHQ-2 Score 0   Q1: Little interest or pleasure in doing things Not at all   Q2: Feeling down, depressed or hopeless Not at all   PHQ-2 Score 0                  Objective:   Physical Exam:    /60   Pulse 73   Ht 5' 6\" (1.676 m)   Wt 230 lb (104.3 kg)   BMI 37.12 kg/m    Body mass index is 37.12 kg/m .      General:  Well-appearing male in no acute distress.  Overweight, pleasant, cooperative, and interactive throughout the examination and interview.  CV: No lower extremity edema on inspection or paltation.  Lymphatics: No cervical lymphadenopathy palpated.  Pulses 2+ posterior tibial arteries bilaterally.  Eyes: sclera clear. Skin: No rashes or lesions seen over the head/neck,   arms, legs   Respirations unlabored.  MSK: Gait is nonantalgic.  Able to heel-toe walk without difficulty.  Negative Romberg.  Spine: normal AP curves of the C, T, and L spine.  Moderately reduced lumbar flexion " finger to floor testing..  Palpation: Tenderness to palpation over SI joints and gluteal tissues.  Extremities: Full range of motion of the elbows, and wrists with no effusions or tenderness to palpation.  Mildly reduced hip external rotation bilaterally and Naomi's test.  Full range of motion of the  knees, and ankles with no effusions or tenderness to palpation.  Positive SI pain with thigh thrust bilaterally, Naomi's test and Gaenslen's.. No hypermobility of the upper or lower extremities.  Neurologic exam: Mental status: Patient is alert and oriented with normal affect.  Attention, knowledge, memory, and language are intact.  Normal coordination throughout the examination.  Reflexes are 2+ and symmetric biceps, triceps, brachioradialis, patellar, and 0 Achilles with down-going toes and Negative Azul's.  Sensation is intact to light touch throughout the upper and lower extremities bilaterally.  Manual muscle testing reveals 5 out of 5 in the hip flexors, knee flexors/extensors, ankle plantar flexors, ankle  dorsiflexors, and EHL.  Upper extremities: Grossly normal strength . Normal muscle bulk and tone in the arms and legs.    Negative seated and supine straight leg raise bilaterally.        Again, thank you for allowing me to participate in the care of your patient.        Sincerely,        Sage Mendiola, DO

## 2022-10-13 NOTE — PATIENT INSTRUCTIONS
A bilateral Sacroiliac joint injection has been ordered today. Please schedule this injection at least  2 weeks from now to allow time for insurance prior authorization. On the day of your injection, you cannot be sick or taking antibiotics. If you become sick and are prescribed, please call the clinic so your injection can be rescheduled for once you have completed your antibiotics. You will need to bring a  with you for your injection. If you have any questions or concerns prior to your injection, please do not hesitate to call the nurse navigation line at [phone].   2. A physical therapy order was provided for you today.  You will be contacted by physical therapy.  If nobody contacts you within 3 to 5 days, please contact the clinic at [phone].  It will be very important for you to do your physical therapy exercises on a regular basis to decrease your pain and prevent future pain flares.

## 2022-10-31 DIAGNOSIS — N40.1 BPH ASSOCIATED WITH NOCTURIA: ICD-10-CM

## 2022-10-31 DIAGNOSIS — R35.1 BPH ASSOCIATED WITH NOCTURIA: ICD-10-CM

## 2022-11-01 RX ORDER — TAMSULOSIN HYDROCHLORIDE 0.4 MG/1
CAPSULE ORAL
Qty: 90 CAPSULE | Refills: 3 | Status: SHIPPED | OUTPATIENT
Start: 2022-11-01 | End: 2023-10-20

## 2022-11-01 NOTE — TELEPHONE ENCOUNTER
"Last Written Prescription Date:  4/25/22  Last Fill Quantity: 90,  # refills: 1   Last office visit provider:  9/13/22     Requested Prescriptions   Pending Prescriptions Disp Refills     tamsulosin (FLOMAX) 0.4 MG capsule [Pharmacy Med Name: TAMSULOSIN 0.4MG CAPSULES] 90 capsule 1     Sig: TAKE 1 CAPSULE(0.4 MG) BY MOUTH EVERY EVENING       Alpha Blockers Passed - 10/31/2022  2:12 PM        Passed - Blood pressure under 140/90 in past 12 months     BP Readings from Last 3 Encounters:   10/13/22 123/60   09/13/22 124/68   08/29/22 138/82                 Passed - Recent (12 mo) or future (30 days) visit within the authorizing provider's specialty     Patient has had an office visit with the authorizing provider or a provider within the authorizing providers department within the previous 12 mos or has a future within next 30 days. See \"Patient Info\" tab in inbasket, or \"Choose Columns\" in Meds & Orders section of the refill encounter.              Passed - Patient does not have Tadalafil, Vardenafil, or Sildenafil on their medication list        Passed - Medication is active on med list        Passed - Patient is 18 years of age or older             Marilou Higgins RN 11/01/22 1:24 PM  "

## 2022-11-10 DIAGNOSIS — F32.0 MILD MAJOR DEPRESSION (H): ICD-10-CM

## 2022-11-10 DIAGNOSIS — G89.4 CHRONIC PAIN SYNDROME: ICD-10-CM

## 2022-11-12 NOTE — TELEPHONE ENCOUNTER
"Routing refill request to provider for review/approval because:  phq 9    Last Written Prescription Date:  8/17/22  Last Fill Quantity: 90,  # refills: 0   Last office visit provider:  9/13/22     Requested Prescriptions   Pending Prescriptions Disp Refills     DULoxetine (CYMBALTA) 30 MG capsule [Pharmacy Med Name: DULOXETINE DR 30MG CAPSULES] 90 capsule 0     Sig: TAKE 1 CAPSULE(30 MG) BY MOUTH DAILY       Serotonin-Norepinephrine Reuptake Inhibitors  Failed - 11/10/2022 11:59 PM        Failed - PHQ-9 score of less than 5 in past 6 months     Please review last PHQ-9 score.           Passed - Blood pressure under 140/90 in past 12 months     BP Readings from Last 3 Encounters:   10/13/22 123/60   09/13/22 124/68   08/29/22 138/82                 Passed - Medication is active on med list        Passed - Patient is age 18 or older        Passed - Recent (6 mo) or future (30 days) visit within the authorizing provider's specialty     Patient had office visit in the last 6 months or has a visit in the next 30 days with authorizing provider or within the authorizing provider's specialty.  See \"Patient Info\" tab in inbasket, or \"Choose Columns\" in Meds & Orders section of the refill encounter.                 Karin Todd RN 11/12/22 9:46 AM  "

## 2022-11-13 RX ORDER — DULOXETIN HYDROCHLORIDE 30 MG/1
CAPSULE, DELAYED RELEASE ORAL
Qty: 90 CAPSULE | Refills: 0 | Status: SHIPPED | OUTPATIENT
Start: 2022-11-13 | End: 2023-02-13

## 2022-12-02 ENCOUNTER — ANCILLARY PROCEDURE (OUTPATIENT)
Dept: CT IMAGING | Facility: CLINIC | Age: 72
End: 2022-12-02
Attending: INTERNAL MEDICINE
Payer: MEDICARE

## 2022-12-02 DIAGNOSIS — R93.3 ABNORMAL FINDING ON GI TRACT IMAGING: ICD-10-CM

## 2022-12-02 LAB
CREAT BLD-MCNC: 1.1 MG/DL (ref 0.7–1.3)
GFR SERPL CREATININE-BSD FRML MDRD: >60 ML/MIN/1.73M2

## 2022-12-02 PROCEDURE — 74177 CT ABD & PELVIS W/CONTRAST: CPT | Mod: MG | Performed by: RADIOLOGY

## 2022-12-02 PROCEDURE — G1010 CDSM STANSON: HCPCS | Mod: GC | Performed by: RADIOLOGY

## 2022-12-02 PROCEDURE — 82565 ASSAY OF CREATININE: CPT | Performed by: PATHOLOGY

## 2022-12-02 RX ORDER — IOPAMIDOL 755 MG/ML
122 INJECTION, SOLUTION INTRAVASCULAR ONCE
Status: COMPLETED | OUTPATIENT
Start: 2022-12-02 | End: 2022-12-02

## 2022-12-02 RX ADMIN — IOPAMIDOL 122 ML: 755 INJECTION, SOLUTION INTRAVASCULAR at 09:58

## 2022-12-08 DIAGNOSIS — R93.3 ABNORMAL FINDING ON GI TRACT IMAGING: Primary | ICD-10-CM

## 2022-12-14 ENCOUNTER — OFFICE VISIT (OUTPATIENT)
Dept: INTERNAL MEDICINE | Facility: CLINIC | Age: 72
End: 2022-12-14
Payer: MEDICARE

## 2022-12-14 VITALS
HEART RATE: 68 BPM | BODY MASS INDEX: 37.77 KG/M2 | SYSTOLIC BLOOD PRESSURE: 124 MMHG | DIASTOLIC BLOOD PRESSURE: 60 MMHG | WEIGHT: 235 LBS | OXYGEN SATURATION: 98 % | HEIGHT: 66 IN

## 2022-12-14 DIAGNOSIS — J06.9 UPPER RESPIRATORY TRACT INFECTION, UNSPECIFIED TYPE: Primary | ICD-10-CM

## 2022-12-14 DIAGNOSIS — M54.16 LUMBAR BACK PAIN WITH RADICULOPATHY AFFECTING LEFT LOWER EXTREMITY: ICD-10-CM

## 2022-12-14 DIAGNOSIS — F32.0 MILD MAJOR DEPRESSION (H): ICD-10-CM

## 2022-12-14 DIAGNOSIS — R63.5 WEIGHT GAIN: ICD-10-CM

## 2022-12-14 DIAGNOSIS — K86.2 PANCREATIC CYST: ICD-10-CM

## 2022-12-14 DIAGNOSIS — K76.0 FATTY LIVER: ICD-10-CM

## 2022-12-14 DIAGNOSIS — I10 ESSENTIAL HYPERTENSION: ICD-10-CM

## 2022-12-14 DIAGNOSIS — E66.01 MORBID OBESITY (H): ICD-10-CM

## 2022-12-14 DIAGNOSIS — E78.00 HYPERCHOLESTEREMIA: ICD-10-CM

## 2022-12-14 LAB
ALBUMIN SERPL BCG-MCNC: 4 G/DL (ref 3.5–5.2)
ALP SERPL-CCNC: 100 U/L (ref 40–129)
ALT SERPL W P-5'-P-CCNC: 34 U/L (ref 10–50)
ANION GAP SERPL CALCULATED.3IONS-SCNC: 11 MMOL/L (ref 7–15)
AST SERPL W P-5'-P-CCNC: 32 U/L (ref 10–50)
BILIRUB SERPL-MCNC: 0.4 MG/DL
BUN SERPL-MCNC: 22.6 MG/DL (ref 8–23)
CALCIUM SERPL-MCNC: 9 MG/DL (ref 8.8–10.2)
CHLORIDE SERPL-SCNC: 98 MMOL/L (ref 98–107)
CHOLEST SERPL-MCNC: 152 MG/DL
CREAT SERPL-MCNC: 1.15 MG/DL (ref 0.67–1.17)
DEPRECATED HCO3 PLAS-SCNC: 24 MMOL/L (ref 22–29)
GFR SERPL CREATININE-BSD FRML MDRD: 67 ML/MIN/1.73M2
GLUCOSE SERPL-MCNC: 104 MG/DL (ref 70–99)
HDLC SERPL-MCNC: 42 MG/DL
LDLC SERPL CALC-MCNC: 74 MG/DL
NONHDLC SERPL-MCNC: 110 MG/DL
POTASSIUM SERPL-SCNC: 4.5 MMOL/L (ref 3.4–5.3)
PROT SERPL-MCNC: 6.5 G/DL (ref 6.4–8.3)
SODIUM SERPL-SCNC: 133 MMOL/L (ref 136–145)
TRIGL SERPL-MCNC: 180 MG/DL
TSH SERPL DL<=0.005 MIU/L-ACNC: 3.41 UIU/ML (ref 0.3–4.2)

## 2022-12-14 PROCEDURE — 80053 COMPREHEN METABOLIC PANEL: CPT | Performed by: INTERNAL MEDICINE

## 2022-12-14 PROCEDURE — 99214 OFFICE O/P EST MOD 30 MIN: CPT | Performed by: INTERNAL MEDICINE

## 2022-12-14 PROCEDURE — 80061 LIPID PANEL: CPT | Performed by: INTERNAL MEDICINE

## 2022-12-14 PROCEDURE — 84443 ASSAY THYROID STIM HORMONE: CPT | Performed by: INTERNAL MEDICINE

## 2022-12-14 PROCEDURE — 36415 COLL VENOUS BLD VENIPUNCTURE: CPT | Performed by: INTERNAL MEDICINE

## 2022-12-14 ASSESSMENT — ENCOUNTER SYMPTOMS: BACK PAIN: 1

## 2022-12-14 NOTE — PROGRESS NOTES
"  Assessment & Plan     1. Upper respiratory tract infection, unspecified type  Reassurance.  He is getting better he tells me.    2. Morbid obesity (H)  Ongoing efforts at weight loss urged.  We discussed stopping eating between meals.    3. Pancreatic cyst  He recently had a scan which showed stability.  He will have another scan in 6 months per GI.    4. Lumbar back pain with radiculopathy affecting left lower extremity  I have urged him to get into get the injections that were planned for him.    5. Mild major depression (H)  Mood is good at this time.    6. Fatty liver  Ongoing efforts at weight loss urged.    7. Essential hypertension  Blood pressure looks good today.  Labs today for monitoring.  - Comprehensive metabolic panel (BMP + Alb, Alk Phos, ALT, AST, Total. Bili, TP); Future    8. Hypercholesteremia  Lipids today for monitoring on his statin.  - Comprehensive metabolic panel (BMP + Alb, Alk Phos, ALT, AST, Total. Bili, TP); Future  - Lipid panel reflex to direct LDL Fasting; Future    9. Weight gain  Likely due to dietary and exercise changes Hello we will check TSH  - TSH with free T4 reflex; Future             BMI:   Estimated body mass index is 37.93 kg/m  as calculated from the following:    Height as of this encounter: 1.676 m (5' 6\").    Weight as of this encounter: 106.6 kg (235 lb).           Return in about 4 months (around 4/14/2023) for Follow up, with me, in person.    ANAY NAVARRO MD  [facility]   Efrain is a 72 year old, presenting for the following health issues:  Follow Up (3 mo recheck ) and Back Pain (Pt reports ongoing sxs's of back pains - using tylenol prn (postponed spine injection since he developed a cold) )      Back Pain            Efrain comes in for follow-up of his multiple medical problems.  He continues to gain weight.  He attributes this to the diet where he lives.  He lives in assisted living now.  He has full meals daily " "including desserts and then he will snack in the evening as well.  He is not done any exercise.  His back hurts.  He was supposed to get injections in his SI joint.  However, he did not because he got a cold about a month ago.  He still has symptoms from the cold.  He did get tested negative for COVID.  No shortness of breath.  No purulent nasal discharge.    Review of Systems   Musculoskeletal: Positive for back pain.            Objective    /60 (BP Location: Left arm, Patient Position: Sitting, Cuff Size: Adult Regular)   Pulse 68   Ht 1.676 m (5' 6\")   Wt 106.6 kg (235 lb)   SpO2 98%   BMI 37.93 kg/m    Body mass index is 37.93 kg/m .  Physical Exam       Pleasant gentleman.  He does sound nasally congested.  Lungs are clear however                "

## 2023-01-05 ENCOUNTER — RADIOLOGY INJECTION OFFICE VISIT (OUTPATIENT)
Dept: PHYSICAL MEDICINE AND REHAB | Facility: CLINIC | Age: 73
End: 2023-01-05
Attending: PHYSICAL MEDICINE & REHABILITATION
Payer: MEDICARE

## 2023-01-05 VITALS
HEART RATE: 84 BPM | OXYGEN SATURATION: 99 % | SYSTOLIC BLOOD PRESSURE: 106 MMHG | DIASTOLIC BLOOD PRESSURE: 62 MMHG | TEMPERATURE: 98.5 F | RESPIRATION RATE: 18 BRPM

## 2023-01-05 DIAGNOSIS — M53.3 SACROILIAC JOINT PAIN: ICD-10-CM

## 2023-01-05 PROCEDURE — 27096 INJECT SACROILIAC JOINT: CPT | Mod: 50 | Performed by: PAIN MEDICINE

## 2023-01-05 RX ORDER — ROPIVACAINE HYDROCHLORIDE 5 MG/ML
INJECTION, SOLUTION EPIDURAL; INFILTRATION; PERINEURAL
Status: COMPLETED | OUTPATIENT
Start: 2023-01-05 | End: 2023-01-05

## 2023-01-05 RX ORDER — LIDOCAINE HYDROCHLORIDE 10 MG/ML
INJECTION, SOLUTION EPIDURAL; INFILTRATION; INTRACAUDAL; PERINEURAL
Status: COMPLETED | OUTPATIENT
Start: 2023-01-05 | End: 2023-01-05

## 2023-01-05 RX ORDER — METHYLPREDNISOLONE ACETATE 40 MG/ML
INJECTION, SUSPENSION INTRA-ARTICULAR; INTRALESIONAL; INTRAMUSCULAR; SOFT TISSUE
Status: COMPLETED | OUTPATIENT
Start: 2023-01-05 | End: 2023-01-05

## 2023-01-05 RX ADMIN — ROPIVACAINE HYDROCHLORIDE 5 ML: 5 INJECTION, SOLUTION EPIDURAL; INFILTRATION; PERINEURAL at 13:55

## 2023-01-05 RX ADMIN — METHYLPREDNISOLONE ACETATE 40 MG: 40 INJECTION, SUSPENSION INTRA-ARTICULAR; INTRALESIONAL; INTRAMUSCULAR; SOFT TISSUE at 13:55

## 2023-01-05 RX ADMIN — LIDOCAINE HYDROCHLORIDE 2 ML: 10 INJECTION, SOLUTION EPIDURAL; INFILTRATION; INTRACAUDAL; PERINEURAL at 13:55

## 2023-01-05 ASSESSMENT — PAIN SCALES - GENERAL
PAINLEVEL: MILD PAIN (2)
PAINLEVEL: MODERATE PAIN (4)

## 2023-01-05 NOTE — PATIENT INSTRUCTIONS
DISCHARGE INSTRUCTIONS    During office hours (8:00 a.m.- 4:00 p.m.) questions or concerns may be answered  by calling Spine Center Navigation Nurses at  [phone].  Messages received after hours will be returned the following business day.      In the case of an emergency, please dial 911 or seek assistance at the nearest Emergency Room/Urgent Care facility.     All Patients:    You may experience an increase in your symptoms for the first 2 days (It may take anywhere between 2 days- 2 weeks for the steroid to have maximum effect).    You may use ice on the injection site, as frequently as 20 minutes each hour if needed.    You may take your pain medicine.    You may continue taking your regular medication after your injection. If you have had a Medial Branch Block you may resume pain medication once your pain diary is completed.    You may shower. No swimming, tub bath or hot tub for 48 hours.  You may remove your bandaid/bandage as soon as you are home.    You may resume light activities, as tolerated.    Resume your usual diet as tolerated.    It is strongly advised that you do not drive for 1-3 hours post injection.    If you have had oral sedation:  Do not drive for 8 hours post injection.      If you have had IV sedation:  Do not drive for 24 hours post injection.  Do not operate hazardous machinery or make important personal/business decisions for 24 hours.      POSSIBLE STEROID SIDE EFFECTS (If steroid/cortisone was used for your procedure)    -If you experience these symptoms, it should only last for a short period    Swelling of the legs              Skin redness (flushing)     Mouth (oral) irritation   Blood sugar (glucose) levels            Sweats                    Mood changes  Headache  Sleeplessness  Weakened immune system for up to 14 days, which could increase the risk of debbie the COVID-19 virus and/or experiencing more severe symptoms of the disease, if exposed.  Decreased  effectiveness of the flu vaccine if given within 2 weeks of the steroid.         POSSIBLE PROCEDURE SIDE EFFECTS  -Call the Spine Center if you are concerned  Increased Pain           Increased numbness/tingling      Nausea/Vomiting          Bruising/bleeding at site      Redness or swelling                                              Difficulty walking      Weakness           Fever greater than 100.5    *In the event of a severe headache after an epidural steroid injection that is relieved by lying down, please call the [facility] to speak with a clinical staff member*

## 2023-01-27 DIAGNOSIS — I10 ESSENTIAL HYPERTENSION: ICD-10-CM

## 2023-01-27 RX ORDER — AMLODIPINE BESYLATE 2.5 MG/1
TABLET ORAL
Qty: 90 TABLET | Refills: 3 | Status: SHIPPED | OUTPATIENT
Start: 2023-01-27 | End: 2024-04-17

## 2023-01-28 NOTE — TELEPHONE ENCOUNTER
"Last Written Prescription Date:  4/25/22  Last Fill Quantity: 90,  # refills: 3   Last office visit provider:  12/14/22     Requested Prescriptions   Pending Prescriptions Disp Refills     amLODIPine (NORVASC) 2.5 MG tablet [Pharmacy Med Name: AMLODIPINE BESYLATE 2.5MG TABLETS] 90 tablet 3     Sig: TAKE 1 TABLET(2.5 MG) BY MOUTH DAILY       Calcium Channel Blockers Protocol  Passed - 1/27/2023  8:04 AM        Passed - Blood pressure under 140/90 in past 12 months     BP Readings from Last 3 Encounters:   01/05/23 106/62   12/14/22 124/60   10/13/22 123/60                 Passed - Recent (12 mo) or future (30 days) visit within the authorizing provider's specialty     Patient has had an office visit with the authorizing provider or a provider within the authorizing providers department within the previous 12 mos or has a future within next 30 days. See \"Patient Info\" tab in inbasket, or \"Choose Columns\" in Meds & Orders section of the refill encounter.              Passed - Medication is active on med list        Passed - Patient is age 18 or older        Passed - Normal serum creatinine on file in past 12 months     Recent Labs   Lab Test 12/14/22  1413   CR 1.15       Ok to refill medication if creatinine is low               Karin Todd, RN 01/27/23 6:56 PM  "

## 2023-02-10 DIAGNOSIS — F32.0 MILD MAJOR DEPRESSION (H): ICD-10-CM

## 2023-02-10 DIAGNOSIS — G89.4 CHRONIC PAIN SYNDROME: ICD-10-CM

## 2023-02-12 NOTE — TELEPHONE ENCOUNTER
"Routing refill request to provider for review/approval because:  phq 9    Last Written Prescription Date:  11/13/22  Last Fill Quantity: 90,  # refills: 0   Last office visit provider:  12/14/22     Requested Prescriptions   Pending Prescriptions Disp Refills     DULoxetine (CYMBALTA) 30 MG capsule [Pharmacy Med Name: DULOXETINE DR 30MG CAPSULES] 90 capsule 0     Sig: TAKE 1 CAPSULE(30 MG) BY MOUTH DAILY       Serotonin-Norepinephrine Reuptake Inhibitors  Failed - 2/10/2023  4:08 PM        Failed - PHQ-9 score of less than 5 in past 6 months     Please review last PHQ-9 score.           Passed - Blood pressure under 140/90 in past 12 months     BP Readings from Last 3 Encounters:   01/05/23 106/62   12/14/22 124/60   10/13/22 123/60                 Passed - Medication is active on med list        Passed - Patient is age 18 or older        Passed - Recent (6 mo) or future (30 days) visit within the authorizing provider's specialty     Patient had office visit in the last 6 months or has a visit in the next 30 days with authorizing provider or within the authorizing provider's specialty.  See \"Patient Info\" tab in inbasket, or \"Choose Columns\" in Meds & Orders section of the refill encounter.                 Karin Todd RN 02/12/23 1:50 PM  "

## 2023-02-13 RX ORDER — DULOXETIN HYDROCHLORIDE 30 MG/1
CAPSULE, DELAYED RELEASE ORAL
Qty: 90 CAPSULE | Refills: 3 | Status: SHIPPED | OUTPATIENT
Start: 2023-02-13 | End: 2023-10-24

## 2023-04-18 ENCOUNTER — OFFICE VISIT (OUTPATIENT)
Dept: INTERNAL MEDICINE | Facility: CLINIC | Age: 73
End: 2023-04-18
Payer: MEDICARE

## 2023-04-18 VITALS
DIASTOLIC BLOOD PRESSURE: 68 MMHG | SYSTOLIC BLOOD PRESSURE: 124 MMHG | BODY MASS INDEX: 37.93 KG/M2 | OXYGEN SATURATION: 97 % | WEIGHT: 236 LBS | HEIGHT: 66 IN | HEART RATE: 64 BPM | TEMPERATURE: 98 F

## 2023-04-18 DIAGNOSIS — F10.20 ALCOHOLISM (H): ICD-10-CM

## 2023-04-18 DIAGNOSIS — M54.50 CHRONIC BILATERAL LOW BACK PAIN WITHOUT SCIATICA: ICD-10-CM

## 2023-04-18 DIAGNOSIS — R21 RASH: ICD-10-CM

## 2023-04-18 DIAGNOSIS — E78.00 HYPERCHOLESTEREMIA: ICD-10-CM

## 2023-04-18 DIAGNOSIS — R39.12 POOR URINARY STREAM: ICD-10-CM

## 2023-04-18 DIAGNOSIS — F32.0 MILD MAJOR DEPRESSION (H): ICD-10-CM

## 2023-04-18 DIAGNOSIS — G89.4 CHRONIC PAIN SYNDROME: ICD-10-CM

## 2023-04-18 DIAGNOSIS — N39.43 URINARY DRIBBLING: ICD-10-CM

## 2023-04-18 DIAGNOSIS — Z00.00 ENCOUNTER FOR MEDICARE ANNUAL WELLNESS EXAM: Primary | ICD-10-CM

## 2023-04-18 DIAGNOSIS — K76.0 FATTY LIVER: ICD-10-CM

## 2023-04-18 DIAGNOSIS — I10 ESSENTIAL HYPERTENSION: ICD-10-CM

## 2023-04-18 DIAGNOSIS — R60.0 LOCALIZED EDEMA: ICD-10-CM

## 2023-04-18 DIAGNOSIS — M79.605 BILATERAL LEG PAIN: ICD-10-CM

## 2023-04-18 DIAGNOSIS — D64.9 ANEMIA, UNSPECIFIED TYPE: ICD-10-CM

## 2023-04-18 DIAGNOSIS — E66.01 MORBID OBESITY (H): ICD-10-CM

## 2023-04-18 DIAGNOSIS — G89.29 CHRONIC BILATERAL LOW BACK PAIN WITHOUT SCIATICA: ICD-10-CM

## 2023-04-18 DIAGNOSIS — K86.2 PANCREATIC CYST: ICD-10-CM

## 2023-04-18 DIAGNOSIS — M79.604 BILATERAL LEG PAIN: ICD-10-CM

## 2023-04-18 LAB
ALBUMIN SERPL BCG-MCNC: 4.3 G/DL (ref 3.5–5.2)
ALBUMIN UR-MCNC: 30 MG/DL
ALP SERPL-CCNC: 103 U/L (ref 40–129)
ALT SERPL W P-5'-P-CCNC: 35 U/L (ref 10–50)
ANION GAP SERPL CALCULATED.3IONS-SCNC: 14 MMOL/L (ref 7–15)
APPEARANCE UR: CLEAR
AST SERPL W P-5'-P-CCNC: 31 U/L (ref 10–50)
BACTERIA #/AREA URNS HPF: ABNORMAL /HPF
BILIRUB SERPL-MCNC: 0.5 MG/DL
BILIRUB UR QL STRIP: ABNORMAL
BUN SERPL-MCNC: 24 MG/DL (ref 8–23)
CALCIUM SERPL-MCNC: 9.4 MG/DL (ref 8.8–10.2)
CHLORIDE SERPL-SCNC: 104 MMOL/L (ref 98–107)
CHOLEST SERPL-MCNC: 153 MG/DL
COLOR UR AUTO: YELLOW
CREAT SERPL-MCNC: 1.15 MG/DL (ref 0.67–1.17)
DEPRECATED HCO3 PLAS-SCNC: 22 MMOL/L (ref 22–29)
ERYTHROCYTE [DISTWIDTH] IN BLOOD BY AUTOMATED COUNT: 13 % (ref 10–15)
GFR SERPL CREATININE-BSD FRML MDRD: 67 ML/MIN/1.73M2
GLUCOSE SERPL-MCNC: 106 MG/DL (ref 70–99)
GLUCOSE UR STRIP-MCNC: NEGATIVE MG/DL
HCT VFR BLD AUTO: 41.6 % (ref 40–53)
HDLC SERPL-MCNC: 46 MG/DL
HGB BLD-MCNC: 13.7 G/DL (ref 13.3–17.7)
HGB UR QL STRIP: NEGATIVE
KETONES UR STRIP-MCNC: NEGATIVE MG/DL
LDLC SERPL CALC-MCNC: 89 MG/DL
LEUKOCYTE ESTERASE UR QL STRIP: NEGATIVE
MCH RBC QN AUTO: 30.1 PG (ref 26.5–33)
MCHC RBC AUTO-ENTMCNC: 32.9 G/DL (ref 31.5–36.5)
MCV RBC AUTO: 91 FL (ref 78–100)
MUCOUS THREADS #/AREA URNS LPF: PRESENT /LPF
NITRATE UR QL: NEGATIVE
NONHDLC SERPL-MCNC: 107 MG/DL
PH UR STRIP: 5.5 [PH] (ref 5–8)
PLATELET # BLD AUTO: 215 10E3/UL (ref 150–450)
POTASSIUM SERPL-SCNC: 5 MMOL/L (ref 3.4–5.3)
PROT SERPL-MCNC: 7 G/DL (ref 6.4–8.3)
PSA SERPL DL<=0.01 NG/ML-MCNC: 0.45 NG/ML (ref 0–6.5)
RBC # BLD AUTO: 4.55 10E6/UL (ref 4.4–5.9)
RBC #/AREA URNS AUTO: ABNORMAL /HPF
SODIUM SERPL-SCNC: 140 MMOL/L (ref 136–145)
SP GR UR STRIP: >=1.03 (ref 1–1.03)
SQUAMOUS #/AREA URNS AUTO: ABNORMAL /LPF
TRIGL SERPL-MCNC: 89 MG/DL
UROBILINOGEN UR STRIP-ACNC: 0.2 E.U./DL
WBC # BLD AUTO: 7.1 10E3/UL (ref 4–11)
WBC #/AREA URNS AUTO: ABNORMAL /HPF

## 2023-04-18 PROCEDURE — 85027 COMPLETE CBC AUTOMATED: CPT | Performed by: INTERNAL MEDICINE

## 2023-04-18 PROCEDURE — 80053 COMPREHEN METABOLIC PANEL: CPT | Performed by: INTERNAL MEDICINE

## 2023-04-18 PROCEDURE — 84153 ASSAY OF PSA TOTAL: CPT | Performed by: INTERNAL MEDICINE

## 2023-04-18 PROCEDURE — 81001 URINALYSIS AUTO W/SCOPE: CPT | Performed by: INTERNAL MEDICINE

## 2023-04-18 PROCEDURE — 36415 COLL VENOUS BLD VENIPUNCTURE: CPT | Performed by: INTERNAL MEDICINE

## 2023-04-18 PROCEDURE — 99214 OFFICE O/P EST MOD 30 MIN: CPT | Mod: 25 | Performed by: INTERNAL MEDICINE

## 2023-04-18 PROCEDURE — G0439 PPPS, SUBSEQ VISIT: HCPCS | Performed by: INTERNAL MEDICINE

## 2023-04-18 PROCEDURE — 80061 LIPID PANEL: CPT | Performed by: INTERNAL MEDICINE

## 2023-04-18 RX ORDER — LISINOPRIL 40 MG/1
40 TABLET ORAL DAILY
Qty: 90 TABLET | Refills: 3 | Status: SHIPPED | OUTPATIENT
Start: 2023-04-18 | End: 2024-04-17

## 2023-04-18 RX ORDER — KETOCONAZOLE 20 MG/G
CREAM TOPICAL DAILY
Qty: 60 G | Refills: 0 | Status: SHIPPED | OUTPATIENT
Start: 2023-04-18 | End: 2024-07-25

## 2023-04-18 RX ORDER — ATORVASTATIN CALCIUM 10 MG/1
10 TABLET, FILM COATED ORAL DAILY
Qty: 90 TABLET | Refills: 3 | Status: SHIPPED | OUTPATIENT
Start: 2023-04-18 | End: 2023-05-22

## 2023-04-18 ASSESSMENT — ENCOUNTER SYMPTOMS
COUGH: 0
ARTHRALGIAS: 1
MYALGIAS: 1
COUGH: 1

## 2023-04-18 ASSESSMENT — PATIENT HEALTH QUESTIONNAIRE - PHQ9
SUM OF ALL RESPONSES TO PHQ QUESTIONS 1-9: 1
SUM OF ALL RESPONSES TO PHQ QUESTIONS 1-9: 1
10. IF YOU CHECKED OFF ANY PROBLEMS, HOW DIFFICULT HAVE THESE PROBLEMS MADE IT FOR YOU TO DO YOUR WORK, TAKE CARE OF THINGS AT HOME, OR GET ALONG WITH OTHER PEOPLE: NOT DIFFICULT AT ALL

## 2023-04-18 ASSESSMENT — ACTIVITIES OF DAILY LIVING (ADL): CURRENT_FUNCTION: NO ASSISTANCE NEEDED

## 2023-04-18 NOTE — PATIENT INSTRUCTIONS
Patient Education   Personalized Prevention Plan  You are due for the preventive services outlined below.  Your care team is available to assist you in scheduling these services.  If you have already completed any of these items, please share that information with your care team to update in your medical record.  Health Maintenance Due   Topic Date Due    Zoster (Shingles) Vaccine (2 of 3) 09/21/2010    ANNUAL REVIEW OF HM ORDERS  01/17/2023    Annual Wellness Visit  04/25/2023     Your Health Risk Assessment indicates you feel you are not in good health    A healthy lifestyle helps keep the body fit and the mind alert. It helps protect you from disease, helps you fight disease, and helps prevent chronic disease (disease that doesn't go away) from getting worse. This is important as you get older and begin to notice twinges in muscles and joints and a decline in the strength and stamina you once took for granted. A healthy lifestyle includes good healthcare, good nutrition, weight control, recreation, and regular exercise. Avoid harmful substances and do what you can to keep safe. Another part of a healthy lifestyle is stay mentally active and socially involved.    Good healthcare   Have a wellness visit every year.   If you have new symptoms, let us know right away. Don't wait until the next checkup.   Take medicines exactly as prescribed and keep your medicines in a safe place. Tell us if your medicine causes problems.   Healthy diet and weight control   Eat 3 or 4 small, nutritious, low-fat, high-fiber meals a day. Include a variety of fruits, vegetables, and whole-grain foods.   Make sure you get enough calcium in your diet. Calcium, vitamin D, and exercise help prevent osteoporosis (bone thinning).   If you live alone, try eating with others when you can. That way you get a good meal and have company while you eat it.   Try to keep a healthy weight. If you eat more calories than your body uses for energy, it  will be stored as fat and you will gain weight.     Recreation   Recreation is not limited to sports and team events. It includes any activity that provides relaxation, interest, enjoyment, and exercise. Recreation provides an outlet for physical, mental, and social energy. It can give a sense of worth and achievement. It can help you stay healthy.    Mental Exercise and Social Involvement  Mental and emotional health is as important as physical health. Keep in touch with friends and family. Stay as active as possible. Continue to learn and challenge yourself.   Things you can do to stay mentally active are:  Learn something new, like a foreign language or musical instrument.   Play SCRABBLE or do crossword puzzles. If you cannot find people to play these games with you at home, you can play them with others on your computer through the Internet.   Join a games club--anything from card games to chess or checkers or lawn bowling.   Start a new hobby.   Go back to school.   Volunteer.   Read.   Keep up with world events.    Urinary Incontinence (Male)  We understand that gender is a spectrum. We may use gendered terms to talk about anatomy and health risk. Please use this sheet in a way that works best for you and your provider as you talk about your care.     Urinary incontinence means not being able to control the release of urine from the bladder.   Causes  Common causes of urinary incontinence in men include:  Infection  Certain medicines  Aging  Poor pelvic muscle tone  Bladder spasms  Obesity  Enlarged prostate  Trouble urinating and fully emptying the bladder (urinary retention)  Other things that can cause incontinence are:   Nervous system diseases  Diabetes  Sleep apnea  Urinary tract infections  Prostate surgery  Pelvic injury  Constipation and smoking have also been identified as risk factors.   Symptoms  Urge incontinence (overactive bladder). This is a sudden urge to urinate. It occurs even though there  may not be much urine in the bladder. The need to urinate often during the night is common. It's due to overactive bladder muscles.  Stress incontinence. This is urine leakage that you can't control. It can occur with sneezing, coughing, and other actions that put stress on the bladder.    Treatment  Treatment depends on what is causing the condition. Bladder infections are treated with antibiotics. Urinary retention is treated with a bladder catheter.   Home care  Follow these guidelines when caring for yourself at home:  Don't have any foods and drinks that may irritate the bladder. This includes:  Chocolate  Alcohol  Caffeine  Carbonated drinks  Acidic fruits and juices  Limit fluids to 6 to 8 cups a day.  Lose weight if you are overweight. This may reduce your symptoms.  If advised, do regular pelvic muscle-strengthening exercises such as Kegel exercises.  If needed, wear absorbent pads to catch urine. Change the pads often. This is for good hygiene and to prevent skin and bladder infections.  Bathe daily for good hygiene.  If an antibiotic was prescribed to treat a bladder infection, take it until it's finished. Keep taking it even if you are feeling better. This is to make sure your infection has cleared.  If a catheter was left in place, keep bacteria from getting into the collection bag. Don't disconnect the catheter from the collection bag.  Use a leg band to secure the catheter drainage tube, so it does not pull on the catheter. Drain the collection bag when it becomes full. To do this, use the drain spout at the bottom of the bag. Don't disconnect the bag from the catheter.  Don't pull on or try to remove a catheter. The catheter must be removed by a healthcare provider.  If you smoke, stop. Ask your provider for help if you can't do this on your own.  Follow-up care  Follow up with your healthcare provider, or as advised.  When to get medical advice  Call your healthcare provider right away if any of  these occur:   Fever over 100.4 F (38 C), or as directed by your provider  Bladder pain or fullness  Belly swelling, nausea, or vomiting  Back pain  Weakness, dizziness, or fainting  If a catheter was left in place, return if:  The catheter falls out  The catheter stops draining for 6 hours  Your urine gets cloudy or smells bad  Jude last reviewed this educational content on 6/1/2022 2000-2022 [employer]. All rights reserved. This information is not intended as a substitute for professional medical care. Always follow your healthcare professional's instructions.        Hold your lipitor until I see you in a couple months.      Go ahead and try chiropractic for your back pain.

## 2023-04-18 NOTE — PROGRESS NOTES
"SUBJECTIVE:   Galo is a 73 year old who presents for Preventive Visit.      4/18/2023    10:06 AM   Additional Questions   Roomed by Vira Zuniga comes in today for annual wellness.  He tells me that he is going to be heading home to his condo here at the end of the month.  He has been in assisted living and ever since being discharged from the hospital over a year ago now.  He was sent to assisted living because of concerns about his ability to live on his own and his alcoholism.  He is planning on moving home for the summer to see how it goes.  He does not think he is going to have any issue with not drinking.  He tells me he can go see his daughter and stay with her if necessary.  He tells me he is doing okay physically.  Back pain persists.  Injections that he had in January were of no benefit.  They told him that maybe chiropractic would be good for him.  He does not know any chiropractors.  He wonders if he can hold his Lipitor to see if that helps his leg pains.  He is somewhat vague about his leg and back pains.  Today he tells me its more his knees that are bothering him.      Patient has been advised of split billing requirements and indicates understanding: Yes  Are you in the first 12 months of your Medicare coverage?  No    Healthy Habits:     In general, how would you rate your overall health?  Fair    Frequency of exercise:  2-3 days/week    Duration of exercise:  15-30 minutes    Do you usually eat at least 4 servings of fruit and vegetables a day, include whole grains    & fiber and avoid regularly eating high fat or \"junk\" foods?  Yes    Taking medications regularly:  Yes    Medication side effects:  Muscle aches    Ability to successfully perform activities of daily living:  No assistance needed    Home Safety:  No safety concerns identified    Hearing Impairment:  No hearing concerns    In the past 6 months, have you been bothered by leaking of urine? Yes    In general, how would you rate " your overall mental or emotional health?  Good      PHQ-2 Total Score: 1    Additional concerns today:  No      Have you ever done Advance Care Planning? (For example, a Health Directive, POLST, or a discussion with a medical provider or your loved ones about your wishes): Yes, patient states has an Advance Care Planning document and will bring a copy to the clinic.       Fall risk  Fallen 2 or more times in the past year?: No  Any fall with injury in the past year?: No    Cognitive Screening   1) Repeat 3 items (Leader, Season, Table)    2) Clock draw: NORMAL  3) 3 item recall: Recalls 3 objects  Results: NORMAL CLOCK    Mini-CogTM Copyright [employer]. Licensed by the author for use in [facility]; reprinted with permission (sodonna@[facility]). All rights reserved.      Do you have sleep apnea, excessive snoring or daytime drowsiness?: no    Reviewed and updated as needed this visit by clinical staff    Allergies  Meds              Reviewed and updated as needed this visit by Provider                 Social History     Tobacco Use     Smoking status: Former     Types: Pipe     Quit date: 1994     Years since quittin.3     Smokeless tobacco: Never   Vaping Use     Vaping status: Never Used     Passive vaping exposure: Yes   Substance Use Topics     Alcohol use: Not Currently     Comment: sober since 10/2021             2023     9:47 AM   Alcohol Use   Prescreen: >3 drinks/day or >7 drinks/week? Not Applicable     Do you have a current opioid prescription? No  Do you use any other controlled substances or medications that are not prescribed by a provider? None        Current providers sharing in care for this patient include:   Patient Care Team:  Ronnie Bundy MD as PCP - General (Internal Medicine)  Ronnie Bundy MD as Assigned PCP  Sage Mendiola DO as Assigned Neuroscience Provider    The following health maintenance items are reviewed in Epic and correct as of today:  Health  "Maintenance   Topic Date Due     ZOSTER IMMUNIZATION (2 of 3) 09/21/2010     ANNUAL REVIEW OF HM ORDERS  01/17/2023     MEDICARE ANNUAL WELLNESS VISIT  04/25/2023     PHQ-9  10/18/2023     FALL RISK ASSESSMENT  04/18/2024     DTAP/TDAP/TD IMMUNIZATION (3 - Td or Tdap) 01/22/2025     ADVANCE CARE PLANNING  04/25/2027     LIPID  12/14/2027     COLORECTAL CANCER SCREENING  07/15/2032     HEPATITIS C SCREENING  Completed     DEPRESSION ACTION PLAN  Completed     INFLUENZA VACCINE  Completed     Pneumococcal Vaccine: 65+ Years  Completed     AORTIC ANEURYSM SCREENING (SYSTEM ASSIGNED)  Completed     COVID-19 Vaccine  Completed     IPV IMMUNIZATION  Aged Out     MENINGITIS IMMUNIZATION  Aged Out               Review of Systems   HENT: Negative for congestion.    Respiratory: Negative for cough.    Genitourinary: Positive for impotence.   Musculoskeletal: Positive for arthralgias and myalgias.         OBJECTIVE:   /68 (BP Location: Left arm, Patient Position: Sitting, Cuff Size: Adult Regular)   Pulse 64   Temp 98  F (36.7  C) (Tympanic)   Ht 1.676 m (5' 6\")   Wt 107 kg (236 lb)   SpO2 97%   BMI 38.09 kg/m   Estimated body mass index is 38.09 kg/m  as calculated from the following:    Height as of this encounter: 1.676 m (5' 6\").    Weight as of this encounter: 107 kg (236 lb).  Physical Exam  Pleasant obese gentleman in no distress.  Vital signs noted.  He has a nodular acneiform type rash over the upper chest/neck.  Lungs clear.  Heart regular.  Abdomen is obese soft nontender.  Very thickened cracked skin over soles bilaterally        ASSESSMENT / PLAN:   1. Encounter for Medicare annual wellness exam  He is due for a shingles vaccine but he will have to get that through his pharmacy  - PRIMARY CARE FOLLOW-UP SCHEDULING; Future    2. Chronic pain syndrome  Continue Cymbalta.    3. Chronic bilateral low back pain without sciatica  I have referred him to a chiropractor.  - Chiropractic Referral; Future    4. " Bilateral leg pain  As above.  We will also give him a trial off statin to see if that makes a difference.  - Chiropractic Referral; Future    5. Alcoholism (H)  I am most concerned with him going home and drinking again.  Lengthy discussion with him.  I will see him back in a month after he has made that transition back home again.  - CBC with platelets; Future    6. Hypercholesteremia  As above, will hold the atorvastatin for few weeks.  - atorvastatin (LIPITOR) 10 MG tablet; Take 1 tablet (10 mg) by mouth daily  Dispense: 90 tablet; Refill: 3  - Lipid panel reflex to direct LDL Fasting; Future  - Comprehensive metabolic panel (BMP + Alb, Alk Phos, ALT, AST, Total. Bili, TP); Future    7. Essential hypertension  Blood pressure controlled at this time.  - lisinopril (ZESTRIL) 40 MG tablet; Take 1 tablet (40 mg) by mouth daily  Dispense: 90 tablet; Refill: 3  - Comprehensive metabolic panel (BMP + Alb, Alk Phos, ALT, AST, Total. Bili, TP); Future  - UA Macro with Reflex to Micro and Culture - lab collect; Future    8. Mild major depression (H)  Mood okay with the Cymbalta at current dosing    9. Morbid obesity (H)  Ongoing efforts at weight loss urged.  Once he is home and not eating the food at the assisted living I think he may lose weight but I am concerned about nutrition at home as well.    10. Fatty liver  Ongoing efforts at weight loss    11. Localized edema  He notes mild edema in his ankles but it looks like it is due to his rash and scratching.    12. Rash  Very thickened skin over the foot.  I do not know if this is a fungal infection or not but we will give him a empiric course of ketoconazole cream.  He should follow-up with his dermatologist  - Adult Dermatology Referral; Future  - ketoconazole (NIZORAL) 2 % external cream; Apply topically daily To feet  Dispense: 60 g; Refill: 0    13. Urinary dribbling  Check PSA  - PSA, tumor marker; Future    14. Pancreatic cyst  He is to have a another scan this  "summer.  It does not look like that is been set up yet.    15. Anemia, unspecified type  Hemogram today for monitoring  - CBC with platelets; Future    16. Poor urinary stream  As above, check PSA  - PSA, tumor marker; Future    Patient has been advised of split billing requirements and indicates understanding: Yes      COUNSELING:  Reviewed preventive health counseling, as reflected in patient instructions      BMI:   Estimated body mass index is 38.09 kg/m  as calculated from the following:    Height as of this encounter: 1.676 m (5' 6\").    Weight as of this encounter: 107 kg (236 lb).   Weight management plan: Discussed healthy diet and exercise guidelines      He reports that he quit smoking about 29 years ago. His smoking use included pipe. He has never used smokeless tobacco.      Appropriate preventive services were discussed with this patient, including applicable screening as appropriate for cardiovascular disease, diabetes, osteopenia/osteoporosis, and glaucoma.  As appropriate for age/gender, discussed screening for colorectal cancer, prostate cancer, breast cancer, and cervical cancer. Checklist reviewing preventive services available has been given to the patient.    Reviewed patients plan of care and provided an AVS. The Complex Care Plan (for patients with higher acuity and needing more deliberate coordination of services) for Efrain meets the Care Plan requirement. This Care Plan has been established and reviewed with the Patient.      ANAY NAVARRO MD  [facility]    Identified Health Risks:    I have reviewed Opioid Use Disorder and Substance Use Disorder risk factors and made any needed referrals.     Answers for HPI/ROS submitted by the patient on 4/18/2023  If you checked off any problems, how difficult have these problems made it for you to do your work, take care of things at home, or get along with other people?: Not difficult at all  PHQ9 TOTAL SCORE: " 1        The patient was provided with suggestions to help him develop a healthy physical lifestyle.  Information on urinary incontinence and treatment options given to patient.

## 2023-05-22 ENCOUNTER — OFFICE VISIT (OUTPATIENT)
Dept: INTERNAL MEDICINE | Facility: CLINIC | Age: 73
End: 2023-05-22
Payer: MEDICARE

## 2023-05-22 VITALS
WEIGHT: 223.6 LBS | HEART RATE: 94 BPM | SYSTOLIC BLOOD PRESSURE: 103 MMHG | OXYGEN SATURATION: 98 % | TEMPERATURE: 98.9 F | BODY MASS INDEX: 35.09 KG/M2 | DIASTOLIC BLOOD PRESSURE: 68 MMHG | RESPIRATION RATE: 20 BRPM | HEIGHT: 67 IN

## 2023-05-22 DIAGNOSIS — F10.20 ALCOHOLISM (H): ICD-10-CM

## 2023-05-22 DIAGNOSIS — M79.605 BILATERAL LEG PAIN: ICD-10-CM

## 2023-05-22 DIAGNOSIS — M54.50 CHRONIC BILATERAL LOW BACK PAIN WITHOUT SCIATICA: Primary | ICD-10-CM

## 2023-05-22 DIAGNOSIS — G89.29 CHRONIC BILATERAL LOW BACK PAIN WITHOUT SCIATICA: Primary | ICD-10-CM

## 2023-05-22 DIAGNOSIS — M79.604 BILATERAL LEG PAIN: ICD-10-CM

## 2023-05-22 DIAGNOSIS — K86.2 PANCREATIC CYST: ICD-10-CM

## 2023-05-22 DIAGNOSIS — I10 ESSENTIAL HYPERTENSION: ICD-10-CM

## 2023-05-22 DIAGNOSIS — E78.00 HYPERCHOLESTEREMIA: ICD-10-CM

## 2023-05-22 PROCEDURE — 99214 OFFICE O/P EST MOD 30 MIN: CPT | Performed by: INTERNAL MEDICINE

## 2023-05-22 NOTE — PROGRESS NOTES
"  1. Chronic bilateral low back pain without sciatica  Continue chiropractic care and off of the statin.  Next visit I may add back Crestor    2. Bilateral leg pain  As above    3. Alcoholism (H)  He is now back home in his condo.  He tells me he is not drinking.  Continue AA.    4. Hypercholesteremia  I have asked that he come fasting next visit    5. Essential hypertension  Blood pressures controlled    6. Pancreatic cyst  I have urged him to schedule that scan that is due this summer    Subjective   Galo is a 73 year old, presenting for the following health issues:  F/Up for back pain,skin issues        5/22/2023    11:41 AM   Additional Questions   Roomed by Althea HARDY       Efrain comes in today for follow-up.  He was having a lot of back and leg pains which she thought may be due to his statin.  I believe he has spinal issues though causing most of it.  Regardless I told him if he could try off of the cholesterol medicine and he tells me it is better.  He is thinks that his aches and pains are better off of it so he would like to remain off of it at least for another couple months.  He did not take the Nizoral cream as I prescribed.  Apparently the pharmacy did not have it.  The chiropractor seems to be helping his back.  He is back at home now for the last couple weeks.  He has not been drinking thankfully.  He had been in assisted living to try to keep him off alcohol.  He gained a lot of weight in the assisted living.  He is eating well he tells me but has down in his weight.  He is looking forward to doing some sailing on his boat on [place] this summer      Review of Systems         Objective    /68 (BP Location: Left arm, Patient Position: Sitting, Cuff Size: Adult Regular)   Pulse 94   Temp 98.9  F (37.2  C) (Tympanic)   Resp 20   Ht 1.702 m (5' 7.01\")   Wt 101.4 kg (223 lb 9.6 oz)   SpO2 98%   BMI 35.01 kg/m    Body mass index is 35.01 kg/m .  Physical Exam       Pleasant " gentleman.  He is down about 13 pounds

## 2023-07-24 ENCOUNTER — OFFICE VISIT (OUTPATIENT)
Dept: INTERNAL MEDICINE | Facility: CLINIC | Age: 73
End: 2023-07-24
Payer: MEDICARE

## 2023-07-24 VITALS
TEMPERATURE: 97 F | DIASTOLIC BLOOD PRESSURE: 62 MMHG | BODY MASS INDEX: 35.08 KG/M2 | SYSTOLIC BLOOD PRESSURE: 97 MMHG | RESPIRATION RATE: 14 BRPM | OXYGEN SATURATION: 97 % | WEIGHT: 218.3 LBS | HEIGHT: 66 IN | HEART RATE: 70 BPM

## 2023-07-24 DIAGNOSIS — E66.811 CLASS 1 OBESITY DUE TO EXCESS CALORIES WITHOUT SERIOUS COMORBIDITY WITH BODY MASS INDEX (BMI) OF 34.0 TO 34.9 IN ADULT: ICD-10-CM

## 2023-07-24 DIAGNOSIS — M54.50 CHRONIC BILATERAL LOW BACK PAIN WITHOUT SCIATICA: Primary | ICD-10-CM

## 2023-07-24 DIAGNOSIS — E66.09 CLASS 1 OBESITY DUE TO EXCESS CALORIES WITHOUT SERIOUS COMORBIDITY WITH BODY MASS INDEX (BMI) OF 34.0 TO 34.9 IN ADULT: ICD-10-CM

## 2023-07-24 DIAGNOSIS — K86.2 PANCREATIC CYST: ICD-10-CM

## 2023-07-24 DIAGNOSIS — F10.20 ALCOHOLISM (H): ICD-10-CM

## 2023-07-24 DIAGNOSIS — I10 ESSENTIAL HYPERTENSION: ICD-10-CM

## 2023-07-24 DIAGNOSIS — L12.0 BULLOUS PEMPHIGOID (H): ICD-10-CM

## 2023-07-24 DIAGNOSIS — E78.00 HYPERCHOLESTEREMIA: ICD-10-CM

## 2023-07-24 DIAGNOSIS — G89.29 CHRONIC BILATERAL LOW BACK PAIN WITHOUT SCIATICA: Primary | ICD-10-CM

## 2023-07-24 PROCEDURE — 99214 OFFICE O/P EST MOD 30 MIN: CPT | Performed by: INTERNAL MEDICINE

## 2023-07-24 RX ORDER — ATORVASTATIN CALCIUM 10 MG/1
10 TABLET, FILM COATED ORAL DAILY
Qty: 90 TABLET | Refills: 1 | COMMUNITY
Start: 2023-07-24 | End: 2024-04-17

## 2023-07-24 ASSESSMENT — PAIN SCALES - GENERAL: PAINLEVEL: NO PAIN (1)

## 2023-07-24 NOTE — PROGRESS NOTES
1. Chronic bilateral low back pain without sciatica  He is doing well with chiropractic care.  Continue same.    2. Alcoholism (H)  He tells me he is remaining sober.    3. Class 1 obesity due to excess calories without serious comorbidity with body mass index (BMI) of 34.0 to 34.9 in adult  He is slowly losing weight that he gained in the assisted living.  Continue same.    4. Pancreatic cyst  He is overdue for imaging.  We will get that scheduled.    5. Essential hypertension  Blood pressures actually on the lower side.  He has no symptoms.  Continue same regimen for now.    6. Hypercholesteremia  Resume statin.  Check labs next visit.    7. Bullous pemphigoid  He should follow-up with his dermatologist for his bullous pemphigoid issues.    Leonel Rosenthal is a 73 year old, presenting for the following health issues:  Follow Up ( Follow up for chronic bilateral back pain.  Patient had gone off his statin and would like to resume taking it. Rash on extremities is resolving. Skin is dty and flaky. He would like to discuss pancreas testing.)        7/24/2023    11:16 AM   Additional Questions   Roomed by Althea LUCAS     History of Present Illness       Reason for visit:  General checkup,skin,statins,spine....    He eats 4 or more servings of fruits and vegetables daily.He consumes 0 sweetened beverage(s) daily.He exercises with enough effort to increase his heart rate 30 to 60 minutes per day.    He is taking medications regularly.         Efrain comes in for follow-up.  He has been home living in his condo.  He has alcoholism and was living in assisted living for a year but then went home this summer and has not been drinking he tells me.  He has 3 flights of stairs to go up and down to go in and out of his condo.  He is going to eventually sell it.  He has been dealing with a lot of low back pain.  He was also having leg pain.  He has a new chiropractor who his improved his symptoms by 90% he tells me.  Pleased  "about that.  Denies other new somatic concerns for me.  Would like to go back on his statin.  We had stopped it thinking perhaps that was causing some of his pains.      Review of Systems         Objective    BP 97/62   Pulse 70   Temp 97  F (36.1  C) (Tympanic)   Resp 14   Ht 1.676 m (5' 6\")   Wt 99 kg (218 lb 4.8 oz)   SpO2 97%   BMI 35.23 kg/m    Body mass index is 35.23 kg/m .  Physical Exam           Pleasant gentleman who looks well in no distress.            "

## 2023-07-25 ENCOUNTER — HOSPITAL ENCOUNTER (OUTPATIENT)
Dept: CT IMAGING | Facility: HOSPITAL | Age: 73
Discharge: HOME | End: 2023-07-25
Attending: INTERNAL MEDICINE | Admitting: INTERNAL MEDICINE
Payer: MEDICARE

## 2023-07-25 DIAGNOSIS — R93.3 ABNORMAL FINDING ON GI TRACT IMAGING: ICD-10-CM

## 2023-07-25 LAB
CREAT BLD-MCNC: 1.2 MG/DL (ref 0.7–1.3)
GFR SERPL CREATININE-BSD FRML MDRD: >60 ML/MIN/1.73M2

## 2023-07-25 PROCEDURE — 250N000011 HC RX IP 250 OP 636: Performed by: INTERNAL MEDICINE

## 2023-07-25 PROCEDURE — 82565 ASSAY OF CREATININE: CPT

## 2023-07-25 PROCEDURE — G1010 CDSM STANSON: HCPCS

## 2023-07-25 RX ORDER — IOPAMIDOL 755 MG/ML
75 INJECTION, SOLUTION INTRAVASCULAR ONCE
Status: COMPLETED | OUTPATIENT
Start: 2023-07-25 | End: 2023-07-25

## 2023-07-25 RX ADMIN — IOPAMIDOL 75 ML: 755 INJECTION, SOLUTION INTRAVENOUS at 15:31

## 2023-07-26 DIAGNOSIS — K86.2 PANCREATIC CYST: Primary | ICD-10-CM

## 2023-07-26 NOTE — PROGRESS NOTES
Per Dr. Perez letter July 2023  I recommend repeat CT of the abdomen in one year with IV contrast. If again benign and without concerning feature, no further surveillance is warranted.     Imaging ordered    ML

## 2023-08-30 ENCOUNTER — TRANSFERRED RECORDS (OUTPATIENT)
Dept: HEALTH INFORMATION MANAGEMENT | Facility: CLINIC | Age: 73
End: 2023-08-30
Payer: MEDICARE

## 2023-09-16 DIAGNOSIS — I10 ESSENTIAL HYPERTENSION: ICD-10-CM

## 2023-09-18 RX ORDER — METOPROLOL SUCCINATE 50 MG/1
50 TABLET, EXTENDED RELEASE ORAL DAILY
Qty: 90 TABLET | Refills: 1 | Status: SHIPPED | OUTPATIENT
Start: 2023-09-18 | End: 2024-06-03

## 2023-10-20 DIAGNOSIS — R35.1 BPH ASSOCIATED WITH NOCTURIA: ICD-10-CM

## 2023-10-20 DIAGNOSIS — N40.1 BPH ASSOCIATED WITH NOCTURIA: ICD-10-CM

## 2023-10-20 RX ORDER — TAMSULOSIN HYDROCHLORIDE 0.4 MG/1
CAPSULE ORAL
Qty: 90 CAPSULE | Refills: 3 | Status: SHIPPED | OUTPATIENT
Start: 2023-10-20

## 2023-10-23 ASSESSMENT — PATIENT HEALTH QUESTIONNAIRE - PHQ9
SUM OF ALL RESPONSES TO PHQ QUESTIONS 1-9: 4
SUM OF ALL RESPONSES TO PHQ QUESTIONS 1-9: 4
10. IF YOU CHECKED OFF ANY PROBLEMS, HOW DIFFICULT HAVE THESE PROBLEMS MADE IT FOR YOU TO DO YOUR WORK, TAKE CARE OF THINGS AT HOME, OR GET ALONG WITH OTHER PEOPLE: NOT DIFFICULT AT ALL

## 2023-10-24 ENCOUNTER — OFFICE VISIT (OUTPATIENT)
Dept: INTERNAL MEDICINE | Facility: CLINIC | Age: 73
End: 2023-10-24
Payer: MEDICARE

## 2023-10-24 VITALS
SYSTOLIC BLOOD PRESSURE: 104 MMHG | WEIGHT: 215.1 LBS | TEMPERATURE: 98 F | OXYGEN SATURATION: 95 % | HEIGHT: 66 IN | HEART RATE: 62 BPM | DIASTOLIC BLOOD PRESSURE: 63 MMHG | RESPIRATION RATE: 16 BRPM | BODY MASS INDEX: 34.57 KG/M2

## 2023-10-24 DIAGNOSIS — Z29.11 NEED FOR VACCINATION AGAINST RESPIRATORY SYNCYTIAL VIRUS: ICD-10-CM

## 2023-10-24 DIAGNOSIS — F32.0 MILD MAJOR DEPRESSION (H): ICD-10-CM

## 2023-10-24 DIAGNOSIS — G89.4 CHRONIC PAIN SYNDROME: ICD-10-CM

## 2023-10-24 DIAGNOSIS — M54.50 CHRONIC BILATERAL LOW BACK PAIN WITHOUT SCIATICA: ICD-10-CM

## 2023-10-24 DIAGNOSIS — F10.20 ALCOHOLISM (H): ICD-10-CM

## 2023-10-24 DIAGNOSIS — E78.00 HYPERCHOLESTEREMIA: Primary | ICD-10-CM

## 2023-10-24 DIAGNOSIS — G89.29 CHRONIC BILATERAL LOW BACK PAIN WITHOUT SCIATICA: ICD-10-CM

## 2023-10-24 DIAGNOSIS — Z23 NEED FOR SHINGLES VACCINE: ICD-10-CM

## 2023-10-24 DIAGNOSIS — E66.01 MORBID OBESITY (H): ICD-10-CM

## 2023-10-24 DIAGNOSIS — K86.2 PANCREATIC CYST: ICD-10-CM

## 2023-10-24 DIAGNOSIS — I10 ESSENTIAL HYPERTENSION: ICD-10-CM

## 2023-10-24 LAB
ALBUMIN SERPL BCG-MCNC: 4.3 G/DL (ref 3.5–5.2)
ALP SERPL-CCNC: 103 U/L (ref 40–129)
ALT SERPL W P-5'-P-CCNC: 29 U/L (ref 0–70)
ANION GAP SERPL CALCULATED.3IONS-SCNC: 8 MMOL/L (ref 7–15)
AST SERPL W P-5'-P-CCNC: 29 U/L (ref 0–45)
BILIRUB SERPL-MCNC: 0.4 MG/DL
BUN SERPL-MCNC: 24.5 MG/DL (ref 8–23)
CALCIUM SERPL-MCNC: 9.2 MG/DL (ref 8.8–10.2)
CHLORIDE SERPL-SCNC: 104 MMOL/L (ref 98–107)
CHOLEST SERPL-MCNC: 168 MG/DL
CREAT SERPL-MCNC: 1.19 MG/DL (ref 0.67–1.17)
DEPRECATED HCO3 PLAS-SCNC: 26 MMOL/L (ref 22–29)
EGFRCR SERPLBLD CKD-EPI 2021: 64 ML/MIN/1.73M2
GLUCOSE SERPL-MCNC: 101 MG/DL (ref 70–99)
HDLC SERPL-MCNC: 44 MG/DL
LDLC SERPL CALC-MCNC: 103 MG/DL
NONHDLC SERPL-MCNC: 124 MG/DL
POTASSIUM SERPL-SCNC: 5 MMOL/L (ref 3.4–5.3)
PROT SERPL-MCNC: 6.7 G/DL (ref 6.4–8.3)
SODIUM SERPL-SCNC: 138 MMOL/L (ref 135–145)
TRIGL SERPL-MCNC: 106 MG/DL

## 2023-10-24 PROCEDURE — 90662 IIV NO PRSV INCREASED AG IM: CPT | Performed by: INTERNAL MEDICINE

## 2023-10-24 PROCEDURE — G0008 ADMIN INFLUENZA VIRUS VAC: HCPCS | Performed by: INTERNAL MEDICINE

## 2023-10-24 PROCEDURE — 80061 LIPID PANEL: CPT | Performed by: INTERNAL MEDICINE

## 2023-10-24 PROCEDURE — 80053 COMPREHEN METABOLIC PANEL: CPT | Performed by: INTERNAL MEDICINE

## 2023-10-24 PROCEDURE — 36415 COLL VENOUS BLD VENIPUNCTURE: CPT | Performed by: INTERNAL MEDICINE

## 2023-10-24 PROCEDURE — 99214 OFFICE O/P EST MOD 30 MIN: CPT | Mod: 25 | Performed by: INTERNAL MEDICINE

## 2023-10-24 PROCEDURE — 91320 SARSCV2 VAC 30MCG TRS-SUC IM: CPT | Performed by: INTERNAL MEDICINE

## 2023-10-24 PROCEDURE — 90480 ADMN SARSCOV2 VAC 1/ONLY CMP: CPT | Performed by: INTERNAL MEDICINE

## 2023-10-24 RX ORDER — DULOXETIN HYDROCHLORIDE 60 MG/1
60 CAPSULE, DELAYED RELEASE ORAL DAILY
Qty: 90 CAPSULE | Refills: 1 | Status: SHIPPED | OUTPATIENT
Start: 2023-10-24 | End: 2024-05-29

## 2023-10-24 RX ORDER — RESPIRATORY SYNCYTIAL VIRUS VACCINE 120MCG/0.5
0.5 KIT INTRAMUSCULAR ONCE
Qty: 1 EACH | Refills: 0 | Status: SHIPPED | OUTPATIENT
Start: 2023-10-24 | End: 2023-10-24

## 2023-10-24 ASSESSMENT — PAIN SCALES - GENERAL: PAINLEVEL: MILD PAIN (3)

## 2023-10-24 ASSESSMENT — PATIENT HEALTH QUESTIONNAIRE - PHQ9: SUM OF ALL RESPONSES TO PHQ QUESTIONS 1-9: 4

## 2023-10-24 NOTE — PROGRESS NOTES
1. Hypercholesteremia  Lipids today for monitoring back on the statin.  - Lipid panel reflex to direct LDL Fasting; Future  - Comprehensive metabolic panel (BMP + Alb, Alk Phos, ALT, AST, Total. Bili, TP); Future    2. Essential hypertension  Blood pressure not an issue.  Ask looks quite good today.    3. Chronic bilateral low back pain without sciatica  Stable.  Increase Cymbalta as below.    4. Alcoholism (H)  Maintaining his sobriety he tells me.    5. Mild major depression (H24)  Trial of increasing Cymbalta to 60 mg daily.  We will touch base in another few weeks to see how he is doing.  - DULoxetine (CYMBALTA) 60 MG capsule; Take 1 capsule (60 mg) by mouth daily  Dispense: 90 capsule; Refill: 1    6. Morbid obesity (H)  Ongoing efforts at weight loss urged.    7. Pancreatic cyst  Follow-up CT scan in 1 year per Dr. Mark Anthony mckeon.    8. Need for shingles vaccine    - zoster vaccine recombinant adjuvanted (SHINGRIX) injection; Inject 0.5 mLs into the muscle once for 1 dose Pharmacist administered  Dispense: 0.5 mL; Refill: 0    9. Need for vaccination against respiratory syncytial virus    - respiratory syncytial virus vaccine, bivalent (ABRYSVO) injection; Inject 0.5 mLs into the muscle once for 1 dose  Dispense: 1 each; Refill: 0    10. Chronic pain syndrome    - DULoxetine (CYMBALTA) 60 MG capsule; Take 1 capsule (60 mg) by mouth daily  Dispense: 90 capsule; Refill: 1     Subjective   Galo is a 73 year old, presenting for the following health issues:  Recheck Medication, Follow Up (3 month follow up), and Arthritis      10/24/2023     9:52 AM   Additional Questions   Roomed by riaz carias       Arthritis    History of Present Illness       Reason for visit:  Annual exam    He eats 2-3 servings of fruits and vegetables daily.He consumes 1 sweetened beverage(s) daily.He exercises with enough effort to increase his heart rate 30 to 60 minutes per day.  He exercises with enough effort to increase his heart rate 6 days per  "week.   He is taking medications regularly.           Efrain comes in today for follow-up.  He reports things have been going fairly well.  Maintains his sobriety.  He plans to stay in his condo through the winter he believes.  He feels like he is getting little down as the days get shorter.  His chronic pain is stable.  He did have the CT scan of his pancreas cyst which shows decreased size.  Dr. Mark Anthony Pinzon Feels That He Can Have a Repeat in 1 Year.  This has been ordered by them.  Otherwise things are going well.  [place]      Review of Systems   Musculoskeletal:  Positive for arthritis.            Objective    /63 (BP Location: Left arm, Patient Position: Sitting, Cuff Size: Adult Large)   Pulse 62   Temp 98  F (36.7  C)   Resp 16   Ht 1.676 m (5' 6\")   Wt 97.6 kg (215 lb 1.6 oz)   SpO2 95%   BMI 34.72 kg/m    Body mass index is 34.72 kg/m .  Physical Exam           Pleasant gentleman.  He is down a little more and weight again.  Blood pressure quite good.              "

## 2023-11-13 NOTE — PROGRESS NOTES
Office Visit - Follow Up   Efrain Gomes   71 year old male    Date of Visit: 2/9/2022    No chief complaint on file.       Assessment and Plan   1. Lumbar back pain with radiculopathy affecting left lower extremity  Longstanding for many years he tells me. Trial of physical therapy. If that's not improving things I would recommend a visit to our spine clinic.    2. Chronic pain syndrome  As above. Trial physical therapy. Continue Cymbalta for mood    3. Mild major depression (H)  As above. Much improvement with his Cymbalta. Continue same.    4. Alcoholism (H)  Patient is remaining to be alcohol free. Congratulated him on his sobriety.    5. Essential hypertension  Blood pressure excellent. Continue same regimen    6. Itching  Resolved.    I will see him back in 6 weeks and see how he is doing. Sooner if anything comes up.        No follow-ups on file.     History of Present Illness   This 71 year old Efrain comes in today for follow-up. Pleasant gentleman with history of alcoholism. Last year was admitted to hospital with pancreatitis. He had been on several month binge it sounded like. He still living in assisted living. He'll be there for at least another month he tells me. They're then going to sell his condo and help move to an apartment. [place] with [place] hope is least. He has been in good spirits. I did put him on Cymbalta last visit. This was an attempt to help his mood as well as some chronic pain in his back that he has had for many years. He tells me the back is much better. Radiates down his left leg times. Mood is improved considerably with the Cymbalta. He is pleased with that. He would like to continue that. No new symptoms. His itching has resolved he tells me. He is pleased about that.    Review of Systems: A comprehensive review of systems was negative except as noted.     Medications, Allergies and Problem List  "  Reviewed, reconciled and updated  Post Discharge Medication Reconciliation Status:      Physical Exam   General Appearance:   Pleasant gentleman. PHQ-9 and RASHI-7 noted.    /60 (BP Location: Left arm, Patient Position: Sitting, Cuff Size: Adult Small)   Pulse 60   Temp 97.8  F (36.6  C)   Ht 1.676 m (5' 6\")   Wt 93.9 kg (207 lb)   SpO2 98%   BMI 33.41 kg/m           Additional Information   Current Outpatient Medications   Medication Sig Dispense Refill     acetaminophen (TYLENOL) 500 MG tablet Take 500-1,000 mg by mouth every 6 hours as needed for mild pain       amLODIPine (NORVASC) 2.5 MG tablet Take 2.5 mg by mouth daily       atorvastatin (LIPITOR) 10 MG tablet Take 1 tablet (10 mg) by mouth every evening       betamethasone dipropionate (DIPROLENE) 0.05 % cream [BETAMETHASONE DIPROPIONATE (DIPROLENE) 0.05 % CREAM] STEPHEN EXT AA BID  11     cholecalciferol, vitamin D3, (VITAMIN D3) 2,000 unit Tab [CHOLECALCIFEROL, VITAMIN D3, (VITAMIN D3) 2,000 UNIT TAB] Take 1 tablet by mouth daily.       DULoxetine (CYMBALTA) 30 MG capsule Take 1 capsule (30 mg) by mouth daily 30 capsule 0     folic acid 800 MCG tablet Take 800 mcg by mouth daily       lisinopril (ZESTRIL) 40 MG tablet TAKE 1 TABLET(40 MG) BY MOUTH DAILY 90 tablet 1     magnesium oxide (MAG-OX) 400 MG tablet Take 2 tablets (800 mg) by mouth daily       metoprolol succinate ER (TOPROL-XL) 50 MG 24 hr tablet Take 1 tablet (50 mg) by mouth daily       multivitamin w/minerals (THERA-VIT-M) tablet Take 1 tablet by mouth daily       senna (SENOKOT) 8.6 MG tablet Take 1 tablet by mouth daily       thiamine (B-1) 100 MG tablet Take 1 tablet (100 mg) by mouth daily       Allergies   Allergen Reactions     Septra [Sulfamethoxazole W/Trimethoprim] Rash     Sulfamethoxazole-Trimethoprim Rash     Social History     Tobacco Use     Smoking status: Never Smoker     Smokeless tobacco: Never Used   Substance Use Topics     Alcohol use: Yes     Drug use: Not " General Sunscreen Counseling: I recommended a broad spectrum sunscreen with a SPF of 30 or higher.  I explained that SPF 40 sunscreens block approximately 97 percent of the sun's harmful rays.  Sunscreens should be applied at least 15 minutes prior to expected sun exposure and then every 2 hours after that as long as sun exposure continues. If swimming or exercising sunscreen should be reapplied every 45 minutes to an hour after getting wet or sweating.  One ounce, or the equivalent of a shot glass full of sunscreen, is adequate to protect the skin not covered by a bathing suit. I also recommended a lip balm with a sunscreen as well. Sun protective clothing can be used in lieu of sunscreen but must be worn the entire time you are exposed to the sun's rays. Currently       Review and/or order of clinical lab tests:  Review and/or order of radiology tests:  Review and/or order of medicine tests:  Discussion of test results with performing physician:  Decision to obtain old records and/or obtain history from someone other than the patient:  Review and summarization of old records and/or obtaining history from someone other than the patient and.or discussion of case with another health care provider:  Independent visualization of image, tracing or specimen itself:    Time:      ANYA NAVARRO MD   Detail Level: Generalized

## 2024-01-18 DIAGNOSIS — I10 ESSENTIAL HYPERTENSION: ICD-10-CM

## 2024-01-18 RX ORDER — METOPROLOL SUCCINATE 50 MG/1
50 TABLET, EXTENDED RELEASE ORAL DAILY
Qty: 90 TABLET | Refills: 1 | OUTPATIENT
Start: 2024-01-18

## 2024-01-19 RX ORDER — METOPROLOL SUCCINATE 50 MG/1
50 TABLET, EXTENDED RELEASE ORAL DAILY
Qty: 90 TABLET | Refills: 1 | OUTPATIENT
Start: 2024-01-19

## 2024-04-17 DIAGNOSIS — E78.00 HYPERCHOLESTEREMIA: Primary | ICD-10-CM

## 2024-04-17 DIAGNOSIS — I10 ESSENTIAL HYPERTENSION: ICD-10-CM

## 2024-04-17 RX ORDER — LISINOPRIL 40 MG/1
40 TABLET ORAL DAILY
Qty: 90 TABLET | Refills: 3 | Status: SHIPPED | OUTPATIENT
Start: 2024-04-17 | End: 2024-06-20

## 2024-04-17 RX ORDER — ATORVASTATIN CALCIUM 10 MG/1
10 TABLET, FILM COATED ORAL DAILY
Qty: 90 TABLET | Refills: 1 | Status: ON HOLD | OUTPATIENT
Start: 2024-04-17 | End: 2024-07-27

## 2024-04-17 RX ORDER — AMLODIPINE BESYLATE 2.5 MG/1
2.5 TABLET ORAL DAILY
Qty: 90 TABLET | Refills: 3 | Status: SHIPPED | OUTPATIENT
Start: 2024-04-17 | End: 2024-05-29

## 2024-04-24 ENCOUNTER — TELEPHONE (OUTPATIENT)
Dept: INTERNAL MEDICINE | Facility: CLINIC | Age: 74
End: 2024-04-24

## 2024-04-24 NOTE — TELEPHONE ENCOUNTER
Lm on vm informing of missed awv appt today, and to call back and reschedule.  Or if pt has established care elsewhere with a different provider, to please call back and let us know.

## 2024-04-24 NOTE — TELEPHONE ENCOUNTER
----- Message from Ronnie Bundy MD sent at 4/24/2024  2:49 PM CDT -----  Please contact patient and let him know he failed an appointment with me today and I would like him to reschedule his annual wellness.  If he has changed physicians, please remove my name as PCP.

## 2024-05-29 ENCOUNTER — CARE COORDINATION (OUTPATIENT)
Dept: GASTROENTEROLOGY | Facility: CLINIC | Age: 74
End: 2024-05-29

## 2024-05-29 ENCOUNTER — OFFICE VISIT (OUTPATIENT)
Dept: INTERNAL MEDICINE | Facility: CLINIC | Age: 74
End: 2024-05-29
Payer: MEDICARE

## 2024-05-29 VITALS
BODY MASS INDEX: 31.98 KG/M2 | HEART RATE: 105 BPM | HEIGHT: 66 IN | DIASTOLIC BLOOD PRESSURE: 64 MMHG | TEMPERATURE: 97.8 F | OXYGEN SATURATION: 98 % | RESPIRATION RATE: 16 BRPM | SYSTOLIC BLOOD PRESSURE: 99 MMHG | WEIGHT: 199 LBS

## 2024-05-29 DIAGNOSIS — I10 ESSENTIAL HYPERTENSION: ICD-10-CM

## 2024-05-29 DIAGNOSIS — R00.0 TACHYCARDIA: ICD-10-CM

## 2024-05-29 DIAGNOSIS — E78.00 HYPERCHOLESTEREMIA: ICD-10-CM

## 2024-05-29 DIAGNOSIS — K86.2 PANCREATIC CYST: ICD-10-CM

## 2024-05-29 DIAGNOSIS — R25.1 TREMOR: ICD-10-CM

## 2024-05-29 DIAGNOSIS — R06.09 EXERTIONAL DYSPNEA: ICD-10-CM

## 2024-05-29 DIAGNOSIS — Z12.5 SCREENING PSA (PROSTATE SPECIFIC ANTIGEN): ICD-10-CM

## 2024-05-29 DIAGNOSIS — Z91.199 POOR COMPLIANCE: ICD-10-CM

## 2024-05-29 DIAGNOSIS — E66.01 MORBID OBESITY (H): ICD-10-CM

## 2024-05-29 DIAGNOSIS — F32.0 MILD MAJOR DEPRESSION (H): ICD-10-CM

## 2024-05-29 DIAGNOSIS — Z00.00 ENCOUNTER FOR MEDICARE ANNUAL WELLNESS EXAM: Primary | ICD-10-CM

## 2024-05-29 DIAGNOSIS — Z29.11 NEED FOR VACCINATION AGAINST RESPIRATORY SYNCYTIAL VIRUS: ICD-10-CM

## 2024-05-29 DIAGNOSIS — F10.20 ALCOHOLISM (H): ICD-10-CM

## 2024-05-29 DIAGNOSIS — Z23 NEED FOR SHINGLES VACCINE: ICD-10-CM

## 2024-05-29 DIAGNOSIS — I73.9 PERIPHERAL ARTERIAL DISEASE (H): ICD-10-CM

## 2024-05-29 LAB
ALBUMIN SERPL BCG-MCNC: 4.7 G/DL (ref 3.5–5.2)
ALBUMIN UR-MCNC: 100 MG/DL
ALP SERPL-CCNC: 101 U/L (ref 40–150)
ALT SERPL W P-5'-P-CCNC: 37 U/L (ref 0–70)
AMORPH CRY #/AREA URNS HPF: ABNORMAL /HPF
ANION GAP SERPL CALCULATED.3IONS-SCNC: 13 MMOL/L (ref 7–15)
APPEARANCE UR: ABNORMAL
AST SERPL W P-5'-P-CCNC: 33 U/L (ref 0–45)
BACTERIA #/AREA URNS HPF: ABNORMAL /HPF
BILIRUB SERPL-MCNC: 0.7 MG/DL
BILIRUB UR QL STRIP: ABNORMAL
BUN SERPL-MCNC: 24.4 MG/DL (ref 8–23)
CALCIUM SERPL-MCNC: 9.9 MG/DL (ref 8.8–10.2)
CHLORIDE SERPL-SCNC: 105 MMOL/L (ref 98–107)
CHOLEST SERPL-MCNC: 149 MG/DL
COLOR UR AUTO: YELLOW
CREAT SERPL-MCNC: 1.45 MG/DL (ref 0.67–1.17)
DEPRECATED HCO3 PLAS-SCNC: 22 MMOL/L (ref 22–29)
EGFRCR SERPLBLD CKD-EPI 2021: 50 ML/MIN/1.73M2
ERYTHROCYTE [DISTWIDTH] IN BLOOD BY AUTOMATED COUNT: 13.5 % (ref 10–15)
FASTING STATUS PATIENT QL REPORTED: YES
FASTING STATUS PATIENT QL REPORTED: YES
GLUCOSE SERPL-MCNC: 132 MG/DL (ref 70–99)
GLUCOSE UR STRIP-MCNC: NEGATIVE MG/DL
HCT VFR BLD AUTO: 46.3 % (ref 40–53)
HDLC SERPL-MCNC: 49 MG/DL
HGB BLD-MCNC: 15.1 G/DL (ref 13.3–17.7)
HGB UR QL STRIP: NEGATIVE
HYALINE CASTS #/AREA URNS LPF: ABNORMAL /LPF
KETONES UR STRIP-MCNC: ABNORMAL MG/DL
LDLC SERPL CALC-MCNC: 78 MG/DL
LEUKOCYTE ESTERASE UR QL STRIP: NEGATIVE
MCH RBC QN AUTO: 29.5 PG (ref 26.5–33)
MCHC RBC AUTO-ENTMCNC: 32.6 G/DL (ref 31.5–36.5)
MCV RBC AUTO: 91 FL (ref 78–100)
MUCOUS THREADS #/AREA URNS LPF: PRESENT /LPF
NITRATE UR QL: NEGATIVE
NONHDLC SERPL-MCNC: 100 MG/DL
PH UR STRIP: 5.5 [PH] (ref 5–8)
PLATELET # BLD AUTO: 246 10E3/UL (ref 150–450)
POTASSIUM SERPL-SCNC: 4.8 MMOL/L (ref 3.4–5.3)
PROT SERPL-MCNC: 7.3 G/DL (ref 6.4–8.3)
PSA SERPL DL<=0.01 NG/ML-MCNC: 1.04 NG/ML (ref 0–6.5)
RBC # BLD AUTO: 5.11 10E6/UL (ref 4.4–5.9)
RBC #/AREA URNS AUTO: ABNORMAL /HPF
SODIUM SERPL-SCNC: 140 MMOL/L (ref 135–145)
SP GR UR STRIP: 1.02 (ref 1–1.03)
SQUAMOUS #/AREA URNS AUTO: ABNORMAL /LPF
TRIGL SERPL-MCNC: 110 MG/DL
TSH SERPL DL<=0.005 MIU/L-ACNC: 3.32 UIU/ML (ref 0.3–4.2)
UROBILINOGEN UR STRIP-ACNC: 0.2 E.U./DL
WBC # BLD AUTO: 10 10E3/UL (ref 4–11)
WBC #/AREA URNS AUTO: ABNORMAL /HPF

## 2024-05-29 PROCEDURE — 93005 ELECTROCARDIOGRAM TRACING: CPT | Performed by: INTERNAL MEDICINE

## 2024-05-29 PROCEDURE — G0439 PPPS, SUBSEQ VISIT: HCPCS | Performed by: INTERNAL MEDICINE

## 2024-05-29 PROCEDURE — 85027 COMPLETE CBC AUTOMATED: CPT | Performed by: INTERNAL MEDICINE

## 2024-05-29 PROCEDURE — 81001 URINALYSIS AUTO W/SCOPE: CPT | Performed by: INTERNAL MEDICINE

## 2024-05-29 PROCEDURE — G0103 PSA SCREENING: HCPCS | Performed by: INTERNAL MEDICINE

## 2024-05-29 PROCEDURE — 99214 OFFICE O/P EST MOD 30 MIN: CPT | Mod: 25 | Performed by: INTERNAL MEDICINE

## 2024-05-29 PROCEDURE — 90480 ADMN SARSCOV2 VAC 1/ONLY CMP: CPT | Performed by: INTERNAL MEDICINE

## 2024-05-29 PROCEDURE — 91320 SARSCV2 VAC 30MCG TRS-SUC IM: CPT | Performed by: INTERNAL MEDICINE

## 2024-05-29 PROCEDURE — 80061 LIPID PANEL: CPT | Performed by: INTERNAL MEDICINE

## 2024-05-29 PROCEDURE — 84443 ASSAY THYROID STIM HORMONE: CPT | Performed by: INTERNAL MEDICINE

## 2024-05-29 PROCEDURE — 80053 COMPREHEN METABOLIC PANEL: CPT | Performed by: INTERNAL MEDICINE

## 2024-05-29 PROCEDURE — 36415 COLL VENOUS BLD VENIPUNCTURE: CPT | Performed by: INTERNAL MEDICINE

## 2024-05-29 PROCEDURE — 93010 ELECTROCARDIOGRAM REPORT: CPT | Mod: OFF | Performed by: INTERNAL MEDICINE

## 2024-05-29 RX ORDER — RESPIRATORY SYNCYTIAL VIRUS VACCINE 120MCG/0.5
0.5 KIT INTRAMUSCULAR ONCE
Qty: 1 EACH | Refills: 0 | Status: SHIPPED | OUTPATIENT
Start: 2024-05-29 | End: 2024-05-29

## 2024-05-29 SDOH — HEALTH STABILITY: PHYSICAL HEALTH: ON AVERAGE, HOW MANY DAYS PER WEEK DO YOU ENGAGE IN MODERATE TO STRENUOUS EXERCISE (LIKE A BRISK WALK)?: 5 DAYS

## 2024-05-29 ASSESSMENT — PATIENT HEALTH QUESTIONNAIRE - PHQ9
10. IF YOU CHECKED OFF ANY PROBLEMS, HOW DIFFICULT HAVE THESE PROBLEMS MADE IT FOR YOU TO DO YOUR WORK, TAKE CARE OF THINGS AT HOME, OR GET ALONG WITH OTHER PEOPLE: NOT DIFFICULT AT ALL
SUM OF ALL RESPONSES TO PHQ QUESTIONS 1-9: 2
SUM OF ALL RESPONSES TO PHQ QUESTIONS 1-9: 2

## 2024-05-29 ASSESSMENT — SOCIAL DETERMINANTS OF HEALTH (SDOH): HOW OFTEN DO YOU GET TOGETHER WITH FRIENDS OR RELATIVES?: ONCE A WEEK

## 2024-05-29 ASSESSMENT — PAIN SCALES - GENERAL: PAINLEVEL: MODERATE PAIN (4)

## 2024-05-29 NOTE — PROGRESS NOTES
Preventive Care Visit  [facility]  ANAY NAVARRO MD, Internal Medicine  May 29, 2024      1. Encounter for Medicare annual wellness exam  We discussed immunizations.  He should be more active and I discussed that with him.  Given his dyspnea on exertion and diaphoresis on exertion I think we should do stress testing given a known history of vascular disease    2. Morbid obesity (H)  He tells me he limits his calories and tries to exercise.  Weight has remained stable however    3. Mild major depression (H24)  Mood is okay despite no medications.  He wants to stay off of medicine    4. Pancreatic cyst  He will have another scan in the summer.    5. Alcoholism (H)  He tells me he is still sober.  I hope that is indeed the case.    6. Tachycardia  I question if he is off of his metoprolol.  According to fill records from the pharmacy it looks like he may be off of it may be contributing to his worsening tremor and tachycardia.  - EKG 12-lead, tracing only  - TSH with free T4 reflex; Future  - CBC with platelets; Future  - TSH with free T4 reflex  - CBC with platelets    7. Tremor  As above.  - TSH with free T4 reflex; Future  - TSH with free T4 reflex    8. Essential hypertension  Blood pressure is on the low side.  I have asked that he hold his amlodipine at this point.  - Comprehensive metabolic panel (BMP + Alb, Alk Phos, ALT, AST, Total. Bili, TP); Future  - UA Macroscopic with reflex to Microscopic and Culture - Lab Collect; Future  - Comprehensive metabolic panel (BMP + Alb, Alk Phos, ALT, AST, Total. Bili, TP)  - UA Macroscopic with reflex to Microscopic and Culture - Lab Collect    9. Hypercholesteremia  Lipids for monitoring.  - Lipid panel reflex to direct LDL Fasting; Future  - Lipid panel reflex to direct LDL Fasting    10. Peripheral arterial disease (H24)--mild distal disease on CTA in 2022  Continue statin.    11. Need for shingles vaccine    - zoster vaccine recombinant adjuvanted  (SHINGRIX) injection; Inject 0.5 mLs into the muscle once for 1 dose Pharmacist administered  Dispense: 0.5 mL; Refill: 0    12. Need for vaccination against respiratory syncytial virus    - respiratory syncytial virus vaccine, bivalent (ABRYSVO) injection; Inject 0.5 mLs into the muscle once for 1 dose  Dispense: 1 each; Refill: 0    13. Exertional dyspnea  As above.  Stress testing ordered.  - Echocardiogram Exercise Stress; Future    14. Poor compliance  Concerned about his compliance.  I have asked that he meet with our pharmacist to discuss.  - Med Therapy Management Referral    15. Screening PSA (prostate specific antigen)    - PSA, screen; Future  - PSA, screen     Leonel Rosenthal is a 74 year old, presenting for the following:  Wellness Visit (Sleep issues and restless legs.  He has been taking CBD/Melatonin gummies.  He is having continued spine issues.  His hands are cramping after meals. His prescriptions for Cymblata was denied and he states he has not been taking for a few months. He is not feeling and withdrawals/side effects.)          Health Care Directive  Patient does not have a Health Care Directive or Living Will: Patient states has Advance Directive and will bring in a copy to clinic.    HAYLEY Rosenthal is an exceedingly complicated Jude with multimedical problems and alcoholism who comes in today for follow-up and for annual wellness.  He failed his last couple visits with me unfortunately.  He tells me he is not drinking and has not drank for a couple years.  He is living independently in his condo.  He is having some dyspnea on exertion and diaphoresis on exertion when he climbs up stairs at home.  Has a pancreatic cyst that needs follow-up this summer.  He notes a tremor that is getting worse over the last couple months.  He stopped taking his duloxetine because he states that we did not refill it.  He believes he is taking his other medications but when asked what he is taking he is not entirely  sure.  Somewhat of a vague historian.  He is spending some time at [place] [facility].            5/29/2024   General Health   How would you rate your overall physical health? (!) FAIR   Feel stress (tense, anxious, or unable to sleep) Not at all         5/29/2024   Nutrition   Diet: Low salt    Low fat/cholesterol    Carbohydrate counting         5/29/2024   Exercise   Days per week of moderate/strenous exercise 5 days         5/29/2024   Social Factors   Frequency of gathering with friends or relatives Once a week   Worry food won't last until get money to buy more No   Food not last or not have enough money for food? No   Do you have housing?  Yes   Are you worried about losing your housing? No   Lack of transportation? No   Unable to get utilities (heat,electricity)? No         5/29/2024   Fall Risk   Fallen 2 or more times in the past year? No    No   Trouble with walking or balance? No    Yes          5/29/2024   Activities of Daily Living- Home Safety   Needs help with the following daily activites None of the above   Safety concerns in the home None of the above         5/29/2024   Dental   Dentist two times every year? (!) NO         5/29/2024   Hearing Screening   Hearing concerns? None of the above         5/29/2024   Driving Risk Screening   Patient/family members have concerns about driving No         5/29/2024   General Alertness/Fatigue Screening   Have you been more tired than usual lately? (!) YES         5/29/2024   Urinary Incontinence Screening   Bothered by leaking urine in past 6 months No         5/29/2024   TB Screening   Were you born outside of the [place]? No       Today's PHQ-9 Score:       5/29/2024     9:46 AM   PHQ-9 SCORE   PHQ-9 Total Score MyChart 2 (Minimal depression)   PHQ-9 Total Score 2         5/29/2024   Substance Use   Alcohol more than 3/day or more than 7/wk Not Applicable   Do you have a current opioid prescription? No   How severe/bad is pain from 1 to 10? 4/10   Do you use  any other substances recreationally? (!) CANNABIS PRODUCTS     Social History     Tobacco Use    Smoking status: Former     Types: Pipe     Quit date: 1994     Years since quittin.4    Smokeless tobacco: Never   Vaping Use    Vaping status: Never Used   Substance Use Topics    Alcohol use: Not Currently     Comment: sober since 10/2021    Drug use: Not Currently       ASCVD Risk   The 10-year ASCVD risk score (Pari SCHMITT, et al., 2019) is: 18.7%    Values used to calculate the score:      Age: 74 years      Sex: Male      Is Non- : No      Diabetic: No      Tobacco smoker: No      Systolic Blood Pressure: 99 mmHg      Is BP treated: Yes      HDL Cholesterol: 44 mg/dL      Total Cholesterol: 168 mg/dL            Reviewed and updated as needed this visit by Provider                      Current providers sharing in care for this patient include:  Patient Care Team:  Ronnie Bundy MD as PCP - General (Internal Medicine)  Ronnie Bundy MD as Assigned PCP    The following health maintenance items are reviewed in Epic and correct as of today:  Health Maintenance   Topic Date Due    RSV VACCINE (Pregnancy & 60+) (1 - 1-dose 60+ series) Never done    ZOSTER IMMUNIZATION (2 of 3) 2010    COVID-19 Vaccine ( season) 2024    MEDICARE ANNUAL WELLNESS VISIT  2024    ANNUAL REVIEW OF HM ORDERS  2024    LIPID  10/24/2024    PHQ-9  2024    DTAP/TDAP/TD IMMUNIZATION (3 - Td or Tdap) 2025    FALL RISK ASSESSMENT  2025    GLUCOSE  10/24/2026    ADVANCE CARE PLANNING  2028    COLORECTAL CANCER SCREENING  07/15/2032    HEPATITIS C SCREENING  Completed    DEPRESSION ACTION PLAN  Completed    INFLUENZA VACCINE  Completed    Pneumococcal Vaccine: 65+ Years  Completed    AORTIC ANEURYSM SCREENING (SYSTEM ASSIGNED)  Completed    IPV IMMUNIZATION  Aged Out    HPV IMMUNIZATION  Aged Out    MENINGITIS IMMUNIZATION  Aged Out    RSV MONOCLONAL  "ANTIBODY  Aged Out            Objective    Exam  BP 99/64 (BP Location: Left arm, Patient Position: Sitting, Cuff Size: Adult Regular)   Pulse 105   Temp 97.8  F (36.6  C) (Tympanic)   Resp 16   Ht 1.676 m (5' 5.98\")   Wt 90.3 kg (199 lb)   SpO2 98%   BMI 32.14 kg/m     Estimated body mass index is 32.14 kg/m  as calculated from the following:    Height as of this encounter: 1.676 m (5' 5.98\").    Weight as of this encounter: 90.3 kg (199 lb).    Physical Exam  Pleasant gentleman.  Mildly tachycardic but regular on initial exam.  HEENT is unremarkable.  Lungs clear.  Heart regular.  Abdomen is obese soft and nontender.  Extremities without edema.        5/29/2024   Mini Cog   Clock Draw Score 2 Normal   3 Item Recall 3 objects recalled   Mini Cog Total Score 5              Signed Electronically by: ANAY NAVARRO MD    "

## 2024-05-29 NOTE — PATIENT INSTRUCTIONS
"Preventive Care Advice   This is general advice we often give to help people stay healthy. Your care team may have specific advice just for you. Please talk to your care team about your own preventive care needs.  Lifestyle  Exercise at least 150 minutes each week (30 minutes a day, 5 days a week).  Do muscle strengthening activities 2 days a week. These help control your weight and prevent disease.  No smoking.  Wear sunscreen to prevent skin cancer.  Have your home tested for radon every 2 to 5 years. Radon is a colorless, odorless gas that can harm your lungs. To learn more, go to www.health.[facility].mn. and search for \"Radon in Homes.\"  Keep guns unloaded and locked up in a safe place like a safe or gun vault, or, use a gun lock and hide the keys. Always lock away bullets separately. To learn more, visit [employer].[place].gov and search for \"safe gun storage.\"  Nutrition  Eat 5 or more servings of fruits and vegetables each day.  Try wheat bread, brown rice and whole grain pasta (instead of white bread, rice, and pasta).  Get enough calcium and vitamin D. Check the label on foods and aim for 100% of the RDA (recommended daily allowance).  Regular exams  Have a dental exam and cleaning every 6 months.  See your health care team every year to talk about:  Any changes in your health.  Any medicines your care team has prescribed.  Preventive care, family planning, and ways to prevent chronic diseases.  Shots (vaccines)   HPV shots (up to age 26), if you've never had them before.  Hepatitis B shots (up to age 59), if you've never had them before.  COVID-19 shot: Get this shot when it's due.  Flu shot: Get a flu shot every year.  Tetanus shot: Get a tetanus shot every 10 years.  Pneumococcal, hepatitis A, and RSV shots: Ask your care team if you need these based on your risk.  Shingles shot (for age 50 and up).  General health tests  Diabetes screening:  Starting at age 35, Get screened for diabetes at least every 3 years.  If " you are younger than age 35, ask your care team if you should be screened for diabetes.  Cholesterol test: At age 39, start having a cholesterol test every 5 years, or more often if advised.  Bone density scan (DEXA): At age 50, ask your care team if you should have this scan for osteoporosis (brittle bones).  Hepatitis C: Get tested at least once in your life.  Abdominal aortic aneurysm screening: Talk to your doctor about having this screening if you:  Have ever smoked; and  Are biologically male; and  Are between the ages of 65 and 75.  STIs (sexually transmitted infections)  Before age 24: Ask your care team if you should be screened for STIs.  After age 24: Get screened for STIs if you're at risk. You are at risk for STIs (including HIV) if:  You are sexually active with more than one person.  You don't use condoms every time.  You or a partner was diagnosed with a sexually transmitted infection.  If you are at risk for HIV, ask about PrEP medicine to prevent HIV.  Get tested for HIV at least once in your life, whether you are at risk for HIV or not.  Cancer screening tests  Cervical cancer screening: If you have a cervix, begin getting regular cervical cancer screening tests at age 21. Most people who have regular screenings with normal results can stop after age 65. Talk about this with your provider.  Breast cancer scan (mammogram): If you've ever had breasts, begin having regular mammograms starting at age 40. This is a scan to check for breast cancer.  Colon cancer screening: It is important to start screening for colon cancer at age 45.  Have a colonoscopy test every 10 years (or more often if you're at risk) Or, ask your provider about stool tests like a FIT test every year or Cologuard test every 3 years.  To learn more about your testing options, visit: www.[employer]/800581.pdf.  For help making a decision, visit: ismael/ap97945.  Prostate cancer screening test: If you have a prostate and are age 55  to 69, ask your provider if you would benefit from a yearly prostate cancer screening test.  Lung cancer screening: If you are a current or former smoker age 50 to 80, ask your care team if ongoing lung cancer screenings are right for you.  For informational purposes only. Not to replace the advice of your health care provider. Copyright   2023 [facility]. All rights reserved. Clinically reviewed by the [facility]itions [facility]. [employer] 467211 - REV 04/24.    Nutrition for Older Adults: Care Instructions  Overview     Good nutrition is important at any age. But it is especially important for older adults. Eating healthy foods helps keep your body strong. And it can help lower your risk for disease.  As you get older, your body needs more of certain nutrients. These include vitamin B12, calcium, and vitamin D. But it may be harder for you to get these and other important nutrients. This could be for many reasons. You may not feel as hungry as you used to. Or you could have problems with your teeth or mouth that make it hard to chew. Or you may not enjoy planning and preparing meals, especially if you live alone.  Talk with your doctor if you want help getting the most nutrition from what you eat. They may have you work with a dietitian to help you plan meals.  Follow-up care is a key part of your treatment and safety. Be sure to make and go to all appointments, and call your doctor if you are having problems. It's also a good idea to know your test results and keep a list of the medicines you take.  How can you care for yourself at home?  To stay healthy  Eat a variety of foods. The more you vary the foods you eat, the more vitamins, minerals, and other nutrients you get.  Ask your doctor if you should take a multivitamin. Choose one with about 100% of the daily value (DV) for vitamins and minerals. Do not take more than 100% of the daily value for any vitamin or mineral unless your  doctor tells you to. Talk with your doctor if you are not sure which multivitamin is right for you.  Try to eat lots of fruits and vegetables. Fresh or frozen vegetables and fruits are healthy choices. Choose canned vegetables that have no salt added and fruits that are canned in their own juice or light syrup.  Include foods that are high in vitamin B12 in your diet. Good choices are fortified breakfast cereal, nonfat or low-fat milk and other dairy products, meat, poultry, fish, and eggs.  Get enough calcium and vitamin D. Good choices include nonfat or low-fat milk, cheese, and yogurt. Other good options are tofu, orange juice with added calcium, and some leafy green vegetables, such as mell greens and kale. If you don't use milk products, talk to your doctor about calcium and vitamin D supplements.  Try to eat protein foods every day. Good choices include lean meat, fish, poultry, eggs, and cheese. Other good options are cooked beans, peanut butter, and nuts and seeds.  Choose whole grains for half of the grains you eat. Look for 100% whole wheat bread, whole-grain cereals, brown rice, and other whole grains.  If you have constipation  Eat high-fiber foods every day if you can. These include fruits, vegetables, cooked dried beans, and whole grains.  Drink plenty of fluids. If you have kidney, heart, or liver disease and have to limit fluids, talk with your doctor before you increase the amount of fluids you drink.  Ask your doctor if stool softeners may help keep your bowels regular.  If you have mouth problems that make chewing hard  Pick canned or cooked fruits and vegetables. These are often softer.  Chop or shred meat, poultry, and fish. Add sauce or gravy to the meat to help keep it moist.  Pick other protein foods that are soft. These include cheese, peanut butter, cooked beans, cottage cheese, and eggs.  If you have trouble shopping for yourself  Ask a local food store to deliver groceries to your  "home.  Contact your local area agency on aging and ask about resources that can help.  Ask a family member or neighbor to help you.  If you have trouble preparing meals  Try easier cooking methods such as using a slow cooker or microwave oven.  Let the grocery store do some of the work for you. Look for precut, washed, and ready-to-eat foods.  Take part in group meal programs. You can find these through senior citizen programs.  Have meals brought to your home. Your community may offer programs that deliver meals, such as Meals on Wheels. Or you could use an online meal delivery service.  If you are able, take a cooking class.  If your appetite is poor  Try to eat meals on a regular schedule. It may help to eat smaller meals more often throughout the day.  If you can, eat some meals with other people. You could ask family or friends to eat with you. Or you could take part in group meal programs offered in your community.  Ask your doctor if your medicines could cause appetite or taste problems. If so, ask about changing medicines.  Add spices and herbs to increase the flavor of food.  If you think you are depressed, ask your doctor for help. Depression can affect your appetite. And it can make it hard to do everyday activities like grocery shopping and making meals. Treatment can help.  When should you call for help?  Watch closely for changes in your health, and be sure to contact your doctor if you have any problems.  Where can you learn more?  Go to https://www.health[employer].net/patiented  Enter L643 in the search box to learn more about \"Nutrition for Older Adults: Care Instructions.\"  Current as of: September 25, 2023               Content Version: 14.0    3988-8486 [employer] [facility].   Care instructions adapted under license by your healthcare professional. If you have questions about a medical condition or this instruction, always ask your healthcare professional. Healthwise, [employer] disclaims any " warranty or liability for your use of this information.      Learning About Sleeping Well  What does sleeping well mean?     Sleeping well means getting enough sleep to feel good and stay healthy. How much sleep is enough varies among people.  The number of hours you sleep and how you feel when you wake up are both important. If you do not feel refreshed, you probably need more sleep. Another sign of not getting enough sleep is feeling tired during the day.  Experts recommend that adults get at least 7 or more hours of sleep per day. Children and older adults need more sleep.  Why is getting enough sleep important?  Getting enough quality sleep is a basic part of good health. When your sleep suffers, your physical health, mood, and your thoughts can suffer too. You may find yourself feeling more grumpy or stressed. Not getting enough sleep also can lead to serious problems, including injury, accidents, anxiety, and depression.  What might cause poor sleeping?  Many things can cause sleep problems, including:  Changes to your sleep schedule.  Stress. Stress can be caused by fear about a single event, such as giving a speech. Or you may have ongoing stress, such as worry about work or school.  Depression, anxiety, and other mental or emotional conditions.  Changes in your sleep habits or surroundings. This includes changes that happen where you sleep, such as noise, light, or sleeping in a different bed. It also includes changes in your sleep pattern, such as having jet lag or working a late shift.  Health problems, such as pain, breathing problems, and restless legs syndrome.  Lack of regular exercise.  Using alcohol, nicotine, or caffeine before bed.  How can you help yourself?  Here are some tips that may help you sleep more soundly and wake up feeling more refreshed.  Your sleeping area   Use your bedroom only for sleeping and sex. A bit of light reading may help you fall asleep. But if it doesn't, do your reading  "elsewhere in the house. Try not to use your TV, computer, smartphone, or tablet while you are in bed.  Be sure your bed is big enough to stretch out comfortably, especially if you have a sleep partner.  Keep your bedroom quiet, dark, and cool. Use curtains, blinds, or a sleep mask to block out light. To block out noise, use earplugs, soothing music, or a \"white noise\" machine.  Your evening and bedtime routine   Create a relaxing bedtime routine. You might want to take a warm shower or bath, or listen to soothing music.  Go to bed at the same time every night. And get up at the same time every morning, even if you feel tired.  What to avoid   Limit caffeine (coffee, tea, caffeinated sodas) during the day, and don't have any for at least 6 hours before bedtime.  Avoid drinking alcohol before bedtime. Alcohol can cause you to wake up more often during the night.  Try not to smoke or use tobacco, especially in the evening. Nicotine can keep you awake.  Limit naps during the day, especially close to bedtime.  Avoid lying in bed awake for too long. If you can't fall asleep or if you wake up in the middle of the night and can't get back to sleep within about 20 minutes, get out of bed and go to another room until you feel sleepy.  Avoid taking medicine right before bed that may keep you awake or make you feel hyper or energized. Your doctor can tell you if your medicine may do this and if you can take it earlier in the day.  If you can't sleep   Imagine yourself in a peaceful, pleasant scene. Focus on the details and feelings of being in a place that is relaxing.  Get up and do a quiet or boring activity until you feel sleepy.  Avoid drinking any liquids before going to bed to help prevent waking up often to use the bathroom.  Where can you learn more?  Go to https://www.healthwise.net/patiented  Enter J942 in the search box to learn more about \"Learning About Sleeping Well.\"  Current as of: July 10, " 2023               Content Version: 14.0    8834-1321 [employer].   Care instructions adapted under license by your healthcare professional. If you have questions about a medical condition or this instruction, always ask your healthcare professional. [employer] disclaims any warranty or liability for your use of this information.      Substance Use Disorder: Care Instructions  Overview     You can improve your life and health by stopping your use of alcohol or drugs. When you don't drink or use drugs, you may feel and sleep better. You may get along better with your family, friends, and coworkers. There are medicines and programs that can help with substance use disorder.  How can you care for yourself at home?  Here are some ways to help you stay sober and prevent relapse.  If you have been given medicine to help keep you sober or reduce your cravings, be sure to take it exactly as prescribed.  Talk to your doctor about programs that can help you stop using drugs or drinking alcohol.  Do not keep alcohol or drugs in your home.  Plan ahead. Think about what you'll say if other people ask you to drink or use drugs. Try not to spend time with people who drink or use drugs.  Use the time and money spent on drinking or drugs to do something that's important to you.  Preventing a relapse  Have a plan to deal with relapse. Learn to recognize changes in your thinking that lead you to drink or use drugs. Get help before you start to drink or use drugs again.  Try to stay away from situations, friends, or places that may lead you to drink or use drugs.  If you feel the need to drink alcohol or use drugs again, seek help right away. Call a trusted friend or family member. Some people get support from organizations such as Narcotics Anonymous or [employer] or from treatment facilities.  If you relapse, get help as soon as you can. Some people make a plan with another person that outlines what  they want that person to do for them if they relapse. The plan usually includes how to handle the relapse and who to notify in case of relapse.  Don't give up. Remember that a relapse doesn't mean that you have failed. Use the experience to learn the triggers that lead you to drink or use drugs. Then quit again. Recovery is a lifelong process. Many people have several relapses before they are able to quit for good.  Follow-up care is a key part of your treatment and safety. Be sure to make and go to all appointments, and call your doctor if you are having problems. It's also a good idea to know your test results and keep a list of the medicines you take.  When should you call for help?   Call 911  anytime you think you may need emergency care. For example, call if you or someone else:    Has overdosed or has withdrawal signs. Be sure to tell the emergency workers that you are or someone else is using or trying to quit using drugs. Overdose or withdrawal signs may include:  Losing consciousness.  Seizure.  Seeing or hearing things that aren't there (hallucinations).     Is thinking or talking about suicide or harming others.   Where to get help 24 hours a day, 7 days a week   If you or someone you know talks about suicide, self-harm, a mental health crisis, a substance use crisis, or any other kind of emotional distress, get help right away. You can:    Call the Suicide and Crisis Lifeline at 567.     Call 6-584-851-YKQC (1-283.859.2982).     Text HOME to 513820 to access the Crisis Text Line.   Consider saving these numbers in your phone.  Go to Vgift[employer].org for more information or to chat online.  Call your doctor now or seek immediate medical care if:    You are having withdrawal symptoms. These may include nausea or vomiting, sweating, shakiness, and anxiety.   Watch closely for changes in your health, and be sure to contact your doctor if:    You have a relapse.     You need more help or support to stop.  "  Where can you learn more?  Go to https://www.healthMola.com.net/patiented  Enter H573 in the search box to learn more about \"Substance Use Disorder: Care Instructions.\"  Current as of: November 15, 2023               Content Version: 14.0    2052-6848 [employer].   Care instructions adapted under license by your healthcare professional. If you have questions about a medical condition or this instruction, always ask your healthcare professional. Healthwise, [employer] disclaims any warranty or liability for your use of this information.      "

## 2024-05-29 NOTE — NURSING NOTE
Six Item Cognitive Impairment Test   (6CIT):    What year is it?                               Correct - 0 points  What month is it?                               Correct - 0 points    Give the patient an address to remember with five components:   [place] ( first and last name - 2 components)   [address]  (number and name of street - 2 components)   [place] ( city - 1 component)    About what time is it (within the hour)? Correct - 0 points  Count backwards from 20 to 1:   Correct - 0 points  Say the months of the year in reverse: Correct - 0 points  Repeat the address phrase:   1 error - 2 points    Total 6CIT Score:      2/28    Interpretation: The 6CIT uses an inverse score and questions are weighted to produce a total out of 28. Scores of 0-7 are considered normal and 8 or more significant.    Advantages The test has high sensitivity without compromising specificity even in mild dementia. It is easy to translate linguistically and culturally.  Disadvantages The main disadvantage is in the scoring and weighting of the test, which is initially confusing, however computer models have simplified this greatly.    Probability Statistics: At the 7/8 cut off: Overall figures sensitivity 90% specificity 100%, in mild dementia sensitivity = 78% , specificity = 100%    Copyright 2000 [facility], [facility] [place]. Courtesy of Dr. Rajendra Snyder

## 2024-05-30 LAB
ATRIAL RATE - MUSE: 85 BPM
DIASTOLIC BLOOD PRESSURE - MUSE: NORMAL MMHG
INTERPRETATION ECG - MUSE: NORMAL
P AXIS - MUSE: 61 DEGREES
PR INTERVAL - MUSE: 158 MS
QRS DURATION - MUSE: 96 MS
QT - MUSE: 356 MS
QTC - MUSE: 423 MS
R AXIS - MUSE: 60 DEGREES
SYSTOLIC BLOOD PRESSURE - MUSE: NORMAL MMHG
T AXIS - MUSE: 61 DEGREES
VENTRICULAR RATE- MUSE: 85 BPM

## 2024-06-03 ENCOUNTER — VIRTUAL VISIT (OUTPATIENT)
Dept: PHARMACY | Facility: CLINIC | Age: 74
End: 2024-06-03
Attending: INTERNAL MEDICINE
Payer: MEDICARE

## 2024-06-03 DIAGNOSIS — G89.29 CHRONIC BILATERAL LOW BACK PAIN WITHOUT SCIATICA: ICD-10-CM

## 2024-06-03 DIAGNOSIS — F32.0 MILD MAJOR DEPRESSION (H): ICD-10-CM

## 2024-06-03 DIAGNOSIS — Z78.9 TAKES DIETARY SUPPLEMENTS: ICD-10-CM

## 2024-06-03 DIAGNOSIS — I10 ESSENTIAL HYPERTENSION: ICD-10-CM

## 2024-06-03 DIAGNOSIS — M54.50 CHRONIC BILATERAL LOW BACK PAIN WITHOUT SCIATICA: ICD-10-CM

## 2024-06-03 DIAGNOSIS — E78.00 HYPERCHOLESTEREMIA: Primary | ICD-10-CM

## 2024-06-03 PROCEDURE — 99207 PR NO CHARGE LOS: CPT | Mod: 93 | Performed by: PHARMACIST

## 2024-06-03 NOTE — PATIENT INSTRUCTIONS
"Recommendations from today's MTM visit:                                                         Remain on current medication therapy  Consider increasing acetaminophen dose to 1000 mg every 8 hours when back pain flares  Agree with the patient's understanding to avoid NSAIDs  May consider using over-the-counter lidocaine patches in the future.    Follow-up: with Dr. Vasquez    It was great speaking with you today.  I value your experience and would be very thankful for your time in providing feedback in our clinic survey. In the next few days, you may receive an email or text message from [employer] with a link to a survey related to your  clinical pharmacist.\"     To schedule another MTM appointment, please call the clinic directly or you may call the MTM scheduling line at [phone] or toll-free at [phone].     My Clinical Pharmacist's contact information:                                                      Please feel free to contact me with any questions or concerns you have.      Johan Gaspar, Pharm.D.  [facility]  [phone]  [address]    Monday 10-12 noon, Tues: 8-12 noon, Wed 8-5 PM, Friday 8-5 PM  Contact me via castaclipHART      "

## 2024-06-03 NOTE — Clinical Note
Dr. Bundy, I have met with Efrain today.  Thank you for the MTM referral.  I recognize that he is undergoing some CV work up.  I just wanted to bring to your attention that he has not been using the metoprolol for several months.  I am not sure if there is an indication that calls for this medication - and I did not restart the beta blocker, but wanted to share this medication discrepancy. The medication list has been updated to reflect this.  Take najma,  Johan Gaspar, Pharm.D. [phone]  (pager)

## 2024-06-03 NOTE — PROGRESS NOTES
Medication Therapy Management (MTM) Encounter    ASSESSMENT:                            Medication Adherence/Access: No issues identified    Essential hypertension  Controlled  Some confusion regarding medication.  Patient has been off metoprolol since the summer.  The absence of the beta-blocker may explain some of his elevated heart rate last seen in clinic.  Patient is currently receiving cardiac workup and this will be explored further.  Patient is advised not to use his manual home blood pressure cuff.  This is likely unreliable or imprecise for self measurement.  If blood pressure cuff is needed an electronic blood pressure cuff would be preferred.  Patient has appropriately discontinued amlodipine.  Patient is appropriately taking ACE inhibitor full dose.  Considering renal function this is appropriate.    Hypercholesteremia  Controlled    Chronic bilateral low back pain without sciatica  Stable  SNRI was recently discontinued because of lack of effect in improving back pain.  We had a discussion today of alternate therapies that may be appropriate.  Primarily using acetaminophen when his back pain flares.  Recommended dose of 1000 mg every 8 hours for this pain.  I explained that often acetaminophen is underdosed and may not be a fair evaluation of the medications effectiveness.  I have also recommended using over-the-counter lidocaine patches as another option for back pain.  Patient will continue to work with his primary care provider to address back pain.    Mild major depression (H24)  Controlled    Takes dietary supplements  Supplements are safe for use.         PLAN:                            Remain on current medication therapy  Consider increasing acetaminophen dose to 1000 mg every 8 hours when back pain flares  Agree with the patient's understanding to avoid NSAIDs  May consider using over-the-counter lidocaine patches in the future.    Follow-up: with PCP    SUBJECTIVE/OBJECTIVE:                   "        Galo Gomes is a 74 year old male called for an initial visit. He was referred to me from ANAY NAVARRO MD .      Reason for visit: Medication review and reconciliation .    Allergies/ADRs: Reviewed in chart  Past Medical History: Reviewed in chart  Tobacco: He reports that he quit smoking about 30 years ago. His smoking use included pipe. He has never used smokeless tobacco.  Alcohol: history of alcohol dependence    Medication Adherence/Access: no issues reported      Hypertension     Lisinopril 40 mg daily     Worried that blood pressure was going too low, noticed in the PCP office.  CCB was stopped     Describes having tachycardia in the past, but unclear with this history.      Has stopped metoprolol - last used in the winter  Has stopped amlodipine recently - 2 weeks.  Stopped by PCP with a low BP reading.      Has noticed that recently heart has been racing when walking up the stairs.  Will feel light headed bending over, but denies any falls.      Patient reports no current medication side effects  Patient does not self-monitor blood pressure.  Patient has a manual blood pressure cuff at home that he uses to check his blood pressure occasionally.     Hyperlipidemia      atorvastatin 10mg daily  Patient reports no significant myalgias or other side effects.    Worried about calcification of his arteries.      Cholesterol   Date Value Ref Range Status   05/29/2024 149 <200 mg/dL Final   10/24/2023 168 <200 mg/dL Final     Direct Measure HDL   Date Value Ref Range Status   05/29/2024 49 >=40 mg/dL Final   10/24/2023 44 >=40 mg/dL Final     LDL Cholesterol Calculated   Date Value Ref Range Status   05/29/2024 78 <=100 mg/dL Final   10/24/2023 103 (H) <=100 mg/dL Final     Triglycerides   Date Value Ref Range Status   05/29/2024 110 <150 mg/dL Final   10/24/2023 106 <150 mg/dL Final     No results found for: \"CHOLHDLRATIO\"       3. Chronic bilateral low back pain without sciatica  Acetaminophen used " rarely   Not taking IBU  No OTC for pain  Has tried CBD gummies for sleep. Has been helpful for sleep.     Used about 1/2 the week    Had used duloxetine for the past few months, but has found little benefit either in the mental health side or for the back pain.  This was discontinued recently.  Patient denies any adverse effects or withdrawals from discontinuing the SNRI.       4. Mild major depression (H24)  Duloxetine was stopped    Stopped May 5th.       5. BPH  Tamsulosin 0.4 once at night.  Patient describes that his BPH is well-controlled    6. Supplement:   TAkes D3 daily   MVI    ----------------      I spent 45 minutes with this patient today. All changes were made via collaborative practice agreement with ANAY BUNDY MD. A copy of the visit note was provided to the patient's provider(s).    A summary of these recommendations was sent via [employer].    Tessy [email or web address].    Telemedicine Visit Details  Type of service:  Telephone visit  Start Time: 10:36 AM  End Time: 11:21 AM     Medication Therapy Recommendations  No medication therapy recommendations to display           Medication dispensing history provided by [facility] 06/03/24       amLODIPine Besylate     Dispensed Days Supply Quantity Provider Pharmacy   AMLODIPINE BESYLATE 2.5MG TABLETS 04/17/2024 90 90 Units Anay Bundy MD [facility]...   AMLODIPINE BESYLATE 2.5MG TABLETS 01/21/2024 90 90 Units Anay Bundy MD [facility] #...   AMLODIPINE BESYLATE 2.5MG TABLETS 11/03/2023 90 90 Units Anay Bundy MD [facility] #...   AMLODIPINE BESYLATE 2.5MG TABLETS 07/26/2023 90 90 Units Anay Bundy MD [facility] #...      Atorvastatin Calcium     Dispensed Days Supply Quantity Provider Pharmacy   ATORVASTATIN 10MG TABLETS 04/17/2024 90 90 Units Anay Bundy MD [facility] #...   ATORVASTATIN 10MG TABLETS 01/21/2024 90 90 Units Anay Bundy MD [facility] #...    ATORVASTATIN 10MG TABLETS 11/03/2023 90 90 Units Ronnie Bundy MD [facility]...      DULoxetine HCl     Dispensed Days Supply Quantity Provider Pharmacy   DULOXETINE DR 60MG [facility] 02/07/2024 90 90 Units Ronnie Bundy MD [facility]...   DULOXETINE DR 60MG CAPS[facility] 11/03/2023 90 90 Units Ronnie Bundy MD [email or web address]...   DULOXETINE DR 30MG CAPSULES 08/26/2023 90 90 Units Joleen Ruiz MD [facility] #...      Lisinopril     Dispensed Days Supply Quantity Provider Pharmacy   LISINOPRIL 40MG TABLETS 04/20/2024 90 90 Units Ron[facility] Bundy MD [facility]   LISINOPRIL 40MG TABLETS 01/21/2024 90 90 Units Ronnie Bundy MD [email or web address]...   LISINOPRIL 40MG TABLETS 10/24/2023 90 90 Units Ronnie Bundy MD [facility] #...   LISINOPRIL 40MG TABLETS 07/31/2023 90 90 Units Ronnie Bundy MD [facility] #...      Metoprolol Succinate     Dispensed Days Supply Quantity Provider Pharmacy   METOPROLOL ER SUCCINATE 50MG TABS 12/09/2023 30 30 Units Ronnie Bundy MD [facility]...   METOPROLOL ER SUCCINATE 50MG TABS 09/20/2023 90 90 Units Ronnie Bundy MD [email or web address]...   METOPROLOL ER SUCCINATE 50MG TABS 06/16/2023 90 90 Units Ronnie Bundy MD [facility] #...      Tamsulosin HCl     Dispensed Days Supply Quantity Provider Pharmacy   TAMSULOSIN 0.4MG [facility] 04/20/2024 90 90 Units Ronnie Bundy MD [facility]...   TAMSULOSIN 0.4MG CAPSULES 01/21/2024 90 90 Units Ronnie Bundy MD [facility]...   TAMSULOSIN 0.4MG CAPSULES 11/03/2023 90 90 Units Ronnie Bundy MD [facility] #...   TAMSULOSIN 0.4MG CAPSULES 07/25/2023 90 90 Units Ronnie Bundy MD [facility] #...

## 2024-06-11 NOTE — PLAN OF CARE
"Shift: [id number]    Neuro: A&Ox4, able to make needs known  Cardiac: on tele monitoring, denies chest pain. Reporting brief heartburn-type sensation when drinking cold liquids present past 3 months  Respiratory: stable on RA, denies SOB  GI/: reports constipation, no BM for ~1 wk, receiving miralax and senokot BID, passing flatus. Abdomen soft, non-tender on palpation, bowel sounds audible.   Diet/appetite: tolerating clear liquid diet  Activity: assist x1 w/ fww  Pain: denies this shift  Skin: blanchable redness on lower abdomen and back  Lines: PIV R forearm infusing LR @200mL/hr    Vitals: BP (!) 151/75 (BP Location: Right arm)   Pulse 72   Temp 97.8  F (36.6  C) (Oral)   Resp 18   Ht 1.676 m (5' 6\")   Wt 84.4 kg (186 lb)   SpO2 97%   BMI 30.02 kg/m      CIWA q4h, max score 3. No intervention needed.   K+ recheck resulted 3.6  " ----- Message from Florencio Bonilla sent at 6/10/2024  7:07 PM CDT -----  I left a message on the patient's personal voicemail.  She had a solitary subcentimeter sigmoid colon polyp removed that had both hyperplastic and adenomatous features (suspect sessile serrated adenoma).  I have recommended a surveillance colonoscopy in 5 years.  I have asked Keny to contact me with any questions.    GI RNs: Please enter colonoscopy recall for 5 years.

## 2024-06-17 ENCOUNTER — HOSPITAL ENCOUNTER (OUTPATIENT)
Dept: CARDIOLOGY | Facility: HOSPITAL | Age: 74
Discharge: HOME | End: 2024-06-17
Attending: INTERNAL MEDICINE | Admitting: INTERNAL MEDICINE
Payer: MEDICARE

## 2024-06-17 DIAGNOSIS — R06.09 EXERTIONAL DYSPNEA: ICD-10-CM

## 2024-06-17 LAB
CV STRESS CURRENT BP HE: NORMAL
CV STRESS CURRENT HR HE: 101
CV STRESS CURRENT HR HE: 108
CV STRESS CURRENT HR HE: 108
CV STRESS CURRENT HR HE: 112
CV STRESS CURRENT HR HE: 66
CV STRESS CURRENT HR HE: 67
CV STRESS CURRENT HR HE: 68
CV STRESS CURRENT HR HE: 68
CV STRESS CURRENT HR HE: 69
CV STRESS CURRENT HR HE: 69
CV STRESS CURRENT HR HE: 70
CV STRESS CURRENT HR HE: 70
CV STRESS CURRENT HR HE: 80
CV STRESS CURRENT HR HE: 81
CV STRESS CURRENT HR HE: 83
CV STRESS CURRENT HR HE: 88
CV STRESS CURRENT HR HE: 89
CV STRESS CURRENT HR HE: 92
CV STRESS CURRENT HR HE: 98
CV STRESS DEVIATION TIME HE: NORMAL
CV STRESS ECHO PERCENT HR HE: NORMAL
CV STRESS EXERCISE STAGE HE: NORMAL
CV STRESS EXERCISE STAGE REACHED HE: NORMAL
CV STRESS FINAL RESTING BP HE: NORMAL
CV STRESS FINAL RESTING HR HE: 67
CV STRESS MAX HR HE: 113
CV STRESS MAX TREADMILL GRADE HE: 12
CV STRESS MAX TREADMILL SPEED HE: 2.5
CV STRESS PEAK DIA BP HE: NORMAL
CV STRESS PEAK SYS BP HE: NORMAL
CV STRESS PHASE HE: NORMAL
CV STRESS PROTOCOL HE: NORMAL
CV STRESS REASON STOPPED HE: NORMAL
CV STRESS RESTING PT POSITION HE: NORMAL
CV STRESS ST DEVIATION AMOUNT HE: NORMAL
CV STRESS ST DEVIATION ELEVATION HE: NORMAL
CV STRESS ST EVELATION AMOUNT HE: NORMAL
CV STRESS SYMPTOMS HE: NORMAL
CV STRESS TEST TYPE HE: NORMAL
CV STRESS TOTAL STAGE TIME MIN 1 HE: NORMAL
STRESS ECHO BASELINE DIASTOLIC HE: 68
STRESS ECHO BASELINE HR: 87
STRESS ECHO BASELINE SYSTOLIC BP: 122
STRESS ECHO LAST STRESS DIASTOLIC BP: 68
STRESS ECHO LAST STRESS HR: 112
STRESS ECHO LAST STRESS SYSTOLIC BP: 160
STRESS ECHO POST ESTIMATED WORKLOAD: 5.6
STRESS ECHO POST EXERCISE DUR MIN: 3
STRESS ECHO POST EXERCISE DUR SEC: 35
STRESS ECHO TARGET HR: 123

## 2024-06-17 PROCEDURE — 93016 CV STRESS TEST SUPVJ ONLY: CPT | Performed by: INTERNAL MEDICINE

## 2024-06-17 PROCEDURE — 93352 ADMIN ECG CONTRAST AGENT: CPT | Performed by: INTERNAL MEDICINE

## 2024-06-17 PROCEDURE — 93321 DOPPLER ECHO F-UP/LMTD STD: CPT | Mod: 26 | Performed by: INTERNAL MEDICINE

## 2024-06-17 PROCEDURE — 93325 DOPPLER ECHO COLOR FLOW MAPG: CPT | Mod: 26 | Performed by: INTERNAL MEDICINE

## 2024-06-17 PROCEDURE — 93018 CV STRESS TEST I&R ONLY: CPT | Performed by: INTERNAL MEDICINE

## 2024-06-17 PROCEDURE — 255N000002 HC RX 255 OP 636: Performed by: INTERNAL MEDICINE

## 2024-06-17 PROCEDURE — 93350 STRESS TTE ONLY: CPT | Mod: 26 | Performed by: INTERNAL MEDICINE

## 2024-06-17 RX ADMIN — PERFLUTREN 4 ML: 6.52 INJECTION, SUSPENSION INTRAVENOUS at 13:30

## 2024-06-20 ENCOUNTER — OFFICE VISIT (OUTPATIENT)
Dept: INTERNAL MEDICINE | Facility: CLINIC | Age: 74
End: 2024-06-20
Payer: MEDICARE

## 2024-06-20 VITALS
RESPIRATION RATE: 16 BRPM | BODY MASS INDEX: 34.64 KG/M2 | HEIGHT: 65 IN | SYSTOLIC BLOOD PRESSURE: 101 MMHG | OXYGEN SATURATION: 97 % | HEART RATE: 68 BPM | WEIGHT: 207.9 LBS | DIASTOLIC BLOOD PRESSURE: 60 MMHG | TEMPERATURE: 97.7 F

## 2024-06-20 DIAGNOSIS — N17.9 AKI (ACUTE KIDNEY INJURY) (H): Primary | ICD-10-CM

## 2024-06-20 DIAGNOSIS — K86.2 PANCREATIC CYST: ICD-10-CM

## 2024-06-20 DIAGNOSIS — I10 ESSENTIAL HYPERTENSION: ICD-10-CM

## 2024-06-20 DIAGNOSIS — F10.20 ALCOHOLISM (H): ICD-10-CM

## 2024-06-20 DIAGNOSIS — G89.29 CHRONIC BILATERAL LOW BACK PAIN WITHOUT SCIATICA: ICD-10-CM

## 2024-06-20 DIAGNOSIS — R53.81 PHYSICAL DECONDITIONING: ICD-10-CM

## 2024-06-20 DIAGNOSIS — M54.50 CHRONIC BILATERAL LOW BACK PAIN WITHOUT SCIATICA: ICD-10-CM

## 2024-06-20 DIAGNOSIS — E66.01 MORBID OBESITY (H): ICD-10-CM

## 2024-06-20 LAB
ANION GAP SERPL CALCULATED.3IONS-SCNC: 11 MMOL/L (ref 7–15)
BUN SERPL-MCNC: 24.7 MG/DL (ref 8–23)
CALCIUM SERPL-MCNC: 9.4 MG/DL (ref 8.8–10.2)
CHLORIDE SERPL-SCNC: 100 MMOL/L (ref 98–107)
CREAT SERPL-MCNC: 1.23 MG/DL (ref 0.67–1.17)
DEPRECATED HCO3 PLAS-SCNC: 23 MMOL/L (ref 22–29)
EGFRCR SERPLBLD CKD-EPI 2021: 61 ML/MIN/1.73M2
GLUCOSE SERPL-MCNC: 96 MG/DL (ref 70–99)
POTASSIUM SERPL-SCNC: 4.7 MMOL/L (ref 3.4–5.3)
SODIUM SERPL-SCNC: 134 MMOL/L (ref 135–145)

## 2024-06-20 PROCEDURE — G2211 COMPLEX E/M VISIT ADD ON: HCPCS | Performed by: INTERNAL MEDICINE

## 2024-06-20 PROCEDURE — 80048 BASIC METABOLIC PNL TOTAL CA: CPT | Performed by: INTERNAL MEDICINE

## 2024-06-20 PROCEDURE — 36415 COLL VENOUS BLD VENIPUNCTURE: CPT | Performed by: INTERNAL MEDICINE

## 2024-06-20 PROCEDURE — 99214 OFFICE O/P EST MOD 30 MIN: CPT | Performed by: INTERNAL MEDICINE

## 2024-06-20 RX ORDER — LISINOPRIL 40 MG/1
20 TABLET ORAL DAILY
COMMUNITY
Start: 2024-06-20 | End: 2024-08-30

## 2024-06-20 ASSESSMENT — PAIN SCALES - GENERAL: PAINLEVEL: MODERATE PAIN (4)

## 2024-06-20 NOTE — PROGRESS NOTES
1. GUILLERMINA (acute kidney injury) (H24)  Recheck renal function today.  Cut back lisinopril to 20 mg as previously recommended and see me back in a month.  - Basic metabolic panel  (Ca, Cl, CO2, Creat, Gluc, K, Na, BUN); Future    2. Essential hypertension  As above.  - lisinopril (ZESTRIL) 40 MG tablet; Take 0.5 tablets (20 mg) by mouth daily  - Basic metabolic panel  (Ca, Cl, CO2, Creat, Gluc, K, Na, BUN); Future    3. Pancreatic cyst  He will be having a scan next month.    4. Alcoholism (H)  He tells me he is sober.  Congratulated him on this.    5. Morbid obesity (H)  Ongoing efforts at more exercise and weight loss urged.    6. Physical deconditioning  He needs to become more active and he is currently working on that.  7.  Chronic back pain.  He is going to try some lidocaine patches.  The longitudinal plan of care for the diagnosis(es)/condition(s) as documented were addressed during this visit. Due to the added complexity in care, I will continue to support Galo in the subsequent management and with ongoing continuity of care.    Subjective   Galo is a 74 year old, presenting for the following health issues:  Follow Up and Recheck Medication (Pt reports that he is here to follow up for blood pressure recheck.)      6/20/2024     3:43 PM   Additional Questions   Roomed by tanja   Accompanied by inessa         6/20/2024     3:43 PM   Patient Reported Additional Medications   Patient reports taking the following new medications none     History of Present Illness       Back Pain:  He presents for follow up of back pain. Patient's back pain is a chronic problem.  Location of back pain:  Right lower back, left lower back, right shoulder, right hip and left hip  Description of back pain: dull ache  Back pain spreads: right buttocks and left buttocks    Since patient first noticed back pain, pain is: always present, but gets better and worse  Does back pain interfere with his job:  Not applicable       Hyperlipidemia:   "He presents for follow up of hyperlipidemia.   He is taking medication to lower cholesterol. He is having myalgia or other side effects to statin medications.    Hypertension: He presents for follow up of hypertension.  He does not check blood pressure  regularly outside of the clinic. Outside blood pressures have been over 140/90. He follows a low salt diet.     Reason for visit:  Review tests ?    He eats 4 or more servings of fruits and vegetables daily.He consumes 1 sweetened beverage(s) daily.He exercises with enough effort to increase his heart rate 30 to 60 minutes per day.  He exercises with enough effort to increase his heart rate 7 days per week.   He is taking medications regularly.       Galo comes in today for follow-up.  I had asked that he cut back his lisinopril because of worsening renal function.  That message never got to him apparently.  He did not check his MyChart.  He is here today for follow-up.  He did meet with our pharmacist who confirmed that he was not taking his metoprolol.  No other med changes were made.  He did have a stress test which showed no evidence of ischemia.  He is trying to be a little bit more active.  He is not drinking alcohol.              Objective    /60 (BP Location: Left arm, Patient Position: Sitting, Cuff Size: Adult Regular)   Pulse 68   Temp 97.7  F (36.5  C) (Tympanic)   Resp 16   Ht 1.651 m (5' 5\")   Wt 94.3 kg (207 lb 14.4 oz)   SpO2 97%   BMI 34.60 kg/m    Body mass index is 34.6 kg/m .  Physical Exam       Pleasant gentleman.  Weight is up from last visit.        Signed Electronically by: ANAY NAVARRO MD    "

## 2024-07-25 ENCOUNTER — OFFICE VISIT (OUTPATIENT)
Dept: INTERNAL MEDICINE | Facility: CLINIC | Age: 74
End: 2024-07-25
Payer: MEDICARE

## 2024-07-25 ENCOUNTER — HOSPITAL ENCOUNTER (OUTPATIENT)
Facility: HOSPITAL | Age: 74
Setting detail: OBSERVATION
Discharge: HOME | End: 2024-07-27
Attending: EMERGENCY MEDICINE | Admitting: INTERNAL MEDICINE
Payer: MEDICARE

## 2024-07-25 ENCOUNTER — TELEPHONE (OUTPATIENT)
Dept: INTERNAL MEDICINE | Facility: CLINIC | Age: 74
End: 2024-07-25

## 2024-07-25 ENCOUNTER — APPOINTMENT (OUTPATIENT)
Dept: CT IMAGING | Facility: HOSPITAL | Age: 74
End: 2024-07-25
Attending: EMERGENCY MEDICINE
Payer: MEDICARE

## 2024-07-25 VITALS
SYSTOLIC BLOOD PRESSURE: 98 MMHG | DIASTOLIC BLOOD PRESSURE: 58 MMHG | TEMPERATURE: 98.5 F | WEIGHT: 200.8 LBS | HEART RATE: 98 BPM | BODY MASS INDEX: 33.41 KG/M2 | OXYGEN SATURATION: 99 %

## 2024-07-25 DIAGNOSIS — N17.9 AKI (ACUTE KIDNEY INJURY) (H): Primary | ICD-10-CM

## 2024-07-25 DIAGNOSIS — R53.83 FATIGUE, UNSPECIFIED TYPE: ICD-10-CM

## 2024-07-25 DIAGNOSIS — R79.89 ELEVATED LFTS: ICD-10-CM

## 2024-07-25 DIAGNOSIS — I10 ESSENTIAL HYPERTENSION: ICD-10-CM

## 2024-07-25 DIAGNOSIS — K86.2 PANCREATIC CYST: ICD-10-CM

## 2024-07-25 DIAGNOSIS — R68.83 CHILLS: ICD-10-CM

## 2024-07-25 DIAGNOSIS — R05.1 ACUTE COUGH: ICD-10-CM

## 2024-07-25 DIAGNOSIS — R14.0 BLOATING: ICD-10-CM

## 2024-07-25 DIAGNOSIS — R11.10 DRY HEAVES: ICD-10-CM

## 2024-07-25 PROBLEM — E66.01 MORBID OBESITY (H): Status: ACTIVE | Noted: 2022-08-18

## 2024-07-25 PROBLEM — R50.9 FEVER: Status: ACTIVE | Noted: 2024-07-25

## 2024-07-25 PROBLEM — R63.0 ANOREXIA: Status: ACTIVE | Noted: 2024-07-25

## 2024-07-25 PROBLEM — F41.9 ANXIETY DISORDER, UNSPECIFIED: Status: ACTIVE | Noted: 2021-11-02

## 2024-07-25 LAB
ALBUMIN SERPL BCG-MCNC: 3.9 G/DL (ref 3.5–5.2)
ALBUMIN SERPL BCG-MCNC: 4.1 G/DL (ref 3.5–5.2)
ALP SERPL-CCNC: 142 U/L (ref 40–150)
ALP SERPL-CCNC: 144 U/L (ref 40–150)
ALT SERPL W P-5'-P-CCNC: 5565 U/L (ref 0–70)
ALT SERPL W P-5'-P-CCNC: 6277 U/L (ref 0–70)
ANION GAP SERPL CALCULATED.3IONS-SCNC: 11 MMOL/L (ref 7–15)
ANION GAP SERPL CALCULATED.3IONS-SCNC: 15 MMOL/L (ref 7–15)
APAP SERPL-MCNC: <5 UG/ML (ref 10–30)
APTT PPP: 33 SECONDS (ref 22–38)
AST SERPL W P-5'-P-CCNC: 2416 U/L (ref 0–45)
AST SERPL W P-5'-P-CCNC: 3030 U/L (ref 0–45)
BASOPHILS # BLD AUTO: 0 10E3/UL (ref 0–0.2)
BASOPHILS # BLD AUTO: 0.1 10E3/UL (ref 0–0.2)
BASOPHILS NFR BLD AUTO: 0 %
BASOPHILS NFR BLD AUTO: 1 %
BILIRUB DIRECT SERPL-MCNC: 2.71 MG/DL (ref 0–0.3)
BILIRUB SERPL-MCNC: 3.8 MG/DL
BILIRUB SERPL-MCNC: 4.1 MG/DL
BUN SERPL-MCNC: 25 MG/DL (ref 8–23)
BUN SERPL-MCNC: 27.1 MG/DL (ref 8–23)
CALCIUM SERPL-MCNC: 8.9 MG/DL (ref 8.8–10.4)
CALCIUM SERPL-MCNC: 9.3 MG/DL (ref 8.8–10.4)
CHLORIDE SERPL-SCNC: 101 MMOL/L (ref 98–107)
CHLORIDE SERPL-SCNC: 98 MMOL/L (ref 98–107)
CK SERPL-CCNC: 201 U/L (ref 39–308)
CREAT SERPL-MCNC: 1.26 MG/DL (ref 0.67–1.17)
CREAT SERPL-MCNC: 1.32 MG/DL (ref 0.67–1.17)
EGFRCR SERPLBLD CKD-EPI 2021: 56 ML/MIN/1.73M2
EGFRCR SERPLBLD CKD-EPI 2021: 59 ML/MIN/1.73M2
EOSINOPHIL # BLD AUTO: 0 10E3/UL (ref 0–0.7)
EOSINOPHIL # BLD AUTO: 0.1 10E3/UL (ref 0–0.7)
EOSINOPHIL NFR BLD AUTO: 1 %
EOSINOPHIL NFR BLD AUTO: 1 %
ERYTHROCYTE [DISTWIDTH] IN BLOOD BY AUTOMATED COUNT: 14.2 % (ref 10–15)
ERYTHROCYTE [DISTWIDTH] IN BLOOD BY AUTOMATED COUNT: 14.4 % (ref 10–15)
ETHANOL SERPL-MCNC: <0.01 G/DL
GLUCOSE SERPL-MCNC: 106 MG/DL (ref 70–99)
GLUCOSE SERPL-MCNC: 129 MG/DL (ref 70–99)
HCO3 SERPL-SCNC: 20 MMOL/L (ref 22–29)
HCO3 SERPL-SCNC: 24 MMOL/L (ref 22–29)
HCT VFR BLD AUTO: 42.8 % (ref 40–53)
HCT VFR BLD AUTO: 46.8 % (ref 40–53)
HGB BLD-MCNC: 14.2 G/DL (ref 13.3–17.7)
HGB BLD-MCNC: 15.4 G/DL (ref 13.3–17.7)
HOLD SPECIMEN: NORMAL
HOLD SPECIMEN: NORMAL
IMM GRANULOCYTES # BLD: 0 10E3/UL
IMM GRANULOCYTES # BLD: 0 10E3/UL
IMM GRANULOCYTES NFR BLD: 0 %
IMM GRANULOCYTES NFR BLD: 0 %
INR PPP: 1.44 (ref 0.85–1.15)
LACTATE SERPL-SCNC: 2.5 MMOL/L (ref 0.7–2)
LIPASE SERPL-CCNC: 31 U/L (ref 13–60)
LIPASE SERPL-CCNC: 33 U/L (ref 13–60)
LYMPHOCYTES # BLD AUTO: 0.9 10E3/UL (ref 0.8–5.3)
LYMPHOCYTES # BLD AUTO: 0.9 10E3/UL (ref 0.8–5.3)
LYMPHOCYTES NFR BLD AUTO: 14 %
LYMPHOCYTES NFR BLD AUTO: 15 %
MCH RBC QN AUTO: 29.5 PG (ref 26.5–33)
MCH RBC QN AUTO: 29.8 PG (ref 26.5–33)
MCHC RBC AUTO-ENTMCNC: 32.9 G/DL (ref 31.5–36.5)
MCHC RBC AUTO-ENTMCNC: 33.2 G/DL (ref 31.5–36.5)
MCV RBC AUTO: 89 FL (ref 78–100)
MCV RBC AUTO: 91 FL (ref 78–100)
MONOCYTES # BLD AUTO: 0.4 10E3/UL (ref 0–1.3)
MONOCYTES # BLD AUTO: 0.5 10E3/UL (ref 0–1.3)
MONOCYTES NFR BLD AUTO: 8 %
MONOCYTES NFR BLD AUTO: 8 %
NEUTROPHILS # BLD AUTO: 4.2 10E3/UL (ref 1.6–8.3)
NEUTROPHILS # BLD AUTO: 5.1 10E3/UL (ref 1.6–8.3)
NEUTROPHILS NFR BLD AUTO: 75 %
NEUTROPHILS NFR BLD AUTO: 78 %
NRBC # BLD AUTO: 0 10E3/UL
NRBC BLD AUTO-RTO: 0 /100
PLATELET # BLD AUTO: 212 10E3/UL (ref 150–450)
PLATELET # BLD AUTO: 224 10E3/UL (ref 150–450)
POTASSIUM SERPL-SCNC: 4.1 MMOL/L (ref 3.4–5.3)
POTASSIUM SERPL-SCNC: 4.5 MMOL/L (ref 3.4–5.3)
PROT SERPL-MCNC: 6 G/DL (ref 6.4–8.3)
PROT SERPL-MCNC: 6.5 G/DL (ref 6.4–8.3)
RBC # BLD AUTO: 4.82 10E6/UL (ref 4.4–5.9)
RBC # BLD AUTO: 5.17 10E6/UL (ref 4.4–5.9)
SODIUM SERPL-SCNC: 133 MMOL/L (ref 135–145)
SODIUM SERPL-SCNC: 136 MMOL/L (ref 135–145)
WBC # BLD AUTO: 5.6 10E3/UL (ref 4–11)
WBC # BLD AUTO: 6.6 10E3/UL (ref 4–11)

## 2024-07-25 PROCEDURE — 83690 ASSAY OF LIPASE: CPT | Performed by: EMERGENCY MEDICINE

## 2024-07-25 PROCEDURE — 36415 COLL VENOUS BLD VENIPUNCTURE: CPT | Performed by: INTERNAL MEDICINE

## 2024-07-25 PROCEDURE — 85025 COMPLETE CBC W/AUTO DIFF WBC: CPT | Performed by: EMERGENCY MEDICINE

## 2024-07-25 PROCEDURE — 120N000001 HC R&B MED SURG/OB

## 2024-07-25 PROCEDURE — 85610 PROTHROMBIN TIME: CPT | Performed by: EMERGENCY MEDICINE

## 2024-07-25 PROCEDURE — 82077 ASSAY SPEC XCP UR&BREATH IA: CPT | Performed by: EMERGENCY MEDICINE

## 2024-07-25 PROCEDURE — 99285 EMERGENCY DEPT VISIT HI MDM: CPT | Mod: 25

## 2024-07-25 PROCEDURE — 80053 COMPREHEN METABOLIC PANEL: CPT | Performed by: INTERNAL MEDICINE

## 2024-07-25 PROCEDURE — 85025 COMPLETE CBC W/AUTO DIFF WBC: CPT | Performed by: INTERNAL MEDICINE

## 2024-07-25 PROCEDURE — 250N000011 HC RX IP 250 OP 636: Performed by: EMERGENCY MEDICINE

## 2024-07-25 PROCEDURE — 99223 1ST HOSP IP/OBS HIGH 75: CPT | Mod: AI | Performed by: INTERNAL MEDICINE

## 2024-07-25 PROCEDURE — 83605 ASSAY OF LACTIC ACID: CPT | Performed by: EMERGENCY MEDICINE

## 2024-07-25 PROCEDURE — 80143 DRUG ASSAY ACETAMINOPHEN: CPT | Performed by: EMERGENCY MEDICINE

## 2024-07-25 PROCEDURE — 99214 OFFICE O/P EST MOD 30 MIN: CPT | Performed by: INTERNAL MEDICINE

## 2024-07-25 PROCEDURE — 74177 CT ABD & PELVIS W/CONTRAST: CPT | Mod: MG

## 2024-07-25 PROCEDURE — 36415 COLL VENOUS BLD VENIPUNCTURE: CPT | Performed by: EMERGENCY MEDICINE

## 2024-07-25 PROCEDURE — 250N000013 HC RX MED GY IP 250 OP 250 PS 637: Performed by: INTERNAL MEDICINE

## 2024-07-25 PROCEDURE — 80074 ACUTE HEPATITIS PANEL: CPT | Performed by: EMERGENCY MEDICINE

## 2024-07-25 PROCEDURE — 83690 ASSAY OF LIPASE: CPT | Performed by: INTERNAL MEDICINE

## 2024-07-25 PROCEDURE — 85730 THROMBOPLASTIN TIME PARTIAL: CPT | Performed by: EMERGENCY MEDICINE

## 2024-07-25 PROCEDURE — G2211 COMPLEX E/M VISIT ADD ON: HCPCS | Performed by: INTERNAL MEDICINE

## 2024-07-25 PROCEDURE — 80048 BASIC METABOLIC PNL TOTAL CA: CPT | Performed by: EMERGENCY MEDICINE

## 2024-07-25 PROCEDURE — 82550 ASSAY OF CK (CPK): CPT | Performed by: EMERGENCY MEDICINE

## 2024-07-25 RX ORDER — LISINOPRIL 20 MG/1
20 TABLET ORAL DAILY
Status: DISCONTINUED | OUTPATIENT
Start: 2024-07-26 | End: 2024-07-27 | Stop reason: HOSPADM

## 2024-07-25 RX ORDER — TAMSULOSIN HYDROCHLORIDE 0.4 MG/1
0.4 CAPSULE ORAL EVERY EVENING
Status: DISCONTINUED | OUTPATIENT
Start: 2024-07-25 | End: 2024-07-27 | Stop reason: HOSPADM

## 2024-07-25 RX ORDER — IOPAMIDOL 755 MG/ML
90 INJECTION, SOLUTION INTRAVASCULAR ONCE
Status: COMPLETED | OUTPATIENT
Start: 2024-07-25 | End: 2024-07-25

## 2024-07-25 RX ADMIN — IOPAMIDOL 90 ML: 755 INJECTION, SOLUTION INTRAVENOUS at 20:09

## 2024-07-25 RX ADMIN — TAMSULOSIN HYDROCHLORIDE 0.4 MG: 0.4 CAPSULE ORAL at 22:34

## 2024-07-25 ASSESSMENT — ACTIVITIES OF DAILY LIVING (ADL)
ADLS_ACUITY_SCORE: 38

## 2024-07-25 ASSESSMENT — COLUMBIA-SUICIDE SEVERITY RATING SCALE - C-SSRS
2. HAVE YOU ACTUALLY HAD ANY THOUGHTS OF KILLING YOURSELF IN THE PAST MONTH?: NO
1. IN THE PAST MONTH, HAVE YOU WISHED YOU WERE DEAD OR WISHED YOU COULD GO TO SLEEP AND NOT WAKE UP?: NO
6. HAVE YOU EVER DONE ANYTHING, STARTED TO DO ANYTHING, OR PREPARED TO DO ANYTHING TO END YOUR LIFE?: NO

## 2024-07-25 ASSESSMENT — ENCOUNTER SYMPTOMS
BACK PAIN: 1
WHEEZING: 1

## 2024-07-25 ASSESSMENT — PAIN SCALES - GENERAL: PAINLEVEL: MILD PAIN (2)

## 2024-07-25 NOTE — ED PROVIDER NOTES
EMERGENCY DEPARTMENT ENCOUNTER      NAME: Efrain Gomes  AGE: 74 year old male  YOB: 1950  MRN: [id number]  EVALUATION DATE & TIME: 2024  6:44 PM    PCP: Ronnie Bundy    ED PROVIDER: Esteban Joyner D.O.      Chief Complaint   Patient presents with    Abnormal Labs       FINAL IMPRESSION:  1. Elevated LFTs        ED COURSE & MEDICAL DECISION MAKIN:49 PM I met with the patient to gather history and to perform my initial exam. I discussed the plan for care while in the Emergency Department.  8:38 PM Spoke with [facility] Dr. Kelly  8:52 PM spoke with the hospitalist Dr. Castillo who accepts patient for admission.         Pertinent Labs & Imaging studies reviewed. (See chart for details)  74 year old male presents to the Emergency Department for evaluation of severely elevated LFTs, without abdominal pain.  Patient's LFTs are slightly better today than yesterday, however still very severely elevated therefore I did decide to perform a CT scan of the abdomen and see has generalized bloating type symptoms.  CT imaging however does not show any evidence of acute process that could easily explain his elevated LFTs.  Denies using any Tylenol, and other than statin, no medications that would have any effect on liver function.  No exposures that would be suspicious for obvious cause of hepatitis, such as hepatitis A or B, however acute hepatitis panel has been sent.  I discussed patient with gastroenterology, and after discussion we decided be prudent to admit the patient overnight for further evaluation and determination of underlying cause of the elevated LFTs.  Discussed with the hospitalist who agreed to the admission.    Medical Decision Making  Obtained supplemental history:Supplemental history obtained?: No  Reviewed external records: External records reviewed?: Documented in chart  Care impacted by chronic illness:Chronic Pain, Hypertension, Mental Health, and Peripheral Vascular Disease  Care  significantly affected by social determinants of health:Access to Medical Care  Did you consider but not order tests?: Work up considered but not performed and documented in chart, if applicable  Did you interpret images independently?: Independent interpretation of ECG and images noted in documentation, when applicable.  Consultation discussion with other provider:Did you involve another provider (consultant, , pharmacy, etc.)?: I discussed the care with another health care provider, see documentation for details.  Admit.    At the conclusion of the encounter I discussed the results of all of the tests and the disposition. The questions were answered. The patient or family acknowledged understanding and was agreeable with the care plan.        HPI    Patient information was obtained from: patient     Use of : N/A    Efrain Gomes is a 74 year old male who presents for concerning labs.     Patient stated that he had a routine physical today where he had blood work done that showed abnormally high LFT's. Patient described he his bloated and had a subjective fever 2 days ago, and dry heaving. He has not been able to eat much since 2 days ago. Patient stated that he has been sober for 3 years and 29 years before that. Patient notes that he has a slightly fatty liver. Patient denied vomiting, diarrhea, constipation, or history of hepatitis.    Per Chart Review: 07/25/24, [facility]: patient had a follow up for GUILLERMINA, acute cough, fatigue, bloating, dry heaves, chills, hypertension, and pancreatic cyst. Patient had a comprehensive metabolic panel (BMP + Alb, Alk Phos, ALT, AST, Total. Bili, TP) for GUILLERMINA check. AST 3030 and ALT 6277. Patient was also was told to continue on a lower dose of lisinopril.       PAST MEDICAL HISTORY:  Past Medical History:   Diagnosis Date    Alcoholism (H)     BPH (benign prostatic hyperplasia)     Chronic pain syndrome     ED (erectile dysfunction)     Fatty  liver     History of anemia     History of pancreatitis     HLD (hyperlipidemia)     Hypertension     Lumbar back pain with radiculopathy affecting lower extremity     Mild major depression (H24)     Morbid obesity (H)     Pancreatic cyst        PAST SURGICAL HISTORY:  Past Surgical History:   Procedure Laterality Date    COLONOSCOPY      ENDOSCOPIC ULTRASOUND UPPER GASTROINTESTINAL TRACT (GI) N/A 2022    Procedure: ENDOSCOPIC ULTRASOUND, ESOPHAGOSCOPY / UPPER GASTROINTESTINAL TRACT (GI);  Surgeon: Zana Perez MD;  Location:  OR         CURRENT MEDICATIONS:    No current facility-administered medications for this encounter.     Current Outpatient Medications   Medication Sig Dispense Refill    atorvastatin (LIPITOR) 10 MG tablet Take 1 tablet (10 mg) by mouth daily 90 tablet 1    cholecalciferol, vitamin D3, (VITAMIN D3) 2,000 unit Tab [CHOLECALCIFEROL, VITAMIN D3, (VITAMIN D3) 2,000 UNIT TAB] Take 1 tablet by mouth daily.      lisinopril (ZESTRIL) 40 MG tablet Take 0.5 tablets (20 mg) by mouth daily      multivitamin w/minerals (THERA-VIT-M) tablet Take 1 tablet by mouth daily      tamsulosin (FLOMAX) 0.4 MG capsule TAKE 1 CAPSULE(0.4 MG) BY MOUTH EVERY EVENING 90 capsule 3         ALLERGIES:  Allergies   Allergen Reactions    Grass Cough    Sulfamethoxazole-Trimethoprim Rash    Sulfamethoxazole-Trimethoprim Rash       FAMILY HISTORY:  Family History   Problem Relation Age of Onset    Cancer Mother     Cancer Father     Diabetes Brother        SOCIAL HISTORY:  Social History     Socioeconomic History    Marital status:    Tobacco Use    Smoking status: Former     Types: Pipe     Quit date: 1994     Years since quittin.5    Smokeless tobacco: Never   Vaping Use    Vaping status: Never Used   Substance and Sexual Activity    Alcohol use: Not Currently     Comment: sober since 10/2021    Drug use: Not Currently     Social Determinants of Health     Financial Resource Strain: Low Risk  " (5/29/2024)    Financial Resource Strain     Within the past 12 months, have you or your family members you live with been unable to get utilities (heat, electricity) when it was really needed?: No   Food Insecurity: Low Risk  (5/29/2024)    Food Insecurity     Within the past 12 months, did you worry that your food would run out before you got money to buy more?: No     Within the past 12 months, did the food you bought just not last and you didn t have money to get more?: No   Transportation Needs: Low Risk  (5/29/2024)    Transportation Needs     Within the past 12 months, has lack of transportation kept you from medical appointments, getting your medicines, non-medical meetings or appointments, work, or from getting things that you need?: No   Physical Activity: Unknown (5/29/2024)    Exercise Vital Sign     Days of Exercise per Week: 5 days   Stress: No Stress Concern Present (5/29/2024)    [facility] [employer] - Occupational Stress Questionnaire     Feeling of Stress : Not at all   Social Connections: Unknown (5/29/2024)    Social Connection and Isolation Panel [NHANES]     Frequency of Social Gatherings with Friends and Family: Once a week   Interpersonal Safety: Low Risk  (5/29/2024)    Interpersonal Safety     Do you feel physically and emotionally safe where you currently live?: Yes     Within the past 12 months, have you been hit, slapped, kicked or otherwise physically hurt by someone?: No     Within the past 12 months, have you been humiliated or emotionally abused in other ways by your partner or ex-partner?: No   Housing Stability: Low Risk  (5/29/2024)    Housing Stability     Do you have housing? : Yes     Are you worried about losing your housing?: No       VITALS:  Patient Vitals for the past 24 hrs:   BP Temp Temp src Pulse Resp SpO2 Height Weight   07/25/24 1727 (!) 141/69 98.6  F (37  C) Oral 95 16 95 % 1.651 m (5' 5\") 91.6 kg (202 lb)       PHYSICAL EXAM    VITAL SIGNS: BP " "(!) 141/69   Pulse 95   Temp 98.6  F (37  C) (Oral)   Resp 16   Ht 1.651 m (5' 5\")   Wt 91.6 kg (202 lb)   SpO2 95%   BMI 33.61 kg/m      General Appearance: Well-appearing, well-nourished, no acute distress   Head:  Normocephalic, without obvious abnormality, atraumatic  Eyes:  PERRL, conjunctiva/corneas clear, EOM's intact,  ENT:  Lips, mucosa, and tongue normal, membranes are moist without pallor  Neck:  Normal ROM, symmetrical, trachea midline    Cardio:  Regular rate and rhythm, no murmur, rub or gallop, 2+ pulses symmetric in all extremities  Pulm:  Clear to auscultation bilaterally, respirations unlabored,  Abdomen:  Soft, non-tender, no rebound or guarding.  Musculoskeletal: Full ROM, no edema, no cyanosis, good ROM of major joints  Integument:  Warm, Dry, No erythema, No rash.    Neurologic:  Alert & oriented.  No focal deficits appreciated.         LABS  Results for orders placed or performed during the hospital encounter of 07/25/24 (from the past 24 hour(s))   [place] Draw    Narrative    The following orders were created for panel order [place] Draw.  Procedure                               Abnormality         Status                     ---------                               -----------         ------                     Extra Blue Top Tube[[id number]]                                                         Extra Red Top Tube[[id number]]                               Final result               Extra Green Top (Lithium...[[id number]]                                                 Extra Purple Top Tube[[id number]]                                                       Extra Blood Bank Purple ...[[id number]]                      Final result                 Please view results for these tests on the individual orders.   CBC with platelets + differential    Narrative    The following orders were created for panel order CBC with platelets + differential.  Procedure                               " Abnormality         Status                     ---------                               -----------         ------                     CBC with platelets and d...[[id number]]                      Final result                 Please view results for these tests on the individual orders.   Basic metabolic panel   Result Value Ref Range    Sodium 133 (L) 135 - 145 mmol/L    Potassium 4.1 3.4 - 5.3 mmol/L    Chloride 98 98 - 107 mmol/L    Carbon Dioxide (CO2) 24 22 - 29 mmol/L    Anion Gap 11 7 - 15 mmol/L    Urea Nitrogen 25.0 (H) 8.0 - 23.0 mg/dL    Creatinine 1.26 (H) 0.67 - 1.17 mg/dL    GFR Estimate 59 (L) >60 mL/min/1.73m2    Calcium 8.9 8.8 - 10.4 mg/dL    Glucose 129 (H) 70 - 99 mg/dL   CK total   Result Value Ref Range     39 - 308 U/L   Hepatic function panel   Result Value Ref Range    Protein Total 6.0 (L) 6.4 - 8.3 g/dL    Albumin 3.9 3.5 - 5.2 g/dL    Bilirubin Total 3.8 (H) <=1.2 mg/dL    Alkaline Phosphatase 142 40 - 150 U/L    AST 2,416 (HH) 0 - 45 U/L    ALT 5,565 (HH) 0 - 70 U/L    Bilirubin Direct 2.71 (H) 0.00 - 0.30 mg/dL   Lipase   Result Value Ref Range    Lipase 33 13 - 60 U/L   INR   Result Value Ref Range    INR 1.44 (H) 0.85 - 1.15   PTT   Result Value Ref Range    aPTT 33 22 - 38 Seconds   Lactic acid whole blood   Result Value Ref Range    Lactic Acid 2.5 (H) 0.7 - 2.0 mmol/L   Alcohol level blood   Result Value Ref Range    Alcohol ethyl <0.01 <=0.01 g/dL   Extra Red Top Tube   Result Value Ref Range    Hold Specimen JIC    CBC with platelets and differential   Result Value Ref Range    WBC Count 6.6 4.0 - 11.0 10e3/uL    RBC Count 4.82 4.40 - 5.90 10e6/uL    Hemoglobin 14.2 13.3 - 17.7 g/dL    Hematocrit 42.8 40.0 - 53.0 %    MCV 89 78 - 100 fL    MCH 29.5 26.5 - 33.0 pg    MCHC 33.2 31.5 - 36.5 g/dL    RDW 14.4 10.0 - 15.0 %    Platelet Count 212 150 - 450 10e3/uL    % Neutrophils 78 %    % Lymphocytes 14 %    % Monocytes 8 %    % Eosinophils 1 %    % Basophils 0 %    % Immature  Granulocytes 0 %    NRBCs per 100 WBC 0 <1 /100    Absolute Neutrophils 5.1 1.6 - 8.3 10e3/uL    Absolute Lymphocytes 0.9 0.8 - 5.3 10e3/uL    Absolute Monocytes 0.5 0.0 - 1.3 10e3/uL    Absolute Eosinophils 0.0 0.0 - 0.7 10e3/uL    Absolute Basophils 0.0 0.0 - 0.2 10e3/uL    Absolute Immature Granulocytes 0.0 <=0.4 10e3/uL    Absolute NRBCs 0.0 10e3/uL   Extra Blood Bank Purple Top Tube   Result Value Ref Range    Hold Specimen JIC    CT Abdomen Pelvis w Contrast    Narrative    EXAM: CT ABDOMEN PELVIS W CONTRAST  LOCATION: [facility]  DATE: 7/25/2024    INDICATION: Abdominal discomfort with elevated LFTs  COMPARISON: 7/25/2023  TECHNIQUE: CT scan of the abdomen and pelvis was performed following injection of IV contrast. Multiplanar reformats were obtained. Dose reduction techniques were used.  CONTRAST: isovue 370 90ml    FINDINGS:   LOWER CHEST: Normal.    HEPATOBILIARY: Normal.    PANCREAS: Decreasing 2.3 x 1.5 cm cystic lesion in the pancreaticoduodenal groove. The pancreas is otherwise normal.    SPLEEN: Normal.    ADRENAL GLANDS: Normal.    KIDNEYS/BLADDER: Normal.    BOWEL: No obstruction or inflammatory change. Normal appendix.    LYMPH NODES: Normal.    VASCULATURE: No abdominal aortic aneurysm.    PELVIC ORGANS: Prostatomegaly measuring 5.6 cm in transverse dimension.    MUSCULOSKELETAL: Degenerative changes. Osseous demineralization.      Impression    IMPRESSION:   1.  No acute abnormality.  2.  Decreasing 2.3 cm cystic lesion in the pancreaticoduodenal groove.         RADIOLOGY  CT Abdomen Pelvis w Contrast   Final Result   IMPRESSION:    1.  No acute abnormality.   2.  Decreasing 2.3 cm cystic lesion in the pancreaticoduodenal groove.               MEDICATIONS GIVEN IN THE EMERGENCY:  Medications   iopamidol (ISOVUE-370) solution 90 mL (90 mLs Intravenous $Given 7/25/24 2009)       NEW PRESCRIPTIONS STARTED AT TODAY'S ER VISIT  New Prescriptions    No medications on file         I, Phillip Jama, am serving as a scribe to document services personally performed by Esteban Joyner D.O., based on my observations and the provider's statements to me.  I, Esteban Joyner D.O., attest that Phillip Jama is acting in a scribe capacity, has observed my performance of the services and has documented them in accordance with my direction.     Esteban Joyner D.O.  Emergency Medicine  [facility] EMERGENCY DEPARTMENT  [address] [place] [id number]  [phone]  Dept: [phone]      Esteban Joyner,   07/25/24 8934

## 2024-07-25 NOTE — ED NOTES
Bed: JNED-06  Expected date: 7/25/24  Expected time:   Means of arrival:   Comments:  [facility] waiting room when clean

## 2024-07-25 NOTE — PROGRESS NOTES
1. GUILLERMINA (acute kidney injury) (H24)  Recheck renal function although with this acute illness I am concerned that renal function may be worse again.  I discussed with patient he needs to get some fluids and.  I have asked that he go get some echo electrolyte-containing fluids such as Gatorade.  If he is unable to keep those down he was instructed to go to emergency room for IV fluids.  - Comprehensive metabolic panel (BMP + Alb, Alk Phos, ALT, AST, Total. Bili, TP); Future  - CBC with platelets and differential; Future  - Comprehensive metabolic panel (BMP + Alb, Alk Phos, ALT, AST, Total. Bili, TP)  - CBC with platelets and differential    2. Acute cough  Check chest x-ray.  Lungs are clear today and there is no cough during the interview.  - XR Chest 2 Views; Future    3. Fatigue, unspecified type  Unclear etiology.  Presumably due to this recent illness  - CBC with platelets and differential; Future  - CBC with platelets and differential    4. Bloating  I do not know what caused this bloating and dry heaves.  Possible foodborne illness versus viral gastroenteritis versus?.  I want to see him back on Monday.  Labs as below.  - Lipase; Future  - Lipase    5. Dry heaves  As above.    6. Chills  As above.    7. Essential hypertension  Pressure on low side.  Continue lower dose of lisinopril.  - XR Abdomen 2 Views; Future    8. Pancreatic cyst  He will have his MRI tomorrow unless she is not feeling well      The longitudinal plan of care for the diagnosis(es)/condition(s) as documented were addressed during this visit. Due to the added complexity in care, I will continue to support Galo in the subsequent management and with ongoing continuity of care.  Subjective   Galo is a 74 year old, presenting for the following health issues:  Follow Up (1 month follow up ), Gastrointestinal Problem (Pt reports fasting for 2 days. Pt states that he may have eaten something . Pt reports gas and denies pain. Pt reports  fatigue ), Allergies (Pt would like to discuss. ), and Back Pain (Pt reports still has lower back pain - /left lower back  And radiates to right and left buttock/)        7/25/2024    12:56 PM   Additional Questions   Roomed by Catrachita     Allergies    Back Pain     History of Present Illness       Back Pain:  He presents for follow up of back pain. Patient's back pain is a chronic problem.  Location of back pain:  Left lower back, right buttock and left buttock  Description of back pain: dull ache  Back pain spreads: right buttocks and left buttocks    Since patient first noticed back pain, pain is: always present, but gets better and worse  Does back pain interfere with his job:  Not applicable       CKD: He is not using over the counter pain medicine.     Hypertension: He presents for follow up of hypertension.  He does not check blood pressure  regularly outside of the clinic. Outpatient blood pressures have not been over 140/90. He follows a low salt diet.     He eats 2-3 servings of fruits and vegetables daily.He consumes 0 sweetened beverage(s) daily.He exercises with enough effort to increase his heart rate 20 to 29 minutes per day.  He exercises with enough effort to increase his heart rate 6 days per week.   He is taking medications regularly.           Patient comes in today initially for follow-up of blood pressure and acute kidney injury.  We cut back his lisinopril to 20 mg because of worsening renal function.  He is here for follow-up of that however for the last 2 days has not been feeling well.  He has been having constitution of symptoms including fatigue and abdominal bloating and some dry heaves and chills.  He has had some cough intermittently.  He just has felt out of sorts.  He has not been drinking alcohol.  He thought maybe he ate something bad in his fridge.  There is no confusion.  No falls.  He has not eaten or drinking anything in the last couple days as a result of not feeling well.   Urination is dark.  He is having normal BMs.  Passing gas just fine.  No significant abdominal pain he supposed to have an MRI of his pancreas tomorrow.  Absolutely no alcohol use he tells me          Objective    BP 98/58 (BP Location: Left arm, Patient Position: Sitting, Cuff Size: Adult Regular)   Pulse 98   Temp 98.5  F (36.9  C) (Tympanic)   Wt 91.1 kg (200 lb 12.8 oz)   SpO2 99%   BMI 33.41 kg/m    Body mass index is 33.41 kg/m .  Physical Exam   Pleasant gentleman who does not appear acutely ill.  He is able ambulate without difficulty.  Blood pressures a little low today.  Abdomen is slightly distended but soft without pain on palpation.  Lungs are clear.            Signed Electronically by: ANAY NAVARRO MD

## 2024-07-25 NOTE — TELEPHONE ENCOUNTER
Test Results    Who ordered the test:  Dr. Bundy    Type of test: Lab    Date of test:  7/25/2024    Where was the test performed:  [place] [facility]    What are your questions/concerns?:   CRITICAL LAB RESULTS:   AST 3030  ALT 6247

## 2024-07-25 NOTE — ED TRIAGE NOTES
Patient states he has been feeling generally unwell for the past few days, so he went to see his primary care doctor. Patient states they jenny his blood and they found his liver enzymes to be elevated. Patient states he feels fatigued and nauseous. Hx of ETOH abuse in the past, but states he has been sober for 3 years.      Triage Assessment (Adult)       Row Name 07/25/24 2055          Triage Assessment    Airway WDL WDL        Respiratory WDL    Respiratory WDL WDL        Skin Circulation/Temperature WDL    Skin Circulation/Temperature WDL WDL        Cardiac WDL    Cardiac WDL WDL        Peripheral/Neurovascular WDL    Peripheral Neurovascular WDL WDL        Cognitive/Neuro/Behavioral WDL    Cognitive/Neuro/Behavioral WDL WDL

## 2024-07-25 NOTE — TELEPHONE ENCOUNTER
Patient does not have anyone listed on CTC. RN sent [employer] message to notify patient as well.

## 2024-07-25 NOTE — TELEPHONE ENCOUNTER
RN huddled with Dr. Jiménez who suggests patient presents to emergency department. RN called patient and LVM notifying patient to call back and importance of returning the phone call.

## 2024-07-26 ENCOUNTER — APPOINTMENT (OUTPATIENT)
Dept: ULTRASOUND IMAGING | Facility: HOSPITAL | Age: 74
End: 2024-07-26
Attending: INTERNAL MEDICINE
Payer: MEDICARE

## 2024-07-26 ENCOUNTER — HOSPITAL ENCOUNTER (OUTPATIENT)
Dept: RADIOLOGY | Facility: HOSPITAL | Age: 74
Discharge: HOME | End: 2024-07-26
Attending: INTERNAL MEDICINE | Admitting: INTERNAL MEDICINE
Payer: MEDICARE

## 2024-07-26 PROBLEM — N18.31 STAGE 3A CHRONIC KIDNEY DISEASE (H): Status: ACTIVE | Noted: 2024-07-26

## 2024-07-26 LAB
ALBUMIN SERPL BCG-MCNC: 3.5 G/DL (ref 3.5–5.2)
ALP SERPL-CCNC: 136 U/L (ref 40–150)
ALT SERPL W P-5'-P-CCNC: 4249 U/L (ref 0–70)
ANION GAP SERPL CALCULATED.3IONS-SCNC: 10 MMOL/L (ref 7–15)
AST SERPL W P-5'-P-CCNC: 1516 U/L (ref 0–45)
BILIRUB SERPL-MCNC: 3.6 MG/DL
BUN SERPL-MCNC: 18.9 MG/DL (ref 8–23)
CALCIUM SERPL-MCNC: 8.5 MG/DL (ref 8.8–10.4)
CHLORIDE SERPL-SCNC: 101 MMOL/L (ref 98–107)
CREAT SERPL-MCNC: 1.11 MG/DL (ref 0.67–1.17)
EGFRCR SERPLBLD CKD-EPI 2021: 69 ML/MIN/1.73M2
ERYTHROCYTE [DISTWIDTH] IN BLOOD BY AUTOMATED COUNT: 14.5 % (ref 10–15)
FERRITIN SERPL-MCNC: 6958 NG/ML (ref 31–409)
GLUCOSE SERPL-MCNC: 136 MG/DL (ref 70–99)
HAV IGM SERPL QL IA: NONREACTIVE
HBV CORE IGM SERPL QL IA: NONREACTIVE
HBV SURFACE AG SERPL QL IA: NONREACTIVE
HCO3 SERPL-SCNC: 23 MMOL/L (ref 22–29)
HCT VFR BLD AUTO: 41 % (ref 40–53)
HCV AB SERPL QL IA: NONREACTIVE
HGB BLD-MCNC: 13.8 G/DL (ref 13.3–17.7)
HOLD SPECIMEN: NORMAL
MCH RBC QN AUTO: 29.8 PG (ref 26.5–33)
MCHC RBC AUTO-ENTMCNC: 33.7 G/DL (ref 31.5–36.5)
MCV RBC AUTO: 89 FL (ref 78–100)
PLATELET # BLD AUTO: 194 10E3/UL (ref 150–450)
POTASSIUM SERPL-SCNC: 4.5 MMOL/L (ref 3.4–5.3)
PROT SERPL-MCNC: 5.6 G/DL (ref 6.4–8.3)
RBC # BLD AUTO: 4.63 10E6/UL (ref 4.4–5.9)
SARS-COV-2 RNA RESP QL NAA+PROBE: NEGATIVE
SODIUM SERPL-SCNC: 134 MMOL/L (ref 135–145)
WBC # BLD AUTO: 5.1 10E3/UL (ref 4–11)

## 2024-07-26 PROCEDURE — 74019 RADEX ABDOMEN 2 VIEWS: CPT

## 2024-07-26 PROCEDURE — 86038 ANTINUCLEAR ANTIBODIES: CPT | Performed by: INTERNAL MEDICINE

## 2024-07-26 PROCEDURE — 86618 LYME DISEASE ANTIBODY: CPT | Performed by: INTERNAL MEDICINE

## 2024-07-26 PROCEDURE — 86645 CMV ANTIBODY IGM: CPT | Performed by: INTERNAL MEDICINE

## 2024-07-26 PROCEDURE — 86720 LEPTOSPIRA ANTIBODY: CPT | Performed by: INTERNAL MEDICINE

## 2024-07-26 PROCEDURE — 36415 COLL VENOUS BLD VENIPUNCTURE: CPT | Performed by: INTERNAL MEDICINE

## 2024-07-26 PROCEDURE — 86256 FLUORESCENT ANTIBODY TITER: CPT | Performed by: INTERNAL MEDICINE

## 2024-07-26 PROCEDURE — 82103 ALPHA-1-ANTITRYPSIN TOTAL: CPT | Performed by: INTERNAL MEDICINE

## 2024-07-26 PROCEDURE — 80053 COMPREHEN METABOLIC PANEL: CPT | Performed by: INTERNAL MEDICINE

## 2024-07-26 PROCEDURE — 87529 HSV DNA AMP PROBE: CPT | Performed by: INTERNAL MEDICINE

## 2024-07-26 PROCEDURE — 87635 SARS-COV-2 COVID-19 AMP PRB: CPT | Performed by: INTERNAL MEDICINE

## 2024-07-26 PROCEDURE — 82040 ASSAY OF SERUM ALBUMIN: CPT | Performed by: INTERNAL MEDICINE

## 2024-07-26 PROCEDURE — 86015 ACTIN ANTIBODY EACH: CPT | Performed by: INTERNAL MEDICINE

## 2024-07-26 PROCEDURE — 250N000013 HC RX MED GY IP 250 OP 250 PS 637: Performed by: INTERNAL MEDICINE

## 2024-07-26 PROCEDURE — G0378 HOSPITAL OBSERVATION PER HR: HCPCS

## 2024-07-26 PROCEDURE — 87798 DETECT AGENT NOS DNA AMP: CPT | Performed by: INTERNAL MEDICINE

## 2024-07-26 PROCEDURE — 99222 1ST HOSP IP/OBS MODERATE 55: CPT | Performed by: INTERNAL MEDICINE

## 2024-07-26 PROCEDURE — 76705 ECHO EXAM OF ABDOMEN: CPT

## 2024-07-26 PROCEDURE — 85027 COMPLETE CBC AUTOMATED: CPT | Performed by: INTERNAL MEDICINE

## 2024-07-26 PROCEDURE — 82728 ASSAY OF FERRITIN: CPT | Performed by: INTERNAL MEDICINE

## 2024-07-26 PROCEDURE — 99232 SBSQ HOSP IP/OBS MODERATE 35: CPT | Performed by: INTERNAL MEDICINE

## 2024-07-26 RX ORDER — AMOXICILLIN 250 MG
1 CAPSULE ORAL 2 TIMES DAILY PRN
Status: DISCONTINUED | OUTPATIENT
Start: 2024-07-26 | End: 2024-07-27 | Stop reason: HOSPADM

## 2024-07-26 RX ORDER — AMOXICILLIN 250 MG
2 CAPSULE ORAL 2 TIMES DAILY PRN
Status: DISCONTINUED | OUTPATIENT
Start: 2024-07-26 | End: 2024-07-27 | Stop reason: HOSPADM

## 2024-07-26 RX ORDER — ONDANSETRON 2 MG/ML
4 INJECTION INTRAMUSCULAR; INTRAVENOUS EVERY 6 HOURS PRN
Status: DISCONTINUED | OUTPATIENT
Start: 2024-07-26 | End: 2024-07-27 | Stop reason: HOSPADM

## 2024-07-26 RX ORDER — ONDANSETRON 4 MG/1
4 TABLET, ORALLY DISINTEGRATING ORAL EVERY 6 HOURS PRN
Status: DISCONTINUED | OUTPATIENT
Start: 2024-07-26 | End: 2024-07-27 | Stop reason: HOSPADM

## 2024-07-26 RX ADMIN — LISINOPRIL 20 MG: 20 TABLET ORAL at 08:30

## 2024-07-26 RX ADMIN — TAMSULOSIN HYDROCHLORIDE 0.4 MG: 0.4 CAPSULE ORAL at 20:09

## 2024-07-26 ASSESSMENT — ACTIVITIES OF DAILY LIVING (ADL)
ADLS_ACUITY_SCORE: 20
ADLS_ACUITY_SCORE: 20
ADLS_ACUITY_SCORE: 37
ADLS_ACUITY_SCORE: 20
ADLS_ACUITY_SCORE: 37
ADLS_ACUITY_SCORE: 20
ADLS_ACUITY_SCORE: 37
ADLS_ACUITY_SCORE: 37
ADLS_ACUITY_SCORE: 20
ADLS_ACUITY_SCORE: 37
ADLS_ACUITY_SCORE: 20

## 2024-07-26 NOTE — H&P
[facility]    History and Physical - Hospitalist Service       Date of Admission:  7/25/2024    Assessment & Plan      73 yo M with h/o HTN, HLD, obesity, Etoh abuse in remission 3 years, MDD, PAD, BPH, CKD stage 2-3, and fatty liver who presents with 2-3 days of subj fever, anorexia, dry heaves, and bloating and was found to have severe transaminitis with AST 3030 and ALT of 6277.  TB was 4.1, but ALK phos normal.  CT abd does not show any obvious etiology and no abd pain either.  May be viral - hepatitis panel negative, tylenol level negative.  Denies any ingestions.  No travel other than up to [facility].  Has been using some CBD gummies but only 1-2 at  for 2 months or so.  Will get GI consult and follow LFT's.     Principal Problem:    Severely Elevated LFTs with anorexia, dry heaves, fever, as above - GI consult, follow, also check COVID though feel this is unlikely.    Active Problems:    Hypercholesteremia - hold statin for now    Essential hypertension - continue lisinopril as at home    Obesity - Body mass index is 33.61 kg/m .    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    Weight as of this encounter: 91.6 kg (202 lb).              Disposition Plan     Medically Ready for Discharge: Anticipated Tomorrow           Karson Castillo MD  Hospitalist Service  [facility]  Securely message with [employer] (more info)  Text page via [employer] Paging/Directory     ______________________________________________________________________    Chief Complaint   Fever and dry heaves and anorexia    History is obtained from the patient and electronic health record    History of Present Illness   75 yo M with h/o HTN, HLD, obesity, Etoh abuse in remission 3 years, MDD, PAD, BPH, and fatty liver who presents with 2-3 days of subj fever, anorexia, dry heaves, and bloating.  He is wondering if he ate something bad but he cannot think of anything specific.  Denies any significant abdominal pain.  He denies any alcohol use for 3 years.  Denies NSAIDs.  He has been using some CBD Gummies to try to help sleep at night for the last couple months but just takes 1-2 each night.  He denies any other ingestions and no travel other than up to [place].      Past Medical History    Past Medical History:   Diagnosis Date    Alcoholism (H)     BPH (benign prostatic hyperplasia)     Chronic pain syndrome     ED (erectile dysfunction)     Fatty liver     History of anemia     History of pancreatitis     HLD (hyperlipidemia)     Hypertension     Lumbar back pain with radiculopathy affecting lower extremity     Mild major depression (H24)     Morbid obesity (H)     Pancreatic cyst        Past Surgical History   Past Surgical History:   Procedure Laterality Date    COLONOSCOPY      ENDOSCOPIC ULTRASOUND UPPER GASTROINTESTINAL TRACT (GI) N/A 8/29/2022    Procedure: ENDOSCOPIC ULTRASOUND, ESOPHAGOSCOPY / UPPER GASTROINTESTINAL TRACT (GI);  Surgeon: Zana Perez " MD Jus;  Location:  OR       Prior to Admission Medications   Prior to Admission Medications   Prescriptions Last Dose Informant Patient Reported? Taking?   atorvastatin (LIPITOR) 10 MG tablet 2024 at pm  No Yes   Sig: Take 1 tablet (10 mg) by mouth daily   cholecalciferol, vitamin D3, (VITAMIN D3) 2,000 unit Tab Past Week  Yes Yes   Sig: [CHOLECALCIFEROL, VITAMIN D3, (VITAMIN D3) 2,000 UNIT TAB] Take 1 tablet by mouth daily.   lisinopril (ZESTRIL) 40 MG tablet 2024 at pm  Yes Yes   Sig: Take 0.5 tablets (20 mg) by mouth daily   multivitamin w/minerals (THERA-VIT-M) tablet Past Week  No Yes   Sig: Take 1 tablet by mouth daily   tamsulosin (FLOMAX) 0.4 MG capsule 2024 at pm  No Yes   Sig: TAKE 1 CAPSULE(0.4 MG) BY MOUTH EVERY EVENING      Facility-Administered Medications: None        Review of Systems    The 10 point Review of Systems is negative other than noted in the HPI     Social History   I have reviewed this patient's social history and updated it with pertinent information if needed.  Social History     Tobacco Use    Smoking status: Former     Types: Pipe     Quit date: 1994     Years since quittin.5    Smokeless tobacco: Never   Vaping Use    Vaping status: Never Used   Substance Use Topics    Alcohol use: Not Currently     Comment: sober since 10/2021    Drug use: Not Currently         Family History   I have reviewed this patient's family history and updated it with pertinent information if needed.  Family History   Problem Relation Age of Onset    Cancer Mother     Cancer Father     Diabetes Brother         Physical Exam   Vital Signs: Temp: 98.6  F (37  C) Temp src: Oral BP: (!) 141/69 Pulse: 95   Resp: 16 SpO2: 95 % O2 Device: None (Room air)    Weight: 202 lbs 0 oz    General Appearance:    Older M in NAD   Head:    Normocephalic, without obvious abnormality, atraumatic   Eyes:    PERRL, conjunctiva/corneas clear, EOM's intact,both eyes    Ears:    Normal external ear  canals no drainage or erythema bilat.   Nose:   Nares normal by gross inspection,  mucosa normal, no drainage or sinus tenderness   Throat:   Lips, mucosa, and tongue normal; teeth and gums normal   Neck:   Supple, symmetrical, trachea midline, no adenopathy;        thyroid:  No enlargement/tenderness/nodules   Back:     Symmetric, no curvature, ROM normal, no CVA tenderness   Lungs:     CTA   Chest wall:    No tenderness or deformity   Heart:    Regular rate and rhythm, S1 and S2 normal, I/VI systolic murmur, no rubs, no JVD, no edema   Abdomen:     Soft, non-tender, bowel sounds active all four quadrants,     no masses, no hepatosplenomegaly   Musculoskeletal:   Extremities are warm and non-tender, atraumatic, no joint swelling or tenderness   Pulses:   2+ and symmetric all extremities   Skin:   Skin color, texture, turgor normal, no rashes or lesions on exposed areas, please see nursing assessment for full skin assessment   Neurologic:    Psychiatric:   Alert, oriented, CN 2-12 intact, motor 5/5 upper and lower ext symm bilaterally, sensory grossly intact to light touch.    Affect is calm and normal         Medical Decision Making       75 MINUTES SPENT BY ME on the date of service doing chart review, history, exam, documentation & further activities per the note.      Data     I have personally reviewed the following data over the past 24 hrs:    6.6  \   14.2   / 212     133 (L) 98 25.0 (H) /  129 (H)   4.1 24 1.26 (H) \     ALT: 5,565 (HH) AST: 2,416 (HH) AP: 142 TBILI: 3.8 (H)   ALB: 3.9 TOT PROTEIN: 6.0 (L) LIPASE: 33     Procal: N/A CRP: N/A Lactic Acid: 2.5 (H)       INR:  1.44 (H) PTT:  33   D-dimer:  N/A Fibrinogen:  N/A       Imaging results reviewed over the past 24 hrs:   Recent Results (from the past 24 hour(s))   CT Abdomen Pelvis w Contrast    Narrative    EXAM: CT ABDOMEN PELVIS W CONTRAST  LOCATION: [facility]  DATE: 7/25/2024    INDICATION: Abdominal discomfort with  elevated LFTs  COMPARISON: 7/25/2023  TECHNIQUE: CT scan of the abdomen and pelvis was performed following injection of IV contrast. Multiplanar reformats were obtained. Dose reduction techniques were used.  CONTRAST: isovue 370 90ml    FINDINGS:   LOWER CHEST: Normal.    HEPATOBILIARY: Normal.    PANCREAS: Decreasing 2.3 x 1.5 cm cystic lesion in the pancreaticoduodenal groove. The pancreas is otherwise normal.    SPLEEN: Normal.    ADRENAL GLANDS: Normal.    KIDNEYS/BLADDER: Normal.    BOWEL: No obstruction or inflammatory change. Normal appendix.    LYMPH NODES: Normal.    VASCULATURE: No abdominal aortic aneurysm.    PELVIC ORGANS: Prostatomegaly measuring 5.6 cm in transverse dimension.    MUSCULOSKELETAL: Degenerative changes. Osseous demineralization.      Impression    IMPRESSION:   1.  No acute abnormality.  2.  Decreasing 2.3 cm cystic lesion in the pancreaticoduodenal groove.

## 2024-07-26 NOTE — PLAN OF CARE
Goal Outcome Evaluation:         A&Ox4, VSS on room air. Denies pain. Moves independently in the room. Takes pills whole. Can call appropriately. No acute events overnight

## 2024-07-26 NOTE — PLAN OF CARE
Goal Outcome Evaluation:         Pt admitted to floor around 2140. A&Ox4, VSS on room air. Denies pain. Moves independently in the room. Takes pills whole. Can call appropriately

## 2024-07-26 NOTE — PROGRESS NOTES
[facility]    Medicine Progress Note - Hospitalist Service    Date of Admission:  7/25/2024    Assessment & Plan      Efrain Gomes is a 73 yo M with h/o HTN, HLD, obesity, Etoh abuse in remission 3 years, MDD, PAD, BPH, CKD stage 2-3, and fatty liver who presents with 2-3 days of subjective fever, anorexia, dry heaves, and bloating. He was found to have severe transaminitis with AST 3030 and ALT of 6277.  Admitted for workup.    Severe transaminitis:  Presents with anorexia, dry heaves, fever, as above.  Elevated AST 3030 and ALT of 6277. Total bilirubin was 4.1, but ALP normal.  CT abd does not show any obvious etiology and no abd pain either.    Hepatitis panel negative. Tylenol level negative.  Did travel to [facility]. Cleaned up some dead mice and animal droppings. Did go to the woods and have mosquito bites.   History of bullous pemphigoid many years ago.  - GI consulted and input appreciated  - Check lyme disease panel and Tics panel  - Check CMV, Herpes  - ID consult for additional work up  - Autoimmune hepatitis work up: [place], anti smooth muscle   - Monitor LFT: has shown improvement    Active Problems:    Hypercholesteremia - hold statin for now    Essential hypertension - continue lisinopril as at home    Obesity - Body mass index is 33.61 kg/m .    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  Diet: Regular Diet Adult    DVT Prophylaxis: Low Risk/Ambulatory with no VTE prophylaxis indicated  Ruiz Catheter: Not present  Lines: None     Cardiac Monitoring: None  Code Status: Full Code      Clinically Significant Risk Factors Present on Admission          # Hypocalcemia: Lowest Ca = 8.5 mg/dL in last 2 days, will monitor and replace as appropriate      # Coagulation Defect: INR = 1.44 (Ref range: 0.85 - 1.15) and/or PTT = 33 Seconds (Ref range: 22 - 38 Seconds), will monitor for bleeding    # Hypertension: Noted on problem list         # Obesity: Estimated body mass index is 33.61 kg/m  as calculated from the following:    Height as of this encounter: 1.651 m (5' 5\").    Weight as of this encounter: 91.6 kg (202 lb).              Disposition Plan     Medically Ready for Discharge: Anticipated Tomorrow             Seema Vicente MD  Hospitalist Service  [facility]  Securely message with [email or web address] (more info)  Text page via [employer] Paging/Directory   ______________________________________________________________________    Interval History   Patient reports that he continues to feel generalized weakness and poor appetite today. No abdominal pain, nausea and vomiting.   He reports no recent herbal medicine.   He reports that around the 7/4 weekend, he travelled to the [facility] to his cabin. He cleaned some dead mice and some droppings. He did go to the woods. But he did not stay for long concerning tics. He did get some mosquito bites.   He reports having bullous pemphigoid many years ago.    Physical Exam   Vital Signs: Temp: 98.6  F (37  C) Temp src: Oral BP: 133/68 Pulse: 71   Resp: 16 SpO2: 95 % O2 Device: None (Room air)    Weight: 202 lbs 0 oz    General appearance: not in acute distress  HEENT: PERRL, EOMI  Lungs: Clear breath sounds in bilateral lung fields  Cardiovascular: Regular rate and rhythm, normal S1-S2  Abdomen: Soft, non tender, no " distension  Musculoskeletal: No joint swelling  Skin: No rash and no edema  Neurology: AAO ×3.  Cranial nerves II - XII normal.  Normal muscle strength in all four extremities.     Medical Decision Making       48 MINUTES SPENT BY ME on the date of service doing chart review, history, exam, documentation & further activities per the note.      Data     I have personally reviewed the following data over the past 24 hrs:    5.1  \   13.8   / 194     134 (L) 101 18.9 /  136 (H)   4.5 23 1.11 \     ALT: 4,249 (HH) AST: 1,516 (HH) AP: 136 TBILI: 3.6 (H)   ALB: 3.5 TOT PROTEIN: 5.6 (L) LIPASE: 33     Procal: N/A CRP: N/A Lactic Acid: 2.5 (H)       INR:  1.44 (H) PTT:  33   D-dimer:  N/A Fibrinogen:  N/A     Ferritin:  6,958 (H) % Retic:  N/A LDH:  N/A       Imaging results reviewed over the past 24 hrs:   Recent Results (from the past 24 hour(s))   CT Abdomen Pelvis w Contrast    Narrative    EXAM: CT ABDOMEN PELVIS W CONTRAST  LOCATION: [facility]  DATE: 7/25/2024    INDICATION: Abdominal discomfort with elevated LFTs  COMPARISON: 7/25/2023  TECHNIQUE: CT scan of the abdomen and pelvis was performed following injection of IV contrast. Multiplanar reformats were obtained. Dose reduction techniques were used.  CONTRAST: isovue 370 90ml    FINDINGS:   LOWER CHEST: Normal.    HEPATOBILIARY: Normal.    PANCREAS: Decreasing 2.3 x 1.5 cm cystic lesion in the pancreaticoduodenal groove. The pancreas is otherwise normal.    SPLEEN: Normal.    ADRENAL GLANDS: Normal.    KIDNEYS/BLADDER: Normal.    BOWEL: No obstruction or inflammatory change. Normal appendix.    LYMPH NODES: Normal.    VASCULATURE: No abdominal aortic aneurysm.    PELVIC ORGANS: Prostatomegaly measuring 5.6 cm in transverse dimension.    MUSCULOSKELETAL: Degenerative changes. Osseous demineralization.      Impression    IMPRESSION:   1.  No acute abnormality.  2.  Decreasing 2.3 cm cystic lesion in the pancreaticoduodenal groove.

## 2024-07-26 NOTE — UTILIZATION REVIEW
"Admission Status; Secondary Review Determination         Under the authority of the Utilization Management Committee, the utilization review process indicated a secondary review on the above patient.  The review outcome is based on review of the medical records, discussions with staff, and applying clinical experience noted on the date of the review.          (x) Observation Status Appropriate - This patient does not meet hospital inpatient criteria and is placed in observation status. If this patient's primary payer is Medicare and was admitted as an inpatient, Condition Code 44 should be used and patient status changed to \"observation\".     RATIONALE FOR DETERMINATION:  75 y/o male presented with elevated LFT's and some nausea.   Since arrival LFT's are trending down.   No definitive etiology found yet but possibly statin per GI.   GI has recommended if imaging reasonable that is pending they could discharge home for outpatient follow-up.  Regular diet is ordered, nausea improved.  Would recommend observation at this time.   If the patient worsens would then recommend reconsideration for inpatient status.    The severity of illness, intensity of service provided, expected LOS and risk for adverse outcome make the care appropriate for further observation; however, doesn't meet criteria for hospital inpatient admission. Dr Vicente notified of this determination.    The information on this document is developed by the utilization review team in order for the business office to ensure compliance.  This only denotes the appropriateness of proper admission status and does not reflect the quality of care rendered.         The definitions of Inpatient Status and Observation Status used in making the determination above are those provided in the CMS Coverage Manual, Chapter 1 and Chapter 6, section 70.4.      Sincerely,    Stevan Hendrix DO, [facility]  Utilization Review  Physician Advisor   "

## 2024-07-26 NOTE — CONSULTS
Infectious Disease Consultation:  Requesting MD:  Reason for consult:      HISTORY:copied from ED note.  I've also read Dr. Bundy primary visit note:  Patient stated that he had a routine physical today where he had blood work done that showed abnormally high LFT's. Patient described he his bloated and had a subjective fever 2 days ago, and dry heaving. He has not been able to eat much since 2 days ago. Patient stated that he has been sober for 3 years and 29 years before that. Patient notes that he has a slightly fatty liver. Patient denied vomiting, diarrhea, constipation, or history of hepatitis.     Per Chart Review: 07/25/24, [facility]: patient had a follow up for GUILLERMINA, acute cough, fatigue, bloating, dry heaves, chills, hypertension, and pancreatic cyst. Patient had a comprehensive metabolic panel (BMP + Alb, Alk Phos, ALT, AST, Total. Bili, TP) for GUILLERMINA check. AST 3030 and ALT 6277. Patient was also was told to continue on a lower dose of lisinopril.     GI work up and imaging negative.  Asked to see from ID perspective.  He was in [facility] but doesn't recall obvious exposure there. 5 yrs ago he took a few months of a biologic for bullous pemphigoid.  That all improved fully.           Pertinent past history, past infectious disease history:    Past Medical History:        Patient Active Problem List     Diagnosis Date Noted    Stage 3a chronic kidney disease (H) 07/26/2024       Priority: Medium    Elevated LFTs 07/25/2024       Priority: Medium    Anorexia 07/25/2024       Priority: Medium    Fever 07/25/2024       Priority: Medium    BPH (benign prostatic hyperplasia)         Priority: Medium    Peripheral arterial disease (H24)--mild distal disease on CTA in 2022 05/29/2024       Priority: Medium    Mild major depression (H24) 04/18/2023       Priority: Medium    Pancreatic cyst 12/14/2022       Priority: Medium    Morbid obesity (H) 08/18/2022       Priority: Medium    Anxiety  disorder, unspecified 11/02/2021       Priority: Medium    Elevated lipase 10/23/2021       Priority: Medium    Bullous pemphigoid (H28) 07/11/2019       Priority: Medium    Fatty liver 05/31/2018       Priority: Medium    Alcoholism (H) 05/14/2018       Priority: Medium    Obesity 01/22/2018       Priority: Medium    Essential hypertension 12/27/2016       Priority: Medium    Hypercholesteremia 01/26/2016       Priority: Medium    Low back pain 12/16/2015       Priority: Medium    ED (erectile dysfunction) 01/22/2015       Priority: Medium    Chronic sinusitis 01/22/2015       Priority: Medium         Medications:   Prescriptions Prior to Admission           Medications Prior to Admission   Medication Sig Dispense Refill Last Dose    atorvastatin (LIPITOR) 10 MG tablet Take 1 tablet (10 mg) by mouth daily 90 tablet 1 7/24/2024 at pm    cholecalciferol, vitamin D3, (VITAMIN D3) 2,000 unit Tab [CHOLECALCIFEROL, VITAMIN D3, (VITAMIN D3) 2,000 UNIT TAB] Take 1 tablet by mouth daily.     Past Week    lisinopril (ZESTRIL) 40 MG tablet Take 0.5 tablets (20 mg) by mouth daily     7/24/2024 at pm    multivitamin w/minerals (THERA-VIT-M) tablet Take 1 tablet by mouth daily     Past Week    tamsulosin (FLOMAX) 0.4 MG capsule TAKE 1 CAPSULE(0.4 MG) BY MOUTH EVERY EVENING 90 capsule 3 7/24/2024 at pm           Current Medications      Current Facility-Administered Medications:     lisinopril (ZESTRIL) tablet 20 mg, 20 mg, Oral, Daily, Karson Castillo MD, 20 mg at 07/26/24 0830    ondansetron (ZOFRAN ODT) ODT tab 4 mg, 4 mg, Oral, Q6H PRN **OR** ondansetron (ZOFRAN) injection 4 mg, 4 mg, Intravenous, Q6H PRN, Karson Castillo MD    senna-docusate (SENOKOT-S/PERICOLACE) 8.6-50 MG per tablet 1 tablet, 1 tablet, Oral, BID PRN **OR** senna-docusate (SENOKOT-S/PERICOLACE) 8.6-50 MG per tablet 2 tablet, 2 tablet, Oral, BID PRN, Karson Castillo MD    tamsulosin (FLOMAX) capsule 0.4 mg, 0.4 mg, Oral, QPM, Karson Castillo MD, 0.4 mg at  "24            Allergies: Grass, Sulfamethoxazole-trimethoprim, and Sulfamethoxazole-trimethoprim     Family History:  Family History         Family History   Problem Relation Age of Onset    Cancer Mother      Cancer Father      Diabetes Brother              Social History:  Social History               Tobacco Use    Smoking status: Former       Types: Pipe       Quit date: 1994       Years since quittin.5    Smokeless tobacco: Never   Vaping Use    Vaping status: Never Used   Substance Use Topics    Alcohol use: Not Currently       Comment: sober since 10/2021    Drug use: Not Currently              Medications:  Reviewed prior to admission meds as applicable in chart review.  Current meds are reviewed in the EMR listed MAR.     ANTIBIOTICS:    Current:none   Prior:   Allergy to:    SH/FH and  travel history(if applicable to consult):    REVIEW OF SYSTEMS:  All other systems negative    EXAMINATION:  /71 (BP Location: Right arm)   Pulse 66   Temp 98.5  F (36.9  C) (Oral)   Resp 18   Ht 1.651 m (5' 5\")   Wt 91.6 kg (202 lb)   SpO2 97%   BMI 33.61 kg/m    Alert, awake  Vitals tabulated above, reviewed  HEENT:nl  Neck supple without lymphadenopathy  Sclera clear  CARDIOVASCULAR regular rate and rhythm, no murmur  Lungs CLEAR TO AUSCULTATION   Abdomen soft, NT/ND, absent HEPATOSPLENOMEGALY  Skin normal  Joints normal  Neurologic exam non focal  Wound:  NA        CLINICAL DATABASE FOR---LAB/MICRO/CULTURES/IMAGING STUDIES:  Lab Results   Component Value Date    WBC 5.1 2024     Lab Results   Component Value Date    RBC 4.63 2024     Lab Results   Component Value Date    HGB 13.8 2024     Lab Results   Component Value Date    HCT 41.0 2024     No components found for: \"MCT\"  Lab Results   Component Value Date    MCV 89 2024     Lab Results   Component Value Date    MCH 29.8 2024     Lab Results   Component Value Date    MCHC 33.7 2024     Lab " Results   Component Value Date    RDW 14.5 07/26/2024     Lab Results   Component Value Date     07/26/2024       Last Comprehensive Metabolic Panel:  Sodium   Date Value Ref Range Status   07/26/2024 134 (L) 135 - 145 mmol/L Final     Potassium   Date Value Ref Range Status   07/26/2024 4.5 3.4 - 5.3 mmol/L Final   04/25/2022 5.2 (H) 3.5 - 5.0 mmol/L Final     Chloride   Date Value Ref Range Status   07/26/2024 101 98 - 107 mmol/L Final   04/25/2022 99 98 - 107 mmol/L Final     Carbon Dioxide (CO2)   Date Value Ref Range Status   07/26/2024 23 22 - 29 mmol/L Final   04/25/2022 24 22 - 31 mmol/L Final     Anion Gap   Date Value Ref Range Status   07/26/2024 10 7 - 15 mmol/L Final   04/25/2022 12 5 - 18 mmol/L Final     Glucose   Date Value Ref Range Status   07/26/2024 136 (H) 70 - 99 mg/dL Final   04/25/2022 94 70 - 125 mg/dL Final     Urea Nitrogen   Date Value Ref Range Status   07/26/2024 18.9 8.0 - 23.0 mg/dL Final   04/25/2022 20 8 - 28 mg/dL Final     Creatinine   Date Value Ref Range Status   07/26/2024 1.11 0.67 - 1.17 mg/dL Final     GFR Estimate   Date Value Ref Range Status   07/26/2024 69 >60 mL/min/1.73m2 Final     Comment:     eGFR calculated using 2021 CKD-EPI equation.   11/16/2020 >60 >60 mL/min/1.73m2 Final     GFR, ESTIMATED POCT   Date Value Ref Range Status   07/25/2023 >60 >60 mL/min/1.73m2 Final     Calcium   Date Value Ref Range Status   07/26/2024 8.5 (L) 8.8 - 10.4 mg/dL Final     Comment:     Reference intervals for this test were updated on 7/16/2024 to reflect our healthy population more accurately. There may be differences in the flagging of prior results with similar values performed with this method. Those prior results can be interpreted in the context of the updated reference intervals.       Liver Function Studies -   Recent Labs   Lab Test 07/26/24  0625   PROTTOTAL 5.6*   ALBUMIN 3.5   BILITOTAL 3.6*   ALKPHOS 136   AST 1,516*   ALT 4,249*       Erythrocyte Sedimentation  Rate   Date Value Ref Range Status   04/25/2022 13 0 - 15 mm/hr Final       CRP   Date Value Ref Range Status   04/25/2022 0.3 0.0 - 0.8 mg/dL Final               IMPRESSION:  Elevated Lft without fever, renal insuff, rash etc    PLAN:  I can think of nothing ID wise to add here.  Could send tick panel and leptospirosis IgM/IgG but it seems a serious stretch to be lepto.      Will not follow  Sign off        RODNEY MCCLURE MD  [facility]  Office [phone]

## 2024-07-26 NOTE — ED NOTES
"[facility] ED Handoff Report    ED Chief Complaint: Abnormal Labs    ED Diagnosis:  (R79.89) Elevated LFTs  Comment:   Plan:        PMH:    Past Medical History:   Diagnosis Date    Alcoholism (H)     BPH (benign prostatic hyperplasia)     Chronic pain syndrome     ED (erectile dysfunction)     Fatty liver     History of anemia     History of pancreatitis     HLD (hyperlipidemia)     Hypertension     Lumbar back pain with radiculopathy affecting lower extremity     Mild major depression (H24)     Morbid obesity (H)     Pancreatic cyst         Code Status:  Prior     Falls Risk: No Band: Not applicable    Current Living Situation/Residence: lives alone     Elimination Status: Continent: Yes     Activity Level: Independent    Patients Preferred Language:  English     Needed: No    Vital Signs:  BP (!) 141/69   Pulse 95   Temp 98.6  F (37  C) (Oral)   Resp 16   Ht 1.651 m (5' 5\")   Wt 91.6 kg (202 lb)   SpO2 95%   BMI 33.61 kg/m       Cardiac Rhythm:     Pain Score: 5/10    Is the Patient Confused:  No    Last Food or Drink: 07/25/24 at before admission to the ER    Focused Assessment:  Patient comes in with generally feelings of unwell for a couple weeks and primary labs were elevated liver labs. Redrawn in the ER and remain high. All other labs unremarkable. Pending hepatitis labs. CT resolved as resolving cyst.      Tests Performed: Done: Labs and Imaging    Treatments Provided:  none    Family Dynamics/Concerns: No    Family Updated On Visitor Policy: No    Plan of Care Communicated to Family: Yes    Who Was Updated about Plan of Care: patient able to update     Belongings Checklist Done and Signed by Patient: No    Belongings Sent with Patient:     Medications sent with patient: none    Covid: asymptomatic , not tested    Additional Information:     RN: Emigdio Daley RN   7/25/2024 9:25 PM        "

## 2024-07-26 NOTE — CONSULTS
[facility] consult     Impression: Increased liver tests-there is marked elevations of unclear etiology.  Preliminary testing for hepatitis, Tylenol negative.  On imaging the liver, biliary system looks normal.  Liver tests are improving, INR minimally elevated.  Otherwise, he is essentially asymptomatic.  Given his mild nonspecific symptoms, perhaps a different viral etiology.  I will order other serological testing to cover the bases (I expect those will be normal low), and also a Doppler ultrasound to look at the vasculature, reassess the bile duct.  He has been on a statin medication, that is being held.  That can cause liver test elevations but it would be very unusual to cause such significant elevations after being on it for a prolonged period of time.  Given the continued improvement, the patient being essentially otherwise asymptomatic currently, I am not sure he needs to stay in the hospital.  If he were to be discharged, repeat the liver tests in a week or so.    Recommendation: Check other serologies, Doppler ultrasound.  Possible discharge.  If he is discharged, follow-up liver tests in a week.    Name: Efrain Gomes    Medical Record #: [id number]    YOB: 1950    Date/Time: 7/26/2024/8:57 AM    Reason for Consultation: Seema Vicente MD has asked me to evaluate Efrain Gomes regarding increased liver function tests.    HPI: This is a 74-year-old male with a history of hypertension, hyperlipidemia, obesity, alcohol abuse in remission for 3 years, depression, peripheral artery disease, BPH, chronic renal disease stage 2-3, pancreatic cyst status post EUS 8/29/2022 by Dr. 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  Fluid analysis showed CEA 27.2, amylase greater than 7500) fatty liver who has been having  symptoms of fever, anorexia, dry heaves and bloating.  Liver enzymes are markedly elevated.  Tylenol negative.  Acute hepatitis A, B and C panel negative.  Alcohol level undetectable.    Last colonoscopy 7/15/2022 for history of hyperplastic rectal polyp showed sigmoid diverticulosis and given these unremarkable results and no history of adenomatous polyps no future colonoscopy was recommended.    Earlier this week he began feeling vague symptoms such as abdominal bloating, mild nausea, decreased appetite.  He may have had some chills.  He denies any new medications, supplements or antibiotics.  No alcohol for the last 3 years.  No Tylenol, NSAIDs or other new medications.  No mushrooms.  He denies having a history of hepatitis or being jaundiced in the past (other than when he was born apparently), no IV drug abuse, snorting drugs.  No family history of liver disease, hepatitis.    Review of Systems (ROS): Complete ROS positive for some cracks in his tongue, low back nerve pain, intermittent left-sided discomfort that is relieved after a bowel movement, otherwise negative except for as above.    Past Medical History:  Patient Active Problem List    Diagnosis Date Noted    Stage 3a chronic kidney disease (H) 07/26/2024     Priority: Medium    Elevated LFTs 07/25/2024     Priority: Medium    Anorexia 07/25/2024     Priority: Medium    Fever 07/25/2024     Priority: Medium    BPH (benign prostatic hyperplasia)      Priority: Medium    Peripheral arterial disease (H24)--mild distal disease on CTA in 2022 05/29/2024     Priority: Medium    Mild major depression (H24) 04/18/2023     Priority: Medium    Pancreatic cyst 12/14/2022     Priority: Medium    Morbid obesity (H) 08/18/2022     Priority: Medium    Anxiety disorder, unspecified 11/02/2021     Priority: Medium    Elevated lipase 10/23/2021     Priority: Medium    Bullous pemphigoid (H28) 07/11/2019     Priority: Medium    Fatty liver 05/31/2018     Priority: Medium     Alcoholism (H) 05/14/2018     Priority: Medium    Obesity 01/22/2018     Priority: Medium    Essential hypertension 12/27/2016     Priority: Medium    Hypercholesteremia 01/26/2016     Priority: Medium    Low back pain 12/16/2015     Priority: Medium    ED (erectile dysfunction) 01/22/2015     Priority: Medium    Chronic sinusitis 01/22/2015     Priority: Medium       Medications:   Medications Prior to Admission   Medication Sig Dispense Refill Last Dose    atorvastatin (LIPITOR) 10 MG tablet Take 1 tablet (10 mg) by mouth daily 90 tablet 1 7/24/2024 at pm    cholecalciferol, vitamin D3, (VITAMIN D3) 2,000 unit Tab [CHOLECALCIFEROL, VITAMIN D3, (VITAMIN D3) 2,000 UNIT TAB] Take 1 tablet by mouth daily.   Past Week    lisinopril (ZESTRIL) 40 MG tablet Take 0.5 tablets (20 mg) by mouth daily   7/24/2024 at pm    multivitamin w/minerals (THERA-VIT-M) tablet Take 1 tablet by mouth daily   Past Week    tamsulosin (FLOMAX) 0.4 MG capsule TAKE 1 CAPSULE(0.4 MG) BY MOUTH EVERY EVENING 90 capsule 3 7/24/2024 at pm       Current Facility-Administered Medications:     lisinopril (ZESTRIL) tablet 20 mg, 20 mg, Oral, Daily, Karson Castillo MD, 20 mg at 07/26/24 0830    ondansetron (ZOFRAN ODT) ODT tab 4 mg, 4 mg, Oral, Q6H PRN **OR** ondansetron (ZOFRAN) injection 4 mg, 4 mg, Intravenous, Q6H PRN, Karson Castillo MD    senna-docusate (SENOKOT-S/PERICOLACE) 8.6-50 MG per tablet 1 tablet, 1 tablet, Oral, BID PRN **OR** senna-docusate (SENOKOT-S/PERICOLACE) 8.6-50 MG per tablet 2 tablet, 2 tablet, Oral, BID PRN, Karson Castillo MD    tamsulosin (FLOMAX) capsule 0.4 mg, 0.4 mg, Oral, QPM, Karson Castillo MD, 0.4 mg at 07/25/24 2234       Allergies: Grass, Sulfamethoxazole-trimethoprim, and Sulfamethoxazole-trimethoprim    Family History:  Family History   Problem Relation Age of Onset    Cancer Mother     Cancer Father     Diabetes Brother         Social History:  Social History     Tobacco Use    Smoking status: Former     Types:  "Pipe     Quit date: 1994     Years since quittin.5    Smokeless tobacco: Never   Vaping Use    Vaping status: Never Used   Substance Use Topics    Alcohol use: Not Currently     Comment: sober since 10/2021    Drug use: Not Currently          Physical Exam: /68 (BP Location: Right arm)   Pulse 71   Temp 98.6  F (37  C) (Oral)   Resp 16   Ht 1.651 m (5' 5\")   Wt 91.6 kg (202 lb)   SpO2 95%   BMI 33.61 kg/m      General: NAD  Eyes: No scleral icterus or conjunctivitis  Oropharynx: WNL  Neck/Thyroid: No neck masses or thyromegaly  Pulmonary: Lungs are clear to auscultation bilaterally  Cardiovascular: Regular, no lower extermity edema.  No hepatojugular reflux.  Gastrointestinal: Positive bowel sounds, soft, non-tender, no rebound or guarding. No HSM.  Lymph nodes: No cervical or supraclavicular lymphadenopathy  Skin: The patient is not jaundiced.  Back: No spinal/paraspinal tenderness, no CVA tenderness.  Neurologic: Alert and oriented ×3    Labs:    CBC RESULTS:   Recent Labs   Lab Test 24  0625   WBC 5.1   RBC 4.63   HGB 13.8   HCT 41.0   MCV 89   MCH 29.8   MCHC 33.7   RDW 14.5           CMP Results:   Recent Labs   Lab Test 24  0625   *   POTASSIUM 4.5   CHLORIDE 101   CO2 23   ANIONGAP 10   *   BUN 18.9   CR 1.11   BILITOTAL 3.6*   ALKPHOS 136   ALT 4,249*   AST 1,516*        INR Results:   Recent Labs   Lab Test 24  1758   INR 1.44*        Component      Latest Ref Rn 2024  11:19 AM 2024  4:28 PM 2024  1:35 PM 2024  5:58 PM 2024  6:25 AM   Sodium      135 - 145 mmol/L 140  134 (L)  136  133 (L)  134 (L)    Anion Gap      7 - 15 mmol/L 13  11  15  11  10    Calcium      8.8 - 10.4 mg/dL 9.9  9.4  9.3  8.9  8.5 (L)    Creatinine      0.67 - 1.17 mg/dL 1.45 (H)  1.23 (H)  1.32 (H)  1.26 (H)  1.11    GFR Estimate      >60 mL/min/1.73m2 50 (L)  61  56 (L)  59 (L)  69    Bilirubin Total      <=1.2 mg/dL 0.7   4.1 (H)  3.8 (H)  3.6 " (H)    Protein Total      6.4 - 8.3 g/dL 7.3   6.5  6.0 (L)  5.6 (L)    Alkaline Phosphatase      40 - 150 U/L 101   144  142  136    AST      0 - 45 U/L 33   3,030 (HH)  2,416 (HH)  1,516 (HH)    ALT      0 - 70 U/L 37   6,277 (HH)  5,565 (HH)  4,249 (HH)       Legend:  (H) High  (L) Low  (HH) High Panic      Radiology: CT Abdomen Pelvis w Contrast    Result Date: 7/25/2024  EXAM: CT ABDOMEN PELVIS W CONTRAST LOCATION: [facility] DATE: 7/25/2024 INDICATION: Abdominal discomfort with elevated LFTs COMPARISON: 7/25/2023 TECHNIQUE: CT scan of the abdomen and pelvis was performed following injection of IV contrast. Multiplanar reformats were obtained. Dose reduction techniques were used. CONTRAST: isovue 370 90ml FINDINGS: LOWER CHEST: Normal. HEPATOBILIARY: Normal. PANCREAS: Decreasing 2.3 x 1.5 cm cystic lesion in the pancreaticoduodenal groove. The pancreas is otherwise normal. SPLEEN: Normal. ADRENAL GLANDS: Normal. KIDNEYS/BLADDER: Normal. BOWEL: No obstruction or inflammatory change. Normal appendix. LYMPH NODES: Normal. VASCULATURE: No abdominal aortic aneurysm. PELVIC ORGANS: Prostatomegaly measuring 5.6 cm in transverse dimension. MUSCULOSKELETAL: Degenerative changes. Osseous demineralization.     IMPRESSION: 1.  No acute abnormality. 2.  Decreasing 2.3 cm cystic lesion in the pancreaticoduodenal groove.       The total time spent with this patient was 40 minutes                                             Rajendra Chu M.D.  Thank you for the opportunity to participate in the care of this patient.   Please feel free to call me with any questions or concerns.  Phone number [phone].

## 2024-07-26 NOTE — MEDICATION SCRIBE - ADMISSION MEDICATION HISTORY
Medication Scribe Admission Medication History    Admission medication history is complete. The information provided in this note is only as accurate as the sources available at the time of the update.    Information Source(s): Patient and CareEverywhere/SureScripts via in-person    Pertinent Information: pt hasn't had his OTC past couple days    Changes made to PTA medication list:  Added: None  Deleted: None  Changed: None    Allergies reviewed with patient and updates made in EHR: yes    Medication History Completed By: EVELYN LEARY 7/25/2024 7:48 PM    PTA Med List   Medication Sig Last Dose    atorvastatin (LIPITOR) 10 MG tablet Take 1 tablet (10 mg) by mouth daily 7/24/2024 at pm    cholecalciferol, vitamin D3, (VITAMIN D3) 2,000 unit Tab [CHOLECALCIFEROL, VITAMIN D3, (VITAMIN D3) 2,000 UNIT TAB] Take 1 tablet by mouth daily. Past Week    lisinopril (ZESTRIL) 40 MG tablet Take 0.5 tablets (20 mg) by mouth daily 7/24/2024 at pm    multivitamin w/minerals (THERA-VIT-M) tablet Take 1 tablet by mouth daily Past Week    tamsulosin (FLOMAX) 0.4 MG capsule TAKE 1 CAPSULE(0.4 MG) BY MOUTH EVERY EVENING 7/24/2024 at pm

## 2024-07-27 VITALS
TEMPERATURE: 98.4 F | OXYGEN SATURATION: 95 % | DIASTOLIC BLOOD PRESSURE: 62 MMHG | HEART RATE: 72 BPM | WEIGHT: 202 LBS | HEIGHT: 65 IN | RESPIRATION RATE: 18 BRPM | SYSTOLIC BLOOD PRESSURE: 117 MMHG | BODY MASS INDEX: 33.66 KG/M2

## 2024-07-27 LAB
A PHAGOCYTOPH DNA BLD QL NAA+PROBE: NOT DETECTED
ALBUMIN SERPL BCG-MCNC: 3.5 G/DL (ref 3.5–5.2)
ALP SERPL-CCNC: 140 U/L (ref 40–150)
ALT SERPL W P-5'-P-CCNC: 2865 U/L (ref 0–70)
AST SERPL W P-5'-P-CCNC: 723 U/L (ref 0–45)
B BURGDOR IGG+IGM SER QL: 0.45
BABESIA DNA BLD QL NAA+PROBE: NOT DETECTED
BILIRUB DIRECT SERPL-MCNC: 2.06 MG/DL (ref 0–0.3)
BILIRUB SERPL-MCNC: 3.1 MG/DL
CMV DNA SPEC NAA+PROBE-ACNC: NOT DETECTED IU/ML
EHRLICHIA DNA SPEC QL NAA+PROBE: NOT DETECTED
HOLD SPECIMEN: NORMAL
LAB DIRECTOR COMMENTS: NORMAL
LAB DIRECTOR DISCLAIMER: NORMAL
LAB DIRECTOR INTERPRETATION: NORMAL
LAB DIRECTOR METHODOLOGY: NORMAL
LAB DIRECTOR RESULTS: NORMAL
PROT SERPL-MCNC: 5.4 G/DL (ref 6.4–8.3)
SPECIMEN TYPE: NORMAL

## 2024-07-27 PROCEDURE — G0452 MOLECULAR PATHOLOGY INTERPR: HCPCS | Mod: 26 | Performed by: PATHOLOGY

## 2024-07-27 PROCEDURE — 36415 COLL VENOUS BLD VENIPUNCTURE: CPT | Performed by: INTERNAL MEDICINE

## 2024-07-27 PROCEDURE — 99239 HOSP IP/OBS DSCHRG MGMT >30: CPT | Performed by: INTERNAL MEDICINE

## 2024-07-27 PROCEDURE — 81256 HFE GENE: CPT | Performed by: INTERNAL MEDICINE

## 2024-07-27 PROCEDURE — 80076 HEPATIC FUNCTION PANEL: CPT | Performed by: INTERNAL MEDICINE

## 2024-07-27 PROCEDURE — 250N000013 HC RX MED GY IP 250 OP 250 PS 637: Performed by: INTERNAL MEDICINE

## 2024-07-27 PROCEDURE — G0378 HOSPITAL OBSERVATION PER HR: HCPCS

## 2024-07-27 RX ADMIN — LISINOPRIL 20 MG: 20 TABLET ORAL at 10:32

## 2024-07-27 ASSESSMENT — ACTIVITIES OF DAILY LIVING (ADL)
ADLS_ACUITY_SCORE: 20

## 2024-07-27 NOTE — DISCHARGE SUMMARY
"[facility]  Hospitalist Discharge Summary      Date of Admission:  7/25/2024  Date of Discharge:  7/27/2024  Discharging Provider: Seema Vicente MD  Discharge Service: Hospitalist Service    Discharge Diagnoses     Principal Problem:    Elevated LFTs  Active Problems:    Hypercholesteremia    Essential hypertension    Obesity    Alcoholism (H)    Fatty liver    Mild major depression (H24)    Peripheral arterial disease (H24)--mild distal disease on CTA in 2022    Anxiety disorder, unspecified    BPH (benign prostatic hyperplasia)    Anorexia        Clinically Significant Risk Factors     # Obesity: Estimated body mass index is 33.61 kg/m  as calculated from the following:    Height as of this encounter: 1.651 m (5' 5\").    Weight as of this encounter: 91.6 kg (202 lb).       Follow-ups Needed After Discharge   Follow-up Appointments     Follow-up and recommended labs and tests       * Follow up with primary care provider, ANAY NAVARRO, within 7 days for   hospital follow- up.  The following labs/tests are recommended: Recheck   liver enzymes in one week.    * If your liver enzymes do not return to normal, you need to see a liver   disease specialist. A referral has been given. You can call the number   listed for an appointment.          Discharge Disposition   Discharged to home  Condition at discharge: Stable    Hospital Course     Efrain Gomes is a 73 yo M with h/o HTN, HLD, obesity, alcohol abuse in remission 3 years, MDD, PAD, BPH, CKD stage 2-3, and fatty liver, who presents with 2-3 days of subjective fever, anorexia, dry heaves, and bloating. He was found to have severe transaminitis with AST 3030 and ALT of 6277.  Admitted for workup.     Severe transaminitis:  Patient presents with anorexia, dry heaves, fever, as above. He had elevated AST 3030 and ALT of 6277. Total bilirubin was 4.1, but ALP was normal.  CT abd does not show any obvious etiology. Hepatitis panel were negative. " Tylenol level was negative. He reports that he recently travelled to [facility]. He cleaned up some dead mice and animal droppings in his cabin. He went to the woods and had mosquito bites.   After admission, GI and infectious disease were consulted. Infectious disease workup were sent, including lyme disease panel and Tics panel, CMV, Herpes. The results were negative. Autoimmune hepatitis work up were also sent, including RICHMOND and anti smooth muscle. At the time of discharge, the results are pending.   Patient was given supportive care. His LFT gradually improved. He was afebrile. His symptoms improved. Per GI, patient can be safely discharged home. He is advised to recheck LFT with his primary care provider. If it remains elevated, he needs to see a hepatology specialist. A referral has been provided.      Active Problems:    Hypercholesteremia - hold statin for elevated LFT.     Essential hypertension - continue lisinopril as at home    Obesity - Body mass index is 33.61 kg/m .    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       Seema Vicente MD  [address] [address]  Phone: [phone]  Fax: [phone]  ______________________________________________________________________    Physical  Exam   Vital Signs: Temp: 98.4  F (36.9  C) Temp src: Oral BP: 117/62 Pulse: 72   Resp: 18 SpO2: 95 % O2 Device: None (Room air)    Weight: 202 lbs 0 oz    General appearance: not in acute distress  HEENT: PERRL, EOMI  Lungs: Clear breath sounds in bilateral lung fields  Cardiovascular: Regular rate and rhythm, normal S1-S2  Abdomen: Soft, non tender, no distension  Musculoskeletal: No joint swelling  Skin: No rash and no edema  Neurology: AAO ×3.  Cranial nerves II - XII normal.  Normal muscle strength in all four extremities.        Primary Care Physician   ANAY NAVARRO    Discharge Orders      Adult GI  Referral - Consult Only      Reason for your hospital stay    Admitted for elevated liver enzymes.     Follow-up and recommended labs and tests     * Follow up with primary care provider, ANAY NAVARRO, within 7 days for hospital follow- up.  The following labs/tests are recommended: Recheck liver enzymes in one week.    * If your liver enzymes do not return to normal, you need to see a liver disease specialist. A referral has been given. You can call the number listed for an appointment.     Activity    Your activity upon discharge: activity as tolerated.     Discharge Instructions    * Hold lipitor until your liver function returns to normal.  * Do not drink alcohol.  * Do not take tylenol until your liver function test returns to normal.     Diet    Follow this diet upon discharge: Regular Diet Adult       Significant Results and Procedures   Most Recent 3 CBC's:  Recent Labs   Lab Test 07/26/24  0625 07/25/24  1758 07/25/24  1335   WBC 5.1 6.6 5.6   HGB 13.8 14.2 15.4   MCV 89 89 91    212 224     Most Recent 3 BMP's:  Recent Labs   Lab Test 07/29/24  1559 07/26/24  0625 07/25/24  1758    134* 133*   POTASSIUM 4.3 4.5 4.1   CHLORIDE 101 101 98   CO2 24 23 24   BUN 22.0 18.9 25.0*   CR 1.04 1.11 1.26*   ANIONGAP 10 10 11   EDDIE 9.3 8.5* 8.9   GLC 95 136* 129*     Most Recent 2  LFT's:  Recent Labs   Lab Test 07/29/24  1559 07/27/24  0642   * 723*   ALT 1,579* 2,865*   ALKPHOS 156* 140   BILITOTAL 1.4* 3.1*   ,   Results for orders placed or performed during the hospital encounter of 07/25/24   CT Abdomen Pelvis w Contrast    Narrative    EXAM: CT ABDOMEN PELVIS W CONTRAST  LOCATION: [facility]  DATE: 7/25/2024    INDICATION: Abdominal discomfort with elevated LFTs  COMPARISON: 7/25/2023  TECHNIQUE: CT scan of the abdomen and pelvis was performed following injection of IV contrast. Multiplanar reformats were obtained. Dose reduction techniques were used.  CONTRAST: isovue 370 90ml    FINDINGS:   LOWER CHEST: Normal.    HEPATOBILIARY: Normal.    PANCREAS: Decreasing 2.3 x 1.5 cm cystic lesion in the pancreaticoduodenal groove. The pancreas is otherwise normal.    SPLEEN: Normal.    ADRENAL GLANDS: Normal.    KIDNEYS/BLADDER: Normal.    BOWEL: No obstruction or inflammatory change. Normal appendix.    LYMPH NODES: Normal.    VASCULATURE: No abdominal aortic aneurysm.    PELVIC ORGANS: Prostatomegaly measuring 5.6 cm in transverse dimension.    MUSCULOSKELETAL: Degenerative changes. Osseous demineralization.      Impression    IMPRESSION:   1.  No acute abnormality.  2.  Decreasing 2.3 cm cystic lesion in the pancreaticoduodenal groove.   US Abdomen Limited w Abd/Pelvis Duplex Complete    Narrative    EXAM: US ABDOMEN LIMITED WITH DOPPLER COMPLETE  LOCATION: [facility]  DATE: 7/26/2024    INDICATION: Acute increase in liver function tests.   COMPARISON: CT abdomen pelvis 7/25/2024.   TECHNIQUE: Complete abdominal ultrasound. Color flow with spectral Doppler and waveform analysis performed.     FINDINGS:    GALLBLADDER: Low risk 0.8 cm sessile polyp along the gallbladder wall. No gallstones or gallbladder sludge. No gallbladder wall thickening. No pericholecystic fluid. Sonographic Dalton's sign is negative.      BILE DUCTS: No  intrahepatic biliary ductal dilation. Common bile duct is not well visualized.    LIVER: Normal parenchyma with smooth contour. No focal mass.     RIGHT KIDNEY: 10.2 cm. Normal cortical thickness and echogenicity. No hydronephrosis or sonographically detectable calculi.      SPLEEN: 9 cm. Normal appearance.     PANCREAS: The visualized portions are normal.    AORTA: Obscured by bowel gas and not well visualized.     IVC: Normal where visualized.    No ascites.    ABDOMINAL DUPLEX: The hepatic artery, hepatic veins, IVC, portal veins, and splenic vein are patent with flow in the normal direction.       Impression    IMPRESSION:    1.  Normal liver Doppler exam.     2.  Normal liver contour and morphology.     3.  Low risk 0.8 cm gallbladder polyp. Recommend follow-up in 12 months per guidelines below.     4.  No intrahepatic biliary ductal dilation. Common bile duct is not well visualized.     Gallbladder polyp 7-9 mm: Follow-up US at 12 months. If on follow-up increase of >= 4 mm in <= 12 months - recommend surgical consult. If decrease of >= 4 mm - stop following.    REFERENCE:  Management of Incidentally Detected Gallbladder Polyps: Society of Radiologists in Ultrasound Consensus Conference Recommendations. Radiology 2022; 000:1-12       Discharge Medications   Current Discharge Medication List        CONTINUE these medications which have NOT CHANGED    Details   cholecalciferol, vitamin D3, (VITAMIN D3) 2,000 unit Tab [CHOLECALCIFEROL, VITAMIN D3, (VITAMIN D3) 2,000 UNIT TAB] Take 1 tablet by mouth daily.      lisinopril (ZESTRIL) 40 MG tablet Take 0.5 tablets (20 mg) by mouth daily    Associated Diagnoses: Essential hypertension      multivitamin w/minerals (THERA-VIT-M) tablet Take 1 tablet by mouth daily    Associated Diagnoses: Alcoholism (H)      tamsulosin (FLOMAX) 0.4 MG capsule TAKE 1 CAPSULE(0.4 MG) BY MOUTH EVERY EVENING  Qty: 90 capsule, Refills: 3    Associated Diagnoses: BPH associated with nocturia            STOP taking these medications       atorvastatin (LIPITOR) 10 MG tablet Comments:   Reason for Stopping:             Allergies   Allergies   Allergen Reactions    Grass Cough    Sulfamethoxazole-Trimethoprim Rash    Sulfamethoxazole-Trimethoprim Rash

## 2024-07-27 NOTE — PLAN OF CARE
Problem: Comorbidity Management  Goal: Blood Pressure in Desired Range  7/26/2024 2104 by Char Cary RN  Outcome: Progressing     Problem: Infection  Goal: Absence of Infection Signs and Symptoms  Outcome: Progressing   Goal Outcome Evaluation:  No significant events this shift. Abdominal US and xray completed. Denies pain. Reports abdominal fullness with eating, he has to eat really slow. Independent in room. Room air. No telemetry. ID saw pt this evening.

## 2024-07-27 NOTE — PROGRESS NOTES
[facility] progress Note    Subjective: Patient without complaints.  Minimally upset stomach after eating, symptoms have resolved.  No abdominal pain, nausea or vomiting.    Ferritin 6958 (2 years ago 113, and then 5 and 6 years ago 445 and 605).  He denies a known family history of hemochromatosis.  Alpha-1 antitrypsin, RICHMOND, anti-smooth muscle antibody, tickborne disease panel and leptospirosis IgM (suggested by ID), CMV, HSV are all pending.    Objective:  Vital signs in last 24 hours  Temp:  [98  F (36.7  C)-98.5  F (36.9  C)] 98.4  F (36.9  C)  Pulse:  [66-72] 72  Resp:  [16-18] 18  BP: (112-138)/(62-71) 117/62  SpO2:  [95 %-99 %] 95 %     Gen: Awake, no acute distress  Pulmonary: Lungs clear to ausculation bilaterally  Cardiovascular: Regular  Gastrointestinal: Positive bowel sounds, soft, non-tender, non-distended, no rebound or guarding  Neuro: Alert and oriented x 3, no asterixis.    Assessment: 1.  Increased LFTs-this appears to be an acute event.  Many labs pending.  It seems like it was likely something acute that he was exposed to, infection etc. and symptoms and labs are improving/resolving.  Liver tests improving nicely.  Doppler ultrasound unremarkable for the liver or bile duct.    2.  Increased ferritin-potentially an acute phase reactant elevation.  Will check hemochromatosis gene to see if hemochromatosis is possible.    3.  Gallbladder polyp-seen incidentally on the ultrasound.  Follow-up ultrasound in 1 years to see if it is changing in size.    Plan: Hemochromatosis gene blood test ordered.  Follow-up the results of the other lab results.    From a GI perspective would be okay to discharge.  We will sign off but call if questions.    Patient Active Problem List   Diagnosis    ED (erectile dysfunction)    Chronic sinusitis    Low back pain    Hypercholesteremia    Essential hypertension    Obesity    Alcoholism (H)    Fatty liver    Bullous pemphigoid (H28)    Elevated lipase     Morbid obesity (H)    Pancreatic cyst    Mild major depression (H24)    Peripheral arterial disease (H24)--mild distal disease on CTA in 2022    Elevated LFTs    Anxiety disorder, unspecified    BPH (benign prostatic hyperplasia)    Anorexia    Fever    Stage 3a chronic kidney disease (H)       Labs:  CMP Results:   Recent Labs   Lab Test 07/27/24  0642 07/26/24 0625   NA  --  134*   POTASSIUM  --  4.5   CHLORIDE  --  101   CO2  --  23   ANIONGAP  --  10   GLC  --  136*   BUN  --  18.9   CR  --  1.11   BILITOTAL 3.1* 3.6*   ALKPHOS 140 136   ALT 2,865* 4,249*   * 1,516*      CBC  Recent Labs   Lab 07/26/24  0625 07/25/24  1758 07/25/24  1335   WBC 5.1 6.6 5.6   RBC 4.63 4.82 5.17   HGB 13.8 14.2 15.4   HCT 41.0 42.8 46.8   MCV 89 89 91   MCH 29.8 29.5 29.8   MCHC 33.7 33.2 32.9   RDW 14.5 14.4 14.2    212 224     INR  Recent Labs   Lab 07/25/24  1758   INR 1.44*      Lipase   Date Value Ref Range Status   07/25/2024 33 13 - 60 U/L Final   07/25/2024 31 13 - 60 U/L Final   11/15/2021 245 (H) 0 - 52 U/L Final   11/12/2021 112 (H) 0 - 52 U/L Final   11/09/2021 126 (H) 0 - 52 U/L Final     Recent Labs   Lab 07/27/24  0642 07/26/24 0625 07/25/24  1758 07/25/24  1335   * 1,516* 2,416* 3,030*   ALT 2,865* 4,249* 5,565* 6,277*   ALKPHOS 140 136 142 144   BILITOTAL 3.1* 3.6* 3.8* 4.1*   LIPASE  --   --  33 31       Imaging: XR Abdomen 2 Views    Result Date: 7/26/2024  EXAM: XR ABDOMEN 2 VIEWS LOCATION: [facility] DATE: 7/26/2024 INDICATION: Essential hypertension. COMPARISON: CT abdomen pelvis 7/5/2024.     IMPRESSION: Single nonspecific mildly dilated gas-filled small bowel loop within the left mid abdomen measuring up to 3.5 cm. Scattered gas is seen throughout multiple additional nondilated small and large bowel loops throughout the abdomen. No definite evidence of obstruction. No pneumatosis or free intraperitoneal air. Moderate amount of stool predominantly within the  ascending, transverse, and descending colon. No radiopaque calculi project over either renal fossa or the expected course of either ureter. Visualized lung bases are clear.    US Abdomen Limited w Abd/Pelvis Duplex Complete    Result Date: 7/26/2024  EXAM: US ABDOMEN LIMITED WITH DOPPLER COMPLETE LOCATION: [facility] DATE: 7/26/2024 INDICATION: Acute increase in liver function tests. COMPARISON: CT abdomen pelvis 7/25/2024. TECHNIQUE: Complete abdominal ultrasound. Color flow with spectral Doppler and waveform analysis performed.  FINDINGS: GALLBLADDER: Low risk 0.8 cm sessile polyp along the gallbladder wall. No gallstones or gallbladder sludge. No gallbladder wall thickening. No pericholecystic fluid. Sonographic Dalton's sign is negative.  BILE DUCTS: No intrahepatic biliary ductal dilation. Common bile duct is not well visualized. LIVER: Normal parenchyma with smooth contour. No focal mass. RIGHT KIDNEY: 10.2 cm. Normal cortical thickness and echogenicity. No hydronephrosis or sonographically detectable calculi.  SPLEEN: 9 cm. Normal appearance. PANCREAS: The visualized portions are normal. AORTA: Obscured by bowel gas and not well visualized. IVC: Normal where visualized. No ascites. ABDOMINAL DUPLEX: The hepatic artery, hepatic veins, IVC, portal veins, and splenic vein are patent with flow in the normal direction.     IMPRESSION: 1.  Normal liver Doppler exam. 2.  Normal liver contour and morphology. 3.  Low risk 0.8 cm gallbladder polyp. Recommend follow-up in 12 months per guidelines below. 4.  No intrahepatic biliary ductal dilation. Common bile duct is not well visualized. Gallbladder polyp 7-9 mm: Follow-up US at 12 months. If on follow-up increase of >= 4 mm in <= 12 months - recommend surgical consult. If decrease of >= 4 mm - stop following. REFERENCE: Management of Incidentally Detected Gallbladder Polyps: Society of Radiologists in Ultrasound Consensus Conference  Recommendations. Radiology 2022; 000:1-12    CT Abdomen Pelvis w Contrast    Result Date: 7/25/2024  EXAM: CT ABDOMEN PELVIS W CONTRAST LOCATION: [facility] DATE: 7/25/2024 INDICATION: Abdominal discomfort with elevated LFTs COMPARISON: 7/25/2023 TECHNIQUE: CT scan of the abdomen and pelvis was performed following injection of IV contrast. Multiplanar reformats were obtained. Dose reduction techniques were used. CONTRAST: isovue 370 90ml FINDINGS: LOWER CHEST: Normal. HEPATOBILIARY: Normal. PANCREAS: Decreasing 2.3 x 1.5 cm cystic lesion in the pancreaticoduodenal groove. The pancreas is otherwise normal. SPLEEN: Normal. ADRENAL GLANDS: Normal. KIDNEYS/BLADDER: Normal. BOWEL: No obstruction or inflammatory change. Normal appendix. LYMPH NODES: Normal. VASCULATURE: No abdominal aortic aneurysm. PELVIC ORGANS: Prostatomegaly measuring 5.6 cm in transverse dimension. MUSCULOSKELETAL: Degenerative changes. Osseous demineralization.     IMPRESSION: 1.  No acute abnormality. 2.  Decreasing 2.3 cm cystic lesion in the pancreaticoduodenal groove.      The total time spent with this patient was 25 minutes.                                                Rajendra Chu MD  Thank you for the opportunity to participate in the care of this patient.   Please feel free to call me with any questions or concerns.  Phone number [phone].

## 2024-07-27 NOTE — CARE PLAN
"PRIMARY DIAGNOSIS: \"GENERIC\" NURSING  OUTPATIENT/OBSERVATION GOALS TO BE MET BEFORE DISCHARGE:  ADLs back to baseline: Yes    Activity and level of assistance: Ambulating independently.    Pain status: Pain free.    Return to near baseline physical activity: Yes     Discharge Planner Nurse   Safe discharge environment identified: Yes  Barriers to discharge: Yes       Entered by: Char Cary RN 07/26/2024 6:53 PM     Please review provider order for any additional goals.   Nurse to notify provider when observation goals have been met and patient is ready for discharge.  "
"PRIMARY DIAGNOSIS: \"GENERIC\" NURSING  OUTPATIENT/OBSERVATION GOALS TO BE MET BEFORE DISCHARGE:  ADLs back to baseline: Yes    Activity and level of assistance: Ambulating independently.    Pain status: Pain free.    Return to near baseline physical activity: Yes     Discharge Planner Nurse   Safe discharge environment identified: Yes  Barriers to discharge: Yes       Entered by: Char Cary RN 07/26/2024 8:45 PM     Please review provider order for any additional goals.   Nurse to notify provider when observation goals have been met and patient is ready for discharge.  "
"PRIMARY DIAGNOSIS: \"GENERIC\" NURSING  OUTPATIENT/OBSERVATION GOALS TO BE MET BEFORE DISCHARGE:  ADLs back to baseline: Yes    Activity and level of assistance: Ambulating independently.    Pain status: Pain free.    Return to near baseline physical activity: Yes     Discharge Planner Nurse   Safe discharge environment identified: Yes  Barriers to discharge: Yes       Entered by: Char Cary RN 07/27/2024 1:48 AM     Please review provider order for any additional goals.   Nurse to notify provider when observation goals have been met and patient is ready for discharge.  "
"PRIMARY DIAGNOSIS: \"GENERIC\" NURSING  OUTPATIENT/OBSERVATION GOALS TO BE MET BEFORE DISCHARGE:  ADLs back to baseline: Yes    Activity and level of assistance: Ambulating independently.    Pain status: Pain free.    Return to near baseline physical activity: Yes     Discharge Planner Nurse   Safe discharge environment identified: Yes  Barriers to discharge: Yes       Entered by: Char Cary RN 07/27/2024 4:02 AM     Please review provider order for any additional goals.   Nurse to notify provider when observation goals have been met and patient is ready for discharge.  "
done

## 2024-07-28 LAB — SMA IGG SER-ACNC: 24 UNITS

## 2024-07-29 ENCOUNTER — PATIENT OUTREACH (OUTPATIENT)
Dept: INTERNAL MEDICINE | Facility: CLINIC | Age: 74
End: 2024-07-29

## 2024-07-29 ENCOUNTER — OFFICE VISIT (OUTPATIENT)
Dept: INTERNAL MEDICINE | Facility: CLINIC | Age: 74
End: 2024-07-29
Payer: MEDICARE

## 2024-07-29 ENCOUNTER — NURSE TRIAGE (OUTPATIENT)
Dept: NURSING | Facility: CLINIC | Age: 74
End: 2024-07-29

## 2024-07-29 ENCOUNTER — TELEPHONE (OUTPATIENT)
Dept: INTERNAL MEDICINE | Facility: CLINIC | Age: 74
End: 2024-07-29

## 2024-07-29 VITALS
RESPIRATION RATE: 12 BRPM | TEMPERATURE: 98.2 F | BODY MASS INDEX: 33.44 KG/M2 | SYSTOLIC BLOOD PRESSURE: 122 MMHG | DIASTOLIC BLOOD PRESSURE: 58 MMHG | HEIGHT: 65 IN | OXYGEN SATURATION: 99 % | HEART RATE: 78 BPM | WEIGHT: 200.7 LBS

## 2024-07-29 DIAGNOSIS — B17.9 ACUTE HEPATITIS: ICD-10-CM

## 2024-07-29 DIAGNOSIS — K82.4 GALLBLADDER POLYP: ICD-10-CM

## 2024-07-29 DIAGNOSIS — K86.2 PANCREATIC CYST: ICD-10-CM

## 2024-07-29 DIAGNOSIS — Z09 HOSPITAL DISCHARGE FOLLOW-UP: Primary | ICD-10-CM

## 2024-07-29 DIAGNOSIS — Z79.899 HIGH RISK MEDICATION USE: ICD-10-CM

## 2024-07-29 DIAGNOSIS — N17.9 AKI (ACUTE KIDNEY INJURY) (H): ICD-10-CM

## 2024-07-29 PROBLEM — N18.31 STAGE 3A CHRONIC KIDNEY DISEASE (H): Status: RESOLVED | Noted: 2024-07-26 | Resolved: 2024-07-29

## 2024-07-29 LAB
A1AT PHENOTYP SERPL-IMP: NORMAL
A1AT SERPL-MCNC: 95 MG/DL
ALBUMIN SERPL BCG-MCNC: 4.3 G/DL (ref 3.5–5.2)
ALP SERPL-CCNC: 156 U/L (ref 40–150)
ALT SERPL W P-5'-P-CCNC: 1579 U/L (ref 0–70)
ANA SER QL IF: NEGATIVE
ANION GAP SERPL CALCULATED.3IONS-SCNC: 10 MMOL/L (ref 7–15)
AST SERPL W P-5'-P-CCNC: 224 U/L (ref 0–45)
BILIRUB SERPL-MCNC: 1.4 MG/DL
BUN SERPL-MCNC: 22 MG/DL (ref 8–23)
CALCIUM SERPL-MCNC: 9.3 MG/DL (ref 8.8–10.4)
CHLORIDE SERPL-SCNC: 101 MMOL/L (ref 98–107)
CMV IGM SERPL IA-ACNC: <8 AU/ML
CMV IGM SERPL IA-ACNC: NEGATIVE
CREAT SERPL-MCNC: 1.04 MG/DL (ref 0.67–1.17)
CREAT UR-MCNC: 266 MG/DL
EGFRCR SERPLBLD CKD-EPI 2021: 75 ML/MIN/1.73M2
GLUCOSE SERPL-MCNC: 95 MG/DL (ref 70–99)
HCO3 SERPL-SCNC: 24 MMOL/L (ref 22–29)
HSV1 DNA SPEC QL NAA+PROBE: NEGATIVE
HSV2 DNA SPEC QL NAA+PROBE: NEGATIVE
MICROALBUMIN UR-MCNC: <12 MG/L
MICROALBUMIN/CREAT UR: NORMAL MG/G{CREAT}
POTASSIUM SERPL-SCNC: 4.3 MMOL/L (ref 3.4–5.3)
PROT SERPL-MCNC: 6.9 G/DL (ref 6.4–8.3)
SMOOTH MUSCLE IGG TITR SER: NORMAL {TITER}
SODIUM SERPL-SCNC: 135 MMOL/L (ref 135–145)
VIT D+METAB SERPL-MCNC: 37 NG/ML (ref 20–50)

## 2024-07-29 PROCEDURE — 82043 UR ALBUMIN QUANTITATIVE: CPT | Performed by: INTERNAL MEDICINE

## 2024-07-29 PROCEDURE — 82306 VITAMIN D 25 HYDROXY: CPT | Performed by: INTERNAL MEDICINE

## 2024-07-29 PROCEDURE — 99214 OFFICE O/P EST MOD 30 MIN: CPT | Performed by: INTERNAL MEDICINE

## 2024-07-29 PROCEDURE — 80053 COMPREHEN METABOLIC PANEL: CPT | Performed by: INTERNAL MEDICINE

## 2024-07-29 PROCEDURE — 36415 COLL VENOUS BLD VENIPUNCTURE: CPT | Performed by: INTERNAL MEDICINE

## 2024-07-29 PROCEDURE — 82570 ASSAY OF URINE CREATININE: CPT | Performed by: INTERNAL MEDICINE

## 2024-07-29 ASSESSMENT — PAIN SCALES - GENERAL: PAINLEVEL: NO PAIN (0)

## 2024-07-29 NOTE — PROGRESS NOTES
1. Hospital discharge follow-up  He seems to be improving clinically.  I want to see him back in another couple weeks.  Sooner if he is not feeling well.    2. Acute hepatitis  Etiology unclear.  Discontinue all supplements other than multivitamin and his vitamin D.  Will check vitamin D to make sure he is not getting too much.  - Comprehensive metabolic panel (BMP + Alb, Alk Phos, ALT, AST, Total. Bili, TP); Future  - Comprehensive metabolic panel (BMP + Alb, Alk Phos, ALT, AST, Total. Bili, TP)    3. Gallbladder polyp  Will have a follow-up ultrasound done in 1 year.  - US Abdomen Limited; Future    4. Pancreatic cyst  This is continuing to decrease in size.  I do not know if any further evaluation of this is necessary.    5. GUILLERMINA (acute kidney injury) (H24)  Most recent renal function was back to baseline/normal.  - Albumin Random Urine Quantitative with Creat Ratio; Future  - Comprehensive metabolic panel (BMP + Alb, Alk Phos, ALT, AST, Total. Bili, TP); Future  - Albumin Random Urine Quantitative with Creat Ratio  - Comprehensive metabolic panel (BMP + Alb, Alk Phos, ALT, AST, Total. Bili, TP)    6. High risk medication use  As above.  Will hold all medications that have potential for liver toxicity.  - Vitamin D Deficiency; Future  - Comprehensive metabolic panel (BMP + Alb, Alk Phos, ALT, AST, Total. Bili, TP); Future  - Vitamin D Deficiency  - Comprehensive metabolic panel (BMP + Alb, Alk Phos, ALT, AST, Total. Bili, TP)     Subjective   Galo is a 74 year old, presenting for the following health issues:  Hospital F/U (Hospital/Nursing Home/IP Rehab Facility: [facility]/Date of Admission: 07/25/24 /Date of Discharge: 07/27/24/Reason(s) for Admission: Elevated LFT's/Was the patient in the ICU or did the patient experience delirium during hospitalization?  No/Do you have any other stressors you would like to discuss with your provider? No/Problems taking medications regularly:   None/Medication changes since discharge: Discontinued Atorvastatin /), LAB REQUEST (Relook at blood work), and Results (Talk through CT results from 7/25/Lab workup done 7/25, 7/26)        7/29/2024     3:08 PM   Additional Questions   Roomed by KIRIT Perez         7/29/2024     3:11 PM   Patient Reported Additional Medications   Patient reports taking the following new medications Vitamin A     [facility]         Hospital Follow-up Visit:    Hospital/Nursing Home/ Rehab Facility: [facility]  Date of Admission: 07/25/24   Date of Discharge: 07/27/24  Reason(s) for Admission: Elevated LFT's  Was the patient in the ICU or did the patient experience delirium during hospitalization?  No  Do you have any other stressors you would like to discuss with your provider? No    Problems taking medications regularly:  None  Medication changes since discharge: Discontinued Atorvastatin   Problems adhering to non-medication therapy:  None    Summary of hospitalization:  Discharge summary unavailable  Diagnostic Tests/Treatments reviewed.  Follow up needed: GI?  Other Healthcare Providers Involved in Patient s Care:         None  Update since discharge: improved.         Plan of care communicated with patient           Galo comes in today for follow-up of his recent hospitalization.  He was admitted last Thursday with acute hepatitis.  He had symptoms of anorexia and bloating and just not feeling well for couple days.  Lab work revealed markedly elevated transaminases in the several thousand range.  Etiology is unclear.  He was seen by GI and ID etc.  Nothing has been found as of yet.  Ferritin was quite high but this was possibly a acute phase reactant.  No follow-up with GI or other providers was planned it sounds like.  Unfortunately I do not have a discharge summary available at this time.            Objective    /58 (BP Location: Left arm, Patient Position: Sitting, Cuff Size: Adult Regular)   Pulse  "78   Temp 98.2  F (36.8  C) (Tympanic)   Resp 12   Ht 1.651 m (5' 5\")   Wt 91 kg (200 lb 11.2 oz)   SpO2 99%   BMI 33.40 kg/m    Body mass index is 33.4 kg/m .  Physical Exam   A pleasant gentleman who looks well and is not jaundiced.            Signed Electronically by: ANAY NAVARRO MD    "

## 2024-07-30 NOTE — TELEPHONE ENCOUNTER
DATE/TIME OF CALL RECEIVED FROM LAB:  07/29/24 at 9:11 PM   LAB TEST:  ALT  LAB VALUE:  1579  PROVIDER NOTIFIED?: Yes  PROVIDER NAME: Ronnie Bundy  DATE/TIME LAB VALUE REPORTED TO PROVIDER: 9:19 PM  MECHANISM OF PROVIDER NOTIFICATION: Page  PROVIDER RESPONSE: Aware of result        Reason for Disposition    Lab result questions    Lab or radiology calling with CRITICAL test results    Protocols used: Information Only Call - No Triage-A-AH, PCP Call - No Triage-A-AH

## 2024-07-31 LAB — LEPTOSPIRA IGM SER QL: NEGATIVE

## 2024-08-01 ENCOUNTER — TELEPHONE (OUTPATIENT)
Dept: GASTROENTEROLOGY | Facility: CLINIC | Age: 74
End: 2024-08-01
Payer: MEDICARE

## 2024-08-01 DIAGNOSIS — R79.89 ABNORMAL LFTS: Primary | ICD-10-CM

## 2024-08-01 DIAGNOSIS — Z11.59 ENCOUNTER FOR SCREENING FOR OTHER VIRAL DISEASES: ICD-10-CM

## 2024-08-01 DIAGNOSIS — R94.5 ABNORMAL RESULTS OF LIVER FUNCTION STUDIES: ICD-10-CM

## 2024-08-01 NOTE — TELEPHONE ENCOUNTER
Called patient to reschedule labs to 1 day prior to upcoming visit with Dr. Cheng. Detailed VM left. [employer] message sent.    SOFY SilvaN, RN, PHN  Hepatology Clinic  [facility]  [facility]

## 2024-08-01 NOTE — TELEPHONE ENCOUNTER
Health Call Center    Phone Message    May a detailed message be left on voicemail: yes     Reason for Call: Other: Patient has an appt for labs on 8/6/24, for his appt on 8/6/24 with Dr. Cheng.  Please add orders to EPIC.     Action Taken: Message routed to:  [facility] ([facility]): [facility]    Travel Screening: Not Applicable

## 2024-08-05 ENCOUNTER — LAB (OUTPATIENT)
Dept: LAB | Facility: CLINIC | Age: 74
End: 2024-08-05
Payer: MEDICARE

## 2024-08-05 DIAGNOSIS — R79.89 ABNORMAL LFTS: ICD-10-CM

## 2024-08-05 DIAGNOSIS — R94.5 ABNORMAL RESULTS OF LIVER FUNCTION STUDIES: ICD-10-CM

## 2024-08-05 LAB
ALBUMIN SERPL BCG-MCNC: 4.1 G/DL (ref 3.5–5.2)
ALP SERPL-CCNC: 103 U/L (ref 40–150)
ALT SERPL W P-5'-P-CCNC: 400 U/L (ref 0–70)
ANION GAP SERPL CALCULATED.3IONS-SCNC: 12 MMOL/L (ref 7–15)
AST SERPL W P-5'-P-CCNC: 94 U/L (ref 0–45)
BILIRUB SERPL-MCNC: 0.8 MG/DL
BUN SERPL-MCNC: 23.4 MG/DL (ref 8–23)
CALCIUM SERPL-MCNC: 9.5 MG/DL (ref 8.8–10.4)
CHLORIDE SERPL-SCNC: 101 MMOL/L (ref 98–107)
CREAT SERPL-MCNC: 1.16 MG/DL (ref 0.67–1.17)
EGFRCR SERPLBLD CKD-EPI 2021: 66 ML/MIN/1.73M2
GLUCOSE SERPL-MCNC: 140 MG/DL (ref 70–99)
HCO3 SERPL-SCNC: 22 MMOL/L (ref 22–29)
INR PPP: 1.02 (ref 0.85–1.15)
POTASSIUM SERPL-SCNC: 5 MMOL/L (ref 3.4–5.3)
PROT SERPL-MCNC: 6.6 G/DL (ref 6.4–8.3)
SODIUM SERPL-SCNC: 135 MMOL/L (ref 135–145)

## 2024-08-05 PROCEDURE — 85610 PROTHROMBIN TIME: CPT

## 2024-08-05 PROCEDURE — 36415 COLL VENOUS BLD VENIPUNCTURE: CPT

## 2024-08-05 PROCEDURE — 86038 ANTINUCLEAR ANTIBODIES: CPT

## 2024-08-05 PROCEDURE — 80053 COMPREHEN METABOLIC PANEL: CPT

## 2024-08-06 ENCOUNTER — MYC MEDICAL ADVICE (OUTPATIENT)
Dept: GASTROENTEROLOGY | Facility: CLINIC | Age: 74
End: 2024-08-06

## 2024-08-06 ENCOUNTER — OFFICE VISIT (OUTPATIENT)
Dept: GASTROENTEROLOGY | Facility: CLINIC | Age: 74
End: 2024-08-06
Attending: INTERNAL MEDICINE
Payer: MEDICARE

## 2024-08-06 ENCOUNTER — TELEPHONE (OUTPATIENT)
Dept: GASTROENTEROLOGY | Facility: CLINIC | Age: 74
End: 2024-08-06

## 2024-08-06 VITALS
RESPIRATION RATE: 20 BRPM | OXYGEN SATURATION: 98 % | BODY MASS INDEX: 33.12 KG/M2 | WEIGHT: 199 LBS | DIASTOLIC BLOOD PRESSURE: 75 MMHG | HEART RATE: 95 BPM | SYSTOLIC BLOOD PRESSURE: 119 MMHG

## 2024-08-06 DIAGNOSIS — R79.89 ELEVATED LFTS: Primary | ICD-10-CM

## 2024-08-06 DIAGNOSIS — R79.89 ELEVATED LFTS: ICD-10-CM

## 2024-08-06 LAB — ANA SER QL IF: NEGATIVE

## 2024-08-06 PROCEDURE — 99204 OFFICE O/P NEW MOD 45 MIN: CPT | Performed by: INTERNAL MEDICINE

## 2024-08-06 NOTE — PATIENT INSTRUCTIONS
Summary:    1.  As we discussed, the cause of your marked elevation of liver enzymes is not clear.  You had extensive testing in the hospital did not reveal an infectious cause.  It is possible that your statin medication was contributing to this, and I would avoid this class of medicines for now.    2.  Your liver test from yesterday have improved substantially, but are still significantly abnormal.  I am recommending that we get a liver panel every 2 weeks for the next few months.  If the liver tests worsen, or do not normalize, we will likely recommend a liver biopsy.    3.  We will recommend to return to this clinic in early October to review things.  Please contact us sooner than that if you have liver or GI symptoms or issues.    If you have any questions about your liver disease care or tests, you can call the clinic at [phone].

## 2024-08-06 NOTE — LETTER
8/6/2024      Efrain Gomes  [address]  [place] [address]      Dear Colleague,    Thank you for referring your patient, Efrain Gomes, to the [facility] [place]. Please see a copy of my visit note below.    [facility]       Hepatology Clinic    Date of Service: 8/6/2024       Primary Care Provider: Ronnie Bundy    History of Present Illness     Mr. Gomes is sent in consultation by Dr. Vicente because of markedly elevated liver enzyme tests that were incidentally discovered in July 2024.    This patient follows with Dr. Bundy.  The patient was seen in clinic on July 25 and was noting some nonspecific malaise, nausea, and an abdominal bloating.  Liver tests were obtained, which showed substantially elevated liver enzymes (ALT 6227, AST 3030, bilirubin 4.1, INR 1.44).  He was instructed to go to the emergency room, and was admitted to [facility] where he had an extensive evaluation that did not reveal a cause for his liver test abnormalities.  After several days, these were trending down, and he was discharged.  He had liver test yesterday that were significantly improved, but still substantially abnormal (see below).  Notably, he had normal liver tests in May 2024.    As noted above, at the time of his hospitalization his symptoms included vague malaise, fatigue, and nausea.  He had not had vomiting.  He apparently did not document severe hypotension.  He had been on a atorvastatin, she was stopped at this time.  He continues on lisinopril and Flomax.    He reports no travel outside of [place] recently.  He denies any use of herbal or supplemental medications.    He has a history of alcohol abuse, but reports none in the last 3 years.  He is unaware of any prior history of liver problems.  There is no family history of liver disease.    Currently, he reports that he feels well and is back at his usual baseline health.    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      Past Medical History:  Past Medical History:   Diagnosis Date     Alcoholism (H)      BPH (benign prostatic hyperplasia)      Chronic pain syndrome      ED (erectile dysfunction)      Fatty liver      History of anemia      History of pancreatitis      HLD (hyperlipidemia)      Hypertension      Lumbar back pain with radiculopathy affecting lower extremity      Mild major depression (H24)      Morbid obesity (H)      Pancreatic cyst        Patient Active Problem List   Diagnosis     ED (erectile dysfunction)     Chronic sinusitis     Low back pain     Hypercholesteremia     Essential hypertension     Obesity     Alcoholism (H)     Fatty liver     Bullous pemphigoid (H28)     Elevated lipase     Morbid obesity (H)     Pancreatic cyst     Mild major depression (H24)     Peripheral arterial disease (H24)--mild distal disease on CTA in      Elevated LFTs     Anxiety disorder, unspecified     BPH (benign prostatic hyperplasia)     Anorexia     Fever     Gallbladder polyp       Surgical History:  Past Surgical History:   Procedure Laterality Date     COLONOSCOPY       ENDOSCOPIC ULTRASOUND UPPER GASTROINTESTINAL TRACT (GI) N/A 2022    Procedure: ENDOSCOPIC ULTRASOUND, ESOPHAGOSCOPY / UPPER GASTROINTESTINAL TRACT (GI);  Surgeon: Zana Perez MD;  Location:  OR       Social History:  Social History     Tobacco Use     Smoking status: Former     Types: Pipe     Quit date: 1994     Years since quittin.6     Smokeless tobacco: Never   Vaping Use     Vaping status: Never Used   Substance Use Topics     Alcohol use: Not Currently     Comment: sober since 10/2021     Drug use: Not Currently       Family History:  Family History   Problem Relation Age of Onset     Cancer Mother      Cancer Father      Diabetes Brother        Medications:  Current Outpatient Medications   Medication Sig Dispense Refill     cholecalciferol, vitamin D3, (VITAMIN D3)  2,000 unit Tab [CHOLECALCIFEROL, VITAMIN D3, (VITAMIN D3) 2,000 UNIT TAB] Take 1 tablet by mouth daily.       lisinopril (ZESTRIL) 40 MG tablet Take 0.5 tablets (20 mg) by mouth daily       multivitamin w/minerals (THERA-VIT-M) tablet Take 1 tablet by mouth daily (Patient not taking: Reported on 8/6/2024)       tamsulosin (FLOMAX) 0.4 MG capsule TAKE 1 CAPSULE(0.4 MG) BY MOUTH EVERY EVENING 90 capsule 3     No current facility-administered medications for this visit.         Objective:         Vitals:    08/06/24 1407   BP: 119/75   BP Location: Left arm   Patient Position: Sitting   Cuff Size: Adult Regular   Pulse: 95   Resp: 20   SpO2: 98%   Weight: 90.3 kg (199 lb)     Body mass index is 33.12 kg/m .     Physical Exam  Constitutional: Well-developed, well-nourished, in no apparent distress.    HEENT: Normocephalic.  No scleral icterus.   GI:  Abdomen soft, non-distended, non-tender. No overt hepatosplenomegaly.     Skin:  No rash noted.  No spider nevi noted.    Peripheral Vascular: No lower extremity edema.   Musculoskeletal:  ROM intact, apparently normal muscle bulk    Psychiatric: Normal mood and affect. Behavior is normal.  Neuro: No asterixis    Labs and Diagnostic tests:  Lab Results   Component Value Date     08/05/2024    POTASSIUM 5.0 08/05/2024    POTASSIUM 5.2 04/25/2022    CHLORIDE 101 08/05/2024    CHLORIDE 99 04/25/2022    CO2 22 08/05/2024    CO2 24 04/25/2022    BUN 23.4 08/05/2024    BUN 20 04/25/2022    CR 1.16 08/05/2024     Lab Results   Component Value Date    BILITOTAL 0.8 08/05/2024     08/05/2024    AST 94 08/05/2024    ALKPHOS 103 08/05/2024     Lab Results   Component Value Date    ALBUMIN 4.1 08/05/2024    ALBUMIN 3.8 04/25/2022    PROTTOTAL 6.6 08/05/2024      Lab Results   Component Value Date    WBC 5.1 07/26/2024    HGB 13.8 07/26/2024    MCV 89 07/26/2024     07/26/2024     Lab Results   Component Value Date    INR 1.02 08/05/2024       MELD 3.0: 9 at 8/5/2024  12:05 PM  MELD-Na: 8 at 8/5/2024 12:05 PM  Calculated from:  Serum Creatinine: 1.16 mg/dL at 8/5/2024 12:05 PM  Serum Sodium: 135 mmol/L at 8/5/2024 12:05 PM  Total Bilirubin: 0.8 mg/dL (Using min of 1 mg/dL) at 8/5/2024 12:05 PM  Serum Albumin: 4.1 g/dL (Using max of 3.5 g/dL) at 8/5/2024 12:05 PM  INR(ratio): 1.02 at 8/5/2024 12:05 PM  Age at listing (hypothetical): 74 years  Sex: Male at 8/5/2024 12:05 PM    Previous work-up:   Lab Results   Component Value Date    HEPBANG Nonreactive 07/25/2024    HCVAB Nonreactive 07/25/2024    MARTI 6,958 (H) 07/26/2024    IRONSAT 35 04/25/2022    ANA M Negative 08/05/2024    SMOOTHMUS 24 (H) 07/26/2024    A1A 95 07/26/2024    TSH 3.32 05/29/2024    CHOL 149 05/29/2024    HDL 49 05/29/2024    LDL 78 05/29/2024    TRIG 110 05/29/2024    A1C 5.2 01/22/2015      Lab Results   Component Value Date    SPECDES  07/27/2024     Blood: ACD      LDRESULTS  07/27/2024     HEMOCHROMATOSIS RESULTS    HFE Gene C282Y (G845A) RESULTS:    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  Normal liver Doppler exam.      2.  Normal liver contour and morphology.      3.  Low risk 0.8 cm gallbladder polyp. Recommend follow-up in 12 months per guidelines below.      4.  No intrahepatic biliary ductal dilation. Common bile duct is not well visualized.     CT 7-25-24  HEPATOBILIARY: Normal.   IMPRESSION:   1.  No acute abnormality.  2.  Decreasing 2.3 cm cystic lesion in the pancreaticoduodenal groove.    Assessment and Plan:    Acute hepatitis of unknown cause.  He was noted to have markedly elevated transaminases on routine clinic testing.  He had a appropriately thorough workup while hospitalized at [facility].  It is  certainly possible that this was due to a viral infection that was not among those tested.  Other possibilities would include adenovirus, EBV, hepatitis C, etc.  Another possibility is he had transient severe hypotension, although his history does not suggest this.  His imaging test do not suggest vascular insufficiency or Budd-Chiari syndrome.    He feels well now, and his liver tests have come down substantially.  However, I would like to follow these closely over the next 2 months (every 2 weeks).  If these do not normalize, or if they arise, we will suggest a liver biopsy.    He is currently on lisinopril, which is rarely been associated with a hepatitis picture.  However, this seems unlikely given that his liver tests are improving on this medication.  For now, I would continue to avoid statin medications.    His questions were answered.        About 45 minutes spent today with patient, reviewing results, and coordinating care.    This note was created with voice recognition software, and while reviewed for accuracy, typos may remain.        Juan Cheng MD  Professor of Medicine  [facility]  Division of Gastroenterology, Hepatology, and Nutrition       Again, thank you for allowing me to participate in the care of your patient.        Sincerely,        Juan Cheng MD

## 2024-08-06 NOTE — PROGRESS NOTES
[facility]       Hepatology Clinic    Date of Service: 8/6/2024       Primary Care Provider: Ronnie Bundy    History of Present Illness     Mr. Gomes is sent in consultation by Dr. Vicente because of markedly elevated liver enzyme tests that were incidentally discovered in July 2024.    This patient follows with Dr. Bundy.  The patient was seen in clinic on July 25 and was noting some nonspecific malaise, nausea, and an abdominal bloating.  Liver tests were obtained, which showed substantially elevated liver enzymes (ALT 6227, AST 3030, bilirubin 4.1, INR 1.44).  He was instructed to go to the emergency room, and was admitted to [facility] where he had an extensive evaluation that did not reveal a cause for his liver test abnormalities.  After several days, these were trending down, and he was discharged.  He had liver test yesterday that were significantly improved, but still substantially abnormal (see below).  Notably, he had normal liver tests in May 2024.    As noted above, at the time of his hospitalization his symptoms included vague malaise, fatigue, and nausea.  He had not had vomiting.  He apparently did not document severe hypotension.  He had been on a atorvastatin, she was stopped at this time.  He continues on lisinopril and Flomax.    He reports no travel outside of [place] recently.  He denies any use of herbal or supplemental medications.    He has a history of alcohol abuse, but reports none in the last 3 years.  He is unaware of any prior history of liver problems.  There is no family history of liver disease.    Currently, he reports that he feels well and is back at his usual baseline health.    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      Past Medical History:  Past Medical History:   Diagnosis Date    Alcoholism (H)     BPH (benign prostatic hyperplasia)     Chronic pain syndrome     ED (erectile dysfunction)     Fatty liver      History of anemia     History of pancreatitis     HLD (hyperlipidemia)     Hypertension     Lumbar back pain with radiculopathy affecting lower extremity     Mild major depression (H24)     Morbid obesity (H)     Pancreatic cyst        Patient Active Problem List   Diagnosis    ED (erectile dysfunction)    Chronic sinusitis    Low back pain    Hypercholesteremia    Essential hypertension    Obesity    Alcoholism (H)    Fatty liver    Bullous pemphigoid (H28)    Elevated lipase    Morbid obesity (H)    Pancreatic cyst    Mild major depression (H24)    Peripheral arterial disease (H24)--mild distal disease on CTA in     Elevated LFTs    Anxiety disorder, unspecified    BPH (benign prostatic hyperplasia)    Anorexia    Fever    Gallbladder polyp       Surgical History:  Past Surgical History:   Procedure Laterality Date    COLONOSCOPY      ENDOSCOPIC ULTRASOUND UPPER GASTROINTESTINAL TRACT (GI) N/A 2022    Procedure: ENDOSCOPIC ULTRASOUND, ESOPHAGOSCOPY / UPPER GASTROINTESTINAL TRACT (GI);  Surgeon: Zana Perez MD;  Location:  OR       Social History:  Social History     Tobacco Use    Smoking status: Former     Types: Pipe     Quit date: 1994     Years since quittin.6    Smokeless tobacco: Never   Vaping Use    Vaping status: Never Used   Substance Use Topics    Alcohol use: Not Currently     Comment: sober since 10/2021    Drug use: Not Currently       Family History:  Family History   Problem Relation Age of Onset    Cancer Mother     Cancer Father     Diabetes Brother        Medications:  Current Outpatient Medications   Medication Sig Dispense Refill    cholecalciferol, vitamin D3, (VITAMIN D3) 2,000 unit Tab [CHOLECALCIFEROL, VITAMIN D3, (VITAMIN D3) 2,000 UNIT TAB] Take 1 tablet by mouth daily.      lisinopril (ZESTRIL) 40 MG tablet Take 0.5 tablets (20 mg) by mouth daily      multivitamin w/minerals (THERA-VIT-M) tablet Take 1 tablet by mouth daily (Patient not taking: Reported  on 8/6/2024)      tamsulosin (FLOMAX) 0.4 MG capsule TAKE 1 CAPSULE(0.4 MG) BY MOUTH EVERY EVENING 90 capsule 3     No current facility-administered medications for this visit.         Objective:         Vitals:    08/06/24 1407   BP: 119/75   BP Location: Left arm   Patient Position: Sitting   Cuff Size: Adult Regular   Pulse: 95   Resp: 20   SpO2: 98%   Weight: 90.3 kg (199 lb)     Body mass index is 33.12 kg/m .     Physical Exam  Constitutional: Well-developed, well-nourished, in no apparent distress.    HEENT: Normocephalic.  No scleral icterus.   GI:  Abdomen soft, non-distended, non-tender. No overt hepatosplenomegaly.     Skin:  No rash noted.  No spider nevi noted.    Peripheral Vascular: No lower extremity edema.   Musculoskeletal:  ROM intact, apparently normal muscle bulk    Psychiatric: Normal mood and affect. Behavior is normal.  Neuro: No asterixis    Labs and Diagnostic tests:  Lab Results   Component Value Date     08/05/2024    POTASSIUM 5.0 08/05/2024    POTASSIUM 5.2 04/25/2022    CHLORIDE 101 08/05/2024    CHLORIDE 99 04/25/2022    CO2 22 08/05/2024    CO2 24 04/25/2022    BUN 23.4 08/05/2024    BUN 20 04/25/2022    CR 1.16 08/05/2024     Lab Results   Component Value Date    BILITOTAL 0.8 08/05/2024     08/05/2024    AST 94 08/05/2024    ALKPHOS 103 08/05/2024     Lab Results   Component Value Date    ALBUMIN 4.1 08/05/2024    ALBUMIN 3.8 04/25/2022    PROTTOTAL 6.6 08/05/2024      Lab Results   Component Value Date    WBC 5.1 07/26/2024    HGB 13.8 07/26/2024    MCV 89 07/26/2024     07/26/2024     Lab Results   Component Value Date    INR 1.02 08/05/2024       MELD 3.0: 9 at 8/5/2024 12:05 PM  MELD-Na: 8 at 8/5/2024 12:05 PM  Calculated from:  Serum Creatinine: 1.16 mg/dL at 8/5/2024 12:05 PM  Serum Sodium: 135 mmol/L at 8/5/2024 12:05 PM  Total Bilirubin: 0.8 mg/dL (Using min of 1 mg/dL) at 8/5/2024 12:05 PM  Serum Albumin: 4.1 g/dL (Using max of 3.5 g/dL) at 8/5/2024  12:05 PM  INR(ratio): 1.02 at 8/5/2024 12:05 PM  Age at listing (hypothetical): 74 years  Sex: Male at 8/5/2024 12:05 PM    Previous work-up:   Lab Results   Component Value Date    HEPBANG Nonreactive 07/25/2024    HCVAB Nonreactive 07/25/2024    MARTI 6,958 (H) 07/26/2024    IRONSAT 35 04/25/2022    ANA M Negative 08/05/2024    SMOOTHMUS 24 (H) 07/26/2024    A1A 95 07/26/2024    TSH 3.32 05/29/2024    CHOL 149 05/29/2024    HDL 49 05/29/2024    LDL 78 05/29/2024    TRIG 110 05/29/2024    A1C 5.2 01/22/2015      Lab Results   Component Value Date    SPECDES  07/27/2024     Blood: ACD      LDRESULTS  07/27/2024     HEMOCHROMATOSIS RESULTS    HFE Gene C282Y (G845A) RESULTS:    C282Y Mutation Interpretation: HETEROZYGOTE    HFE Gene H63D (C187G) RESULTS:    H63D Mutation Interpretation: NORMAL    HFE Gene S65C (A193T) RESULTS:    S65C Mutation Interpretation: NORMAL        Hepatitis A IgM, CMV IgM, Leptospira IgM, COV2 PCR, HSV1/2 PCR, Tick-borne panel negative in July 2024     7-26-24  IMPRESSION:     1.  Normal liver Doppler exam.      2.  Normal liver contour and morphology.      3.  Low risk 0.8 cm gallbladder polyp. Recommend follow-up in 12 months per guidelines below.      4.  No intrahepatic biliary ductal dilation. Common bile duct is not well visualized.     CT 7-25-24  HEPATOBILIARY: Normal.   IMPRESSION:   1.  No acute abnormality.  2.  Decreasing 2.3 cm cystic lesion in the pancreaticoduodenal groove.    Assessment and Plan:    Acute hepatitis of unknown cause.  He was noted to have markedly elevated transaminases on routine clinic testing.  He had a appropriately thorough workup while hospitalized at [facility].  It is certainly possible that this was due to a viral infection that was not among those tested.  Other possibilities would include adenovirus, EBV, hepatitis C, etc.  Another possibility is he had transient severe hypotension, although his history does not suggest this.  His imaging test do not  suggest vascular insufficiency or Budd-Chiari syndrome.    He feels well now, and his liver tests have come down substantially.  However, I would like to follow these closely over the next 2 months (every 2 weeks).  If these do not normalize, or if they arise, we will suggest a liver biopsy.    He is currently on lisinopril, which is rarely been associated with a hepatitis picture.  However, this seems unlikely given that his liver tests are improving on this medication.  For now, I would continue to avoid statin medications.    His questions were answered.        About 45 minutes spent today with patient, reviewing results, and coordinating care.    This note was created with voice recognition software, and while reviewed for accuracy, typos may remain.        Juan Cheng MD  Professor of Medicine  [facility]  Division of Gastroenterology, Hepatology, and Nutrition

## 2024-08-06 NOTE — TELEPHONE ENCOUNTER
Called patient per Dr. Cheng regarding future lab visits and return visit.    Informed patient:  - labs due in 2 weeks  - labs due in 4 weeks  - labs due in 6 weeks  - labs due on 9/30  - return visit on 10/1 (spot held for patient)    Detailed VM left with callback #.    SOFY SilvaN, RN, PHN  Hepatology Clinic  [facility]  [facility]

## 2024-08-08 NOTE — TELEPHONE ENCOUNTER
Spoke with patient. Patient reported writer can schedule him for labs and mail the appointment details to him.    FORTUNATO Silva, RN, PHN  Hepatology Clinic  [facility]  [facility]

## 2024-08-12 ENCOUNTER — OFFICE VISIT (OUTPATIENT)
Dept: INTERNAL MEDICINE | Facility: CLINIC | Age: 74
End: 2024-08-12
Payer: MEDICARE

## 2024-08-12 VITALS
OXYGEN SATURATION: 98 % | SYSTOLIC BLOOD PRESSURE: 112 MMHG | TEMPERATURE: 98.5 F | WEIGHT: 201 LBS | RESPIRATION RATE: 18 BRPM | BODY MASS INDEX: 33.49 KG/M2 | HEIGHT: 65 IN | HEART RATE: 82 BPM | DIASTOLIC BLOOD PRESSURE: 62 MMHG

## 2024-08-12 DIAGNOSIS — B17.9 ACUTE HEPATITIS: Primary | ICD-10-CM

## 2024-08-12 DIAGNOSIS — G89.29 CHRONIC BILATERAL LOW BACK PAIN WITHOUT SCIATICA: ICD-10-CM

## 2024-08-12 DIAGNOSIS — M54.50 CHRONIC BILATERAL LOW BACK PAIN WITHOUT SCIATICA: ICD-10-CM

## 2024-08-12 DIAGNOSIS — K82.4 GALLBLADDER POLYP: ICD-10-CM

## 2024-08-12 DIAGNOSIS — E78.00 HYPERCHOLESTEREMIA: ICD-10-CM

## 2024-08-12 PROCEDURE — 99214 OFFICE O/P EST MOD 30 MIN: CPT | Performed by: INTERNAL MEDICINE

## 2024-08-12 NOTE — PROGRESS NOTES
"  1. Acute hepatitis  Resolving.  Unclear etiology.    2. Hypercholesteremia  He will remain off statin indefinitely.  Will check lipids when I see him this winter.    3. Gallbladder polyp  He will have a 1 year follow-up ultrasound    4. Chronic bilateral low back pain without sciatica  We discussed topical therapies today.    Subjective   Galo is a 74 year old, presenting for the following health issues:  Follow Up (2 weeks follow up - patient was seen by Hepatologist on 8/6/24 ) and Advice Only (Discuss if patient should resume statin medication )      8/12/2024     3:03 PM   Additional Questions   Roomed by EDITH Constantino     History of Present Illness       Reason for visit:  2 weeks follow up    He eats 2-3 servings of fruits and vegetables daily.He consumes 0 sweetened beverage(s) daily.He exercises with enough effort to increase his heart rate 10 to 19 minutes per day.  He exercises with enough effort to increase his heart rate 4 days per week.   He is taking medications regularly.           Galo comes in today for follow-up of his acute hepatitis.  He was hospitalized with acute hepatitis of unknown etiology.  Tested heterozygote for hemochromatosis.  Followed up with GI.  They are going to continue to monitor until enzymes returned to normal.  He is feeling markedly better and back to his normal self.  He is been off of statin and very pleased about that.  Wants to remain off indefinitely as he feels much better off of it.    He has been tinkering around with his sailboat which is down in [place].          Objective    /62 (BP Location: Left arm, Patient Position: Sitting, Cuff Size: Adult Regular)   Pulse 82   Temp 98.5  F (36.9  C) (Tympanic)   Resp 18   Ht 1.651 m (5' 5\")   Wt 91.2 kg (201 lb)   SpO2 98%   BMI 33.45 kg/m    Body mass index is 33.45 kg/m .  Physical Exam   Pleasant gentleman who looks well no jaundice.            Signed Electronically by: ANAY NAVARRO MD    "

## 2024-08-20 ENCOUNTER — LAB (OUTPATIENT)
Dept: LAB | Facility: CLINIC | Age: 74
End: 2024-08-20
Payer: MEDICARE

## 2024-08-20 DIAGNOSIS — R79.89 ELEVATED LFTS: ICD-10-CM

## 2024-08-20 LAB
ALBUMIN SERPL BCG-MCNC: 4.2 G/DL (ref 3.5–5.2)
ALP SERPL-CCNC: 84 U/L (ref 40–150)
ALT SERPL W P-5'-P-CCNC: 62 U/L (ref 0–70)
AST SERPL W P-5'-P-CCNC: 39 U/L (ref 0–45)
BILIRUB DIRECT SERPL-MCNC: <0.2 MG/DL (ref 0–0.3)
BILIRUB SERPL-MCNC: 0.5 MG/DL
PROT SERPL-MCNC: 6.8 G/DL (ref 6.4–8.3)

## 2024-08-20 PROCEDURE — 80076 HEPATIC FUNCTION PANEL: CPT

## 2024-08-20 PROCEDURE — 36415 COLL VENOUS BLD VENIPUNCTURE: CPT

## 2024-08-21 NOTE — RESULT ENCOUNTER NOTE
Mr. Gomes:    These liver tests have returned to normal, which is excellent.     I suggest that we check these again in two months (we don't need to check every 2 weeks since they have returned to normal).    If you have any questions about your liver disease care or tests, you can call the clinic at [phone].     - Dr. Cheng

## 2024-08-30 ENCOUNTER — MYC REFILL (OUTPATIENT)
Dept: INTERNAL MEDICINE | Facility: CLINIC | Age: 74
End: 2024-08-30
Payer: MEDICARE

## 2024-08-30 DIAGNOSIS — I10 ESSENTIAL HYPERTENSION: ICD-10-CM

## 2024-09-03 RX ORDER — LISINOPRIL 40 MG/1
20 TABLET ORAL DAILY
Qty: 45 TABLET | Refills: 3 | Status: SHIPPED | OUTPATIENT
Start: 2024-09-03

## 2024-09-30 ENCOUNTER — TELEPHONE (OUTPATIENT)
Dept: GASTROENTEROLOGY | Facility: CLINIC | Age: 74
End: 2024-09-30

## 2024-09-30 ENCOUNTER — LAB (OUTPATIENT)
Dept: LAB | Facility: CLINIC | Age: 74
End: 2024-09-30
Payer: COMMERCIAL

## 2024-09-30 ENCOUNTER — IMMUNIZATION (OUTPATIENT)
Dept: FAMILY MEDICINE | Facility: CLINIC | Age: 74
End: 2024-09-30
Payer: COMMERCIAL

## 2024-09-30 VITALS — TEMPERATURE: 98.1 F | SYSTOLIC BLOOD PRESSURE: 126 MMHG | DIASTOLIC BLOOD PRESSURE: 70 MMHG

## 2024-09-30 DIAGNOSIS — Z23 ENCOUNTER FOR IMMUNIZATION: Primary | ICD-10-CM

## 2024-09-30 DIAGNOSIS — R79.89 ELEVATED LFTS: ICD-10-CM

## 2024-09-30 LAB
ALBUMIN SERPL BCG-MCNC: 4 G/DL (ref 3.5–5.2)
ALP SERPL-CCNC: 77 U/L (ref 40–150)
ALT SERPL W P-5'-P-CCNC: 30 U/L (ref 0–70)
ANION GAP SERPL CALCULATED.3IONS-SCNC: 10 MMOL/L (ref 7–15)
AST SERPL W P-5'-P-CCNC: 33 U/L (ref 0–45)
BILIRUB SERPL-MCNC: 0.5 MG/DL
BUN SERPL-MCNC: 22.9 MG/DL (ref 8–23)
CALCIUM SERPL-MCNC: 9.5 MG/DL (ref 8.8–10.4)
CHLORIDE SERPL-SCNC: 106 MMOL/L (ref 98–107)
CREAT SERPL-MCNC: 1.1 MG/DL (ref 0.67–1.17)
EGFRCR SERPLBLD CKD-EPI 2021: 70 ML/MIN/1.73M2
ERYTHROCYTE [DISTWIDTH] IN BLOOD BY AUTOMATED COUNT: 13.4 % (ref 10–15)
FERRITIN SERPL-MCNC: 139 NG/ML (ref 31–409)
GLUCOSE SERPL-MCNC: 116 MG/DL (ref 70–99)
HCO3 SERPL-SCNC: 20 MMOL/L (ref 22–29)
HCT VFR BLD AUTO: 45.8 % (ref 40–53)
HGB BLD-MCNC: 15.3 G/DL (ref 13.3–17.7)
MCH RBC QN AUTO: 30.2 PG (ref 26.5–33)
MCHC RBC AUTO-ENTMCNC: 33.4 G/DL (ref 31.5–36.5)
MCV RBC AUTO: 91 FL (ref 78–100)
PLATELET # BLD AUTO: 185 10E3/UL (ref 150–450)
POTASSIUM SERPL-SCNC: 4.7 MMOL/L (ref 3.4–5.3)
PROT SERPL-MCNC: 6.5 G/DL (ref 6.4–8.3)
RBC # BLD AUTO: 5.06 10E6/UL (ref 4.4–5.9)
SODIUM SERPL-SCNC: 136 MMOL/L (ref 135–145)
WBC # BLD AUTO: 6.5 10E3/UL (ref 4–11)

## 2024-09-30 PROCEDURE — 90471 IMMUNIZATION ADMIN: CPT

## 2024-09-30 PROCEDURE — 80053 COMPREHEN METABOLIC PANEL: CPT

## 2024-09-30 PROCEDURE — 90480 ADMN SARSCOV2 VAC 1/ONLY CMP: CPT

## 2024-09-30 PROCEDURE — 82728 ASSAY OF FERRITIN: CPT

## 2024-09-30 PROCEDURE — 99207 PR NO CHARGE NURSE ONLY: CPT

## 2024-09-30 PROCEDURE — 85027 COMPLETE CBC AUTOMATED: CPT

## 2024-09-30 PROCEDURE — 90662 IIV NO PRSV INCREASED AG IM: CPT

## 2024-09-30 PROCEDURE — 91320 SARSCV2 VAC 30MCG TRS-SUC IM: CPT

## 2024-09-30 PROCEDURE — 36415 COLL VENOUS BLD VENIPUNCTURE: CPT

## 2024-09-30 NOTE — TELEPHONE ENCOUNTER
[facility] Call Center    Phone Message    May a detailed message be left on voicemail: yes     Reason for Call: Other: Galo is calling in asking for a call back from his care team to discuss having his 10/1/24 appt changed from a video visit to a telephone visit due to not having access for video at this time. Please call back as soon as possible to discuss.     Action Taken: Message routed to:  [facility] ([facility]): Hep    Travel Screening: Not Applicable     Date of Service:

## 2024-09-30 NOTE — PROGRESS NOTES
Prior to immunization administration, verified patients identity using patient s name and date of birth. Please see Immunization Activity for additional information.     Is the patient's temperature normal (100.5 or less)? Yes     Patient MEETS CRITERIA. PROCEED with vaccine administration.          9/30/2024   COVID   Have you had myocarditis or pericarditis (inflammation of or around the heart muscle) after getting a COVID-19 vaccine? No   Have you had a serious reaction to a COVID vaccine or something in a COVID vaccine, like polyethylene glycol (PEG) or polysorbate? No   Have you had multisystem inflammatory syndrome from COVID-19 in the past 90 days? No            Patient MEETS CRITERIA. PROCEED with vaccine administration.        9/30/2024   INFLUENZA   Would you like to receive the flu shot or the nasal flu vaccine today? Flu Shot   Have you had a serious reaction to a flu vaccine or something in a flu vaccine? No   Have you had Guillain-Wever syndrome within 6 weeks of getting a vaccine? No   Have you received a bone marrow transplant within the previous 6 months? No            Patient MEETS CRITERIA. PROCEED with vaccine administration.        Patient instructed to remain in clinic for 15 minutes afterwards, and to report any adverse reactions.      Link to Ancillary Visit Immunization Standing Orders SmartSet     Screening performed by Vira Silverio MA on 9/30/2024 at 12:55 PM.

## 2024-10-01 ENCOUNTER — VIRTUAL VISIT (OUTPATIENT)
Dept: GASTROENTEROLOGY | Facility: CLINIC | Age: 74
End: 2024-10-01
Attending: INTERNAL MEDICINE
Payer: COMMERCIAL

## 2024-10-01 VITALS
HEIGHT: 65 IN | DIASTOLIC BLOOD PRESSURE: 70 MMHG | BODY MASS INDEX: 32.99 KG/M2 | SYSTOLIC BLOOD PRESSURE: 129 MMHG | WEIGHT: 198 LBS

## 2024-10-01 DIAGNOSIS — R79.89 ELEVATED LFTS: ICD-10-CM

## 2024-10-01 PROCEDURE — 99441 PR PHYSICIAN TELEPHONE EVALUATION 5-10 MIN: CPT | Mod: 93 | Performed by: INTERNAL MEDICINE

## 2024-10-01 ASSESSMENT — PAIN SCALES - GENERAL: PAINLEVEL: NO PAIN (0)

## 2024-10-01 NOTE — PROGRESS NOTES
Virtual Visit Details    Type of service:  Telephone Visit   Phone call duration: 5 minutes   Originating Location (pt. Location): Home    Distant Location (provider location):  On-site    Mr. Gomes asked to do a phone visit today instead of video because of technology problems on his end.    He reports that he has no GI or hepatic symptoms.  He does have chronic back pain, but is otherwise apparently feeling well.    His liver tests and ferritin have returned to normal.    We discussed the fact that we do not know what the cause of his liver injury in April was.  This may be related to his medications (including atorvastatin).  He may also have had an unidentified infection.  In any case, he has had normalization of his liver tests.  I suggest that he follow-up in clinic with Dr. Bundy as scheduled, and I would appreciate it if they could check a repeat liver panel at that time.    His questions were answered.  I will not schedule follow-up in this clinic, but he is welcome to return if new liver issues arise.    About 10 minutes spent with patient and reviewing results.

## 2024-10-01 NOTE — NURSING NOTE
Current patient location: [address] [place] [address]    Is the patient currently in the state of [place]? YES    Visit mode:TELEPHONE    If the visit is dropped, the patient can be reconnected by: TELEPHONE VISIT: Phone number: [phone]    Will anyone else be joining the visit? NO  (If patient encounters technical issues they should call [phone]    Are changes needed to the allergy or medication list? No    Are refills needed on medications prescribed by this physician? NO    Rooming Documentation:  Questionnaire(s) completed    Reason for visit: MARIA M SHEETSF

## 2024-10-01 NOTE — TELEPHONE ENCOUNTER
Returned call to patient. VM left explaining writer sent message to Dr. Cheng asking if this was OK.    SOFY SilvaN, RN, PHN  Hepatology Clinic  [facility]  [facility]

## 2024-10-14 DIAGNOSIS — R35.1 BPH ASSOCIATED WITH NOCTURIA: ICD-10-CM

## 2024-10-14 DIAGNOSIS — N40.1 BPH ASSOCIATED WITH NOCTURIA: ICD-10-CM

## 2024-10-14 RX ORDER — TAMSULOSIN HYDROCHLORIDE 0.4 MG/1
CAPSULE ORAL
Qty: 90 CAPSULE | Refills: 3 | Status: SHIPPED | OUTPATIENT
Start: 2024-10-14

## 2024-12-12 ENCOUNTER — OFFICE VISIT (OUTPATIENT)
Dept: INTERNAL MEDICINE | Facility: CLINIC | Age: 74
End: 2024-12-12
Payer: COMMERCIAL

## 2024-12-12 VITALS
OXYGEN SATURATION: 99 % | DIASTOLIC BLOOD PRESSURE: 64 MMHG | HEART RATE: 73 BPM | WEIGHT: 207 LBS | TEMPERATURE: 98.3 F | BODY MASS INDEX: 34.49 KG/M2 | RESPIRATION RATE: 13 BRPM | HEIGHT: 65 IN | SYSTOLIC BLOOD PRESSURE: 112 MMHG

## 2024-12-12 DIAGNOSIS — M54.50 CHRONIC BILATERAL LOW BACK PAIN WITHOUT SCIATICA: ICD-10-CM

## 2024-12-12 DIAGNOSIS — I10 ESSENTIAL HYPERTENSION: Primary | ICD-10-CM

## 2024-12-12 DIAGNOSIS — M25.511 CHRONIC PAIN OF BOTH SHOULDERS: ICD-10-CM

## 2024-12-12 DIAGNOSIS — G89.29 CHRONIC BILATERAL LOW BACK PAIN WITHOUT SCIATICA: ICD-10-CM

## 2024-12-12 DIAGNOSIS — G89.29 CHRONIC PAIN OF BOTH SHOULDERS: ICD-10-CM

## 2024-12-12 DIAGNOSIS — F10.20 ALCOHOLISM (H): ICD-10-CM

## 2024-12-12 DIAGNOSIS — M25.512 CHRONIC PAIN OF BOTH SHOULDERS: ICD-10-CM

## 2024-12-12 DIAGNOSIS — E78.00 HYPERCHOLESTEREMIA: ICD-10-CM

## 2024-12-12 DIAGNOSIS — F32.0 MILD MAJOR DEPRESSION (H): ICD-10-CM

## 2024-12-12 DIAGNOSIS — F41.9 ANXIETY DISORDER, UNSPECIFIED TYPE: ICD-10-CM

## 2024-12-12 DIAGNOSIS — Z86.19 HISTORY OF HEPATITIS: ICD-10-CM

## 2024-12-12 DIAGNOSIS — K86.2 PANCREATIC CYST: ICD-10-CM

## 2024-12-12 LAB
ALBUMIN SERPL BCG-MCNC: 4.2 G/DL (ref 3.5–5.2)
ALP SERPL-CCNC: 78 U/L (ref 40–150)
ALT SERPL W P-5'-P-CCNC: 30 U/L (ref 0–70)
ANION GAP SERPL CALCULATED.3IONS-SCNC: 12 MMOL/L (ref 7–15)
AST SERPL W P-5'-P-CCNC: 30 U/L (ref 0–45)
BILIRUB SERPL-MCNC: 0.6 MG/DL
BUN SERPL-MCNC: 20.8 MG/DL (ref 8–23)
CALCIUM SERPL-MCNC: 9.8 MG/DL (ref 8.8–10.4)
CHLORIDE SERPL-SCNC: 101 MMOL/L (ref 98–107)
CHOLEST SERPL-MCNC: 230 MG/DL
CREAT SERPL-MCNC: 1.1 MG/DL (ref 0.67–1.17)
CRP SERPL-MCNC: <3 MG/L
EGFRCR SERPLBLD CKD-EPI 2021: 70 ML/MIN/1.73M2
ERYTHROCYTE [SEDIMENTATION RATE] IN BLOOD BY WESTERGREN METHOD: 10 MM/HR (ref 0–20)
FASTING STATUS PATIENT QL REPORTED: YES
FASTING STATUS PATIENT QL REPORTED: YES
GLUCOSE SERPL-MCNC: 100 MG/DL (ref 70–99)
HCO3 SERPL-SCNC: 23 MMOL/L (ref 22–29)
HDLC SERPL-MCNC: 56 MG/DL
LDLC SERPL CALC-MCNC: 152 MG/DL
NONHDLC SERPL-MCNC: 174 MG/DL
POTASSIUM SERPL-SCNC: 4.9 MMOL/L (ref 3.4–5.3)
PROT SERPL-MCNC: 6.9 G/DL (ref 6.4–8.3)
SODIUM SERPL-SCNC: 136 MMOL/L (ref 135–145)
TRIGL SERPL-MCNC: 110 MG/DL

## 2024-12-12 ASSESSMENT — PATIENT HEALTH QUESTIONNAIRE - PHQ9
10. IF YOU CHECKED OFF ANY PROBLEMS, HOW DIFFICULT HAVE THESE PROBLEMS MADE IT FOR YOU TO DO YOUR WORK, TAKE CARE OF THINGS AT HOME, OR GET ALONG WITH OTHER PEOPLE: NOT DIFFICULT AT ALL
SUM OF ALL RESPONSES TO PHQ QUESTIONS 1-9: 1
SUM OF ALL RESPONSES TO PHQ QUESTIONS 1-9: 1

## 2024-12-12 NOTE — PROGRESS NOTES
1. Essential hypertension (Primary)  Good control.  Continue regimen.  - Lipid panel reflex to direct LDL Fasting; Future  - Comprehensive metabolic panel (BMP + Alb, Alk Phos, ALT, AST, Total. Bili, TP); Future  - Lipid panel reflex to direct LDL Fasting  - Comprehensive metabolic panel (BMP + Alb, Alk Phos, ALT, AST, Total. Bili, TP)    2. Chronic bilateral low back pain without sciatica  Continue his exercises.  Ongoing efforts at weight loss urged.    3. Pancreatic cyst  He will have another ultrasound in June.    4. Alcoholism (H)  He has been maintaining his sobriety.    5. Mild major depression (H)  Mood is good on no medications.    6. Anxiety disorder, unspecified type  On no medications and doing well.    7. History of hepatitis  Liver enzymes returned to normal.  Etiology unclear.  Off statins for life.    8. Hypercholesteremia  Will check lipids off statin.  - Lipid panel reflex to direct LDL Fasting; Future  - Comprehensive metabolic panel (BMP + Alb, Alk Phos, ALT, AST, Total. Bili, TP); Future  - Lipid panel reflex to direct LDL Fasting  - Comprehensive metabolic panel (BMP + Alb, Alk Phos, ALT, AST, Total. Bili, TP)    9. Chronic pain of both shoulders  Check sed rate.  Likely are neuritis versus rotator cuff etiology.  Suggested orthopedic evaluation.  He will consider.  - Orthopedic  Referral; Future  - ESR: Erythrocyte sedimentation rate; Future  - CRP, inflammation; Future  - ESR: Erythrocyte sedimentation rate  - CRP, inflammation     Subjective   Galo is a 74 year old, presenting for the following health issues:  Hypertension (4 months follow up - fasting for labs if needed )      12/12/2024    10:40 AM   Additional Questions   Roomed by Vira   Accompanied by inessa         12/12/2024    10:40 AM   Patient Reported Additional Medications   Patient reports taking the following new medications none     History of Present Illness       Back Pain:  He presents for follow up of back pain.  Patient's back pain is a chronic problem.  Location of back pain:  Right shoulder, right buttock, left buttock and other  Description of back pain: cramping, dull ache, sharp and other  Back pain spreads: right buttocks and left buttocks    Since patient first noticed back pain, pain is: always present, but gets better and worse  Does back pain interfere with his job:  Not applicable       Hyperlipidemia:  He presents for follow up of hyperlipidemia.   He is not taking medication to lower cholesterol. He is having myalgia or other side effects to statin medications.    Hypertension: He presents for follow up of hypertension.  He does check blood pressure  regularly outside of the clinic. Outpatient blood pressures have not been over 140/90. He follows a low salt diet.     Reason for visit:  Was scheduled.  Long list..see previous questions.    He eats 4 or more servings of fruits and vegetables daily.He consumes 0 sweetened beverage(s) daily.He exercises with enough effort to increase his heart rate 60 or more minutes per day.  He exercises with enough effort to increase his heart rate 7 days per week.   He is taking medications regularly.           Efrain comes in today for follow-up of his multimedical problems he was in [facility] with his son.  That went well.  He continues to have his ongoing chronic back pain Metros been stable.  He does his exercises.  He has a pancreatic cyst which has been is getting smaller.  Likely due to pancreatitis in the past.  He will be having that reimaged in the summer.  He remains sober he tells me.  Mood has been good and he thinks that coming off the beta-blocker has helped considerably.  He had an acute hepatitis of unclear etiology earlier this year.  He had resolution of his elevated liver enzymes.  Did see GI.  He notes pain of his shoulders bilaterally.  He wonders if he can get another shot.  He had 1 shot in his shoulder years ago but heard that maybe he should not  "have another one.          Objective    /64 (BP Location: Left arm, Patient Position: Sitting, Cuff Size: Adult Regular)   Pulse 73   Temp 98.3  F (36.8  C) (Tympanic)   Resp 13   Ht 1.651 m (5' 5\")   Wt 93.9 kg (207 lb)   SpO2 99%   BMI 34.45 kg/m    Body mass index is 34.45 kg/m .  Physical Exam   Pleasant gentleman who looks well and is in no distress.  He is in good spirits today.            Signed Electronically by: ANAY NAVARRO MD    "

## 2024-12-16 ENCOUNTER — PATIENT OUTREACH (OUTPATIENT)
Dept: CARE COORDINATION | Facility: CLINIC | Age: 74
End: 2024-12-16
Payer: MEDICARE

## 2025-06-05 ENCOUNTER — OFFICE VISIT (OUTPATIENT)
Dept: INTERNAL MEDICINE | Facility: CLINIC | Age: 75
End: 2025-06-05
Attending: INTERNAL MEDICINE
Payer: MEDICARE

## 2025-06-10 ENCOUNTER — OFFICE VISIT (OUTPATIENT)
Dept: INTERNAL MEDICINE | Facility: CLINIC | Age: 75
End: 2025-06-10
Payer: MEDICARE

## 2025-06-10 VITALS
BODY MASS INDEX: 35.67 KG/M2 | RESPIRATION RATE: 11 BRPM | TEMPERATURE: 98.6 F | OXYGEN SATURATION: 98 % | HEART RATE: 100 BPM | DIASTOLIC BLOOD PRESSURE: 56 MMHG | HEIGHT: 65 IN | SYSTOLIC BLOOD PRESSURE: 108 MMHG | WEIGHT: 214.1 LBS

## 2025-06-10 DIAGNOSIS — Z01.818 PREOPERATIVE EXAMINATION: ICD-10-CM

## 2025-06-10 DIAGNOSIS — S42.201D CLOSED FRACTURE OF PROXIMAL END OF RIGHT HUMERUS WITH ROUTINE HEALING, UNSPECIFIED FRACTURE MORPHOLOGY, SUBSEQUENT ENCOUNTER: Primary | ICD-10-CM

## 2025-06-10 PROBLEM — M47.816 SPONDYLOSIS OF LUMBAR REGION WITHOUT MYELOPATHY OR RADICULOPATHY: Status: ACTIVE | Noted: 2025-06-10

## 2025-06-10 PROBLEM — R50.9 FEVER: Status: RESOLVED | Noted: 2024-07-25 | Resolved: 2025-06-10

## 2025-06-10 PROBLEM — E66.01 MORBID OBESITY (H): Status: RESOLVED | Noted: 2022-08-18 | Resolved: 2025-06-10

## 2025-06-10 PROBLEM — L12.0 BULLOUS PEMPHIGOID (H): Status: RESOLVED | Noted: 2019-07-11 | Resolved: 2025-06-10

## 2025-06-10 PROBLEM — R74.8 ELEVATED LIPASE: Status: RESOLVED | Noted: 2021-10-23 | Resolved: 2025-06-10

## 2025-06-10 PROBLEM — F10.20 ALCOHOLISM (H): Status: RESOLVED | Noted: 2018-05-14 | Resolved: 2025-06-10

## 2025-06-10 PROBLEM — K82.4 GALLBLADDER POLYP: Status: RESOLVED | Noted: 2024-07-29 | Resolved: 2025-06-10

## 2025-06-10 PROBLEM — K86.2 PANCREATIC CYST: Status: RESOLVED | Noted: 2022-12-14 | Resolved: 2025-06-10

## 2025-06-10 PROBLEM — F41.9 ANXIETY DISORDER, UNSPECIFIED: Status: RESOLVED | Noted: 2021-11-02 | Resolved: 2025-06-10

## 2025-06-10 PROBLEM — R63.0 ANOREXIA: Status: RESOLVED | Noted: 2024-07-25 | Resolved: 2025-06-10

## 2025-06-10 PROBLEM — F32.0 MILD MAJOR DEPRESSION: Status: RESOLVED | Noted: 2023-04-18 | Resolved: 2025-06-10

## 2025-06-10 LAB
ERYTHROCYTE [DISTWIDTH] IN BLOOD BY AUTOMATED COUNT: 13.7 % (ref 10–15)
HCT VFR BLD AUTO: 34.6 % (ref 40–53)
HGB BLD-MCNC: 11.7 G/DL (ref 13.3–17.7)
MCH RBC QN AUTO: 30.2 PG (ref 26.5–33)
MCHC RBC AUTO-ENTMCNC: 33.8 G/DL (ref 31.5–36.5)
MCV RBC AUTO: 89 FL (ref 78–100)
PLATELET # BLD AUTO: 171 10E3/UL (ref 150–450)
RBC # BLD AUTO: 3.87 10E6/UL (ref 4.4–5.9)
WBC # BLD AUTO: 10.5 10E3/UL (ref 4–11)

## 2025-06-10 PROCEDURE — 99214 OFFICE O/P EST MOD 30 MIN: CPT | Performed by: INTERNAL MEDICINE

## 2025-06-10 PROCEDURE — 1125F AMNT PAIN NOTED PAIN PRSNT: CPT | Performed by: INTERNAL MEDICINE

## 2025-06-10 PROCEDURE — 80053 COMPREHEN METABOLIC PANEL: CPT | Performed by: INTERNAL MEDICINE

## 2025-06-10 PROCEDURE — 3078F DIAST BP <80 MM HG: CPT | Performed by: INTERNAL MEDICINE

## 2025-06-10 PROCEDURE — 3074F SYST BP LT 130 MM HG: CPT | Performed by: INTERNAL MEDICINE

## 2025-06-10 PROCEDURE — 36415 COLL VENOUS BLD VENIPUNCTURE: CPT | Performed by: INTERNAL MEDICINE

## 2025-06-10 PROCEDURE — 85027 COMPLETE CBC AUTOMATED: CPT | Performed by: INTERNAL MEDICINE

## 2025-06-10 RX ORDER — OXYCODONE HYDROCHLORIDE 5 MG/1
5-10 TABLET ORAL
COMMUNITY
Start: 2025-06-10 | End: 2025-06-10

## 2025-06-10 RX ORDER — HYDROCODONE BITARTRATE AND ACETAMINOPHEN 5; 325 MG/1; MG/1
1 TABLET ORAL EVERY 8 HOURS PRN
Qty: 18 TABLET | Refills: 0 | Status: SHIPPED | OUTPATIENT
Start: 2025-06-10 | End: 2025-06-13

## 2025-06-10 ASSESSMENT — PATIENT HEALTH QUESTIONNAIRE - PHQ9
SUM OF ALL RESPONSES TO PHQ QUESTIONS 1-9: 1
SUM OF ALL RESPONSES TO PHQ QUESTIONS 1-9: 1

## 2025-06-10 ASSESSMENT — PAIN SCALES - GENERAL: PAINLEVEL_OUTOF10: MODERATE PAIN (6)

## 2025-06-10 NOTE — PROGRESS NOTES
Preoperative Evaluation  [facility]  [address]  [place] [address]  Phone: [phone]  Fax: [phone]  Primary Provider: ANAY NAVARRO MD  Pre-op Performing Provider: Scott Jiménez MD  Zaheer 10, 2025         6/10/2025   Surgical Information   What procedure is being done? shoulder replacement (right)   Facility or Hospital where surgery will be performed: [facility]   Who is doing the surgery? Dr fan   Date of surgery : thursday june 12   Time of surgery: 12   Where do you plan to recover after surgery? at home with family     Fax number for surgical facility: [phone] -- Attn: Angelique    Assessment & Plan     The proposed surgical procedure is considered LOW risk.    Closed fracture of proximal end of right humerus with routine healing, unspecified fracture morphology, subsequent encounter    Preoperative examination  He is medically stable.  He has no history of heart or lung disease.  He has not recently been ill. His lab tests are unremarkable.  His slightly low hemoglobin is likely related to blood loss from his fracture.  I find no contraindication to his having shoulder replacement for his right humerus fracture.  I recommend proceeding as planned.       - No identified additional risk factors other than previously addressed    Recommendation  Approval given to proceed with proposed procedure, without further diagnostic evaluation.    Follow up next scheduled visit    Leonel Rosenthal is a 75 year old, presenting for the following:  Pre-Op Exam (/Fax: [phone])        6/10/2025     3:53 PM   Additional Questions   Roomed by Catrachita HARDY: he has been well.  He tripped and fell and incurred a fracture of his proximal humerus.  He also had an abrasion of his left knee, and no other significant injury.  He feels well except for the pain related to his right proximal humerus fracture.  Not recently ill, or having any  unusual dyspnea or cough.  No history of heart disease or lung disease.  Remote pipe smoker.           6/10/2025   Pre-Op Questionnaire   Have you ever had a heart attack or stroke? No   Have you ever had surgery on your heart or blood vessels, such as a stent placement, a coronary artery bypass, or surgery on an artery in your head, neck, heart, or legs? No   Do you have chest pain with activity? No   Do you have a history of heart failure? No   Do you currently have a cold, bronchitis or symptoms of other infection? No   Do you have a cough, shortness of breath, or wheezing? No   Do you or anyone in your family have previous history of blood clots? No   Do you or does anyone in your family have a serious bleeding problem such as prolonged bleeding following surgeries or cuts? No   Have you ever had problems with anemia or been told to take iron pills? No   Have you had any abnormal blood loss such as black, tarry or bloody stools? No   Have you ever had a blood transfusion? No   Are you willing to have a blood transfusion if it is medically needed before, during, or after your surgery? Yes   Have you or any of your relatives ever had problems with anesthesia? No   Do you have sleep apnea, excessive snoring or daytime drowsiness? No   Do you have any artifical heart valves or other implanted medical devices like a pacemaker, defibrillator, or continuous glucose monitor? No   Do you have artificial joints? No   Are you allergic to latex? No     Patient Active Problem List    Diagnosis Date Noted    Gallbladder polyp 07/29/2024     Priority: Medium    Elevated LFTs 07/25/2024     Priority: Medium    Anorexia 07/25/2024     Priority: Medium    Fever 07/25/2024     Priority: Medium    BPH (benign prostatic hyperplasia)      Priority: Medium    Peripheral arterial disease (H24)--mild distal disease on CTA in 2022 05/29/2024     Priority: Medium    Mild major depression 04/18/2023     Priority: Medium    Pancreatic cyst  12/14/2022     Priority: Medium    Morbid obesity (H) 08/18/2022     Priority: Medium    Anxiety disorder, unspecified 11/02/2021     Priority: Medium    Elevated lipase 10/23/2021     Priority: Medium    Bullous pemphigoid (H) 07/11/2019     Priority: Medium    Fatty liver 05/31/2018     Priority: Medium    Alcoholism (H) 05/14/2018     Priority: Medium    Obesity 01/22/2018     Priority: Medium    Essential hypertension 12/27/2016     Priority: Medium    Hypercholesteremia 01/26/2016     Priority: Medium    Low back pain 12/16/2015     Priority: Medium    ED (erectile dysfunction) 01/22/2015     Priority: Medium    Chronic sinusitis 01/22/2015     Priority: Medium      Past Medical History:   Diagnosis Date    Alcoholism (H)     BPH (benign prostatic hyperplasia)     Chronic pain syndrome     ED (erectile dysfunction)     Fatty liver     History of anemia     History of pancreatitis     HLD (hyperlipidemia)     Hypertension     Lumbar back pain with radiculopathy affecting lower extremity     Mild major depression     Morbid obesity (H)     Pancreatic cyst      Past Surgical History:   Procedure Laterality Date    COLONOSCOPY      ENDOSCOPIC ULTRASOUND UPPER GASTROINTESTINAL TRACT (GI) N/A 8/29/2022    Procedure: ENDOSCOPIC ULTRASOUND, ESOPHAGOSCOPY / UPPER GASTROINTESTINAL TRACT (GI);  Surgeon: Zana Perez MD;  Location: [facility] OR     Current Outpatient Medications   Medication Sig Dispense Refill    cholecalciferol, vitamin D3, (VITAMIN D3) 2,000 unit Tab [CHOLECALCIFEROL, VITAMIN D3, (VITAMIN D3) 2,000 UNIT TAB] Take 1 tablet by mouth daily.      lisinopril (ZESTRIL) 40 MG tablet Take 0.5 tablets (20 mg) by mouth daily. 45 tablet 3    tamsulosin (FLOMAX) 0.4 MG capsule TAKE 1 CAPSULE(0.4 MG) BY MOUTH EVERY EVENING 90 capsule 3    oxyCODONE (ROXICODONE) 5 MG tablet Take 5-10 mg by mouth. (Patient not taking: Reported on 6/10/2025)       Allergies   Allergen Reactions    Statins      Stopped due to acute  "hepatitis of unknown cause.     Sulfamethoxazole-Trimethoprim Rash    Sulfamethoxazole-Trimethoprim Rash      Social History     Tobacco Use    Smoking status: Former     Types: Pipe     Quit date: 1994     Years since quittin.4    Smokeless tobacco: Never   Substance Use Topics    Alcohol use: Not Currently     Comment: sober since 10/2021     Family History   Problem Relation Age of Onset    Cancer Mother     Cancer Father     Diabetes Brother      History   Drug Use Unknown     Review of Systems  See HPI    Objective      PHYSICAL EXAM:  General Appearance: In no acute distress  Vitals:    06/10/25 1558   BP: 108/56   BP Location: Left arm   Patient Position: Sitting   Cuff Size: Adult Large   Pulse: 100   Resp: 11   Temp: 98.6  F (37  C)   TempSrc: Temporal   SpO2: 98%   Weight: 97.1 kg (214 lb 1.6 oz)   Height: 1.651 m (5' 5\")     Estimated body mass index is 35.63 kg/m  as calculated from the following:    Height as of this encounter: 1.651 m (5' 5\").    Weight as of this encounter: 97.1 kg (214 lb 1.6 oz).  EYES: Clear  NECK:  without cervical or axillary adenopathy  RESPIRATORY: Clear   CARDIOVASCULAR: S1, S2  ABDOMEN: soft, flat, and non-tender  EXTREMITIES: right arm in a sling     Diagnostics  Recent Results (from the past 48 hours)   CBC with platelets    Collection Time: 06/10/25  4:45 PM   Result Value Ref Range    WBC Count 10.5 4.0 - 11.0 10e3/uL    RBC Count 3.87 (L) 4.40 - 5.90 10e6/uL    Hemoglobin 11.7 (L) 13.3 - 17.7 g/dL    Hematocrit 34.6 (L) 40.0 - 53.0 %    MCV 89 78 - 100 fL    MCH 30.2 26.5 - 33.0 pg    MCHC 33.8 31.5 - 36.5 g/dL    RDW 13.7 10.0 - 15.0 %    Platelet Count 171 150 - 450 10e3/uL   Comprehensive metabolic panel (BMP + Alb, Alk Phos, ALT, AST, Total. Bili, TP)    Collection Time: 06/10/25  4:45 PM   Result Value Ref Range    Sodium 134 (L) 135 - 145 mmol/L    Potassium 4.8 3.4 - 5.3 mmol/L    Carbon Dioxide (CO2) 24 22 - 29 mmol/L    Anion Gap 8 7 - 15 mmol/L    Urea " Nitrogen 29.1 (H) 8.0 - 23.0 mg/dL    Creatinine 1.17 0.67 - 1.17 mg/dL    GFR Estimate 65 >60 mL/min/1.73m2    Calcium 9.0 8.8 - 10.4 mg/dL    Chloride 102 98 - 107 mmol/L    Glucose 96 70 - 99 mg/dL    Alkaline Phosphatase 63 40 - 150 U/L    AST 25 0 - 45 U/L    ALT 23 0 - 70 U/L    Protein Total 6.3 (L) 6.4 - 8.3 g/dL    Albumin 4.0 3.5 - 5.2 g/dL    Bilirubin Total 0.5 <=1.2 mg/dL      Revised Cardiac Risk Index (RCRI)  The patient has the following serious cardiovascular risks for perioperative complications:   - No serious cardiac risks = 0 points     RCRI Interpretation: 0 points: Class I (very low risk - 0.4% complication rate)    Signed Electronically by: Scott Jiménez MD  A copy of this evaluation report is provided to the requesting physician.    Answers submitted by the patient for this visit:  Patient Health Questionnaire (Submitted on 6/10/2025)  PHQ9 TOTAL SCORE: 1

## 2025-06-11 ENCOUNTER — TELEPHONE (OUTPATIENT)
Dept: INTERNAL MEDICINE | Facility: CLINIC | Age: 75
End: 2025-06-11
Payer: MEDICARE

## 2025-06-11 LAB
ALBUMIN SERPL BCG-MCNC: 4 G/DL (ref 3.5–5.2)
ALP SERPL-CCNC: 63 U/L (ref 40–150)
ALT SERPL W P-5'-P-CCNC: 23 U/L (ref 0–70)
ANION GAP SERPL CALCULATED.3IONS-SCNC: 8 MMOL/L (ref 7–15)
AST SERPL W P-5'-P-CCNC: 25 U/L (ref 0–45)
BILIRUB SERPL-MCNC: 0.5 MG/DL
BUN SERPL-MCNC: 29.1 MG/DL (ref 8–23)
CALCIUM SERPL-MCNC: 9 MG/DL (ref 8.8–10.4)
CHLORIDE SERPL-SCNC: 102 MMOL/L (ref 98–107)
CREAT SERPL-MCNC: 1.17 MG/DL (ref 0.67–1.17)
EGFRCR SERPLBLD CKD-EPI 2021: 65 ML/MIN/1.73M2
GLUCOSE SERPL-MCNC: 96 MG/DL (ref 70–99)
HCO3 SERPL-SCNC: 24 MMOL/L (ref 22–29)
POTASSIUM SERPL-SCNC: 4.8 MMOL/L (ref 3.4–5.3)
PROT SERPL-MCNC: 6.3 G/DL (ref 6.4–8.3)
SODIUM SERPL-SCNC: 134 MMOL/L (ref 135–145)

## 2025-06-11 NOTE — TELEPHONE ENCOUNTER
June 11, 2025    Patient's pre-operative physical documentation and labs have been completed by Dr. Bundy.  The pre-operative paperwork was faxed to [facility] at [phone]  per instructions from the surgeon.    Caroline Callahan

## 2025-06-16 ENCOUNTER — TELEPHONE (OUTPATIENT)
Dept: INTERNAL MEDICINE | Facility: CLINIC | Age: 75
End: 2025-06-16
Payer: MEDICARE

## 2025-06-17 NOTE — TELEPHONE ENCOUNTER
June 16, 2025    Home health orders was received via fax for Dr. Bundy.  Patient label was attached to paperwork and placed in provider's inbox to be signed.    Sierra Flores    
June 17, 2025    Home health orders was picked up from outbox of Dr. Bundy and sent via fax to [phone].    Sierra Flores    
4 = No assist / stand by assistance

## 2025-06-23 ENCOUNTER — TELEPHONE (OUTPATIENT)
Dept: INTERNAL MEDICINE | Facility: CLINIC | Age: 75
End: 2025-06-23
Payer: MEDICARE

## 2025-06-23 NOTE — TELEPHONE ENCOUNTER
June 23, 2025    Home health orders was received via fax for Dr. Bundy.  Patient label was attached to paperwork and placed in provider's inbox to be signed.    Sierra Flores

## 2025-06-30 ENCOUNTER — OFFICE VISIT (OUTPATIENT)
Dept: INTERNAL MEDICINE | Facility: CLINIC | Age: 75
End: 2025-06-30
Payer: MEDICARE

## 2025-06-30 VITALS
SYSTOLIC BLOOD PRESSURE: 92 MMHG | TEMPERATURE: 97.5 F | DIASTOLIC BLOOD PRESSURE: 52 MMHG | WEIGHT: 209 LBS | RESPIRATION RATE: 17 BRPM | HEIGHT: 65 IN | OXYGEN SATURATION: 98 % | HEART RATE: 77 BPM | BODY MASS INDEX: 34.82 KG/M2

## 2025-06-30 DIAGNOSIS — F10.21 ALCOHOL DEPENDENCE IN REMISSION (H): ICD-10-CM

## 2025-06-30 DIAGNOSIS — K76.0 FATTY LIVER: ICD-10-CM

## 2025-06-30 DIAGNOSIS — S42.201D CLOSED FRACTURE OF PROXIMAL END OF RIGHT HUMERUS WITH ROUTINE HEALING, UNSPECIFIED FRACTURE MORPHOLOGY, SUBSEQUENT ENCOUNTER: ICD-10-CM

## 2025-06-30 DIAGNOSIS — K82.4 GALLBLADDER POLYP: ICD-10-CM

## 2025-06-30 DIAGNOSIS — I10 ESSENTIAL HYPERTENSION: ICD-10-CM

## 2025-06-30 DIAGNOSIS — Z00.00 ENCOUNTER FOR MEDICARE ANNUAL WELLNESS EXAM: Primary | ICD-10-CM

## 2025-06-30 DIAGNOSIS — B17.9 ACUTE HEPATITIS: ICD-10-CM

## 2025-06-30 DIAGNOSIS — K86.2 PANCREATIC CYST: ICD-10-CM

## 2025-06-30 DIAGNOSIS — Z96.611 S/P REVERSE TOTAL SHOULDER ARTHROPLASTY, RIGHT: ICD-10-CM

## 2025-06-30 DIAGNOSIS — R35.1 BPH ASSOCIATED WITH NOCTURIA: ICD-10-CM

## 2025-06-30 DIAGNOSIS — N40.1 BPH ASSOCIATED WITH NOCTURIA: ICD-10-CM

## 2025-06-30 DIAGNOSIS — Z23 NEED FOR VACCINATION: ICD-10-CM

## 2025-06-30 DIAGNOSIS — G89.29 CHRONIC BILATERAL LOW BACK PAIN WITHOUT SCIATICA: ICD-10-CM

## 2025-06-30 DIAGNOSIS — M54.50 CHRONIC BILATERAL LOW BACK PAIN WITHOUT SCIATICA: ICD-10-CM

## 2025-06-30 LAB
ALBUMIN SERPL BCG-MCNC: 4 G/DL (ref 3.5–5.2)
ALBUMIN UR-MCNC: NEGATIVE MG/DL
ALP SERPL-CCNC: 167 U/L (ref 40–150)
ALT SERPL W P-5'-P-CCNC: 23 U/L (ref 0–70)
ANION GAP SERPL CALCULATED.3IONS-SCNC: 12 MMOL/L (ref 7–15)
APPEARANCE UR: CLEAR
AST SERPL W P-5'-P-CCNC: 31 U/L (ref 0–45)
BILIRUB SERPL-MCNC: 0.6 MG/DL
BILIRUB UR QL STRIP: NEGATIVE
BUN SERPL-MCNC: 23 MG/DL (ref 8–23)
CALCIUM SERPL-MCNC: 9.7 MG/DL (ref 8.8–10.4)
CHLORIDE SERPL-SCNC: 100 MMOL/L (ref 98–107)
CHOLEST SERPL-MCNC: 189 MG/DL
COLOR UR AUTO: YELLOW
CREAT SERPL-MCNC: 1.03 MG/DL (ref 0.67–1.17)
EGFRCR SERPLBLD CKD-EPI 2021: 75 ML/MIN/1.73M2
ERYTHROCYTE [DISTWIDTH] IN BLOOD BY AUTOMATED COUNT: 14 % (ref 10–15)
FASTING STATUS PATIENT QL REPORTED: NO
FASTING STATUS PATIENT QL REPORTED: NO
GLUCOSE SERPL-MCNC: 109 MG/DL (ref 70–99)
GLUCOSE UR STRIP-MCNC: NEGATIVE MG/DL
HCO3 SERPL-SCNC: 23 MMOL/L (ref 22–29)
HCT VFR BLD AUTO: 36.2 % (ref 40–53)
HDLC SERPL-MCNC: 47 MG/DL
HGB BLD-MCNC: 11.7 G/DL (ref 13.3–17.7)
HGB UR QL STRIP: NEGATIVE
KETONES UR STRIP-MCNC: NEGATIVE MG/DL
LDLC SERPL CALC-MCNC: 121 MG/DL
LEUKOCYTE ESTERASE UR QL STRIP: NEGATIVE
MCH RBC QN AUTO: 29.7 PG (ref 26.5–33)
MCHC RBC AUTO-ENTMCNC: 32.3 G/DL (ref 31.5–36.5)
MCV RBC AUTO: 92 FL (ref 78–100)
NITRATE UR QL: NEGATIVE
NONHDLC SERPL-MCNC: 142 MG/DL
PH UR STRIP: 6.5 [PH] (ref 5–8)
PLATELET # BLD AUTO: 397 10E3/UL (ref 150–450)
POTASSIUM SERPL-SCNC: 4.9 MMOL/L (ref 3.4–5.3)
PROT SERPL-MCNC: 6.7 G/DL (ref 6.4–8.3)
PSA SERPL DL<=0.01 NG/ML-MCNC: 1.81 NG/ML (ref 0–6.5)
RBC # BLD AUTO: 3.94 10E6/UL (ref 4.4–5.9)
SODIUM SERPL-SCNC: 135 MMOL/L (ref 135–145)
SP GR UR STRIP: 1.02 (ref 1–1.03)
TRIGL SERPL-MCNC: 106 MG/DL
UROBILINOGEN UR STRIP-ACNC: 1 E.U./DL
WBC # BLD AUTO: 7.5 10E3/UL (ref 4–11)

## 2025-06-30 PROCEDURE — G0439 PPPS, SUBSEQ VISIT: HCPCS | Performed by: INTERNAL MEDICINE

## 2025-06-30 PROCEDURE — 80053 COMPREHEN METABOLIC PANEL: CPT | Performed by: INTERNAL MEDICINE

## 2025-06-30 PROCEDURE — G2211 COMPLEX E/M VISIT ADD ON: HCPCS | Performed by: INTERNAL MEDICINE

## 2025-06-30 PROCEDURE — 99214 OFFICE O/P EST MOD 30 MIN: CPT | Mod: 25 | Performed by: INTERNAL MEDICINE

## 2025-06-30 PROCEDURE — 3074F SYST BP LT 130 MM HG: CPT | Performed by: INTERNAL MEDICINE

## 2025-06-30 PROCEDURE — 3078F DIAST BP <80 MM HG: CPT | Performed by: INTERNAL MEDICINE

## 2025-06-30 PROCEDURE — 85027 COMPLETE CBC AUTOMATED: CPT | Performed by: INTERNAL MEDICINE

## 2025-06-30 PROCEDURE — 84153 ASSAY OF PSA TOTAL: CPT | Performed by: INTERNAL MEDICINE

## 2025-06-30 PROCEDURE — 1125F AMNT PAIN NOTED PAIN PRSNT: CPT | Performed by: INTERNAL MEDICINE

## 2025-06-30 PROCEDURE — 36415 COLL VENOUS BLD VENIPUNCTURE: CPT | Performed by: INTERNAL MEDICINE

## 2025-06-30 PROCEDURE — 81003 URINALYSIS AUTO W/O SCOPE: CPT | Performed by: INTERNAL MEDICINE

## 2025-06-30 PROCEDURE — 80061 LIPID PANEL: CPT | Performed by: INTERNAL MEDICINE

## 2025-06-30 RX ORDER — LISINOPRIL 20 MG/1
20 TABLET ORAL DAILY
Qty: 90 TABLET | Refills: 3 | Status: SHIPPED | OUTPATIENT
Start: 2025-06-30

## 2025-06-30 RX ORDER — ASPIRIN 81 MG/1
162 TABLET, COATED ORAL DAILY
COMMUNITY
Start: 2025-06-14 | End: 2025-07-12

## 2025-06-30 SDOH — HEALTH STABILITY: PHYSICAL HEALTH: ON AVERAGE, HOW MANY DAYS PER WEEK DO YOU ENGAGE IN MODERATE TO STRENUOUS EXERCISE (LIKE A BRISK WALK)?: 7 DAYS

## 2025-06-30 SDOH — HEALTH STABILITY: PHYSICAL HEALTH: ON AVERAGE, HOW MANY MINUTES DO YOU ENGAGE IN EXERCISE AT THIS LEVEL?: 20 MIN

## 2025-06-30 ASSESSMENT — ANXIETY QUESTIONNAIRES
IF YOU CHECKED OFF ANY PROBLEMS ON THIS QUESTIONNAIRE, HOW DIFFICULT HAVE THESE PROBLEMS MADE IT FOR YOU TO DO YOUR WORK, TAKE CARE OF THINGS AT HOME, OR GET ALONG WITH OTHER PEOPLE: NOT DIFFICULT AT ALL
5. BEING SO RESTLESS THAT IT IS HARD TO SIT STILL: NOT AT ALL
2. NOT BEING ABLE TO STOP OR CONTROL WORRYING: NOT AT ALL
6. BECOMING EASILY ANNOYED OR IRRITABLE: NOT AT ALL
1. FEELING NERVOUS, ANXIOUS, OR ON EDGE: NOT AT ALL
8. IF YOU CHECKED OFF ANY PROBLEMS, HOW DIFFICULT HAVE THESE MADE IT FOR YOU TO DO YOUR WORK, TAKE CARE OF THINGS AT HOME, OR GET ALONG WITH OTHER PEOPLE?: NOT DIFFICULT AT ALL
7. FEELING AFRAID AS IF SOMETHING AWFUL MIGHT HAPPEN: NOT AT ALL
7. FEELING AFRAID AS IF SOMETHING AWFUL MIGHT HAPPEN: NOT AT ALL
GAD7 TOTAL SCORE: 0
3. WORRYING TOO MUCH ABOUT DIFFERENT THINGS: NOT AT ALL
4. TROUBLE RELAXING: NOT AT ALL

## 2025-06-30 ASSESSMENT — PAIN SCALES - GENERAL: PAINLEVEL_OUTOF10: MODERATE PAIN (4)

## 2025-06-30 ASSESSMENT — PATIENT HEALTH QUESTIONNAIRE - PHQ9
SUM OF ALL RESPONSES TO PHQ QUESTIONS 1-9: 0
10. IF YOU CHECKED OFF ANY PROBLEMS, HOW DIFFICULT HAVE THESE PROBLEMS MADE IT FOR YOU TO DO YOUR WORK, TAKE CARE OF THINGS AT HOME, OR GET ALONG WITH OTHER PEOPLE: NOT DIFFICULT AT ALL
SUM OF ALL RESPONSES TO PHQ QUESTIONS 1-9: 0

## 2025-06-30 ASSESSMENT — SOCIAL DETERMINANTS OF HEALTH (SDOH): HOW OFTEN DO YOU GET TOGETHER WITH FRIENDS OR RELATIVES?: THREE TIMES A WEEK

## 2025-06-30 NOTE — PROGRESS NOTES
Preventive Care Visit  [facility]  ANAY NAVARRO MD, Internal Medicine  Jun 30, 2025      Assessment & Plan         1. Encounter for Medicare annual wellness exam (Primary)  He is due for some immunizations and I urged him to get those at his pharmacy.    2. Closed fracture of proximal end of right humerus with routine healing, unspecified fracture morphology, subsequent encounter  He seems to be doing fairly well.  Continue to follow with orthopedics.  - CBC with platelets; Future    3. S/P reverse total shoulder arthroplasty, right  This should help his chronic shoulder pain as well    4. Essential hypertension  Blood pressure well-controlled.  Continue regimen  - lisinopril (ZESTRIL) 20 MG tablet; Take 1 tablet (20 mg) by mouth daily.  Dispense: 90 tablet; Refill: 3  - Comprehensive metabolic panel (BMP + Alb, Alk Phos, ALT, AST, Total. Bili, TP); Future  - UA Macroscopic with reflex to Microscopic and Culture - Lab Collect; Future  - Lipid panel reflex to direct LDL Fasting; Future    5. Alcohol dependence in remission (H)  He remains alcohol free.    6. Gallbladder polyp  He is due for his follow-up ultrasound which I urged him to schedule now.    7. Fatty liver  Recheck liver enzymes today    8. Pancreatic cyst  This has been slowly decreasing in size and likely due to pancreatitis in the past    9. BPH associated with nocturia  Stable.  Continue tamsulosin.  Urination is good he tells me  - Comprehensive metabolic panel (BMP + Alb, Alk Phos, ALT, AST, Total. Bili, TP); Future  - UA Macroscopic with reflex to Microscopic and Culture - Lab Collect; Future  - PSA, tumor marker; Future    10. Acute hepatitis thought due to statin?  Resolved.  No plans for further statin in his lifetime.    11. Chronic bilateral low back pain without sciatica  Improved.    12. Need for vaccination    - zoster vaccine recombinant adjuvanted (SHINGRIX) injection; Inject 0.5 mLs into the muscle once for 1 dose.  "Pharmacist administered  Dispense: 0.5 mL; Refill: 0  - Tdap, tetanus-diptheria-acell pertussis, (BOOSTRIX) 5-2.5-18.5 LF-MCG/0.5 ROMMEL injection; Inject 0.5 mLs into the muscle once for 1 dose.  Dispense: 0.5 mL; Refill: 0         BMI  Estimated body mass index is 34.78 kg/m  as calculated from the following:    Height as of this encounter: 1.651 m (5' 5\").    Weight as of this encounter: 94.8 kg (209 lb).       Counseling  Appropriate preventive services were addressed with this patient via screening, questionnaire, or discussion as appropriate for fall prevention, nutrition, physical activity, Tobacco-use cessation, social engagement, weight loss and cognition.  Checklist reviewing preventive services available has been given to the patient.  Reviewed patient's diet, addressing concerns and/or questions.   Information on urinary incontinence and treatment options given to patient.             Leonel Rosenthal is a 75 year old, presenting for the following:  Wellness Visit (Pt here for annual wellness visit - last AWV was 5/29/24)        6/30/2025     9:00 AM   Additional Questions   Roomed by Tori ROSAS RN   Accompanied by self         6/30/2025     9:00 AM   Patient Reported Additional Medications   Patient reports taking the following new medications none          HAYLEY Rosenthal comes in today for his annual wellness.  However since I saw him last he had a right humerus fracture sustained in a fall.  He underwent reverse total shoulder at [facility] a couple weeks ago and is doing okay at this point.  He is getting around okay with his arm in the sling and following up with his therapist and the orthopedist.  He feels otherwise quite good off of a lot of his medications.  He is off of statin and other blood pressure medications and feels quite good.  He needs a follow-up of gallbladder ultrasound this summer.  His back pain is remarkably better.  He states he was reaching for some pickled herring and his refrigerator " and had something pop in his back and has now felt 80% better than he is ever felt in his back.  He is very pleased about that.  Otherwise nothing new to report.  He tries to stay as active as he can.         Advance Care Planning    Patient states has Health Care Directive and will send to [employer].        6/30/2025   General Health   How would you rate your overall physical health? Good   Feel stress (tense, anxious, or unable to sleep) Not at all         6/30/2025   Nutrition   Diet: Low salt    Low fat/cholesterol    Carbohydrate counting       Multiple values from one day are sorted in reverse-chronological order         6/30/2025   Exercise   Days per week of moderate/strenous exercise 7 days   Average minutes spent exercising at this level 20 min         6/30/2025   Social Factors   Frequency of gathering with friends or relatives Three times a week   Worry food won't last until get money to buy more No   Food not last or not have enough money for food? No   Do you have housing? (Housing is defined as stable permanent housing and does not include staying outside in a car, in a tent, in an abandoned building, in an overnight shelter, or couch-surfing.) Yes   Are you worried about losing your housing? No   Lack of transportation? No   Unable to get utilities (heat,electricity)? No         6/30/2025   Fall Risk   Fallen 2 or more times in the past year? No    Trouble with walking or balance? Yes    Gait Speed Test (Document in seconds) 4.6   Gait Speed Test Interpretation Less than or equal to 5.00 seconds - PASS       Proxy-reported          6/30/2025   Activities of Daily Living- Home Safety   Needs help with the following daily activites None of the above   Safety concerns in the home None of the above         6/30/2025   Dental   Dentist two times every year? Yes         6/30/2025   Hearing Screening   Hearing concerns? None of the above         6/30/2025   Driving Risk Screening   Patient/family  members have concerns about driving No         2025   General Alertness/Fatigue Screening   Have you been more tired than usual lately? No         2025   Urinary Incontinence Screening   Bothered by leaking urine in past 6 months Yes       Today's PHQ-9 Score:       2025     8:51 AM   PHQ-9 SCORE   PHQ-9 Total Score MyChart 0   PHQ-9 Total Score 0        Proxy-reported         2025   Substance Use   Alcohol more than 3/day or more than 7/wk No   Do you have a current opioid prescription? No   How severe/bad is pain from 1 to 10? 4/10   Do you use any other substances recreationally? No     Social History     Tobacco Use    Smoking status: Former     Types: Pipe     Quit date: 1994     Years since quittin.5    Smokeless tobacco: Never   Vaping Use    Vaping status: Never Used   Substance Use Topics    Alcohol use: Not Currently     Comment: sober since 10/2021    Drug use: Not Currently       ASCVD Risk   The 10-year ASCVD risk score (Pari SCHMITT, et al., 2019) is: 18%    Values used to calculate the score:      Age: 75 years      Sex: Male      Is Non- : No      Diabetic: No      Tobacco smoker: No      Systolic Blood Pressure: 92 mmHg      Is BP treated: Yes      HDL Cholesterol: 56 mg/dL      Total Cholesterol: 230 mg/dL            Reviewed and updated as needed this visit by Provider                      Current providers sharing in care for this patient include:  Patient Care Team:  Ronnie Bundy MD as PCP - General (Internal Medicine)  Ronnie Bundy MD as Assigned PCP  Johan Gaspar RPH as Pharmacist (Pharmacist)  Johan Gaspar RPH as Assigned [place] Pharmacist  Seema Vicente MD as Hospitalist (HOSPITALIST)  Juan Cheng MD as MD (Gastroenterology)  Juan Cheng MD as Assigned Gastroenterology Provider    The following health maintenance items are reviewed in Epic and correct as of today:  Health Maintenance  "  Topic Date Due    DTAP/TDAP/TD VACCINE (3 - Td or Tdap) 01/22/2025    MEDICARE ANNUAL WELLNESS VISIT  05/29/2025    ANNUAL REVIEW OF HM ORDERS  05/29/2025    ZOSTER VACCINE (3 of 3) 06/09/2025    PHQ-9  12/30/2025    BMP  06/10/2026    FALL RISK ASSESSMENT  06/30/2026    DIABETES SCREENING  06/10/2028    ADVANCE CARE PLANNING  05/29/2029    LIPID  12/12/2029    COLORECTAL CANCER SCREENING  07/15/2032    HEPATITIS C SCREENING  Completed    DEPRESSION ACTION PLAN  Completed    INFLUENZA VACCINE  Completed    PNEUMOCOCCAL VACCINE 50+ YEARS  Completed    RSV VACCINE  Completed    COVID-19 VACCINE  Completed    HPV VACCINE  Aged Out    MENINGITIS VACCINE  Aged Out            Objective    Exam  BP 92/52 (BP Location: Left arm, Patient Position: Sitting, Cuff Size: Adult Regular)   Pulse 77   Temp 97.5  F (36.4  C) (Temporal)   Resp 17   Ht 1.651 m (5' 5\")   Wt 94.8 kg (209 lb)   SpO2 98%   BMI 34.78 kg/m     Estimated body mass index is 34.78 kg/m  as calculated from the following:    Height as of this encounter: 1.651 m (5' 5\").    Weight as of this encounter: 94.8 kg (209 lb).    Physical Exam  Is an gentleman with his right arm in a sling.  Lungs clear.  Heart regular.  Abdomen is obese soft and nontender.  Extremities without edema.  Gait and balance assessed per Gait Speed Test.  Result as above.        6/30/2025   Mini Cog   Clock Draw Score 2 Normal   3 Item Recall 3 objects recalled   Mini Cog Total Score 5              Signed Electronically by: ANAY NAVARRO MD    Answers submitted by the patient for this visit:  Patient Health Questionnaire (Submitted on 6/30/2025)  If you checked off any problems, how difficult have these problems made it for you to do your work, take care of things at home, or get along with other people?: Not difficult at all  PHQ9 TOTAL SCORE: 0  Patient Health Questionnaire (G7) (Submitted on 6/30/2025)  RASHI 7 TOTAL SCORE: 0    "

## 2025-06-30 NOTE — PATIENT INSTRUCTIONS
"Please get your tetanus and your shingles at your pharmacy.      Please schedule the ultrasound     Patient Education   Eating Healthy Foods: Care Instructions  With every meal, you can make healthy food choices. Try to eat a variety of fruits, vegetables, whole grains, lean proteins, and low-fat dairy products. This can help you get the right balance of nutrients, including vitamins and minerals. Small changes add up over time. You can start by adding one healthy food to your meals each day.    Try to make half your plate fruits and vegetables, one-fourth whole grains, and one-fourth lean proteins. Try including dairy with your meals.   Eat more fruits and vegetables. Try to have them with most meals and snacks.   Foods for healthy eating        Fruits   These can be fresh, frozen, canned, or dried.  Try to choose whole fruit rather than fruit juice.  Eat a variety of colors.        Vegetables   These can be fresh, frozen, canned, or dried.  Beans, peas, and lentils count too.        Whole grains   Choose whole-grain breads, cereals, and noodles.  Try brown rice.        Lean proteins   These can include lean meat, poultry, fish, and eggs.  You can also have tofu, beans, peas, lentils, nuts, and seeds.        Dairy   Try milk, yogurt, and cheese.  Choose low-fat or fat-free when you can.  If you need to, use lactose-free milk or fortified plant-based milk products, such as soy milk.        Water   Drink water when you're thirsty.  Limit sugar-sweetened drinks, including soda, fruit drinks, and sports drinks.  Where can you learn more?  Go to https://www.healthCubeyou.net/patiented  Enter T756 in the search box to learn more about \"Eating Healthy Foods: Care Instructions.\"  Current as of: October 7, 2024  Content Version: 14.5 2024-2025 [facility] [employer].   Care instructions adapted under license by your healthcare professional. If you have questions about a medical condition or this instruction, always ask your " healthcare professional. [employer] disclaims any warranty or liability for your use of this information.    Preventive Care Advice   This is general advice given by our system to help you stay healthy. However, your care team may have specific advice just for you. Please talk to your care team about your preventive care needs.  Nutrition  Eat 5 or more servings of fruits and vegetables each day.  Try wheat bread, brown rice and whole grain pasta (instead of white bread, rice, and pasta).  Get enough calcium and vitamin D. Check the label on foods and aim for 100% of the RDA (recommended daily allowance).  Lifestyle  Exercise at least 150 minutes each week  (30 minutes a day, 5 days a week).  Do muscle strengthening activities 2 days a week. These help control your weight and prevent disease.  No smoking.  Wear sunscreen to prevent skin cancer.  Have a dental exam and cleaning every 6 months.  Yearly exams  See your health care team every year to talk about:  Any changes in your health.  Any medicines your care team has prescribed.  Preventive care, family planning, and ways to prevent chronic diseases.  Shots (vaccines)   HPV shots (up to age 26), if you've never had them before.  Hepatitis B shots (up to age 59), if you've never had them before.  COVID-19 shot: Get this shot when it's due.  Flu shot: Get a flu shot every year.  Tetanus shot: Get a tetanus shot every 10 years.  Pneumococcal, hepatitis A, and RSV shots: Ask your care team if you need these based on your risk.  Shingles shot (for age 50 and up)  General health tests  Diabetes screening:  Starting at age 35, Get screened for diabetes at least every 3 years.  If you are younger than age 35, ask your care team if you should be screened for diabetes.  Cholesterol test: At age 39, start having a cholesterol test every 5 years, or more often if advised.  Bone density scan (DEXA): At age 50, ask your care team if you should have this scan for  osteoporosis (brittle bones).  Hepatitis C: Get tested at least once in your life.  STIs (sexually transmitted infections)  Before age 24: Ask your care team if you should be screened for STIs.  After age 24: Get screened for STIs if you're at risk. You are at risk for STIs (including HIV) if:  You are sexually active with more than one person.  You don't use condoms every time.  You or a partner was diagnosed with a sexually transmitted infection.  If you are at risk for HIV, ask about PrEP medicine to prevent HIV.  Get tested for HIV at least once in your life, whether you are at risk for HIV or not.  Cancer screening tests  Cervical cancer screening: If you have a cervix, begin getting regular cervical cancer screening tests starting at age 21.  Breast cancer scan (mammogram): If you've ever had breasts, begin having regular mammograms starting at age 40. This is a scan to check for breast cancer.  Colon cancer screening: It is important to start screening for colon cancer at age 45.  Have a colonoscopy test every 10 years (or more often if you're at risk) Or, ask your provider about stool tests like a FIT test every year or Cologuard test every 3 years.  To learn more about your testing options, visit:   .  For help making a decision, visit:   https://bit.ly/da07977.  Prostate cancer screening test: If you have a prostate, ask your care team if a prostate cancer screening test (PSA) at age 55 is right for you.  Lung cancer screening: If you are a current or former smoker ages 50 to 80, ask your care team if ongoing lung cancer screenings are right for you.  For informational purposes only. Not to replace the advice of your health care provider. Copyright   2023 [employer]. All rights reserved. Clinically reviewed by the [facility] Transitions [facility]. [employer] 623790 - REV 01/24.  Learning About Being Physically Active  What is physical activity?     Being physically active means doing any  "kind of activity that gets your body moving.  The types of physical activity that can help you get fit and stay healthy include:  Aerobic or \"cardio\" activities. These make your heart beat faster and make you breathe harder, such as brisk walking, riding a bike, or running. They strengthen your heart and lungs and build up your endurance.  Strength training activities. These make your muscles work against, or \"resist,\" something. Examples include lifting weights or doing push-ups. These activities help tone and strengthen your muscles and bones.  Stretches. These let you move your joints and muscles through their full range of motion. Stretching helps you be more flexible.  Reaching a balance between these three types of physical activity is important because each one contributes to your overall fitness.  What are the benefits of being active?  Being active is one of the best things you can do for your health. It helps you to:  Feel stronger and have more energy to do all the things you like to do.  Focus better at school or work.  Feel, think, and sleep better.  Reach and stay at a healthy weight.  Lose fat and build lean muscle.  Lower your risk for serious health problems, including diabetes, heart attack, high blood pressure, and some cancers.  Keep your heart, lungs, bones, muscles, and joints strong and healthy.  How can you make being active part of your life?  Start slowly. Make it your long-term goal to get at least 30 minutes of exercise on most days of the week. Walking is a good choice. You also may want to do other activities, such as running, swimming, cycling, or playing tennis or team sports.  Pick activities that you like--ones that make your heart beat faster, your muscles stronger, and your muscles and joints more flexible. If you find more than one thing you like doing, do them all. You don't have to do the same thing every day.  Get your heart pumping every day. Any activity that makes your " "heart beat faster and keeps it at that rate for a while counts.  Here are some great ways to get your heart beating faster:  Go for a brisk walk, run, or hike.  Go for a swim or bike ride.  Take an online exercise class or dance.  Play a game of touch football, basketball, or soccer.  Play tennis, pickleball, or racquetball.  Climb stairs.  Even some household chores can be aerobic. Just do them at a faster pace. Raking or mowing the lawn, sweeping the garage, and vacuuming and cleaning your home all can help get your heart rate up.  Strengthen your muscles during the week. You don't have to lift heavy weights or grow big, bulky muscles to get stronger. Doing a few simple activities that make your muscles work against, or \"resist,\" something can help you get stronger. Aim for at least twice a week.  For example, you can:  Do push-ups or sit-ups, which use your own body weight as resistance.  Lift weights or dumbbells or use stretch bands at home or in a gym or community center.  Stretch your muscles often. Stretching will help you as you become more active. It can help you stay flexible and loosen tight muscles. It can also help improve your balance and posture and can be a great way to relax.  Be sure to stretch the muscles you'll be using when you work out. It's best to warm your muscles slightly before you stretch them. Walk or do some other light aerobic activity for a few minutes. Then start stretching.  When you stretch your muscles:  Do it slowly. Stretching is not about going fast or making sudden movements.  Don't push or bounce during a stretch.  Hold each stretch for at least 15 to 30 seconds, if you can. You should feel a stretch in the muscle, but not pain.  Breathe out as you do the stretch. Then breathe in as you hold the stretch. Don't hold your breath.  If you're worried about how more activity might affect your health, have a checkup before you start. Follow any special advice your doctor gives you " "for getting a smart start.  Where can you learn more?  Go to https://www.[employer].net/patiented  Enter W332 in the search box to learn more about \"Learning About Being Physically Active.\"  Current as of: July 31, 2024  Content Version: 14.5    4094-0363 [employer]   Care instructions adapted under license by your healthcare professional. If you have questions about a medical condition or this instruction, always ask your healthcare professional. [employer] disclaims any warranty or liability for your use of this information.    Preventing Falls: Care Instructions  Injuries and health problems such as trouble walking or poor eyesight can increase your risk of falling. So can some medicines. But there are things you can do to help prevent falls. You can exercise to get stronger. You can also arrange your home to make it safer.    Talk to your doctor about the medicines you take. Ask if any of them increase the risk of falls and whether they can be changed or stopped.   Try to exercise regularly. It can help improve your strength and balance. This can help lower your risk of falling.         Practice fall safety and prevention.   Wear low-heeled shoes that fit well and give your feet good support. Talk to your doctor if you have foot problems that make this hard.  Carry a cellphone or wear a medical alert device that you can use to call for help.  Use stepladders instead of chairs to reach high objects. Don't climb if you're at risk for falls. Ask for help, if needed.  Wear the correct eyeglasses, if you need them.        Make your home safer.   Remove rugs, cords, clutter, and furniture from walkways.  Keep your house well lit. Use night-lights in hallways and bathrooms.  Install and use sturdy handrails on stairways.  Wear nonskid footwear, even inside. Don't walk barefoot or in socks without shoes.        Be safe outside.   Use handrails, curb cuts, and ramps whenever possible.  Keep your " "hands free by using a shoulder bag or backpack.  Try to walk in well-lit areas. Watch out for uneven ground, changes in pavement, and debris.  Be careful in the winter. Walk on the grass or gravel when sidewalks are slippery. Use de-icer on steps and walkways. Add non-slip devices to shoes.    Put grab bars and nonskid mats in your shower or tub and near the toilet. Try to use a shower chair or bath bench when bathing.   Get into a tub or shower by putting in your weaker leg first. Get out with your strong side first. Have a phone or medical alert device in the bathroom with you.   Where can you learn more?  Go to https://www.[employer]/patiented  Enter G117 in the search box to learn more about \"Preventing Falls: Care Instructions.\"  Current as of: July 31, 2024  Content Version: 14.5 2024-2025 [employer].   Care instructions adapted under license by your healthcare professional. If you have questions about a medical condition or this instruction, always ask your healthcare professional. [employer] disclaims any warranty or liability for your use of this information.    Bladder Training: Care Instructions  Your Care Instructions     Bladder training is used to treat urge incontinence and stress incontinence. Urge incontinence means that the need to urinate comes on so fast that you can't get to a toilet in time. Stress incontinence means that you leak urine because of pressure on your bladder. For example, it may happen when you laugh, cough, or lift something heavy.  Bladder training can increase how long you can wait before you have to urinate. It can also help your bladder hold more urine. And it can give you better control over the urge to urinate.  It is important to remember that bladder training takes a few weeks to a few months to make a difference. You may not see results right away, but don't give up.  Follow-up care is a key part of your treatment and safety. Be sure to " make and go to all appointments, and call your doctor if you are having problems. It's also a good idea to know your test results and keep a list of the medicines you take.  How can you care for yourself at home?  Work with your doctor to come up with a bladder training program that is right for you. You may use one or more of the following methods.  Delayed urination  In the beginning, try to keep from urinating for 5 minutes after you first feel the need to go.  While you wait, take deep, slow breaths to relax. Kegel exercises can also help you delay the need to go to the bathroom.  After some practice, when you can easily wait 5 minutes to urinate, try to wait 10 minutes before you urinate.  Slowly increase the waiting period until you are able to control when you have to urinate.  Scheduled urination  Empty your bladder when you first wake up in the morning.  Schedule times throughout the day when you will urinate.  Start by going to the bathroom every hour, even if you don't need to go.  Slowly increase the time between trips to the bathroom.  When you have found a schedule that works well for you, keep doing it.  If you wake up during the night and have to urinate, do it. Apply your schedule to waking hours only.  Kegel exercises  These tighten and strengthen pelvic muscles, which can help you control the flow of urine. (If doing these exercises causes pain, stop doing them and talk with your doctor.) To do Kegel exercises:  Squeeze your muscles as if you were trying not to pass gas. Or squeeze your muscles as if you were stopping the flow of urine. Your belly, legs, and buttocks shouldn't move.  Hold the squeeze for 3 seconds, then relax for 5 to 10 seconds.  Start with 3 seconds, then add 1 second each week until you are able to squeeze for 10 seconds.  Repeat the exercise 10 times a session. Do 3 to 8 sessions a day.  When should you call for help?  Watch closely for changes in your health, and be sure to  "contact your doctor if:    Your incontinence is getting worse.     You do not get better as expected.   Where can you learn more?  Go to https://www.[employer].net/patiented  Enter V684 in the search box to learn more about \"Bladder Training: Care Instructions.\"  Current as of: April 30, 2024  Content Version: 14.5 2024-2025 [employer].   Care instructions adapted under license by your healthcare professional. If you have questions about a medical condition or this instruction, always ask your healthcare professional. [employer] disclaims any warranty or liability for your use of this information.       "

## 2025-07-10 ENCOUNTER — TELEPHONE (OUTPATIENT)
Dept: INTERNAL MEDICINE | Facility: CLINIC | Age: 75
End: 2025-07-10
Payer: MEDICARE

## 2025-07-10 DIAGNOSIS — K76.0 FATTY LIVER: ICD-10-CM

## 2025-07-10 DIAGNOSIS — K82.4 GALLBLADDER POLYP: Primary | ICD-10-CM

## 2025-07-10 NOTE — TELEPHONE ENCOUNTER
Order/Referral Request    Who is requesting: Efrain Gomes     Orders being requested: Ultra Sound - Gallbladder     Reason service is needed/diagnosis: Dr. Bundy sent pt a msg through [employer] and informed pt he should schedule an ultra sound - no orders are in chart.     When are orders needed by: ASAP    Has this been discussed with Provider: Yes    Does patient have a preference on a Group/Provider/Facility? [place]     Does patient have an appointment scheduled?: No    Where to send orders: Place orders within Epic    Could we send this information to you in [employer] or would you prefer to receive a phone call?:   Patient would prefer a phone call   Okay to leave a detailed message?: Yes at Home number on file [phone] (home)

## 2025-07-14 NOTE — TELEPHONE ENCOUNTER
From visit on 6/30    6. Gallbladder polyp  He is due for his follow-up ultrasound which I urged him to schedule now.     7. Fatty liver  Recheck liver enzymes today      Ronnie Bundy MD  BT    7/8/25  7:49 AM  Result Note  One liver test is mildly elevated.  Please get that ultrasound that we discussed at your visit.  Schedule that ASAP.  Everything else here looks pretty good/stable

## 2025-07-15 ENCOUNTER — HOSPITAL ENCOUNTER (OUTPATIENT)
Dept: ULTRASOUND IMAGING | Facility: CLINIC | Age: 75
Discharge: HOME | End: 2025-07-15
Attending: INTERNAL MEDICINE
Payer: MEDICARE

## 2025-07-15 DIAGNOSIS — K82.4 GALLBLADDER POLYP: ICD-10-CM

## 2025-07-15 PROCEDURE — 76705 ECHO EXAM OF ABDOMEN: CPT

## (undated) DEVICE — KIT ENDO FIRST STEP DISINFECTANT 200ML W/POUCH EP-4

## (undated) DEVICE — ENDO NDL BEVEL TIP ULTRASOUND 22GA 5.2FR ECHO-1-22

## (undated) DEVICE — ENDO PROBE COVER ULTRASOUND BALLOON LATEX  MAJ-249

## (undated) DEVICE — SOL WATER IRRIG 1000ML BOTTLE 2F7114

## (undated) RX ORDER — CEFAZOLIN SODIUM/WATER 2 G/20 ML
SYRINGE (ML) INTRAVENOUS
Status: DISPENSED
Start: 2022-08-29

## (undated) RX ORDER — PROPOFOL 10 MG/ML
INJECTION, EMULSION INTRAVENOUS
Status: DISPENSED
Start: 2022-08-29

## (undated) RX ORDER — LIDOCAINE HYDROCHLORIDE 20 MG/ML
INJECTION, SOLUTION EPIDURAL; INFILTRATION; INTRACAUDAL; PERINEURAL
Status: DISPENSED
Start: 2022-08-29

## (undated) RX ORDER — ONDANSETRON 2 MG/ML
INJECTION INTRAMUSCULAR; INTRAVENOUS
Status: DISPENSED
Start: 2022-08-29